# Patient Record
Sex: FEMALE | Race: BLACK OR AFRICAN AMERICAN | NOT HISPANIC OR LATINO | Employment: OTHER | URBAN - METROPOLITAN AREA
[De-identification: names, ages, dates, MRNs, and addresses within clinical notes are randomized per-mention and may not be internally consistent; named-entity substitution may affect disease eponyms.]

---

## 2017-01-09 ENCOUNTER — NURSE TRIAGE (OUTPATIENT)
Dept: ADMINISTRATIVE | Facility: CLINIC | Age: 62
End: 2017-01-09

## 2017-01-09 ENCOUNTER — OFFICE VISIT (OUTPATIENT)
Dept: INTERNAL MEDICINE | Facility: CLINIC | Age: 62
End: 2017-01-09

## 2017-01-09 VITALS
TEMPERATURE: 98 F | HEIGHT: 69 IN | DIASTOLIC BLOOD PRESSURE: 80 MMHG | HEART RATE: 65 BPM | WEIGHT: 185.63 LBS | BODY MASS INDEX: 27.49 KG/M2 | SYSTOLIC BLOOD PRESSURE: 138 MMHG | OXYGEN SATURATION: 99 %

## 2017-01-09 DIAGNOSIS — R07.89 CHEST WALL PAIN: Primary | ICD-10-CM

## 2017-01-09 PROCEDURE — 99214 OFFICE O/P EST MOD 30 MIN: CPT | Mod: S$PBB,,, | Performed by: INTERNAL MEDICINE

## 2017-01-09 PROCEDURE — 99999 PR PBB SHADOW E&M-EST. PATIENT-LVL III: CPT | Mod: PBBFAC,,, | Performed by: INTERNAL MEDICINE

## 2017-01-09 PROCEDURE — 93010 ELECTROCARDIOGRAM REPORT: CPT | Mod: ,,, | Performed by: INTERNAL MEDICINE

## 2017-01-09 PROCEDURE — 93005 ELECTROCARDIOGRAM TRACING: CPT | Mod: PBBFAC | Performed by: INTERNAL MEDICINE

## 2017-01-09 NOTE — TELEPHONE ENCOUNTER
Reason for Disposition   Intermittent chest pains persist > 3 days    Protocols used: ST CHEST PAIN-A-[place]    Patient complains of intermittent chest pain for a few days.  No dizziness or difficulty breathing noted.  Patient wanted to be seen.  Appointment scheduled for today at .

## 2017-01-09 NOTE — MR AVS SNAPSHOT
[facility]  [address]  [facility] [address]  Phone: [phone]  Fax: [phone]                  Josi Gil   2017 6:00 PM   Office Visit    Description:  Female : 1955   Provider:  Rayne Cid MD   Department:  [facility]           Reason for Visit     Chest Pain     Shortness of Breath           Diagnoses this Visit        Comments    Chest wall pain    -  Primary            To Do List           Future Appointments        Provider Department Dept Phone    2017 7:30 AM Lanie Ospina, PT [facility] [phone]      Goals (5 Years of Data)     None      [facility]     [facility] Nurse Care Line -  Assistance  Registered nurses in the [facility] provide clinical advisement, health education, appointment booking, and other advisory services.  Call for this free service at [phone].             Medications           Message regarding Medications     Verify the changes and/or additions to your medication regime listed below are the same as discussed with your clinician today.  If any of these changes or additions are incorrect, please notify your healthcare provider.             Verify that the below list of medications is an accurate representation of the medications you are currently taking.  If none reported, the list may be blank. If incorrect, please contact your healthcare provider. Carry this list with you in case of emergency.           Current Medications     amlodipine (NORVASC) 5 MG tablet Take 1 tablet (5 mg total) by mouth once daily. 1 Tablet Oral Every day    aspirin 81 mg Tab Take 81 mg by mouth. 1 Tablet Oral Every day    esomeprazole (NEXIUM) 40 MG capsule Take 1 capsule (40 mg total) by mouth before breakfast.    ibuprofen (ADVIL,MOTRIN) 800 MG tablet Take 1 tablet (800 mg total) by mouth 2 (two) times daily as needed for Pain.    ketorolac (TORADOL) 10  "mg tablet Take 1 tablet (10 mg total) by mouth 3 (three) times daily.    losartan-hydrochlorothiazide 100-25 mg (HYZAAR) 100-25 mg per tablet Take 1 tablet by mouth once daily. 1 Tablet Oral Every day    metoprolol succinate (TOPROL XL) 25 MG 24 hr tablet Take 1 tablet (25 mg total) by mouth once daily. 1 Tablet Sustained Release 24HR Oral Every day    nitroGLYCERIN (NITROSTAT) 0.4 MG SL tablet Place 1 tablet (0.4 mg total) under the tongue every 5 (five) minutes as needed for Chest pain (times three).           Clinical Reference Information           Vital Signs - Last Recorded  Most recent update: 1/9/2017  6:23 PM by Josseline Franks MA    BP Pulse Temp Ht Wt SpO2    138/80 (BP Location: Left arm, Patient Position: Sitting, BP Method: Manual) 65 98.2 °F (36.8 °C) (Oral) 5' 9" (1.753 m) 84.2 kg (185 lb 10 oz) 99%    BMI                27.41 kg/m2          Blood Pressure          Most Recent Value    BP  138/80      Allergies as of 1/9/2017     No Known Allergies      Immunizations Administered on Date of Encounter - 1/9/2017     None      "

## 2017-01-10 NOTE — PROGRESS NOTES
Subjective:       Patient ID: Josi Gil is a 61 y.o. female.    Chief Complaint: Chest Pain and Shortness of Breath    Chest Pain    This is a new problem. The current episode started in the past 7 days. The onset quality is gradual. The problem occurs intermittently. The problem has been unchanged. The pain is present in the substernal region. The pain is at a severity of 3/10. The pain is mild. The quality of the pain is described as sharp. The pain does not radiate. Associated symptoms include back pain and shortness of breath. Pertinent negatives include no abdominal pain, cough, diaphoresis, dizziness, exertional chest pressure, fever, headaches, nausea, vomiting or weakness. Associated symptoms comments: Chronic back pain  .   Shortness of Breath   Associated symptoms include chest pain. Pertinent negatives include no abdominal pain, ear pain, fever, headaches, rash, sore throat, vomiting or wheezing.     Review of Systems   Constitutional: Negative for activity change, chills, diaphoresis, fatigue and fever.   HENT: Negative for congestion, ear pain, nosebleeds, postnasal drip, sinus pressure and sore throat.    Eyes: Negative.  Negative for visual disturbance.   Respiratory: Positive for shortness of breath. Negative for cough, chest tightness and wheezing.    Cardiovascular: Positive for chest pain.   Gastrointestinal: Negative for abdominal pain, diarrhea, nausea and vomiting.   Genitourinary: Negative for difficulty urinating, dysuria, frequency and urgency.   Musculoskeletal: Positive for back pain. Negative for arthralgias and neck stiffness.   Skin: Negative for rash.   Neurological: Negative for dizziness, weakness and headaches.   Psychiatric/Behavioral: Negative for sleep disturbance. The patient is not nervous/anxious.        Objective:      Physical Exam   Constitutional: She is oriented to person, place, and time. She appears well-developed and well-nourished.  Non-toxic appearance.  No distress.   HENT:   Head: Normocephalic and atraumatic.   Right Ear: Tympanic membrane, external ear and ear canal normal.   Left Ear: Tympanic membrane, external ear and ear canal normal.   Eyes: EOM are normal. Pupils are equal, round, and reactive to light. No scleral icterus.   Neck: Normal range of motion. Neck supple. No thyromegaly present.   Cardiovascular: Normal rate, regular rhythm and normal heart sounds.    Pulmonary/Chest: Effort normal and breath sounds normal.       Abdominal: Soft. Bowel sounds are normal. She exhibits no mass. There is no tenderness. There is no rebound.   Musculoskeletal: Normal range of motion.   Lymphadenopathy:     She has no cervical adenopathy.   Neurological: She is alert and oriented to person, place, and time. She has normal reflexes. She displays normal reflexes. No cranial nerve deficit. She exhibits normal muscle tone. Coordination normal.   Skin: Skin is warm and dry.   Psychiatric: She has a normal mood and affect. Her behavior is normal.       Assessment:       1. Chest wall pain        Plan:   Josi was seen today for chest pain and shortness of breath.    Diagnoses and all orders for this visit:    Chest wall pain

## 2017-01-12 ENCOUNTER — DOCUMENTATION ONLY (OUTPATIENT)
Dept: REHABILITATION | Facility: OTHER | Age: 62
End: 2017-01-12

## 2017-01-12 ENCOUNTER — CLINICAL SUPPORT (OUTPATIENT)
Dept: REHABILITATION | Facility: OTHER | Age: 62
End: 2017-01-12
Attending: PHYSICAL MEDICINE & REHABILITATION

## 2017-01-12 DIAGNOSIS — R29.898 DECREASED STRENGTH OF TRUNK AND BACK: ICD-10-CM

## 2017-01-12 DIAGNOSIS — M54.50 CHRONIC MIDLINE LOW BACK PAIN WITHOUT SCIATICA: Primary | ICD-10-CM

## 2017-01-12 DIAGNOSIS — G89.29 CHRONIC MIDLINE LOW BACK PAIN WITHOUT SCIATICA: Primary | ICD-10-CM

## 2017-01-12 DIAGNOSIS — M53.86 DECREASED RANGE OF MOTION OF LUMBAR SPINE: ICD-10-CM

## 2017-01-12 PROCEDURE — 97110 THERAPEUTIC EXERCISES: CPT

## 2017-01-12 PROCEDURE — 97163 PT EVAL HIGH COMPLEX 45 MIN: CPT
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
## 2017-01-12 NOTE — PROGRESS NOTES
Met with patient to complete FOTO outcomes for lumbar Healthy Back program and to discuss utilizing health coaching services as part of the program.  Patient stated that she is extremely interested and would like to learn more about nutrition and complimentary approaches to her physical therapy.  Said she is very open, but that she has concerns about the focus being on her mindset or arthritis versus her 'skeletal pain'.  Discussed with patient the physiological response of the body to acute inflammation and how with chronic inflammation that once adaptive response can begin to pose challenges physically, mentally, and otherwise.  Josi seemed to understand and said she would like to learn more about inflammation, nutrition, and relaxation.  Gave patient Anti-Inflammatory Diet handout and asked her to bring it back to next session to discuss strategies she is already implementing.  She is scheduled for her first intake session for health coaching before her next PT appointment on Jan 17.
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
## 2017-01-17 ENCOUNTER — CLINICAL SUPPORT (OUTPATIENT)
Dept: REHABILITATION | Facility: OTHER | Age: 62
End: 2017-01-17
Attending: PHYSICAL MEDICINE & REHABILITATION

## 2017-01-17 DIAGNOSIS — M51.36 DDD (DEGENERATIVE DISC DISEASE), LUMBAR: Primary | ICD-10-CM

## 2017-01-17 PROCEDURE — 97110 THERAPEUTIC EXERCISES: CPT
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
## 2017-01-23 ENCOUNTER — TELEPHONE (OUTPATIENT)
Dept: REHABILITATION | Facility: OTHER | Age: 62
End: 2017-01-23

## 2017-01-23 PROCEDURE — 96372 THER/PROPH/DIAG INJ SC/IM: CPT

## 2017-01-23 PROCEDURE — 93005 ELECTROCARDIOGRAM TRACING: CPT

## 2017-01-23 PROCEDURE — 99284 EMERGENCY DEPT VISIT MOD MDM: CPT | Mod: ,,, | Performed by: EMERGENCY MEDICINE

## 2017-01-23 PROCEDURE — 99284 EMERGENCY DEPT VISIT MOD MDM: CPT | Mod: 25
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
## 2017-01-24 ENCOUNTER — HOSPITAL ENCOUNTER (EMERGENCY)
Facility: HOSPITAL | Age: 62
Discharge: HOME | End: 2017-01-24
Attending: EMERGENCY MEDICINE
Payer: COMMERCIAL

## 2017-01-24 VITALS
HEART RATE: 87 BPM | DIASTOLIC BLOOD PRESSURE: 74 MMHG | RESPIRATION RATE: 20 BRPM | BODY MASS INDEX: 27.25 KG/M2 | TEMPERATURE: 99 F | HEIGHT: 69 IN | OXYGEN SATURATION: 98 % | SYSTOLIC BLOOD PRESSURE: 141 MMHG | WEIGHT: 184 LBS
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
## 2017-01-24 DIAGNOSIS — M54.9 BACK PAIN, UNSPECIFIED BACK LOCATION, UNSPECIFIED BACK PAIN LATERALITY, UNSPECIFIED CHRONICITY: Primary | ICD-10-CM

## 2017-01-24 PROCEDURE — 96372 THER/PROPH/DIAG INJ SC/IM: CPT

## 2017-01-24 PROCEDURE — 63600175 PHARM REV CODE 636 W HCPCS: Performed by: EMERGENCY MEDICINE

## 2017-01-24 PROCEDURE — 93005 ELECTROCARDIOGRAM TRACING: CPT

## 2017-01-24 PROCEDURE — 93010 ELECTROCARDIOGRAM REPORT: CPT | Mod: ,,, | Performed by: INTERNAL MEDICINE

## 2017-01-24 RX ORDER — KETOROLAC TROMETHAMINE 30 MG/ML
15 INJECTION, SOLUTION INTRAMUSCULAR; INTRAVENOUS
Status: COMPLETED | OUTPATIENT
Start: 2017-01-24 | End: 2017-01-24

## 2017-01-24 RX ADMIN — KETOROLAC TROMETHAMINE 15 MG: 30 INJECTION, SOLUTION INTRAMUSCULAR at 02:01
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
## 2017-01-25 ENCOUNTER — CLINICAL SUPPORT (OUTPATIENT)
Dept: REHABILITATION | Facility: OTHER | Age: 62
End: 2017-01-25
Attending: PHYSICAL MEDICINE & REHABILITATION

## 2017-01-25 DIAGNOSIS — M51.36 DDD (DEGENERATIVE DISC DISEASE), LUMBAR: Primary | ICD-10-CM

## 2017-01-25 PROCEDURE — 97110 THERAPEUTIC EXERCISES: CPT | Performed by: PHYSICAL THERAPIST
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
## 2017-01-28 ENCOUNTER — HOSPITAL ENCOUNTER (EMERGENCY)
Facility: HOSPITAL | Age: 62
Discharge: HOME | End: 2017-01-28
Attending: FAMILY MEDICINE
Payer: COMMERCIAL

## 2017-01-28 ENCOUNTER — NURSE TRIAGE (OUTPATIENT)
Dept: ADMINISTRATIVE | Facility: CLINIC | Age: 62
End: 2017-01-28

## 2017-01-28 VITALS
HEART RATE: 64 BPM | BODY MASS INDEX: 26.66 KG/M2 | SYSTOLIC BLOOD PRESSURE: 145 MMHG | OXYGEN SATURATION: 100 % | HEIGHT: 69 IN | TEMPERATURE: 99 F | WEIGHT: 180 LBS | DIASTOLIC BLOOD PRESSURE: 63 MMHG | RESPIRATION RATE: 16 BRPM

## 2017-01-28 DIAGNOSIS — R07.89 MUSCULOSKELETAL CHEST PAIN: Primary | ICD-10-CM

## 2017-01-28 LAB — TROPONIN I SERPL DL<=0.01 NG/ML-MCNC: <0.006 NG/ML

## 2017-01-28 PROCEDURE — 93010 ELECTROCARDIOGRAM REPORT: CPT | Mod: ,,, | Performed by: INTERNAL MEDICINE

## 2017-01-28 PROCEDURE — 99284 EMERGENCY DEPT VISIT MOD MDM: CPT | Mod: ,,, | Performed by: PHYSICIAN ASSISTANT

## 2017-01-28 PROCEDURE — 93005 ELECTROCARDIOGRAM TRACING: CPT

## 2017-01-28 PROCEDURE — 63600175 PHARM REV CODE 636 W HCPCS: Performed by: PHYSICIAN ASSISTANT

## 2017-01-28 PROCEDURE — 96372 THER/PROPH/DIAG INJ SC/IM: CPT

## 2017-01-28 PROCEDURE — 99284 EMERGENCY DEPT VISIT MOD MDM: CPT | Mod: 25

## 2017-01-28 PROCEDURE — 84484 ASSAY OF TROPONIN QUANT: CPT

## 2017-01-28 RX ORDER — KETOROLAC TROMETHAMINE 30 MG/ML
15 INJECTION, SOLUTION INTRAMUSCULAR; INTRAVENOUS
Status: DISCONTINUED | OUTPATIENT
Start: 2017-01-28 | End: 2017-01-28

## 2017-01-28 RX ORDER — KETOROLAC TROMETHAMINE 30 MG/ML
30 INJECTION, SOLUTION INTRAMUSCULAR; INTRAVENOUS
Status: COMPLETED | OUTPATIENT
Start: 2017-01-28 | End: 2017-01-28

## 2017-01-28 RX ADMIN — KETOROLAC TROMETHAMINE 30 MG: 30 INJECTION, SOLUTION INTRAMUSCULAR at 02:01

## 2017-01-28 NOTE — PROVIDER PROGRESS NOTES - EMERGENCY DEPT.
Encounter Date: 1/28/2017    ED Physician Progress Notes        Physician Note:   EKG with nonspecific T-wave changes.  Unchanged from prior EKGs    EKG - STEMI Decision  Initial Reading: No STEMI present.
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
## 2017-01-28 NOTE — ED NOTES
LOC: Pt awake, alert, aware of environment, appropriate affect, oriented x3, speaking appropriately  APPEARANCE: Pt resting comfortably, no acute distress, clean, well groomed. Pt's clothing is properly fastened.  SKIN: The skin is warm and dry, intact, patient has normal skin turgor and moist mucus membranes  RESPIRATORY: Airway is open and patent, respirations spontaneous, even and unlabored, normal effort and rate  CARDIAC:  no peripheral edema noted, capillary refill < 3 seconds. Bilateral radial pulses +2 slight swelling to R hand compared to L, denies pain, and no injury  NEUROLOGIC: PERRL, facial expression is symmetrical, pt moving all extremities spontaneously, normal sensation in all extremities when touched with finger. Follows all commands appropriately.  MUSCULOSKELETAL: No obvious deformities.

## 2017-01-28 NOTE — ED AVS SNAPSHOT
[facility]  [address]  [facility] [address]               Josi Gil   2017  1:37 PM   ED    Description:  Female : 1955   Department:  [facility]           Your Care was Coordinated By:     Provider Role From To    Wily Ayala MD Attending Provider 17 [id number] --    XENIA Kline Physician Assistant 17 [phone] --      Reason for Visit     Hand Problem           Diagnoses this Visit        Comments    Musculoskeletal chest pain    -  Primary       ED Disposition     None           To Do List           Follow-up Information     Schedule an appointment as soon as possible for a visit with Jonny Willis MD.    Specialty:  Internal Medicine    Contact information:    [address]  [facility] [address]  [phone]        [facility]     [facility] -  Assistance  Registered nurses in the [facility] provide clinical advisement, health education, appointment booking, and other advisory services.  Call for this free service at [phone].             Medications           Message regarding Medications     Verify the changes and/or additions to your medication regime listed below are the same as discussed with your clinician today.  If any of these changes or additions are incorrect, please notify your healthcare provider.        These medications were administered today        Dose Freq    ketorolac injection 30 mg 30 mg ED 1 Time    Sig: Inject 30 mg into the muscle ED 1 Time.    Class: Normal    Route: Intramuscular           Verify that the below list of medications is an accurate representation of the medications you are currently taking.  If none reported, the list may be blank. If incorrect, please contact your healthcare provider. Carry this list with you in case of emergency.           Current Medications     amlodipine (NORVASC) 5 MG tablet Take 1 tablet (5  "mg total) by mouth once daily. 1 Tablet Oral Every day    aspirin 81 mg Tab Take 81 mg by mouth. 1 Tablet Oral Every day    ibuprofen (ADVIL,MOTRIN) 800 MG tablet Take 1 tablet (800 mg total) by mouth 2 (two) times daily as needed for Pain.    losartan-hydrochlorothiazide 100-25 mg (HYZAAR) 100-25 mg per tablet Take 1 tablet by mouth once daily. 1 Tablet Oral Every day    metoprolol succinate (TOPROL XL) 25 MG 24 hr tablet Take 1 tablet (25 mg total) by mouth once daily. 1 Tablet Sustained Release 24HR Oral Every day    esomeprazole (NEXIUM) 40 MG capsule Take 1 capsule (40 mg total) by mouth before breakfast.    ketorolac (TORADOL) 10 mg tablet Take 1 tablet (10 mg total) by mouth 3 (three) times daily.    nitroGLYCERIN (NITROSTAT) 0.4 MG SL tablet Place 1 tablet (0.4 mg total) under the tongue every 5 (five) minutes as needed for Chest pain (times three).           Clinical Reference Information           Your Vitals Were     BP Pulse Temp Resp Height Weight    110/67 (BP Location: Right arm, Patient Position: Sitting) 77 98.9 °F (37.2 °C) (Oral) 16 5' 9" (1.753 m) 81.6 kg (180 lb)    SpO2 BMI             96% 26.58 kg/m2         Allergies as of 1/28/2017        Reactions    No Known Allergies       Immunizations Administered on Date of Encounter - 1/28/2017     None      ED Micro, Lab, POCT     Start Ordered       Status Ordering Provider    01/28/17 1445 01/28/17 1444  Troponin I  STAT      Final result       ED Imaging Orders     None        Discharge Instructions         Noncardiac Chest Pain    Based on your visit today, the health care provider doesnt know what is causing your chest pain. In most cases, people who come to the emergency department with chest pain dont have a problem with their heart. Instead, the pain is caused by other conditions. These may be problems with the lungs, muscles, bones, digestive tract, nerves, or mental health.  Lung problems  · Inflammation around the lungs " (pleurisy)  · Collapsed lung (pneumothorax)  · Fluid around the lungs (pleural effusion)  · Lung cancer. This is a rare cause of chest pain.  Muscle or bone problems  · Inflamed cartilage between the ribs (pleurisy)  · Fibromyalgia  · Rheumatoid arthritis  Digestive system problems  · Reflux  · Stomach ulcer  · Spasms of the esophagus  · Gall stones  · Gallbladder inflammation  Mental health conditions  · Panic or anxiety attacks  · Emotional distress  Your condition doesnt seem serious and your pain doesnt appear to be coming from your heart. But sometimes the signs of a serious problem take more time to appear. Watch for the warning signs listed below.  Home care  Follow these guidelines when caring for yourself at home:  · Rest today and avoid strenuous activity.  · Take any prescribed medicine as directed.  Follow-up care  Follow up with your health care provider, or as advised, if you dont start to feel better within 24 hours.  When to seek medical advice  Call your health care provider right away if any of these occur:  · A change in the type of pain. Call if it feels different, becomes more serious, lasts longer, or begins to spread into your shoulder, arm, neck, jaw, or back.  · Shortness of breath  · You feel more pain when you breathe  · Cough with dark-colored mucus or blood  · Weakness, dizziness, or fainting  · Fever of 100.4ºF (38ºC) or higher, or as directed by your health care provider  · Swelling, pain, or redness in one leg  © 9946-1258 [employer]. [address] [place] [address]. All rights reserved. This information is not intended as a substitute for professional medical care. Always follow your healthcare professional's instructions.          Chest Wall Pain: Costochondritis    The chest pain that you have had today is caused by costochondritis. This condition is caused by an inflammation of the cartilage joining your ribs to your breastbone. It is not caused by  heart or lung problems. The inflammation may have been brought on by a blow to the chest, lifting heavy objects, intense exercise, or an illness that made you cough and sneeze. It often occurs during times of emotional stress. It can be painful, but it is not dangerous. It usually goes away in 1 to 2 weeks. But it may happen again. Rarely, a more serious condition may cause symptoms similar to costochondritis. Thats why its important to watch for the warning signs listed below.  Home care  Follow these guidelines when caring for yourself at home:  · If you feel that emotional stress is a cause of your condition, try to figure out the sources of that stress. It may not be obvious! Learn ways to deal with the stress in your life. This can include regular exercise, muscle relaxation, meditation, or simply taking time out for yourself. For more information about this, talk with your health care provider. Or go to your local library and look at books on stress reduction.  · You may use acetaminophen or ibuprofen to control pain, unless another pain medicine was prescribed. If you have liver disease or ever had a stomach ulcer, talk with your health care provider before using these medicines.  · You can also help ease pain by using a hot, wet compress or heating pad. Use this with or without a medicated skin cream that helps relieves pain.  · Do stretching exercise as advised by your provider.  · Take any prescribed medicines as directed.  Follow-up care  Follow up with your health care provider, or as advised, if you do not start to get better in the next 2 days.  When to seek medical advice  Call your health care provider right away if any of these occur:  · A change in the type of pain. Call if it feels different, becomes more serious, lasts longer, or spreads into your shoulder, arm, neck, jaw, or back.  · Shortness of breath or pain gets worse when you breathe  · Weakness, dizziness, or fainting  · Cough with  dark-colored sputum (phlegm) or blood  · Abdominal pain  · Dark red or black stools  · Fever of 100.4ºF (38ºC) or higher, or as directed by your health care provider  © 3001-9435 [employer]. [address] [place] [address]. All rights reserved. This information is not intended as a substitute for professional medical care. Always follow your healthcare professional's instructions.          Your Scheduled Appointments     Feb 02, 2017  5:00 PM CST   Established Physical Therapy with Angeline Laureano PTA   [facility]    [address]  [place] [address]   [phone] Feb 07, 2017  4:30 PM CST   Established Physical Therapy with Ghazal Quiñonez, RENITA   [facility]    [address]  [place] [address]   [phone] Feb 09, 2017  5:00 PM CST   Established Physical Therapy with Angeline Laureano PTA   [facility]    [address]  [place] [address]   [phone] Feb 14, 2017  5:00 PM CST   Established Physical Therapy with Ghazal Quiñonez, RENITA   [facility]    [address]  [place] [address]   [phone] Feb 16, 2017  5:00 PM CST   Established Physical Therapy with Angeline Laureano PTA   [facility]    [address]  [place] [address]   [phone]               [facility] complies with applicable Federal civil rights laws and does not discriminate on the basis of race, color, national origin, age, disability, or sex.        Language Assistance Services     ATTENTION: Language assistance services are available, free of charge. Please call [phone].      ATENCIÓN: Si habla español, tiene a galvez disposición servicios gratuitos de asistencia  lingüística. [facility] [phone].     TIMOTHY Ý: N?u b?n nói Ti?ng Vi?t, có các d?ch v? h? tr? ngôn ng? mi?n phí dành cho b?n. G?i s? [phone].

## 2017-01-28 NOTE — DISCHARGE INSTRUCTIONS
Noncardiac Chest Pain    Based on your visit today, the health care provider doesnt know what is causing your chest pain. In most cases, people who come to the emergency department with chest pain dont have a problem with their heart. Instead, the pain is caused by other conditions. These may be problems with the lungs, muscles, bones, digestive tract, nerves, or mental health.  Lung problems  · Inflammation around the lungs (pleurisy)  · Collapsed lung (pneumothorax)  · Fluid around the lungs (pleural effusion)  · Lung cancer. This is a rare cause of chest pain.  Muscle or bone problems  · Inflamed cartilage between the ribs (pleurisy)  · Fibromyalgia  · Rheumatoid arthritis  Digestive system problems  · Reflux  · Stomach ulcer  · Spasms of the esophagus  · Gall stones  · Gallbladder inflammation  Mental health conditions  · Panic or anxiety attacks  · Emotional distress  Your condition doesnt seem serious and your pain doesnt appear to be coming from your heart. But sometimes the signs of a serious problem take more time to appear. Watch for the warning signs listed below.  Home care  Follow these guidelines when caring for yourself at home:  · Rest today and avoid strenuous activity.  · Take any prescribed medicine as directed.  Follow-up care  Follow up with your health care provider, or as advised, if you dont start to feel better within 24 hours.  When to seek medical advice  Call your health care provider right away if any of these occur:  · A change in the type of pain. Call if it feels different, becomes more serious, lasts longer, or begins to spread into your shoulder, arm, neck, jaw, or back.  · Shortness of breath  · You feel more pain when you breathe  · Cough with dark-colored mucus or blood  · Weakness, dizziness, or fainting  · Fever of 100.4ºF (38ºC) or higher, or as directed by your health care provider  · Swelling, pain, or redness in one leg  © 0151-6184 [employer] [address]  [place] [address]. All rights reserved. This information is not intended as a substitute for professional medical care. Always follow your healthcare professional's instructions.          Chest Wall Pain: Costochondritis    The chest pain that you have had today is caused by costochondritis. This condition is caused by an inflammation of the cartilage joining your ribs to your breastbone. It is not caused by heart or lung problems. The inflammation may have been brought on by a blow to the chest, lifting heavy objects, intense exercise, or an illness that made you cough and sneeze. It often occurs during times of emotional stress. It can be painful, but it is not dangerous. It usually goes away in 1 to 2 weeks. But it may happen again. Rarely, a more serious condition may cause symptoms similar to costochondritis. Thats why its important to watch for the warning signs listed below.  Home care  Follow these guidelines when caring for yourself at home:  · If you feel that emotional stress is a cause of your condition, try to figure out the sources of that stress. It may not be obvious! Learn ways to deal with the stress in your life. This can include regular exercise, muscle relaxation, meditation, or simply taking time out for yourself. For more information about this, talk with your health care provider. Or go to your local library and look at books on stress reduction.  · You may use acetaminophen or ibuprofen to control pain, unless another pain medicine was prescribed. If you have liver disease or ever had a stomach ulcer, talk with your health care provider before using these medicines.  · You can also help ease pain by using a hot, wet compress or heating pad. Use this with or without a medicated skin cream that helps relieves pain.  · Do stretching exercise as advised by your provider.  · Take any prescribed medicines as directed.  Follow-up care  Follow up with your health care  provider, or as advised, if you do not start to get better in the next 2 days.  When to seek medical advice  Call your health care provider right away if any of these occur:  · A change in the type of pain. Call if it feels different, becomes more serious, lasts longer, or spreads into your shoulder, arm, neck, jaw, or back.  · Shortness of breath or pain gets worse when you breathe  · Weakness, dizziness, or fainting  · Cough with dark-colored sputum (phlegm) or blood  · Abdominal pain  · Dark red or black stools  · Fever of 100.4ºF (38ºC) or higher, or as directed by your health care provider  © 0364-4236 [employer]. [address] [place] [address], [place] [address]. All rights reserved. This information is not intended as a substitute for professional medical care. Always follow your healthcare professional's instructions.

## 2017-01-28 NOTE — TELEPHONE ENCOUNTER
Reason for Disposition   Patient sounds very sick or weak to the triager    Protocols used: ST HAND AND WRIST PAIN-A-AH    The right hand is very swollen and patient has not had any trauma to the extremity and denies insect or animal bite. No fever reported. Advised patient to have another adult drive her to the ED for evaluation. Patient verbalized understanding.

## 2017-02-09 ENCOUNTER — CLINICAL SUPPORT (OUTPATIENT)
Dept: REHABILITATION | Facility: OTHER | Age: 62
End: 2017-02-09
Attending: PHYSICAL MEDICINE & REHABILITATION

## 2017-02-09 DIAGNOSIS — M51.36 DDD (DEGENERATIVE DISC DISEASE), LUMBAR: Primary | ICD-10-CM

## 2017-02-09 PROCEDURE — 97110 THERAPEUTIC EXERCISES: CPT
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
## 2017-02-13 ENCOUNTER — DOCUMENTATION ONLY (OUTPATIENT)
Dept: REHABILITATION | Facility: OTHER | Age: 62
End: 2017-02-13

## 2017-02-13 NOTE — PROGRESS NOTES
Pt called 2/10/17 and spoke with technician stating she has having a lot of LBP. PTA unable to get on the phone with patient. Technician told pt to ice in z lie and cont to do her wall pelvic tilts. PTA called pt the same day and left a message for pt to call [facility] back. Pt did not call back.

## 2017-02-14 ENCOUNTER — CLINICAL SUPPORT (OUTPATIENT)
Dept: REHABILITATION | Facility: OTHER | Age: 62
End: 2017-02-14
Attending: PHYSICAL MEDICINE & REHABILITATION

## 2017-02-14 DIAGNOSIS — M51.36 DDD (DEGENERATIVE DISC DISEASE), LUMBAR: Primary | ICD-10-CM

## 2017-02-14 PROCEDURE — 97150 GROUP THERAPEUTIC PROCEDURES: CPT

## 2017-02-14 PROCEDURE — 97110 THERAPEUTIC EXERCISES: CPT
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
## 2017-02-16 ENCOUNTER — CLINICAL SUPPORT (OUTPATIENT)
Dept: REHABILITATION | Facility: OTHER | Age: 62
End: 2017-02-16
Attending: PHYSICAL MEDICINE & REHABILITATION

## 2017-02-16 DIAGNOSIS — M51.36 DDD (DEGENERATIVE DISC DISEASE), LUMBAR: Primary | ICD-10-CM

## 2017-02-16 PROCEDURE — 97110 THERAPEUTIC EXERCISES: CPT
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
## 2017-02-21 ENCOUNTER — CLINICAL SUPPORT (OUTPATIENT)
Dept: REHABILITATION | Facility: OTHER | Age: 62
End: 2017-02-21
Attending: PHYSICAL MEDICINE & REHABILITATION

## 2017-02-21 DIAGNOSIS — M51.36 DDD (DEGENERATIVE DISC DISEASE), LUMBAR: Primary | ICD-10-CM

## 2017-02-21 PROCEDURE — 97110 THERAPEUTIC EXERCISES: CPT
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
## 2017-03-13 ENCOUNTER — TELEPHONE (OUTPATIENT)
Dept: INTERNAL MEDICINE | Facility: CLINIC | Age: 62
End: 2017-03-13

## 2017-03-13 DIAGNOSIS — M48.02 NEURAL FORAMINAL STENOSIS OF CERVICAL SPINE: ICD-10-CM

## 2017-03-13 DIAGNOSIS — M47.812 FACET ARTHROPATHY, CERVICAL: Primary | ICD-10-CM

## 2017-03-13 DIAGNOSIS — M47.816 LUMBAR FACET ARTHROPATHY: ICD-10-CM

## 2017-03-13 NOTE — TELEPHONE ENCOUNTER
----- Message from Bronson Alejo MA sent at 3/13/2017 11:12 AM CDT -----  Contact: self - [phone]  Patient stated she left a message a week ago regarding her therapy and has not heard from anyone. Requesting to speak with someone today. Please call. Thanks!

## 2017-03-13 NOTE — TELEPHONE ENCOUNTER
Called pt back. Pt states that she called about a week ago regarding therapy. i see no documentation of her calling. When i asked what the therapy was pertaining to all she said was Dr Willis knows what the therapy is for and she would like him to give her a call back after 5 to discuss more about it.

## 2017-03-17 ENCOUNTER — DOCUMENTATION ONLY (OUTPATIENT)
Dept: INTERNAL MEDICINE | Facility: CLINIC | Age: 62
End: 2017-03-17

## 2017-03-17 NOTE — PROGRESS NOTES
Earlier this week, I had a discussion with Ms. Gil regarding her chronic   ongoing pain that involves her neck to her shoulders and upper head,   particularly to the left shoulder, as well as pain in the lower back that   extends down to the buttocks and legs.  She is going to physical therapy.  She   has tried a number of medicines without success or having some reaction to it.    Also, due to the shoulder arthropathy of the left shoulder, she right now is put   on hold for surgery on this and physical therapy seems to make it worse.  I   just told her that the only other option to consider, which we have not done   yet, is the [facility], for which if there is any indication to do an epidural   steroid injection or facet arthropathy injection.  She is known to have severe   left-sided neural foraminal narrowing at C7-T1 and multilevel facet arthropathy.    She is known to have facet arthropathy involving the lumbar spine,   particularly at L4-L5, L5-S1 with synovial cyst at L5-S1 and evidence of neural   foraminal stenosis.        /ls [id number] review       ESSENCE/CATHY  dd: 03/17/2017 16:22:01 (CDT)  td: 03/18/2017 09:01:38 (CDT)  Doc ID   #[id number]  Job ID #[id number]    CC:

## 2017-03-22 ENCOUNTER — TELEPHONE (OUTPATIENT)
Dept: INTERNAL MEDICINE | Facility: CLINIC | Age: 62
End: 2017-03-22

## 2017-03-22 NOTE — TELEPHONE ENCOUNTER
Refer to the pain clinic per order        Patient  Schedule for Pain clinic. Appt mail out to home on file.

## 2017-03-23 ENCOUNTER — TELEPHONE (OUTPATIENT)
Dept: INTERNAL MEDICINE | Facility: CLINIC | Age: 62
End: 2017-03-23

## 2017-03-23 ENCOUNTER — OFFICE VISIT (OUTPATIENT)
Dept: INTERNAL MEDICINE | Facility: CLINIC | Age: 62
End: 2017-03-23
Payer: COMMERCIAL

## 2017-03-23 VITALS
HEIGHT: 69 IN | SYSTOLIC BLOOD PRESSURE: 113 MMHG | BODY MASS INDEX: 26.96 KG/M2 | DIASTOLIC BLOOD PRESSURE: 68 MMHG | HEART RATE: 71 BPM | WEIGHT: 182 LBS

## 2017-03-23 DIAGNOSIS — G56.21 ULNAR NEUROPATHY AT ELBOW, RIGHT: ICD-10-CM

## 2017-03-23 DIAGNOSIS — D36.10 NEUROMA: ICD-10-CM

## 2017-03-23 DIAGNOSIS — G56.22 ULNAR NEUROPATHY AT ELBOW OF LEFT UPPER EXTREMITY: Primary | ICD-10-CM

## 2017-03-23 DIAGNOSIS — M47.22 OSTEOARTHRITIS OF SPINE WITH RADICULOPATHY, CERVICAL REGION: ICD-10-CM

## 2017-03-23 DIAGNOSIS — M77.42 METATARSALGIA OF BOTH FEET: ICD-10-CM

## 2017-03-23 DIAGNOSIS — M47.26 OSTEOARTHRITIS OF SPINE WITH RADICULOPATHY, LUMBAR REGION: ICD-10-CM

## 2017-03-23 DIAGNOSIS — M77.41 METATARSALGIA OF BOTH FEET: ICD-10-CM

## 2017-03-23 PROCEDURE — 99999 PR PBB SHADOW E&M-EST. PATIENT-LVL III: CPT | Mod: PBBFAC,,, | Performed by: INTERNAL MEDICINE

## 2017-03-23 PROCEDURE — 3074F SYST BP LT 130 MM HG: CPT | Mod: S$GLB,,, | Performed by: INTERNAL MEDICINE

## 2017-03-23 PROCEDURE — 1160F RVW MEDS BY RX/DR IN RCRD: CPT | Mod: S$GLB,,, | Performed by: INTERNAL MEDICINE

## 2017-03-23 PROCEDURE — 3078F DIAST BP <80 MM HG: CPT | Mod: S$GLB,,, | Performed by: INTERNAL MEDICINE

## 2017-03-23 PROCEDURE — 96372 THER/PROPH/DIAG INJ SC/IM: CPT | Mod: S$GLB,,, | Performed by: INTERNAL MEDICINE

## 2017-03-23 PROCEDURE — 99214 OFFICE O/P EST MOD 30 MIN: CPT | Mod: 25,S$GLB,, | Performed by: INTERNAL MEDICINE

## 2017-03-23 RX ORDER — KETOROLAC TROMETHAMINE 10 MG/1
10 TABLET, FILM COATED ORAL 3 TIMES DAILY
Qty: 30 TABLET | Refills: 0 | Status: SHIPPED | OUTPATIENT
Start: 2017-03-23 | End: 2017-06-30

## 2017-03-23 RX ORDER — LIDOCAINE AND PRILOCAINE 25; 25 MG/G; MG/G
CREAM TOPICAL
Qty: 30 G | Refills: 0 | Status: SHIPPED | OUTPATIENT
Start: 2017-03-23 | End: 2017-06-30

## 2017-03-23 RX ORDER — KETOROLAC TROMETHAMINE 30 MG/ML
60 INJECTION, SOLUTION INTRAMUSCULAR; INTRAVENOUS
Status: COMPLETED | OUTPATIENT
Start: 2017-03-23 | End: 2017-03-23

## 2017-03-23 RX ADMIN — KETOROLAC TROMETHAMINE 60 MG: 30 INJECTION, SOLUTION INTRAMUSCULAR; INTRAVENOUS at 04:03

## 2017-03-23 NOTE — PROGRESS NOTES
REASON FOR VISIT:  This is a 62-year-old female who is here to address a number   of painful symptoms.    She has been complaining of tingling pain involving the fourth and fifth fingers   and she feels the pain in the forearm on the ulnar aspect, but not in the hand.    She actually states it feels like her funny bone is being hit, but she feels   the pain extending up to the arm on the right side and even to the neck.  She   does have chronic left-sided neck pain that extends to the left scapula and   shoulder and also pain lifting up the shoulder, but that is due to significant   rotator cuff arthropathy.  She still has chronic low back pain and mid back   pain.  The pain in the back extends down to the buttocks and to the lateral   posterior aspect of the thigh.  Sometimes she feels that her legs are weak.  She   was going to Physical Therapy, but she felt it made it worse.  She always has a   reaction to medication or deferred on it, and thus the situation has been   difficult to treat.  However, in the recent conversation with me, she has agreed   to meet with the Pain Clinic for hopefully therapeutic options.  There is an   appointment set up on April 7.    She has also been noticing a burning type pain on the plantar surface of the   feet, particularly with walking, and episodic sharp pain between the first and   second toes of the right foot.    PAST MEDICAL HISTORY:  Hypertension.  Cervical spondylosis.  Lumbar degenerative disc disease.  Rotator cuff arthropathy of the left shoulder.    PRESENT MEDICATIONS:  Metoprolol XL 25 mg.  Losartan /25.  Nexium 40 mg.  Amlodipine 5 mg.    PHYSICAL EXAMINATION:  VITAL SIGNS:  She has a weight of 182, pulse 72, blood pressure 130/68.  EXTREMITIES:  She does have 2+ biceps, triceps reflexes, 1 to 2+ knee, and   absent ankle jerk reflexes.  She can feel the pain and similarities when I tap   within the ulnar groove of each elbow and a slightly positive Tinel's  sign.  She   is not having tenderness right now when I palpate over the plantar aspect of   both feet.  There is a sharp area noted when I palpate between the big toe and   the second toe of the right foot.    ADDENDUM:      IMPRESSION:  1.  Ulnar neuropathy.  2.  Cervical degenerative disc disease with cervical radiculopathy.  3.  Lumbar spondylosis with radicular pain.  4.  Metatarsalgia.    PLAN:  Recommend possibly meeting with Podiatry for her feet to determine if   there could be evidence of neuroma.  In the meantime, proper arch supports have   been recommended.  Regarding the ulnar neuropathy, we can offer the option of an   EMG study, but I feel that she is going to defer on this.  We will recommend a   trial of sulindac 200 mg twice a day since this is hopefully not harmful and   will not cause GI issues compared to other anti-inflammatories.  She is taking   over-the-counter Aleve and even 800 mg ibuprofen without much relief.  Sometimes   she might take over-the-counter ibuprofen five tablets at a time, which will be   1000 mg.    /sc [id number] review      ESSENCE/CATHY  dd: 03/23/2017 16:24:24 (CDT)  td: 03/24/2017 07:02:48 (CDT)  Doc ID   #[id number]  Job ID #[id number]    CC:

## 2017-03-23 NOTE — TELEPHONE ENCOUNTER
----- Message from Shraddha Park sent at 3/23/2017  1:13 PM CDT -----  Contact: Patient  The patient is scheduled to see Dr. Willis at 3:30 and she would like a call back before her appointment today. She says she has a question to ask you about insurance but refused to elaborate.  You can reach her at [phone].    Thanks!

## 2017-03-23 NOTE — MR AVS SNAPSHOT
[facility] [address]  [address]  [address]  [facility] [address]  Phone: [phone]                  Josi Gil   3/23/2017 3:30 PM   Office Visit    Description:  Female : 1955   Provider:  Jonny Willis MD   Department:  [facility] [address]           Reason for Visit     Generalized Body Aches     Tingling           Diagnoses this Visit        Comments    Ulnar neuropathy at elbow of left upper extremity    -  Primary     Ulnar neuropathy at elbow, right         Metatarsalgia of both feet         Neuroma         Osteoarthritis of spine with radiculopathy, lumbar region         Osteoarthritis of spine with radiculopathy, cervical region                To Do List           Future Appointments        Provider Department Dept Phone    3/28/2017 4:30 PM Ghazal Quiñonez PT [facility] [phone]    2017 1:20 PM Camacho Guillen III, MD [facility] [phone]      Goals (5 Years of Data)     None       These Medications        Disp Refills Start End    lidocaine-prilocaine (EMLA) cream 30 g 0 3/23/2017     Apply topically as needed. - Topical (Top)    Pharmacy: [facility] [address] - [place], [place] - [address] Ph #: [phone]       ketorolac (TORADOL) 10 mg tablet 30 tablet 0 3/23/2017     Take 1 tablet (10 mg total) by mouth 3 (three) times daily. - Oral    Pharmacy: [facility] - [facility] [address] Ph #: [phone]         [facility]     [facility] Care [facility] -  Assistance  Registered nurses in the [facility] provide clinical advisement, health education, appointment booking, and other advisory services.  Call for this free service at [phone].             Medications           Message regarding Medications     Verify the changes and/or additions to your medication regime listed  below are the same as discussed with your clinician today.  If any of these changes or additions are incorrect, please notify your healthcare provider.        START taking these NEW medications        Refills    lidocaine-prilocaine (EMLA) cream 0    Sig: Apply topically as needed.    Class: Normal    Route: Topical (Top)      These medications were administered today        Dose Freq    ketorolac injection 60 mg 60 mg [facility] 1 time    Sig: Inject 2 mLs (60 mg total) into the muscle one time.    Class: Normal    Route: Intramuscular      STOP taking these medications     ibuprofen (ADVIL,MOTRIN) 800 MG tablet Take 1 tablet (800 mg total) by mouth 2 (two) times daily as needed for Pain.    cefTRIAXone injection 1 g            Verify that the below list of medications is an accurate representation of the medications you are currently taking.  If none reported, the list may be blank. If incorrect, please contact your healthcare provider. Carry this list with you in case of emergency.           Current Medications     amlodipine (NORVASC) 5 MG tablet Take 1 tablet (5 mg total) by mouth once daily. 1 Tablet Oral Every day    aspirin 81 mg Tab Take 81 mg by mouth. 1 Tablet Oral Every day    esomeprazole (NEXIUM) 40 MG capsule Take 1 capsule (40 mg total) by mouth before breakfast.    ketorolac (TORADOL) 10 mg tablet Take 1 tablet (10 mg total) by mouth 3 (three) times daily.    lidocaine-prilocaine (EMLA) cream Apply topically as needed.    losartan-hydrochlorothiazide 100-25 mg (HYZAAR) 100-25 mg per tablet Take 1 tablet by mouth once daily. 1 Tablet Oral Every day    metoprolol succinate (TOPROL XL) 25 MG 24 hr tablet Take 1 tablet (25 mg total) by mouth once daily. 1 Tablet Sustained Release 24HR Oral Every day    nitroGLYCERIN (NITROSTAT) 0.4 MG SL tablet Place 1 tablet (0.4 mg total) under the tongue every 5 (five) minutes as needed for Chest pain (times three).           Clinical Reference Information          "  Your Vitals Were     BP Pulse Height Weight BMI    113/68 71 5' 9" (1.753 m) 82.6 kg (182 lb) 26.88 kg/m2      Blood Pressure          Most Recent Value    BP  113/68      Allergies as of 3/23/2017     No Known Allergies      Immunizations Administered on Date of Encounter - 3/23/2017     None      Administrations This Visit     ketorolac injection 60 mg     Admin Date Action Dose Route Administered By             03/23/2017 Given 60 mg Intramuscular Hetal Delgado LPN                      Language Assistance Services     ATTENTION: Language assistance services are available, free of charge. Please call [phone].      ATENCIÓN: Si habla español, tiene a galvez disposición servicios gratuitos de asistencia lingüística. Llame al [phone].     CHÚ Ý: N?u b?n nói Ti?ng Vi?t, có các d?ch v? h? tr? ngôn ng? mi?n phí dành cho b?n. G?i s? [phone].         [facility] [address] complies with applicable Federal civil rights laws and does not discriminate on the basis of race, color, national origin, age, disability, or sex.        "

## 2017-03-24 ENCOUNTER — TELEPHONE (OUTPATIENT)
Dept: INTERNAL MEDICINE | Facility: CLINIC | Age: 62
End: 2017-03-24

## 2017-03-24 NOTE — TELEPHONE ENCOUNTER
Nicci Pt states the pens and needles feeling  in left hand started at 8:15 this morning and lasted for a while , i also informed pt the thr Toradol injection was 60mg and the pill form is 10mg.

## 2017-04-03 ENCOUNTER — CLINICAL SUPPORT (OUTPATIENT)
Dept: REHABILITATION | Facility: OTHER | Age: 62
End: 2017-04-03
Attending: PHYSICAL MEDICINE & REHABILITATION
Payer: COMMERCIAL

## 2017-04-03 DIAGNOSIS — M51.36 DDD (DEGENERATIVE DISC DISEASE), LUMBAR: Primary | ICD-10-CM

## 2017-04-03 PROCEDURE — 97110 THERAPEUTIC EXERCISES: CPT
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
## 2017-04-06 ENCOUNTER — TELEPHONE (OUTPATIENT)
Dept: PAIN MEDICINE | Facility: CLINIC | Age: 62
End: 2017-04-06

## 2017-04-07 ENCOUNTER — TELEPHONE (OUTPATIENT)
Dept: PAIN MEDICINE | Facility: CLINIC | Age: 62
End: 2017-04-07

## 2017-04-07 NOTE — TELEPHONE ENCOUNTER
----- Message from Cathie Peoples sent at 4/6/2017  4:33 PM CDT -----  Contact: Self [phone]  Patient Returning Your Phone Call

## 2017-04-25 ENCOUNTER — TELEPHONE (OUTPATIENT)
Dept: INTERNAL MEDICINE | Facility: CLINIC | Age: 62
End: 2017-04-25

## 2017-04-25 RX ORDER — LOSARTAN POTASSIUM AND HYDROCHLOROTHIAZIDE 25; 100 MG/1; MG/1
1 TABLET ORAL DAILY
Qty: 90 TABLET | Refills: 3 | Status: SHIPPED | OUTPATIENT
Start: 2017-04-25 | End: 2017-04-27 | Stop reason: SDUPTHER

## 2017-04-25 RX ORDER — METOPROLOL SUCCINATE 25 MG/1
25 TABLET, EXTENDED RELEASE ORAL DAILY
Qty: 90 TABLET | Refills: 3 | Status: SHIPPED | OUTPATIENT
Start: 2017-04-25 | End: 2017-04-25 | Stop reason: SDUPTHER

## 2017-04-25 RX ORDER — METOPROLOL SUCCINATE 25 MG/1
25 TABLET, EXTENDED RELEASE ORAL DAILY
Qty: 90 TABLET | Refills: 3 | Status: SHIPPED | OUTPATIENT
Start: 2017-04-25 | End: 2017-04-27 | Stop reason: SDUPTHER

## 2017-04-25 RX ORDER — AMLODIPINE BESYLATE 5 MG/1
5 TABLET ORAL DAILY
Qty: 90 TABLET | Refills: 3 | Status: SHIPPED | OUTPATIENT
Start: 2017-04-25 | End: 2017-04-25 | Stop reason: SDUPTHER

## 2017-04-25 RX ORDER — LOSARTAN POTASSIUM AND HYDROCHLOROTHIAZIDE 25; 100 MG/1; MG/1
1 TABLET ORAL DAILY
Qty: 90 TABLET | Refills: 3 | Status: SHIPPED | OUTPATIENT
Start: 2017-04-25 | End: 2017-04-25 | Stop reason: SDUPTHER

## 2017-04-25 RX ORDER — AMLODIPINE BESYLATE 5 MG/1
5 TABLET ORAL DAILY
Qty: 90 TABLET | Refills: 3 | Status: SHIPPED | OUTPATIENT
Start: 2017-04-25 | End: 2017-04-27 | Stop reason: SDUPTHER

## 2017-04-25 NOTE — TELEPHONE ENCOUNTER
Called pt daughter back since she was the original person who sent the message and she told me to call her mothers number as to this is concerning her mother not her. i call the mothers number and no one answers. i just wanted to let her know that the medications were sent to the pharmacy and if she had any questions to give us a call back. No answer left message on VM to call office back.

## 2017-04-25 NOTE — TELEPHONE ENCOUNTER
----- Message from Bronson Alejo MA sent at 4/25/2017  4:27 PM CDT -----  Contact: self- [phone]   Type: Returning a call    Who left a message? Hetal     When did the practice call? 4/25/17     Comments: stated pharmacy is [id number] - [place] - [address] Please call. Thanks!

## 2017-04-25 NOTE — TELEPHONE ENCOUNTER
Hi Dr. Willis,     A new authorization prescription for my mom, Faina Gil,was sent recently for her high blood pressure medicine. The pharmacy said you denied it due to her not being a patient of yours. Can you please authorize a 90 supply? She is out of medication. Thank you!     ^^^came from pts daughter

## 2017-04-25 NOTE — TELEPHONE ENCOUNTER
Spoke with pt and she is now requesting that all the medication she originally requested be sent to the [facility] on [address] and a [facility] and she is going to get on the phone with [facility] and see what  each uses because she has had a bad experience with one  so she wants to check with her insurance and she said she will call back with the info i told her we leave at 5 so if i do not get back to her today i will call her first thing in the morning. She said she would get on the phone with them now and call back

## 2017-04-27 RX ORDER — METOPROLOL SUCCINATE 25 MG/1
25 TABLET, EXTENDED RELEASE ORAL DAILY
Qty: 90 TABLET | Refills: 3 | Status: SHIPPED | OUTPATIENT
Start: 2017-04-27 | End: 2018-06-25

## 2017-04-27 RX ORDER — AMLODIPINE BESYLATE 5 MG/1
5 TABLET ORAL DAILY
Qty: 90 TABLET | Refills: 3 | Status: SHIPPED | OUTPATIENT
Start: 2017-04-27 | End: 2018-02-26

## 2017-04-27 RX ORDER — LOSARTAN POTASSIUM AND HYDROCHLOROTHIAZIDE 25; 100 MG/1; MG/1
1 TABLET ORAL DAILY
Qty: 90 TABLET | Refills: 3 | Status: SHIPPED | OUTPATIENT
Start: 2017-04-27 | End: 2018-04-27

## 2017-04-27 NOTE — TELEPHONE ENCOUNTER
----- Message from Georgie Bolton sent at 4/27/2017 11:29 AM CDT -----  Contact: patient- [phone] patient is in area and would like to call  RX request - refill or new RX.  Is this a refill or new RX:  refill  RX name and strength: amlodipine (NORVASC) 5 MG tablet  Directions:   Is this a 30 day or 90 day RX: 90    Phone [facility] [address] [facility] [phone]      RX request - refill or new RX.  Is this a refill or new RX:    RX name and strength: metoprolol succinate (TOPROL XL) 25 MG 24 hr tablet  Directions:   Is this a 30 day or 90 day RX:           RX request - refill or new RX.  Is this a refill or new RX:  refill  RX name and strength: losartan-hydrochlorothiazide 100-25 mg (HYZAAR)  Directions:   Is this a 30 day or 90 day RX:  90      Patient is out of medication for days and would need refills sent in

## 2017-05-05 ENCOUNTER — TELEPHONE (OUTPATIENT)
Dept: PAIN MEDICINE | Facility: CLINIC | Age: 62
End: 2017-05-05

## 2017-05-05 NOTE — TELEPHONE ENCOUNTER
Spoke to patient. SHe inquired about the type of pain management DG prescribed. I told her that we are more interventional management and prefer not to prescribe narcotics. Pt verbally stated that she did not want narcotics. Scheduled appt.   ----- Message from Elena Jung sent at 5/5/2017  1:15 PM CDT -----  Contact: pt  _  1st Request  _  2nd Request  _  3rd Request        Who: pt    Why: pt would like the nurse to call her regarding her treatment. Please call the pt     What Number to Call Back [phone]    When to Expect a call back: (Before the end of the day)   -- if the call is after 12:00, the call back will be tomorrow.

## 2017-05-17 ENCOUNTER — NURSE TRIAGE (OUTPATIENT)
Dept: ADMINISTRATIVE | Facility: CLINIC | Age: 62
End: 2017-05-17

## 2017-05-17 ENCOUNTER — TELEPHONE (OUTPATIENT)
Dept: INTERNAL MEDICINE | Facility: CLINIC | Age: 62
End: 2017-05-17

## 2017-05-17 NOTE — TELEPHONE ENCOUNTER
Reason for Disposition   Intermittent chest pains persist > 3 days    Protocols used: ST CHEST PAIN-A-[facility]  pt reports chest pain on and off for 2 weeks. Currently she is having substernal chest pain and  is rating pain 3/10. She denies difficulty breathing. Protocol suggest that she needs to be seen today but nothing available with PCP- pt is refusing to see or talk to anyone but Dr. Gonzalez.    Please contact patient with further instructions, questions, or concerns.

## 2017-05-17 NOTE — TELEPHONE ENCOUNTER
----- Message from Shraddha Park sent at 5/17/2017  4:28 PM CDT -----  Contact: Patient  The patient would like to see Dr. Willis this evening for chest pain. She has spoken with a triage nurse.  Please call her at [phone].    Thanks!

## 2017-05-17 NOTE — TELEPHONE ENCOUNTER
Called pt back no answer was not able to leave message. Phone just cut off. i have informed Dr Willis and he said he will call her after clinic

## 2017-05-27 ENCOUNTER — HOSPITAL ENCOUNTER (EMERGENCY)
Facility: HOSPITAL | Age: 62
Discharge: HOME | End: 2017-05-27
Attending: FAMILY MEDICINE
Payer: COMMERCIAL

## 2017-05-27 VITALS
OXYGEN SATURATION: 100 % | HEIGHT: 69 IN | RESPIRATION RATE: 18 BRPM | HEART RATE: 60 BPM | TEMPERATURE: 99 F | DIASTOLIC BLOOD PRESSURE: 82 MMHG | WEIGHT: 178.81 LBS | SYSTOLIC BLOOD PRESSURE: 159 MMHG | BODY MASS INDEX: 26.48 KG/M2

## 2017-05-27 DIAGNOSIS — R07.9 CHEST PAIN: ICD-10-CM

## 2017-05-27 LAB
ALBUMIN SERPL BCP-MCNC: 3.7 G/DL
ALP SERPL-CCNC: 72 U/L
ALT SERPL W/O P-5'-P-CCNC: 15 U/L
ANION GAP SERPL CALC-SCNC: 10 MMOL/L
AST SERPL-CCNC: 18 U/L
BASOPHILS # BLD AUTO: 0.05 K/UL
BASOPHILS NFR BLD: 1 %
BILIRUB SERPL-MCNC: 0.6 MG/DL
BILIRUB UR QL STRIP: NEGATIVE
BNP SERPL-MCNC: 13 PG/ML
BUN SERPL-MCNC: 13 MG/DL
CALCIUM SERPL-MCNC: 10 MG/DL
CHLORIDE SERPL-SCNC: 102 MMOL/L
CLARITY UR REFRACT.AUTO: CLEAR
CO2 SERPL-SCNC: 28 MMOL/L
COLOR UR AUTO: NORMAL
CREAT SERPL-MCNC: 1 MG/DL
DIFFERENTIAL METHOD: NORMAL
EOSINOPHIL # BLD AUTO: 0.3 K/UL
EOSINOPHIL NFR BLD: 5.4 %
ERYTHROCYTE [DISTWIDTH] IN BLOOD BY AUTOMATED COUNT: 13.3 %
EST. GFR  (AFRICAN AMERICAN): >60 ML/MIN/1.73 M^2
EST. GFR  (NON AFRICAN AMERICAN): >60 ML/MIN/1.73 M^2
GLUCOSE SERPL-MCNC: 87 MG/DL
GLUCOSE UR QL STRIP: NEGATIVE
HCT VFR BLD AUTO: 39.3 %
HGB BLD-MCNC: 13.6 G/DL
HGB UR QL STRIP: NEGATIVE
KETONES UR QL STRIP: NEGATIVE
LEUKOCYTE ESTERASE UR QL STRIP: NEGATIVE
LYMPHOCYTES # BLD AUTO: 2 K/UL
LYMPHOCYTES NFR BLD: 39 %
MCH RBC QN AUTO: 30.8 PG
MCHC RBC AUTO-ENTMCNC: 34.6 %
MCV RBC AUTO: 89 FL
MONOCYTES # BLD AUTO: 0.4 K/UL
MONOCYTES NFR BLD: 7.8 %
NEUTROPHILS # BLD AUTO: 2.4 K/UL
NEUTROPHILS NFR BLD: 46.6 %
NITRITE UR QL STRIP: NEGATIVE
PH UR STRIP: 7 [PH] (ref 5–8)
PLATELET # BLD AUTO: 228 K/UL
PMV BLD AUTO: 10.3 FL
POTASSIUM SERPL-SCNC: 3.9 MMOL/L
PROT SERPL-MCNC: 7.3 G/DL
PROT UR QL STRIP: NEGATIVE
RBC # BLD AUTO: 4.42 M/UL
SODIUM SERPL-SCNC: 140 MMOL/L
SP GR UR STRIP: 1 (ref 1–1.03)
TROPONIN I SERPL DL<=0.01 NG/ML-MCNC: <0.006 NG/ML
TROPONIN I SERPL DL<=0.01 NG/ML-MCNC: <0.006 NG/ML
URN SPEC COLLECT METH UR: NORMAL
UROBILINOGEN UR STRIP-ACNC: NEGATIVE EU/DL
WBC # BLD AUTO: 5.16 K/UL

## 2017-05-27 PROCEDURE — 85025 COMPLETE CBC W/AUTO DIFF WBC: CPT

## 2017-05-27 PROCEDURE — 99284 EMERGENCY DEPT VISIT MOD MDM: CPT | Mod: ,,, | Performed by: EMERGENCY MEDICINE

## 2017-05-27 PROCEDURE — 93005 ELECTROCARDIOGRAM TRACING: CPT

## 2017-05-27 PROCEDURE — 81003 URINALYSIS AUTO W/O SCOPE: CPT

## 2017-05-27 PROCEDURE — 83880 ASSAY OF NATRIURETIC PEPTIDE: CPT

## 2017-05-27 PROCEDURE — 25000003 PHARM REV CODE 250: Performed by: EMERGENCY MEDICINE

## 2017-05-27 PROCEDURE — 99284 EMERGENCY DEPT VISIT MOD MDM: CPT | Mod: 25

## 2017-05-27 PROCEDURE — 84484 ASSAY OF TROPONIN QUANT: CPT

## 2017-05-27 PROCEDURE — 93010 ELECTROCARDIOGRAM REPORT: CPT | Mod: ,,, | Performed by: INTERNAL MEDICINE

## 2017-05-27 PROCEDURE — 80053 COMPREHEN METABOLIC PANEL: CPT

## 2017-05-27 RX ORDER — ASPIRIN 325 MG
325 TABLET ORAL DAILY
COMMUNITY

## 2017-05-27 RX ORDER — ASPIRIN 325 MG
325 TABLET ORAL
Status: COMPLETED | OUTPATIENT
Start: 2017-05-27 | End: 2017-05-27

## 2017-05-27 RX ORDER — NITROGLYCERIN 0.4 MG/1
0.4 TABLET SUBLINGUAL EVERY 5 MIN PRN
Status: DISCONTINUED | OUTPATIENT
Start: 2017-05-27 | End: 2017-05-27 | Stop reason: HOSPADM

## 2017-05-27 RX ADMIN — ASPIRIN 325 MG ORAL TABLET 325 MG: 325 PILL ORAL at 02:05
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
## 2017-05-27 NOTE — ED NOTES
Patient identifiers verified and correct for Josi Gil.    LOC: The patient is awake, alert and aware of environment with an appropriate affect, the patient is oriented x 3 and speaking appropriately.  APPEARANCE: Patient resting comfortably and in no acute distress, patient is clean and well groomed, patient's clothing is properly fastened.  SKIN: The skin is warm and dry, color consistent with ethnicity, patient has normal skin turgor and moist mucus membranes, skin intact, no breakdown or bruising noted.  MUSCULOSKELETAL: Patient moving all extremities spontaneously, no obvious swelling or deformities noted.  RESPIRATORY: Airway is open and patent, respirations are spontaneous, patient has a normal effort and rate, no accessory muscle use noted,   CARDIAC: Patient has a normal rate, no periphreal edema noted, capillary refill < 3 seconds.  ABDOMEN: Soft and non tender to palpation, no distention noted  NEUROLOGIC: PERRL, 3mm bilaterally, eyes open spontaneously, behavior appropriate to situation, follows commands, facial expression symmetrical, bilateral hand grasp equal and even, purposeful motor response noted, normal sensation in all extremities when touched with a finger.

## 2017-05-27 NOTE — ED PROVIDER NOTES
Encounter Date: 2017       History     Chief Complaint   Patient presents with    Chest Pain     x 1.5 weeks.      Review of patient's allergies indicates:   Allergen Reactions    No known allergies      Pt is a 63 y/o F with PMHx as per below who presents with concern for episodic CP that has been occurring for a little over a week.  Pt describes the pain as mid-sternal pressure that radiates to both shoulders.  Pt denies any Hx of ACS or MI with only PMHx of HTN.  Pt's mother suffered a MI in her 70's with no other concerning PMHx or FamHx.  Pt denies any clear inciting or alleviating factors and denies any SOB, JONES, diaphoresis, N/V or any other inciting factors.  Pt denies any exacerbation of her pain with exertion.  Episodes last minutes to hours and then spontaneously resolve.            Past Medical History:   Diagnosis Date    Acute costochondritis 2012    Benign essential hypertension     Gastroesophageal reflux disease without esophagitis     Pain in joint involving multiple sites 2013     Past Surgical History:   Procedure Laterality Date     SECTION      KNEE ARTHROSCOPY W/ ACL RECONSTRUCTION       Family History   Problem Relation Age of Onset    Hypertension Mother     Heart disease Maternal Grandmother      Social History   Substance Use Topics    Smoking status: Never Smoker    Smokeless tobacco: Never Used    Alcohol use No     Review of Systems   Constitutional: Negative for chills and fever.   HENT: Negative for congestion, postnasal drip, rhinorrhea, sinus pressure, sneezing and sore throat.    Eyes: Negative for discharge and redness.   Respiratory: Negative for apnea, cough, choking, chest tightness, shortness of breath, wheezing and stridor.    Cardiovascular: Positive for chest pain. Negative for palpitations and leg swelling.   Gastrointestinal: Negative for abdominal pain, blood in stool, constipation, diarrhea, nausea and vomiting.   Endocrine: Negative  for polydipsia, polyphagia and polyuria.   Genitourinary: Negative for dysuria, hematuria, pelvic pain, vaginal bleeding, vaginal discharge and vaginal pain.   Musculoskeletal: Negative for arthralgias and myalgias.   Skin: Negative for rash and wound.   Allergic/Immunologic: Negative for immunocompromised state.   Neurological: Negative for weakness, light-headedness and headaches.   Hematological: Does not bruise/bleed easily.   Psychiatric/Behavioral: Negative for agitation and confusion. The patient is not nervous/anxious.        Physical Exam     Initial Vitals [05/27/17 1200]   BP Pulse Resp Temp SpO2   121/63 63 16 98.4 °F (36.9 °C) 100 %     Physical Exam    Constitutional: She appears well-developed and well-nourished. She is not diaphoretic. No distress.   HENT:   Head: Normocephalic and atraumatic.   Eyes: Conjunctivae are normal. Right eye exhibits no discharge. No scleral icterus.   Neck: No tracheal deviation present. No JVD present.   Cardiovascular: Normal rate, regular rhythm, normal heart sounds and intact distal pulses. Exam reveals no gallop and no friction rub.    No murmur heard.  Pulmonary/Chest: Breath sounds normal. No stridor. No respiratory distress. She has no wheezes. She has no rhonchi. She has no rales.   Abdominal: Soft. She exhibits no distension. There is no tenderness. There is no rebound and no guarding.   Musculoskeletal: Normal range of motion. She exhibits no edema or tenderness.   Neurological: She is alert and oriented to person, place, and time. She has normal strength.   Skin: Skin is warm and dry. Capillary refill takes less than 2 seconds. No rash noted. No erythema.   Psychiatric: She has a normal mood and affect. Her behavior is normal. Judgment and thought content normal.         ED Course   Procedures  Labs Reviewed   CBC W/ AUTO DIFFERENTIAL   COMPREHENSIVE METABOLIC PANEL   TROPONIN I   B-TYPE NATRIURETIC PEPTIDE   URINALYSIS, REFLEX TO URINE CULTURE   TROPONIN I                    APC / Resident Notes:   Pt is A/O x 4, afebrile, non-toxic in appearance, in no acute respiratory distress with VSS.  Pt's CP mildly concerning for ACS but with prolonged duration of symptoms yet her EKG is NSR with no ischemic changes, initial troponin is negative, labs have all returned unremarkable and CXR shows no acute cardiopulmonary pathology.  HEART score of 2 as story is mildly concerning for 1 point and age is 1 point as well.  EKG is zero as well as initial troponin and risk factors as her only risk factor is HTN.  Pt with no risk factors for PE and with no SOB or exertional Sx's with Well's score of 0 there is a very low clinical suspicion.   Pt denies sudden tearing or ripping CP with no widened mediastinum on CXR.  Will continue to monitor and await second troponin and if negative will discharge with outpatient F/U.    Brannon Harrington MD  PGY -  4  05/27/2017  4:11 PM    2nd Trop has returned negative.  Pt has been discharged at this time and counseled on the need to return to the nearest emergency room if they experience any other concerning symptoms.  Pt will F/U with her PCP in the next two to three days.    Brannon Harrington MD  PGY -  4  05/27/2017  9:33 PM           Attending Attestation:   Physician Attestation Statement for Resident:  As the supervising MD   Physician Attestation Statement: I have personally seen and examined this patient.   I agree with the above history. -:   As the supervising MD I agree with the above PE.    As the supervising MD I agree with the above treatment, course, plan, and disposition.   -: Given that her pain has been going on for over a week, can rule out AMI with troponin.  Will send two sets.  Pain may be GI or possibly musculoskeletal.  Pt has a long history of msk pain.                      ED Course     Clinical Impression:\   The encounter diagnosis was Chest pain.          Brannon Harrington MD  Resident  05/27/17 2094       Sindi MONTEZ  Angelica Gann MD  06/03/17 1958

## 2017-05-27 NOTE — PROVIDER PROGRESS NOTES - EMERGENCY DEPT.
Encounter Date: 5/27/2017    ED Physician Progress Notes        Physician Note:   NSR.  No change from prior EKG.      EKG - STEMI Decision  Initial Reading: No STEMI present.
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
## 2017-06-06 ENCOUNTER — TELEPHONE (OUTPATIENT)
Dept: INTERNAL MEDICINE | Facility: CLINIC | Age: 62
End: 2017-06-06

## 2017-06-06 ENCOUNTER — OFFICE VISIT (OUTPATIENT)
Dept: INTERNAL MEDICINE | Facility: CLINIC | Age: 62
End: 2017-06-06
Payer: COMMERCIAL

## 2017-06-06 VITALS
WEIGHT: 177 LBS | HEART RATE: 64 BPM | DIASTOLIC BLOOD PRESSURE: 78 MMHG | SYSTOLIC BLOOD PRESSURE: 127 MMHG | HEIGHT: 69 IN | BODY MASS INDEX: 26.22 KG/M2

## 2017-06-06 DIAGNOSIS — M54.12 CERVICAL RADICULAR PAIN: Primary | ICD-10-CM

## 2017-06-06 DIAGNOSIS — G56.02 CARPAL TUNNEL SYNDROME OF LEFT WRIST: ICD-10-CM

## 2017-06-06 DIAGNOSIS — M12.812 ROTATOR CUFF ARTHROPATHY, LEFT: ICD-10-CM

## 2017-06-06 PROCEDURE — 99999 PR PBB SHADOW E&M-EST. PATIENT-LVL III: CPT | Mod: PBBFAC,,, | Performed by: INTERNAL MEDICINE

## 2017-06-06 PROCEDURE — 99213 OFFICE O/P EST LOW 20 MIN: CPT | Mod: S$GLB,,, | Performed by: INTERNAL MEDICINE

## 2017-06-06 PROCEDURE — 99213 OFFICE O/P EST LOW 20 MIN: CPT | Mod: PBBFAC,PO | Performed by: INTERNAL MEDICINE

## 2017-06-06 NOTE — PROGRESS NOTES
REASON FOR VISIT:  This is a 62-year-old female.  She still has ongoing pain   involving her shoulders at the top of the left shoulder, down the upper arm,   difficult to raise up.  In addition, for a month she has been also noticing a   tingling, burning pain in her thumb, second, third and part of her fourth finger   going up the hand, the forearm and even the upper arm.    She is known to have degenerative changes involving the glenohumeral joint and   labrum, also cervical degenerative disc disease with significant neural   foraminal stenosis at C7-T1.    PAST MEDICAL HISTORY:  Hypertension.  Anxiety state at times.  Prior history of lumbar pain.  Suspected ulnar neuropathy.    MEDICATIONS:  List per MedCard.    PHYSICAL EXAMINATION:  VITAL SIGNS:  Per EPIC.  EXTREMITIES:  There are 2+ biceps, 2+ right, 1+ left triceps reflexes.  She did   display a positive Tinel sign when tapping on the left wrist.  It is difficult   to abduct the arm up to 90 degrees because of pain involving the lateral arm and   shoulder.    IMPRESSION:  1.  Cervical radicular pain.  2.  Possible carpal tunnel syndrome.  3.  Rotator cuff arthropathy.    PLAN:    We will be putting in an order with Physical Therapy to help further with her   shoulder.    She agreed to take ibuprofen 200 mg four tablets three times a day with meals.   We will arrange for an EMG study.          /monika [id number] review        ESSENCE/CATHY  dd: 06/06/2017 17:02:27 (CDT)  td: 06/07/2017 13:52:34 (CDT)  Doc ID   #[id number]  Job ID #[id number]    CC:

## 2017-06-06 NOTE — TELEPHONE ENCOUNTER
----- Message from Lu Reardon sent at 6/6/2017  9:10 AM CDT -----  Contact: Self  Pt would like a call back in regards to her shoulder pain         Pt can be contacted at [phone]

## 2017-06-07 ENCOUNTER — TELEPHONE (OUTPATIENT)
Dept: PAIN MEDICINE | Facility: CLINIC | Age: 62
End: 2017-06-07
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
## 2017-06-07 NOTE — TELEPHONE ENCOUNTER
----- Message from Zeinab Vargas sent at 6/7/2017  1:16 PM CDT -----  Contact: self  Patient referred by dr Willis  Patient request Dr Guillen states need to schedule appointment   Please call pt at [phone]

## 2017-06-09 ENCOUNTER — TELEPHONE (OUTPATIENT)
Dept: INTERNAL MEDICINE | Facility: CLINIC | Age: 62
End: 2017-06-09

## 2017-06-09 NOTE — TELEPHONE ENCOUNTER
Pt. did not answer the phone. I left a message instructing her to call 911 or call back to be connected to the OOC nurse for triage and instruction.

## 2017-06-09 NOTE — TELEPHONE ENCOUNTER
----- Message from Lu Reardon sent at 6/9/2017  3:25 PM CDT -----  Contact: Self  Pt would like to speak with the nurse in regards to severe chest pains.    Pt refused to speak with the on call nurse     Pt can be contacted at  [phone]

## 2017-06-12 ENCOUNTER — TELEPHONE (OUTPATIENT)
Dept: PAIN MEDICINE | Facility: CLINIC | Age: 62
End: 2017-06-12

## 2017-06-21 ENCOUNTER — DOCUMENTATION ONLY (OUTPATIENT)
Dept: REHABILITATION | Facility: OTHER | Age: 62
End: 2017-06-21

## 2017-06-21 NOTE — PROGRESS NOTES
Outpatient Physical Therapy Discharge Summary    Date/Time: 6/21/17    Date of PT eval:1/12/17  Number of PT visits:8  Date of last visit:4/3/17  Discharge reason: poor response to program    Subjective: Patient has attended 8 visits of physical therapy at [facility] including PT evaluation, pt education, HEP instruction, aerobic exercise, isometric testing of the lumbar/cervical extensors, dynamic spinal strengthening, peripheral muscle strengthening, and functional activity training.        Plan: Discharge from outpatient physical therapy due to failure to respond to patient, symptoms persist, goals not met.

## 2017-06-30 ENCOUNTER — OFFICE VISIT (OUTPATIENT)
Dept: PAIN MEDICINE | Facility: CLINIC | Age: 62
End: 2017-06-30
Attending: ANESTHESIOLOGY
Payer: COMMERCIAL

## 2017-06-30 ENCOUNTER — TELEPHONE (OUTPATIENT)
Dept: PAIN MEDICINE | Facility: CLINIC | Age: 62
End: 2017-06-30

## 2017-06-30 VITALS
SYSTOLIC BLOOD PRESSURE: 156 MMHG | DIASTOLIC BLOOD PRESSURE: 84 MMHG | WEIGHT: 176.13 LBS | HEART RATE: 74 BPM | RESPIRATION RATE: 20 BRPM | HEIGHT: 69 IN | BODY MASS INDEX: 26.09 KG/M2

## 2017-06-30 DIAGNOSIS — G89.29 CHRONIC LEFT SHOULDER PAIN: ICD-10-CM

## 2017-06-30 DIAGNOSIS — G89.4 CHRONIC PAIN SYNDROME: ICD-10-CM

## 2017-06-30 DIAGNOSIS — M19.012 PRIMARY OSTEOARTHRITIS OF LEFT SHOULDER: ICD-10-CM

## 2017-06-30 DIAGNOSIS — M47.22 OSTEOARTHRITIS OF SPINE WITH RADICULOPATHY, CERVICAL REGION: ICD-10-CM

## 2017-06-30 DIAGNOSIS — M25.512 CHRONIC LEFT SHOULDER PAIN: ICD-10-CM

## 2017-06-30 DIAGNOSIS — M79.18 MYOFASCIAL PAIN: ICD-10-CM

## 2017-06-30 DIAGNOSIS — M47.26 OSTEOARTHRITIS OF SPINE WITH RADICULOPATHY, LUMBAR REGION: Primary | ICD-10-CM

## 2017-06-30 PROCEDURE — 99204 OFFICE O/P NEW MOD 45 MIN: CPT | Mod: S$GLB,,, | Performed by: ANESTHESIOLOGY

## 2017-06-30 RX ORDER — MELOXICAM 15 MG/1
15 TABLET ORAL DAILY
Qty: 30 TABLET | Refills: 2 | Status: SHIPPED | OUTPATIENT
Start: 2017-06-30 | End: 2017-07-07 | Stop reason: SDUPTHER

## 2017-06-30 RX ORDER — GABAPENTIN 300 MG/1
300 CAPSULE ORAL 3 TIMES DAILY
Qty: 90 CAPSULE | Refills: 11 | Status: SHIPPED | OUTPATIENT
Start: 2017-06-30 | End: 2017-07-07 | Stop reason: SDUPTHER
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
## 2017-06-30 NOTE — TELEPHONE ENCOUNTER
----- Message from Donita Paris sent at 6/30/2017 11:27 AM CDT -----  _  1st Request  _  2nd Request  _  3rd Request        Who: patient    Why: please call pt, she would like to reschedule her appt she missed this morning but needs for you to call her first.     What Number to Call Back: [phone]    When to Expect a call back: (With in 24 hours)

## 2017-06-30 NOTE — LETTER
June 30, 2017      Jonny Willis MD  [address]  [place] [address]           [facility] - Pain Management  [address]  [facility] [address]  Phone: [phone]  Fax: [phone]          Patient: Josi Gil   MR Number: [id number]   YOB: 1955   Date of Visit: 6/30/2017       Dear Dr. Jonny Willis:    Thank you for referring Josi Gil to me for evaluation. Attached you will find relevant portions of my assessment and plan of care.    If you have questions, please do not hesitate to call me. I look forward to following Josi Gil along with you.    Sincerely,    Camacho Guillen III, MD    Enclosure  CC:  No Recipients    If you would like to receive this communication electronically, please contact [email or web address] or [phone] to request more information on [facility] access.    For providers and/or their staff who would like to refer a patient to [facility], please contact us through our one-stop-shop provider referral line, [facility], at [phone].    If you feel you have received this communication in error or would no longer like to receive these types of communications, please e-mail [email or web address]

## 2017-07-03 ENCOUNTER — TELEPHONE (OUTPATIENT)
Dept: INTERNAL MEDICINE | Facility: CLINIC | Age: 62
End: 2017-07-03

## 2017-07-03 NOTE — TELEPHONE ENCOUNTER
----- Message from Dawn Tavarez sent at 7/3/2017  3:48 PM CDT -----  Contact: Self/[phone]  Patient would like a call back today. Would not leave details.    Please call and advise.    Thank You

## 2017-07-05 ENCOUNTER — TELEPHONE (OUTPATIENT)
Dept: INTERNAL MEDICINE | Facility: CLINIC | Age: 62
End: 2017-07-05

## 2017-07-05 NOTE — TELEPHONE ENCOUNTER
----- Message from Jana Llanos sent at 7/5/2017  9:37 AM CDT -----  Contact: Pt [phone]  Patient is returning a phone call.  Who left a message for the patient: Dr Willis  Does patient know what this is regarding:  A message left on Friday  Comments:

## 2017-07-07 ENCOUNTER — TELEPHONE (OUTPATIENT)
Dept: INTERNAL MEDICINE | Facility: CLINIC | Age: 62
End: 2017-07-07

## 2017-07-07 DIAGNOSIS — M25.512 CHRONIC LEFT SHOULDER PAIN: ICD-10-CM

## 2017-07-07 DIAGNOSIS — M47.22 OSTEOARTHRITIS OF SPINE WITH RADICULOPATHY, CERVICAL REGION: ICD-10-CM

## 2017-07-07 DIAGNOSIS — G89.29 CHRONIC LEFT SHOULDER PAIN: ICD-10-CM

## 2017-07-07 DIAGNOSIS — M19.012 PRIMARY OSTEOARTHRITIS OF LEFT SHOULDER: ICD-10-CM

## 2017-07-07 RX ORDER — GABAPENTIN 300 MG/1
300 CAPSULE ORAL NIGHTLY
Qty: 30 CAPSULE | Refills: 0 | Status: SHIPPED | OUTPATIENT
Start: 2017-07-07 | End: 2018-10-02

## 2017-07-07 RX ORDER — MELOXICAM 15 MG/1
15 TABLET ORAL DAILY
Qty: 30 TABLET | Refills: 0 | Status: SHIPPED | OUTPATIENT
Start: 2017-07-07 | End: 2018-01-03

## 2017-07-07 NOTE — TELEPHONE ENCOUNTER
----- Message from Dawn Tavarez sent at 7/7/2017  8:33 AM CDT -----  Contact: Self/[phone]  Patient is calling again requesting a call.    Please call and advise.    Thank You

## 2017-07-19 ENCOUNTER — OFFICE VISIT (OUTPATIENT)
Dept: OPTOMETRY | Facility: CLINIC | Age: 62
End: 2017-07-19
Payer: COMMERCIAL

## 2017-07-19 DIAGNOSIS — H10.31 ACUTE CONJUNCTIVITIS OF RIGHT EYE, UNSPECIFIED ACUTE CONJUNCTIVITIS TYPE: ICD-10-CM

## 2017-07-19 DIAGNOSIS — H43.811 PVD (POSTERIOR VITREOUS DETACHMENT), RIGHT EYE: Primary | ICD-10-CM

## 2017-07-19 PROCEDURE — 92012 INTRM OPH EXAM EST PATIENT: CPT | Mod: S$GLB,,, | Performed by: OPTOMETRIST

## 2017-07-19 PROCEDURE — 99999 PR PBB SHADOW E&M-EST. PATIENT-LVL II: CPT | Mod: PBBFAC,,, | Performed by: OPTOMETRIST

## 2017-07-19 NOTE — PATIENT INSTRUCTIONS
For occasional ocular allergies (itchy eyes) either Zaditor or Alaway over the counter drops can be used as needed up to twice a daily.

## 2017-07-19 NOTE — PROGRESS NOTES
HPI     DLS: 07/30/2015 Dr. Meyers    S/P LASIK OU // distance only 1999 or 2000 Dr. Sinha     Patient c/o whitish discharge and floaters. The floaters have been going   on for about 5 days. Redness, discharge, itching, and irritation OD for 2   days. Patient has not used any medications. Denies flashes, floaters,   diplopia, glare, HA's, or pain. (+)some HA's    Last edited by Louie Anderson, OD on 7/19/2017  3:51 PM. (History)            Assessment /Plan     For exam results, see Encounter Report.    PVD (posterior vitreous detachment), right eye  -Reviewed signs and symptoms of retinal detachment. Pt instructed to return to clinic immediately with flashes, floaters, or veil of darkness in vision.  Follow up DFE in 1 mos    Acute conjunctivitis of right eye, unspecified acute conjunctivitis type  -no obvious infection  -recommended Zaditor for itch, Systane Balance for dryness      RTC full CL exam with DFE

## 2017-07-20 ENCOUNTER — OFFICE VISIT (OUTPATIENT)
Dept: INTERNAL MEDICINE | Facility: CLINIC | Age: 62
End: 2017-07-20
Payer: COMMERCIAL

## 2017-07-20 VITALS
SYSTOLIC BLOOD PRESSURE: 132 MMHG | WEIGHT: 177.94 LBS | HEART RATE: 60 BPM | BODY MASS INDEX: 26.35 KG/M2 | DIASTOLIC BLOOD PRESSURE: 64 MMHG | HEIGHT: 69 IN

## 2017-07-20 DIAGNOSIS — H10.31 ACUTE CONJUNCTIVITIS OF RIGHT EYE, UNSPECIFIED ACUTE CONJUNCTIVITIS TYPE: Primary | ICD-10-CM

## 2017-07-20 DIAGNOSIS — Z77.011 LEAD EXPOSURE: ICD-10-CM

## 2017-07-20 PROCEDURE — 99213 OFFICE O/P EST LOW 20 MIN: CPT | Mod: S$GLB,,, | Performed by: INTERNAL MEDICINE

## 2017-07-20 PROCEDURE — 99999 PR PBB SHADOW E&M-EST. PATIENT-LVL III: CPT | Mod: PBBFAC,,, | Performed by: INTERNAL MEDICINE

## 2017-07-20 RX ORDER — OLOPATADINE HYDROCHLORIDE 1 MG/ML
1 SOLUTION/ DROPS OPHTHALMIC 2 TIMES DAILY
Qty: 5 ML | Refills: 0 | Status: SHIPPED | OUTPATIENT
Start: 2017-07-20 | End: 2018-10-02
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
## 2017-07-20 NOTE — TELEPHONE ENCOUNTER
put her on for 11:45 today pt states her and her daughter may have been exposed to led ,she stated there is  led in the water system and thinks they could be exposed to it and would like to get that test . I put her on the schedule for an eye infection she says she has also.      Please advise

## 2017-07-20 NOTE — TELEPHONE ENCOUNTER
----- Message from Brien Putnam sent at 7/20/2017  7:25 AM CDT -----  Contact: Pt  Pt would like a call back from staff for scheduling    Pt is requesting to be seen today for possible eye infection and is always requesting an appt for daughter  MRN: [id number]    Pt can be reached at [phone]

## 2017-07-21 RX ORDER — OLOPATADINE HYDROCHLORIDE 1 MG/ML
1 SOLUTION/ DROPS OPHTHALMIC 2 TIMES DAILY
Qty: 5 ML | Refills: 0 | Status: CANCELLED | OUTPATIENT
Start: 2017-07-21 | End: 2018-07-21

## 2017-07-21 NOTE — TELEPHONE ENCOUNTER
"----- Message from Rhodes Shelly sent at 7/21/2017  8:14 AM CDT -----  Contact: self/ [phone] cell  Pt would like her rx for the medication olopatadine (PATANOL) 0.1 % ophthalmic solution re-sent to her local pharmacy [facility] in [facility].  Pt states that the doctor needs to send it "right now".   Please call and advise.    Thank you  "

## 2017-07-24 ENCOUNTER — NURSE TRIAGE (OUTPATIENT)
Dept: ADMINISTRATIVE | Facility: CLINIC | Age: 62
End: 2017-07-24

## 2017-07-24 ENCOUNTER — HOSPITAL ENCOUNTER (EMERGENCY)
Facility: HOSPITAL | Age: 62
Discharge: HOME | End: 2017-07-24
Attending: EMERGENCY MEDICINE
Payer: COMMERCIAL

## 2017-07-24 VITALS
BODY MASS INDEX: 25.92 KG/M2 | HEART RATE: 59 BPM | WEIGHT: 175 LBS | TEMPERATURE: 98 F | RESPIRATION RATE: 18 BRPM | DIASTOLIC BLOOD PRESSURE: 88 MMHG | OXYGEN SATURATION: 100 % | SYSTOLIC BLOOD PRESSURE: 194 MMHG | HEIGHT: 69 IN

## 2017-07-24 DIAGNOSIS — R07.89 CHEST WALL PAIN: Primary | ICD-10-CM

## 2017-07-24 DIAGNOSIS — R07.9 CHEST PAIN: ICD-10-CM

## 2017-07-24 LAB
ALBUMIN SERPL BCP-MCNC: 3.8 G/DL
ALP SERPL-CCNC: 70 U/L
ALT SERPL W/O P-5'-P-CCNC: 17 U/L
ANION GAP SERPL CALC-SCNC: 12 MMOL/L
AST SERPL-CCNC: 16 U/L
BASOPHILS # BLD AUTO: 0.05 K/UL
BASOPHILS NFR BLD: 0.7 %
BILIRUB SERPL-MCNC: 0.5 MG/DL
BILIRUB UR QL STRIP: NEGATIVE
BUN SERPL-MCNC: 16 MG/DL
CALCIUM SERPL-MCNC: 9.6 MG/DL
CHLORIDE SERPL-SCNC: 101 MMOL/L
CLARITY UR REFRACT.AUTO: CLEAR
CO2 SERPL-SCNC: 25 MMOL/L
COLOR UR AUTO: YELLOW
CREAT SERPL-MCNC: 1 MG/DL
DIFFERENTIAL METHOD: NORMAL
EOSINOPHIL # BLD AUTO: 0.3 K/UL
EOSINOPHIL NFR BLD: 4.3 %
ERYTHROCYTE [DISTWIDTH] IN BLOOD BY AUTOMATED COUNT: 13.4 %
EST. GFR  (AFRICAN AMERICAN): >60 ML/MIN/1.73 M^2
EST. GFR  (NON AFRICAN AMERICAN): >60 ML/MIN/1.73 M^2
GLUCOSE SERPL-MCNC: 94 MG/DL
GLUCOSE UR QL STRIP: NEGATIVE
HCT VFR BLD AUTO: 40.5 %
HGB BLD-MCNC: 14 G/DL
HGB UR QL STRIP: NEGATIVE
KETONES UR QL STRIP: NEGATIVE
LEUKOCYTE ESTERASE UR QL STRIP: NEGATIVE
LYMPHOCYTES # BLD AUTO: 2.8 K/UL
LYMPHOCYTES NFR BLD: 41.4 %
MCH RBC QN AUTO: 31 PG
MCHC RBC AUTO-ENTMCNC: 34.6 G/DL
MCV RBC AUTO: 90 FL
MONOCYTES # BLD AUTO: 0.5 K/UL
MONOCYTES NFR BLD: 7.8 %
NEUTROPHILS # BLD AUTO: 3.1 K/UL
NEUTROPHILS NFR BLD: 45.7 %
NITRITE UR QL STRIP: NEGATIVE
PH UR STRIP: 5 [PH] (ref 5–8)
PLATELET # BLD AUTO: 249 K/UL
PMV BLD AUTO: 10.9 FL
POTASSIUM SERPL-SCNC: 4 MMOL/L
PROT SERPL-MCNC: 7.7 G/DL
PROT UR QL STRIP: NEGATIVE
RBC # BLD AUTO: 4.52 M/UL
SODIUM SERPL-SCNC: 138 MMOL/L
SP GR UR STRIP: 1.01 (ref 1–1.03)
TROPONIN I SERPL DL<=0.01 NG/ML-MCNC: <0.006 NG/ML
URN SPEC COLLECT METH UR: NORMAL
UROBILINOGEN UR STRIP-ACNC: NEGATIVE EU/DL
WBC # BLD AUTO: 6.78 K/UL

## 2017-07-24 PROCEDURE — 85025 COMPLETE CBC W/AUTO DIFF WBC: CPT

## 2017-07-24 PROCEDURE — 99284 EMERGENCY DEPT VISIT MOD MDM: CPT | Mod: 25

## 2017-07-24 PROCEDURE — 84484 ASSAY OF TROPONIN QUANT: CPT

## 2017-07-24 PROCEDURE — 93005 ELECTROCARDIOGRAM TRACING: CPT

## 2017-07-24 PROCEDURE — 80053 COMPREHEN METABOLIC PANEL: CPT

## 2017-07-24 PROCEDURE — 81003 URINALYSIS AUTO W/O SCOPE: CPT

## 2017-07-24 PROCEDURE — 99285 EMERGENCY DEPT VISIT HI MDM: CPT | Mod: ,,, | Performed by: EMERGENCY MEDICINE

## 2017-07-24 PROCEDURE — 93010 ELECTROCARDIOGRAM REPORT: CPT | Mod: ,,, | Performed by: INTERNAL MEDICINE

## 2017-07-24 NOTE — TELEPHONE ENCOUNTER
Reason for Disposition   [1] Age > 40 AND [2] no obvious cause AND [3] pain even when not moving the arm    (Exception: pain is clearly made worse by moving arm or bending neck)    Protocols used: ST SHOULDER PAIN-A-AH

## 2017-07-25 NOTE — ED TRIAGE NOTES
Pt states she woke up out of her sleep with awful back/ pain shoulder pain early this morning, pt states throughout the day the pain got worse. Pt states around 2pm she began having chest pain. Pt states the chest pain radiates to her right shoulder, down her back.  Pt states 20 mins later the chest pain came back and this time it radiated to her left jaw. Pt states she took 2 Amor Aspirin ( 325mg).

## 2017-07-25 NOTE — PROVIDER PROGRESS NOTES - EMERGENCY DEPT.
Encounter Date: 7/24/2017    ED Physician Progress Notes       SCRIBE NOTE: I, José Jasso, am scribing for, and in the presence of,  Dr. Loza.  Physician Statement: I, Dr. Loza, personally performed the services described in this documentation as scribed by José Jasso in my presence, and it is both accurate and complete.      EKG - STEMI Decision  Initial Reading: No STEMI present.

## 2017-07-25 NOTE — ED NOTES
Patient identifiers for Josi Gil 62 y.o. female checked and correct.  Chief Complaint   Patient presents with    Chest Pain     intermittent x 2 episodes. right chest radiates to right shoulder and right jaw. States took two 325mg ASA prior to arrival.      Past Medical History:   Diagnosis Date    Acute costochondritis 9/18/2012    Benign essential hypertension     Gastroesophageal reflux disease without esophagitis     Pain in joint involving multiple sites 7/9/2013     Allergies reported:   Review of patient's allergies indicates:   Allergen Reactions    No known allergies        LOC: Patient is awake, alert, and aware of environment with an appropriate affect. Patient is oriented x 3 and speaking appropriately.  APPEARANCE: Patient resting comfortably and in no acute distress. Patient is clean and well groomed, patient's clothing is properly fastened.  HEENT: **  SKIN: The skin is warm and dry. Patient has normal skin turgor and moist mucus membranes. Skin is intact; no bruising or breakdown noted.  MUSKULOSKELETAL: Patient is moving all extremities well, no obvious deformities noted. Pulses intact.   RESPIRATORY: Airway is open and patent. Respirations are spontaneous and non-labored with normal effort and rate, BBS=clear  CARDIAC: Patient has a normal rate and rhythm.No peripheral edema noted.   ABDOMEN: No distention noted. Bowel sounds active in all 4 quadrants. Soft and non-tender upon palpation.  NEUROLOGICAL: pupils 4mm, PERRL. Facial expression is symmetrical. Hand grasps are equal bilaterally. Normal sensation in all extremities when touched with finger.

## 2017-07-25 NOTE — ED PROVIDER NOTES
Encounter Date: 2017    SCRIBE #1 NOTE: I, Negar Cooper, am scribing for, and in the presence of,  Dr. Esposito. I have scribed the following portions of the note - the APC attestation.       History     Chief Complaint   Patient presents with    Chest Pain     intermittent x 2 episodes. right chest radiates to right shoulder and right jaw. States took two 325mg ASA prior to arrival.      62-year-old female to the ER for evaluation of chest pain.  Patient's symptoms began greater than 10 hours ago.  Patient has chronic back and L shoulder pain.  She did not take any medication for her pain earlier today, as she is reluctant to take too many OTC meds and refuses to take opiates.  As the day progressed he began to have pain radiating from her back and R shoulder into her chest that radiated to her jaw.  This started about 12 noon.  This pain lasted for approximately 20 minutes and occurred again an hour later.  She took aspirin for this pain.  She has had similar chest pain in the past and has underwent cardiac evaluation multiple times but there has been no cardiac etiology found.  She was concerned today since pain new went to jaw today. Currently patient resting well, she is in no acute distress and in no pain at this time. No worsening of pain with exertion, no fevers/cough          Review of patient's allergies indicates:   Allergen Reactions    No known allergies      Past Medical History:   Diagnosis Date    Acute costochondritis 2012    Benign essential hypertension     Gastroesophageal reflux disease without esophagitis     Pain in joint involving multiple sites 2013     Past Surgical History:   Procedure Laterality Date     SECTION      KNEE ARTHROSCOPY W/ ACL RECONSTRUCTION      REFRACTIVE SURGERY Bilateral  or     Dr. Bharath hammer     Family History   Problem Relation Age of Onset    Hypertension Mother     Heart disease Maternal Grandmother      Social  History   Substance Use Topics    Smoking status: Never Smoker    Smokeless tobacco: Never Used    Alcohol use No     Review of Systems   Constitutional: Negative for fever.   HENT: Negative for sore throat.    Respiratory: Negative for shortness of breath.    Cardiovascular: Positive for chest pain. Negative for palpitations and leg swelling.   Gastrointestinal: Negative for nausea.   Genitourinary: Negative for dysuria.   Musculoskeletal: Positive for back pain.   Skin: Negative for rash.   Neurological: Negative for weakness.   Hematological: Does not bruise/bleed easily.       Physical Exam     Initial Vitals [07/24/17 1854]   BP Pulse Resp Temp SpO2   (!) 168/82 63 18 98 °F (36.7 °C) 100 %      MAP       110.67         Physical Exam    Constitutional: Vital signs are normal. She appears well-developed and well-nourished. She is not diaphoretic. No distress.   HENT:   Head: Normocephalic and atraumatic.   Right Ear: External ear normal.   Left Ear: External ear normal.   Mouth/Throat: No oropharyngeal exudate.   Eyes: Conjunctivae and EOM are normal. Pupils are equal, round, and reactive to light.   Cardiovascular: Normal rate, regular rhythm and normal heart sounds. Exam reveals no gallop and no friction rub.    No murmur heard.  Pulmonary/Chest: No respiratory distress. She has no wheezes. She has no rhonchi. She has no rales. She exhibits no tenderness.   Normal exam   Abdominal: Soft. Normal appearance, normal aorta and bowel sounds are normal. She exhibits no distension and no mass. There is no tenderness. There is no rebound and no guarding.   Musculoskeletal: Normal range of motion.   Some mild pain to the paraspinal muscles, cervical region   Neurological: She is alert and oriented to person, place, and time.   Skin: Skin is warm and intact.   Psychiatric: She has a normal mood and affect. Her speech is normal and behavior is normal. Cognition and memory are normal.         ED Course    Procedures  Labs Reviewed   CBC W/ AUTO DIFFERENTIAL   COMPREHENSIVE METABOLIC PANEL   URINALYSIS, REFLEX TO URINE CULTURE   TROPONIN I             Medical Decision Making:   History:   Old Medical Records: I decided to obtain old medical records.  Clinical Tests:   Lab Tests: Reviewed and Ordered  Medical Tests: Reviewed and Ordered  ED Management:  62-year-old female with atypical chest pain; h/o chronic LBP and shoulder pain, sx started as chronic pain and radiated to chest and jaw. Similar episodes before with negative ACS workup. No exertional component. Patient's chest pain is not present in the ER.  The pain she was experiencing in her back is reproducible with range of motion and palpation.     ACS workup in the ER is negative.  Chest x-ray was not completed as this was just done 2 months ago and normal, patient's physical exam reveals no findings of cardiac etiology.  EKG is unchanged. I believe the patient's symptoms are secondary to MSK etiology.  She will be discharged home to follow up in clinic.                 Scribe Attestation:   Scribe #1: I performed the above scribed service and the documentation accurately describes the services I performed. I attest to the accuracy of the note.    Attending Attestation:     Physician Attestation Statement for NP/PA:   I have conducted a face to face encounter with this patient in addition to the NP/PA, due to Medical Complexity    Other NP/PA Attestation Additions:      Medical Decision Making:     Hx of HTN and chronic left shoulder and neck pain with exacerbation of chronic pain radiating into chest and jaw. ACS workup negative. No concerning exertional component or EKG changes. Multiple previous chest pain and ed visits with nml findings. Likely musculoskeletal will follow up with pain management and outpatient stress test if needed.        Physician Attestation for Scribe:  Physician Attestation Statement for Scribe #1: I, Dr. Esposito, reviewed  documentation, as scribed by Negar Cooper in my presence, and it is both accurate and complete.                 ED Course     Clinical Impression:   The primary encounter diagnosis was Chest wall pain. A diagnosis of Chest pain was also pertinent to this visit.                           Paramjit Hensley PA-C  07/25/17 0005       Trell Esposito MD  07/26/17 3175

## 2017-07-26 ENCOUNTER — TELEPHONE (OUTPATIENT)
Dept: INTERNAL MEDICINE | Facility: CLINIC | Age: 62
End: 2017-07-26

## 2017-07-26 NOTE — TELEPHONE ENCOUNTER
----- Message from Bronson Alejo MA sent at 7/26/2017 12:50 PM CDT -----  Contact: self - [phone]   Type: Test Results    What test was performed? Labs     Who ordered the test?    When and where were the test performed?  7/20/17    Comments: patient also c/o headaches and requesting to speak with Dr Willis. Please call. Thanks!

## 2017-07-31 ENCOUNTER — TELEPHONE (OUTPATIENT)
Dept: INTERNAL MEDICINE | Facility: CLINIC | Age: 62
End: 2017-07-31

## 2017-07-31 NOTE — TELEPHONE ENCOUNTER
Called pt back to check on her she had called and left a message on my private line. No answer left message on VM to call office back

## 2017-08-03 ENCOUNTER — TELEPHONE (OUTPATIENT)
Dept: INTERNAL MEDICINE | Facility: CLINIC | Age: 62
End: 2017-08-03

## 2017-08-03 NOTE — TELEPHONE ENCOUNTER
----- Message from Gato Kahn sent at 8/3/2017 11:40 AM CDT -----  Contact: self   Patient would like to get test results.  Name of test (lab, mammo, etc.):  lab  Date of test:  7/20  Ordering provider: Dr. YUN  Where was the test performed:  [facility]  Comments:

## 2017-09-25 ENCOUNTER — NURSE TRIAGE (OUTPATIENT)
Dept: ADMINISTRATIVE | Facility: CLINIC | Age: 62
End: 2017-09-25

## 2017-09-26 NOTE — TELEPHONE ENCOUNTER
Reason for Disposition   Caller has medication question, adult has minor symptoms, caller declines triage, and triager answers question    Protocols used: ST MEDICATION QUESTION CALL-A-AH    Josi wanted to know if aspirin can be taken at the same time as nitro if she has chest pain. Both are on her medication list and advised if she needs them she can take them together as directed and there are no interactions between these two medications. She verbalizes understanding. Please contact caller with any further care advice.

## 2017-10-23 ENCOUNTER — TELEPHONE (OUTPATIENT)
Dept: INTERNAL MEDICINE | Facility: CLINIC | Age: 62
End: 2017-10-23

## 2017-10-23 NOTE — TELEPHONE ENCOUNTER
----- Message from Tiana Cottrell sent at 10/20/2017  4:35 PM CDT -----  Contact: self/[phone]  Pt called in regards to getting a Rx for nail fungus.      [facility]  Please advise

## 2017-10-26 NOTE — TELEPHONE ENCOUNTER
Called pt back no answer left message on VM to call office back and to get more info on the nail fungus

## 2018-01-03 ENCOUNTER — TELEPHONE (OUTPATIENT)
Dept: INTERNAL MEDICINE | Facility: CLINIC | Age: 63
End: 2018-01-03

## 2018-01-03 RX ORDER — KETOROLAC TROMETHAMINE 10 MG/1
10 TABLET, FILM COATED ORAL EVERY 6 HOURS
Qty: 40 TABLET | Refills: 0 | Status: SHIPPED | OUTPATIENT
Start: 2018-01-03 | End: 2018-01-05 | Stop reason: SDUPTHER

## 2018-01-03 NOTE — TELEPHONE ENCOUNTER
Pt states she is in extreme pain she doesn't know if it is the flu or a cold. She wants something called in. She states you know she is not good at pain med but would like Toradol sent in, she is having chills body ache , cough nasal congestion , denied fever. she did not want a UC apt because it is painful to walk and don't have any transpiration. She would like something sent in for her.      Please advise

## 2018-01-03 NOTE — TELEPHONE ENCOUNTER
I sent in Toradol medication     I attempted to contact her      the voicemail was not accepting any more messages

## 2018-01-03 NOTE — TELEPHONE ENCOUNTER
----- Message from Georgia De Souza sent at 1/3/2018  9:16 AM CST -----  Contact: self/[phone]  Patient called in regards needing to talk with Dr Willis nurse about medication. Patient stated that she is in a lot of pain. Please call and advise.       Thank you!!!

## 2018-01-04 ENCOUNTER — TELEPHONE (OUTPATIENT)
Dept: INTERNAL MEDICINE | Facility: CLINIC | Age: 63
End: 2018-01-04

## 2018-01-04 RX ORDER — AZITHROMYCIN 250 MG/1
TABLET, FILM COATED ORAL
Qty: 6 TABLET | Refills: 0 | Status: SHIPPED | OUTPATIENT
Start: 2018-01-04 | End: 2018-01-05 | Stop reason: SDUPTHER

## 2018-01-05 ENCOUNTER — TELEPHONE (OUTPATIENT)
Dept: INTERNAL MEDICINE | Facility: CLINIC | Age: 63
End: 2018-01-05

## 2018-01-05 RX ORDER — AZITHROMYCIN 250 MG/1
TABLET, FILM COATED ORAL
Qty: 6 TABLET | Refills: 0 | Status: SHIPPED | OUTPATIENT
Start: 2018-01-05 | End: 2018-02-26

## 2018-01-05 RX ORDER — KETOROLAC TROMETHAMINE 10 MG/1
10 TABLET, FILM COATED ORAL EVERY 6 HOURS
Qty: 40 TABLET | Refills: 0 | Status: SHIPPED | OUTPATIENT
Start: 2018-01-05 | End: 2018-01-15

## 2018-01-05 NOTE — TELEPHONE ENCOUNTER
Patient stated that none of her Rx should have been sent to [facility] and is requesting that you send the two most resent Rx to [facility] asap.     Please call and advise.     Thank You

## 2018-01-05 NOTE — TELEPHONE ENCOUNTER
----- Message from Dawn Tavarez sent at 1/5/2018  8:46 AM CST -----  Contact: Patient [phone]  Patient stated that none of her Rx should have been sent to [facility] and is requesting that you send the two most resent Rx to [facility] asap.    Please call and advise.    Thank You

## 2018-02-16 ENCOUNTER — NURSE TRIAGE (OUTPATIENT)
Dept: ADMINISTRATIVE | Facility: CLINIC | Age: 63
End: 2018-02-16

## 2018-02-26 ENCOUNTER — TELEPHONE (OUTPATIENT)
Dept: INTERNAL MEDICINE | Facility: CLINIC | Age: 63
End: 2018-02-26

## 2018-02-26 RX ORDER — AMLODIPINE BESYLATE 5 MG/1
5 TABLET ORAL DAILY
Qty: 90 TABLET | Refills: 3
Start: 2017-04-27 | End: 2018-04-27

## 2018-02-26 NOTE — TELEPHONE ENCOUNTER
Pt was scheduled for 2pm today,               Pt is calling in regards to being seen today, if possible. Per pt she is having diarrhea, can't keep anything down, headaches and also having leg pain in both legs. Pt would like a call back in regards to this matter.     Pt can be reached at [phone].     Thank you

## 2018-02-26 NOTE — TELEPHONE ENCOUNTER
----- Message from Danyelle Pringle sent at 2/26/2018  8:38 AM CST -----  Contact: self  Pt is calling in regards to being seen today, if possible. Per pt she is having diarrhea, can't keep anything down, headaches and also having leg pain in both legs. Pt would like a call back in regards to this matter.    Pt can be reached at [phone].    Thank you

## 2018-03-19 ENCOUNTER — HOSPITAL ENCOUNTER (EMERGENCY)
Facility: HOSPITAL | Age: 63
Discharge: HOME | End: 2018-03-20
Attending: FAMILY MEDICINE

## 2018-03-19 VITALS
HEIGHT: 69 IN | WEIGHT: 150 LBS | DIASTOLIC BLOOD PRESSURE: 75 MMHG | BODY MASS INDEX: 22.22 KG/M2 | TEMPERATURE: 98 F | SYSTOLIC BLOOD PRESSURE: 146 MMHG | OXYGEN SATURATION: 100 % | RESPIRATION RATE: 18 BRPM | HEART RATE: 83 BPM

## 2018-03-19 DIAGNOSIS — R07.9 CHEST PAIN: ICD-10-CM

## 2018-03-19 DIAGNOSIS — M79.661 PAIN OF RIGHT LOWER LEG: ICD-10-CM

## 2018-03-19 LAB
ALBUMIN SERPL BCP-MCNC: 3.7 G/DL
ALP SERPL-CCNC: 66 U/L
ALT SERPL W/O P-5'-P-CCNC: 16 U/L
ANION GAP SERPL CALC-SCNC: 12 MMOL/L
AST SERPL-CCNC: 25 U/L
BASOPHILS # BLD AUTO: 0.05 K/UL
BASOPHILS NFR BLD: 0.5 %
BILIRUB SERPL-MCNC: 0.9 MG/DL
BUN SERPL-MCNC: 15 MG/DL
CALCIUM SERPL-MCNC: 9.9 MG/DL
CHLORIDE SERPL-SCNC: 101 MMOL/L
CO2 SERPL-SCNC: 24 MMOL/L
CREAT SERPL-MCNC: 0.9 MG/DL
D DIMER PPP IA.FEU-MCNC: 1.68 MG/L FEU
DIFFERENTIAL METHOD: ABNORMAL
EOSINOPHIL # BLD AUTO: 0.3 K/UL
EOSINOPHIL NFR BLD: 2.8 %
ERYTHROCYTE [DISTWIDTH] IN BLOOD BY AUTOMATED COUNT: 13.2 %
EST. GFR  (AFRICAN AMERICAN): >60 ML/MIN/1.73 M^2
EST. GFR  (NON AFRICAN AMERICAN): >60 ML/MIN/1.73 M^2
GLUCOSE SERPL-MCNC: 123 MG/DL
HCT VFR BLD AUTO: 41.8 %
HGB BLD-MCNC: 13.9 G/DL
IMM GRANULOCYTES # BLD AUTO: 0.02 K/UL
IMM GRANULOCYTES NFR BLD AUTO: 0.2 %
LYMPHOCYTES # BLD AUTO: 1.4 K/UL
LYMPHOCYTES NFR BLD: 14.4 %
MCH RBC QN AUTO: 30.3 PG
MCHC RBC AUTO-ENTMCNC: 33.3 G/DL
MCV RBC AUTO: 91 FL
MONOCYTES # BLD AUTO: 0.7 K/UL
MONOCYTES NFR BLD: 7.6 %
NEUTROPHILS # BLD AUTO: 7.2 K/UL
NEUTROPHILS NFR BLD: 74.5 %
NRBC BLD-RTO: 0 /100 WBC
PLATELET # BLD AUTO: 235 K/UL
PMV BLD AUTO: 10.6 FL
POTASSIUM SERPL-SCNC: 4.1 MMOL/L
PROT SERPL-MCNC: 7.8 G/DL
RBC # BLD AUTO: 4.59 M/UL
SODIUM SERPL-SCNC: 137 MMOL/L
TROPONIN I SERPL DL<=0.01 NG/ML-MCNC: 0.01 NG/ML
WBC # BLD AUTO: 9.7 K/UL

## 2018-03-19 PROCEDURE — 84484 ASSAY OF TROPONIN QUANT: CPT

## 2018-03-19 PROCEDURE — 99284 EMERGENCY DEPT VISIT MOD MDM: CPT | Mod: 25

## 2018-03-19 PROCEDURE — 80053 COMPREHEN METABOLIC PANEL: CPT

## 2018-03-19 PROCEDURE — 96374 THER/PROPH/DIAG INJ IV PUSH: CPT

## 2018-03-19 PROCEDURE — 85379 FIBRIN DEGRADATION QUANT: CPT

## 2018-03-19 PROCEDURE — 93010 ELECTROCARDIOGRAM REPORT: CPT | Mod: ,,, | Performed by: INTERNAL MEDICINE

## 2018-03-19 PROCEDURE — 93005 ELECTROCARDIOGRAM TRACING: CPT

## 2018-03-19 PROCEDURE — 99284 EMERGENCY DEPT VISIT MOD MDM: CPT | Mod: ,,, | Performed by: PHYSICIAN ASSISTANT

## 2018-03-19 PROCEDURE — 85025 COMPLETE CBC W/AUTO DIFF WBC: CPT

## 2018-03-19 RX ORDER — SILVER SULFADIAZINE 10 G/1000G
1 CREAM TOPICAL
Status: COMPLETED | OUTPATIENT
Start: 2018-03-19 | End: 2018-03-20

## 2018-03-19 RX ORDER — KETOROLAC TROMETHAMINE 30 MG/ML
10 INJECTION, SOLUTION INTRAMUSCULAR; INTRAVENOUS
Status: COMPLETED | OUTPATIENT
Start: 2018-03-19 | End: 2018-03-20

## 2018-03-20 PROCEDURE — 63600175 PHARM REV CODE 636 W HCPCS: Performed by: PHYSICIAN ASSISTANT

## 2018-03-20 PROCEDURE — 25500020 PHARM REV CODE 255: Performed by: PHYSICIAN ASSISTANT

## 2018-03-20 PROCEDURE — 25000003 PHARM REV CODE 250: Performed by: PHYSICIAN ASSISTANT

## 2018-03-20 PROCEDURE — 90471 IMMUNIZATION ADMIN: CPT | Performed by: PHYSICIAN ASSISTANT

## 2018-03-20 PROCEDURE — 90715 TDAP VACCINE 7 YRS/> IM: CPT | Performed by: PHYSICIAN ASSISTANT

## 2018-03-20 RX ORDER — SILVER SULFADIAZINE 10 G/1000G
CREAM TOPICAL 2 TIMES DAILY
Qty: 85 G | Refills: 1 | Status: SHIPPED | OUTPATIENT
Start: 2018-03-20 | End: 2018-03-20

## 2018-03-20 RX ORDER — SILVER SULFADIAZINE 10 G/1000G
CREAM TOPICAL 2 TIMES DAILY
Qty: 85 G | Refills: 1 | Status: SHIPPED | OUTPATIENT
Start: 2018-03-20 | End: 2018-10-02

## 2018-03-20 RX ORDER — NAPROXEN 500 MG/1
500 TABLET ORAL 2 TIMES DAILY WITH MEALS
Qty: 20 TABLET | Refills: 0 | Status: SHIPPED | OUTPATIENT
Start: 2018-03-20 | End: 2018-10-02

## 2018-03-20 RX ORDER — METHOCARBAMOL 500 MG/1
1000 TABLET, FILM COATED ORAL 3 TIMES DAILY
Qty: 20 TABLET | Refills: 0 | Status: SHIPPED | OUTPATIENT
Start: 2018-03-20 | End: 2018-03-25

## 2018-03-20 RX ADMIN — CLOSTRIDIUM TETANI TOXOID ANTIGEN (FORMALDEHYDE INACTIVATED), CORYNEBACTERIUM DIPHTHERIAE TOXOID ANTIGEN (FORMALDEHYDE INACTIVATED), BORDETELLA PERTUSSIS TOXOID ANTIGEN (GLUTARALDEHYDE INACTIVATED), BORDETELLA PERTUSSIS FILAMENTOUS HEMAGGLUTININ ANTIGEN (FORMALDEHYDE INACTIVATED), BORDETELLA PERTUSSIS PERTACTIN ANTIGEN, AND BORDETELLA PERTUSSIS FIMBRIAE 2/3 ANTIGEN 0.5 ML: 5; 2; 2.5; 5; 3; 5 INJECTION, SUSPENSION INTRAMUSCULAR at 12:03

## 2018-03-20 RX ADMIN — IOHEXOL 75 ML: 350 INJECTION, SOLUTION INTRAVENOUS at 01:03

## 2018-03-20 RX ADMIN — KETOROLAC TROMETHAMINE 10 MG: 30 INJECTION, SOLUTION INTRAMUSCULAR at 12:03

## 2018-03-20 RX ADMIN — SILVER SULFADIAZINE 1 TUBE: 10 CREAM TOPICAL at 12:03
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
## 2018-03-20 NOTE — PROVIDER PROGRESS NOTES - EMERGENCY DEPT.
Encounter Date: 3/19/2018    ED Physician Progress Notes         EKG - STEMI Decision  Initial Reading: No STEMI present.
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
## 2018-03-20 NOTE — DISCHARGE INSTRUCTIONS
Followup with your PCP  Return to the ED for any new symptoms      Our goal in the emergency department is to always give you outstanding care and exceptional service. You may receive a survey by mail or e-mail in the next week regarding your experience in our ED. We would greatly appreciate your completing and returning the survey. Your feedback provides us with a way to recognize our staff who give very good care and it helps us learn how to improve when your experience was below our aspiration of excellence.

## 2018-03-20 NOTE — ED NOTES
Patient identifiers verified and correct for Josi Gil.    LOC: The patient is awake, alert and aware of environment with an appropriate affect, the patient is oriented x 3 and speaking appropriately.  APPEARANCE: Patient resting comfortably and in no acute distress, patient is clean and well groomed, patient's clothing is properly fastened.  SKIN: The skin is warm and dry, color consistent with ethnicity, patient has normal skin turgor and moist mucus membranes, skin intact, no breakdown or bruising noted.  MUSCULOSKELETAL: Patient moving all extremities spontaneously, no obvious swelling or deformities noted.  RESPIRATORY: Airway is open and patent, respirations are spontaneous, patient has a normal effort and rate, no accessory muscle use noted, bilateral breath sounds clear.  CARDIAC: Patient has a normal rate and regular rhythm, mild edema noted to BLE, 2+ pedal pulses bilaterally, capillary refill < 3 seconds, 5/10 chest pain  ABDOMEN: Soft and non tender to palpation, no distention noted, normoactive bowel sounds present in all four quadrants.  NEUROLOGIC: PERRL, 3mm bilaterally, eyes open spontaneously, behavior appropriate to situation, follows commands, facial expression symmetrical, bilateral hand grasp equal and even, purposeful motor response noted, normal sensation in all extremities when touched with a finger.
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
## 2018-03-23 ENCOUNTER — TELEPHONE (OUTPATIENT)
Dept: INTERNAL MEDICINE | Facility: CLINIC | Age: 63
End: 2018-03-23

## 2018-03-23 ENCOUNTER — DOCUMENTATION ONLY (OUTPATIENT)
Dept: INTERNAL MEDICINE | Facility: CLINIC | Age: 63
End: 2018-03-23

## 2018-03-23 DIAGNOSIS — T30.0 BURN BY HOT LIQUID: Primary | ICD-10-CM
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
## 2018-03-23 RX ORDER — SULFAMETHOXAZOLE AND TRIMETHOPRIM 800; 160 MG/1; MG/1
1 TABLET ORAL 2 TIMES DAILY
Qty: 14 TABLET | Refills: 0 | Status: SHIPPED | OUTPATIENT
Start: 2018-03-23 | End: 2018-03-30

## 2018-03-23 RX ORDER — TRAMADOL HYDROCHLORIDE 50 MG/1
50 TABLET ORAL 3 TIMES DAILY
Qty: 15 TABLET | Refills: 0 | Status: SHIPPED | OUTPATIENT
Start: 2018-03-23 | End: 2018-03-28
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
## 2018-03-23 NOTE — TELEPHONE ENCOUNTER
Called pt back and no answer left message on VM to call office back. Need to know what wound and what happened

## 2018-03-23 NOTE — TELEPHONE ENCOUNTER
----- Message from Arian Cross sent at 3/22/2018  3:06 PM CDT -----  Contact: Patient [phone]  Left hand is swollen and warm to the touch, not sure if infected and wants to know what you recommend and if any antibiotic's. Also have been keeping the womb clean and using the cream that was prescribe from the ER Doctor.    Please call and advise  Thank you

## 2018-03-31 ENCOUNTER — NURSE TRIAGE (OUTPATIENT)
Dept: ADMINISTRATIVE | Facility: CLINIC | Age: 63
End: 2018-03-31

## 2018-04-01 NOTE — TELEPHONE ENCOUNTER
2nd call   LM at 922pm at [phone]   Pt called back re further questions. What could be bringing hot and cold sensations in body. Pt states T99.7. rec ED this evening due to CP, fever. Call back with questions.     Reason for Disposition   Nursing judgment or information in reference    Protocols used: ST NO GUIDELINE AVAILABLE - SICK ADULT CALL-A-AH
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
## 2018-04-05 ENCOUNTER — NURSE TRIAGE (OUTPATIENT)
Dept: ADMINISTRATIVE | Facility: CLINIC | Age: 63
End: 2018-04-05

## 2018-04-05 NOTE — TELEPHONE ENCOUNTER
Reason for Disposition   Message left on identified answering machine.     Left message  X 2, with 2 call attempts   Second attempt to contact family AND no contact made.  Answering service notified.    Protocols used: ST NO CONTACT OR DUPLICATE CONTACT CALL-A-AH

## 2018-04-06 ENCOUNTER — TELEPHONE (OUTPATIENT)
Dept: INTERNAL MEDICINE | Facility: CLINIC | Age: 63
End: 2018-04-06

## 2018-04-06 ENCOUNTER — DOCUMENTATION ONLY (OUTPATIENT)
Dept: INTERNAL MEDICINE | Facility: CLINIC | Age: 63
End: 2018-04-06

## 2018-04-06 DIAGNOSIS — N17.9 AKI (ACUTE KIDNEY INJURY): Primary | ICD-10-CM
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
## 2018-04-06 NOTE — TELEPHONE ENCOUNTER
----- Message from Malia Smith sent at 4/6/2018 12:08 PM CDT -----  Contact: Self Call  [phone]  She want to speak with you about the antibiotic and pain medication given to her about two weeks ago. Also, the tetanus shot. She didn't have the name of it.

## 2018-04-06 NOTE — TELEPHONE ENCOUNTER
"Called pt back and she states that the tramadol and the antibiotic caused her to have serious reactions she had No appetite, vision loss, and Blood pressure dropped she states that she is very shocked and mad that she was prescribed bacterium and tramadol she said that the bacterium she heard from many other people that it is not good she states it "should be taken off the market" she thought she was "dying". She states that she wants to know what is the components of the tetanus shot because she feels that since then and the medications prescribed that she has had a number of problems and blames them all on what was given to her that day. She states that she went to a non [facility] facility and got blood work done and they said something was wrong with her kidneys she refused to tell me where or the results of the test she just said that this is all the information Dr Willis needs to know and he needs to call her  "

## 2018-04-09 NOTE — TELEPHONE ENCOUNTER
Call patient tomorrow      we discussed over the phone about her coming in to repeat a chemistry tests to reassess kidney status    the orders are in     this is a lab only appointment       in addition she was to fax over the lab information that she had at the [facility] emergency room

## 2018-04-10 ENCOUNTER — LAB VISIT (OUTPATIENT)
Dept: LAB | Facility: HOSPITAL | Age: 63
End: 2018-04-10
Attending: INTERNAL MEDICINE
Payer: COMMERCIAL

## 2018-04-10 DIAGNOSIS — N17.9 AKI (ACUTE KIDNEY INJURY): ICD-10-CM

## 2018-04-10 LAB
ANION GAP SERPL CALC-SCNC: 8 MMOL/L
BUN SERPL-MCNC: 12 MG/DL
CALCIUM SERPL-MCNC: 10.1 MG/DL
CHLORIDE SERPL-SCNC: 101 MMOL/L
CO2 SERPL-SCNC: 30 MMOL/L
CREAT SERPL-MCNC: 0.9 MG/DL
EST. GFR  (AFRICAN AMERICAN): >60 ML/MIN/1.73 M^2
EST. GFR  (NON AFRICAN AMERICAN): >60 ML/MIN/1.73 M^2
GLUCOSE SERPL-MCNC: 90 MG/DL
POTASSIUM SERPL-SCNC: 3.7 MMOL/L
SODIUM SERPL-SCNC: 139 MMOL/L

## 2018-04-10 PROCEDURE — 36415 COLL VENOUS BLD VENIPUNCTURE: CPT

## 2018-04-10 PROCEDURE — 80048 BASIC METABOLIC PNL TOTAL CA: CPT

## 2018-04-11 NOTE — TELEPHONE ENCOUNTER
Called pt back and explained that her kidney results were fine but that Dr Willis will call her later and discuss more with her and I asked her if she could fax over the results from [facility] she said she would

## 2018-04-12 ENCOUNTER — DOCUMENTATION ONLY (OUTPATIENT)
Dept: INTERNAL MEDICINE | Facility: CLINIC | Age: 63
End: 2018-04-12

## 2018-04-12 NOTE — PROGRESS NOTES
The recent basic metabolic profile lab tests was fine     mainly the creatinine was 0.9       renal function looks good       this was done because of a recent ER visit to [place] , in which she was told that there was something wrong with her kidneys       I suspect that she may have had a degree of dehydration, resulting in azotemia or acute kidney injury, that now has resolved       she was informed of the test results     proper hydration discuss      she can take an over-the-counter anti-inflammatory such as Aleve ibuprofen if needed, for a few days

## 2018-04-13 ENCOUNTER — NURSE TRIAGE (OUTPATIENT)
Dept: ADMINISTRATIVE | Facility: CLINIC | Age: 63
End: 2018-04-13

## 2018-04-17 ENCOUNTER — TELEPHONE (OUTPATIENT)
Dept: INTERNAL MEDICINE | Facility: CLINIC | Age: 63
End: 2018-04-17

## 2018-04-17 ENCOUNTER — DOCUMENTATION ONLY (OUTPATIENT)
Dept: INTERNAL MEDICINE | Facility: CLINIC | Age: 63
End: 2018-04-17

## 2018-04-17 NOTE — TELEPHONE ENCOUNTER
----- Message from Mar Rodriguez sent at 4/17/2018  3:54 PM CDT -----  Contact: Patient [phone]  She said to please call her once you received the fax she sent because she wants to go over it with you.    Thank you

## 2018-04-17 NOTE — PROGRESS NOTES
This is to document lab evaluation when she was at [facility] in April 1 was faxed over and reviewed     CBC was normal with normal white blood cell count of 7.2, hematocrit 37.8, platelet count 109,000, normal differential    Regarding the kidney there was evidence of acute kidney injury with a creatinine being 2.0 and sodium 131    I suspect that this was due to dehydration and she has responded well with IV fluids in the emergency room and stopping the use of tramadol on Bactrim    A repeat basic metabolic profile the [facility] April 10 was normal    creatinine was 0.9      She was reassured that she does not have chronic kidney disease  or a pre-existing kidney disease    proper hydration was discuss     she can use a nonsteroidal anti-inflammatory drug for a few days if needed      she will probably avoid the use of Bactrim since it made her nauseated and felt bad

## 2018-06-14 DIAGNOSIS — Z12.39 BREAST CANCER SCREENING: ICD-10-CM

## 2018-06-22 ENCOUNTER — TELEPHONE (OUTPATIENT)
Dept: INTERNAL MEDICINE | Facility: CLINIC | Age: 63
End: 2018-06-22

## 2018-06-25 RX ORDER — METOPROLOL SUCCINATE 25 MG/1
25 TABLET, EXTENDED RELEASE ORAL DAILY
Qty: 90 TABLET | Refills: 3 | Status: SHIPPED | OUTPATIENT
Start: 2018-06-25 | End: 2019-08-15

## 2018-06-25 RX ORDER — LOSARTAN POTASSIUM AND HYDROCHLOROTHIAZIDE 25; 100 MG/1; MG/1
1 TABLET ORAL DAILY
Qty: 90 TABLET | Refills: 3 | Status: SHIPPED | OUTPATIENT
Start: 2018-06-25 | End: 2019-08-15

## 2018-06-25 RX ORDER — AMLODIPINE BESYLATE 5 MG/1
5 TABLET ORAL DAILY
Qty: 90 TABLET | Refills: 3 | Status: SHIPPED | OUTPATIENT
Start: 2018-06-25 | End: 2019-08-15

## 2018-07-03 ENCOUNTER — LAB VISIT (OUTPATIENT)
Dept: LAB | Facility: HOSPITAL | Age: 63
End: 2018-07-03
Attending: INTERNAL MEDICINE
Payer: COMMERCIAL

## 2018-07-03 ENCOUNTER — OFFICE VISIT (OUTPATIENT)
Dept: OPTOMETRY | Facility: CLINIC | Age: 63
End: 2018-07-03
Payer: COMMERCIAL

## 2018-07-03 DIAGNOSIS — E78.00 PURE HYPERCHOLESTEROLEMIA: ICD-10-CM

## 2018-07-03 DIAGNOSIS — H52.203 ASTIGMATISM OF BOTH EYES WITH PRESBYOPIA: Primary | ICD-10-CM

## 2018-07-03 DIAGNOSIS — I10 ESSENTIAL HYPERTENSION: ICD-10-CM

## 2018-07-03 DIAGNOSIS — H52.4 ASTIGMATISM OF BOTH EYES WITH PRESBYOPIA: Primary | ICD-10-CM

## 2018-07-03 DIAGNOSIS — I10 ESSENTIAL HYPERTENSION: Primary | ICD-10-CM

## 2018-07-03 LAB
ALBUMIN SERPL BCP-MCNC: 3.8 G/DL
ALP SERPL-CCNC: 71 U/L
ALT SERPL W/O P-5'-P-CCNC: 20 U/L
ANION GAP SERPL CALC-SCNC: 8 MMOL/L
AST SERPL-CCNC: 21 U/L
BASOPHILS # BLD AUTO: 0.05 K/UL
BASOPHILS NFR BLD: 1.1 %
BILIRUB SERPL-MCNC: 0.7 MG/DL
BUN SERPL-MCNC: 9 MG/DL
CALCIUM SERPL-MCNC: 10.1 MG/DL
CHLORIDE SERPL-SCNC: 104 MMOL/L
CHOLEST SERPL-MCNC: 243 MG/DL
CHOLEST/HDLC SERPL: 3 {RATIO}
CO2 SERPL-SCNC: 29 MMOL/L
CREAT SERPL-MCNC: 0.8 MG/DL
DIFFERENTIAL METHOD: NORMAL
EOSINOPHIL # BLD AUTO: 0.3 K/UL
EOSINOPHIL NFR BLD: 6 %
ERYTHROCYTE [DISTWIDTH] IN BLOOD BY AUTOMATED COUNT: 13.1 %
EST. GFR  (AFRICAN AMERICAN): >60 ML/MIN/1.73 M^2
EST. GFR  (NON AFRICAN AMERICAN): >60 ML/MIN/1.73 M^2
GLUCOSE SERPL-MCNC: 84 MG/DL
HCT VFR BLD AUTO: 40.8 %
HDLC SERPL-MCNC: 82 MG/DL
HDLC SERPL: 33.7 %
HGB BLD-MCNC: 13.7 G/DL
IMM GRANULOCYTES # BLD AUTO: 0.02 K/UL
IMM GRANULOCYTES NFR BLD AUTO: 0.4 %
LDLC SERPL CALC-MCNC: 140 MG/DL
LYMPHOCYTES # BLD AUTO: 1.9 K/UL
LYMPHOCYTES NFR BLD: 40 %
MCH RBC QN AUTO: 30.8 PG
MCHC RBC AUTO-ENTMCNC: 33.6 G/DL
MCV RBC AUTO: 92 FL
MONOCYTES # BLD AUTO: 0.5 K/UL
MONOCYTES NFR BLD: 9.8 %
NEUTROPHILS # BLD AUTO: 2 K/UL
NEUTROPHILS NFR BLD: 42.7 %
NONHDLC SERPL-MCNC: 161 MG/DL
NRBC BLD-RTO: 0 /100 WBC
PLATELET # BLD AUTO: 223 K/UL
PMV BLD AUTO: 11.2 FL
POTASSIUM SERPL-SCNC: 4.3 MMOL/L
PROT SERPL-MCNC: 7.3 G/DL
RBC # BLD AUTO: 4.45 M/UL
SODIUM SERPL-SCNC: 141 MMOL/L
TRIGL SERPL-MCNC: 105 MG/DL
TSH SERPL DL<=0.005 MIU/L-ACNC: 1.07 UIU/ML
WBC # BLD AUTO: 4.68 K/UL

## 2018-07-03 PROCEDURE — 99999 PR PBB SHADOW E&M-EST. PATIENT-LVL II: CPT | Mod: PBBFAC,,, | Performed by: OPTOMETRIST

## 2018-07-03 PROCEDURE — 80053 COMPREHEN METABOLIC PANEL: CPT

## 2018-07-03 PROCEDURE — 36415 COLL VENOUS BLD VENIPUNCTURE: CPT

## 2018-07-03 PROCEDURE — 92014 COMPRE OPH EXAM EST PT 1/>: CPT | Mod: S$GLB,,, | Performed by: OPTOMETRIST

## 2018-07-03 PROCEDURE — 85025 COMPLETE CBC W/AUTO DIFF WBC: CPT

## 2018-07-03 PROCEDURE — 80061 LIPID PANEL: CPT

## 2018-07-03 PROCEDURE — 84443 ASSAY THYROID STIM HORMONE: CPT

## 2018-07-03 PROCEDURE — 92015 DETERMINE REFRACTIVE STATE: CPT | Mod: S$GLB,,, | Performed by: OPTOMETRIST

## 2018-07-03 NOTE — PROGRESS NOTES
"HPI     DLS: 7/19/2017 with Dr. Anderson  Pt states va is rapidly decreasing all distances. Wearing otc readers.   Floaters are daily, but not constant. Denies flashes   +Tearing   +Redness    No gtts     S/P LASIK OU // distance only 1999 or 2000 Dr. Sinha     Last edited by Jack Gonzalez, OD on 7/3/2018  1:25 PM. (History)        ROS     Positive for: Eyes (LASIK OU)    Negative for: Constitutional, Gastrointestinal, Neurological, Skin,   Genitourinary, Musculoskeletal, HENT, Endocrine, Cardiovascular,   Respiratory, Psychiatric, Allergic/Imm, Heme/Lymph    Last edited by Jack Gonzalez, OD on 7/3/2018  1:25 PM. (History)        Assessment /Plan     For exam results, see Encounter Report.    Astigmatism of both eyes with presbyopia      1. Sp LASIK OU  2. Cat OU    PLAN:    1, long discussion w patient about cataracts and effects on vision.  Discussed that pt would see better with spex, but will still not be perfect due to cats.  Advised trying new spex first, and if VA not sufficient she will call for cat eval w Dr Desouza.  Warned pt that because of LASIK hx VA may still not be 'crisp" even with cats removed, but VA would be improved.  Otherwise if happy w spex will rtc 1 yr  2. Discussed pt pt that dilating eyedrops would not have caused her cats                 "

## 2018-07-09 ENCOUNTER — TELEPHONE (OUTPATIENT)
Dept: INTERNAL MEDICINE | Facility: CLINIC | Age: 63
End: 2018-07-09

## 2018-07-09 NOTE — TELEPHONE ENCOUNTER
Call patient      overall the lab results looked fine      Need to let her know that the chemistry test was normal which includes testing for kidney and liver    thyroid test was normal    blood counts were normal   there is no anemia or any other blood count abnormality    The cholesterol is a little elevated at 242 but the overall profile is fine in which the HDL or good cholesterol is elevated    We'll call later in regard to the results

## 2018-07-09 NOTE — TELEPHONE ENCOUNTER
----- Message from Massiel Paz sent at 7/9/2018 11:16 AM CDT -----  Contact: self/[phone]  Pt took some lab work on 7/3 and is requesting a call with those results. Please advise.        Thanks

## 2018-09-10 ENCOUNTER — TELEPHONE (OUTPATIENT)
Dept: INTERNAL MEDICINE | Facility: CLINIC | Age: 63
End: 2018-09-10

## 2018-09-10 NOTE — TELEPHONE ENCOUNTER
----- Message from Tracey Kimble sent at 9/10/2018 12:58 PM CDT -----  Contact: self [phone]  Pt states that she having terrible pain in the back of neck, legs and she's having trouble walking.Please call her back as soon as possible      thanks

## 2018-09-10 NOTE — TELEPHONE ENCOUNTER
Dr Willis ,pt is requesting to go to the hospital or visiting you to get a Saratin injection to relieve her pain.   Please advise.

## 2018-09-11 ENCOUNTER — CLINICAL SUPPORT (OUTPATIENT)
Dept: INTERNAL MEDICINE | Facility: CLINIC | Age: 63
End: 2018-09-11
Payer: COMMERCIAL

## 2018-09-11 PROCEDURE — 96372 THER/PROPH/DIAG INJ SC/IM: CPT | Mod: PBBFAC,PO

## 2018-09-11 RX ORDER — KETOROLAC TROMETHAMINE 30 MG/ML
60 INJECTION, SOLUTION INTRAMUSCULAR; INTRAVENOUS
Status: COMPLETED | OUTPATIENT
Start: 2018-09-11 | End: 2018-09-11

## 2018-09-11 RX ADMIN — KETOROLAC TROMETHAMINE 60 MG: 60 INJECTION, SOLUTION INTRAMUSCULAR at 10:09

## 2018-09-24 NOTE — TELEPHONE ENCOUNTER
"----- Message from Pablito Akins sent at 6/30/2017 11:34 AM CDT -----  Contact: self   [phone]  "Georgia, please call me.  I would like to come in to see you now.  " negative...

## 2018-10-01 ENCOUNTER — TELEPHONE (OUTPATIENT)
Dept: INTERNAL MEDICINE | Facility: CLINIC | Age: 63
End: 2018-10-01

## 2018-10-01 NOTE — TELEPHONE ENCOUNTER
----- Message from Georgia De Souza sent at 10/1/2018  4:23 PM CDT -----  Contact: self/[phone]  Patient called in regards needing to talk with Dr Willis's office in regards her appointment today 10/01/18 at 4:15pm. Please call and advise. Thank you

## 2018-10-02 ENCOUNTER — OFFICE VISIT (OUTPATIENT)
Dept: INTERNAL MEDICINE | Facility: CLINIC | Age: 63
End: 2018-10-02
Payer: COMMERCIAL

## 2018-10-02 VITALS
SYSTOLIC BLOOD PRESSURE: 124 MMHG | HEART RATE: 79 BPM | BODY MASS INDEX: 27.11 KG/M2 | OXYGEN SATURATION: 99 % | WEIGHT: 183 LBS | HEIGHT: 69 IN | DIASTOLIC BLOOD PRESSURE: 62 MMHG

## 2018-10-02 DIAGNOSIS — R22.40 LOCALIZED SWELLING OF LOWER LEG: Primary | ICD-10-CM

## 2018-10-02 DIAGNOSIS — M79.642 HAND PAIN, LEFT: ICD-10-CM

## 2018-10-02 PROCEDURE — 3074F SYST BP LT 130 MM HG: CPT | Mod: CPTII,S$GLB,, | Performed by: INTERNAL MEDICINE

## 2018-10-02 PROCEDURE — 3008F BODY MASS INDEX DOCD: CPT | Mod: CPTII,S$GLB,, | Performed by: INTERNAL MEDICINE

## 2018-10-02 PROCEDURE — 99999 PR PBB SHADOW E&M-EST. PATIENT-LVL IV: CPT | Mod: PBBFAC,,, | Performed by: INTERNAL MEDICINE

## 2018-10-02 PROCEDURE — 99214 OFFICE O/P EST MOD 30 MIN: CPT | Mod: S$GLB,,, | Performed by: INTERNAL MEDICINE

## 2018-10-02 PROCEDURE — 3078F DIAST BP <80 MM HG: CPT | Mod: CPTII,S$GLB,, | Performed by: INTERNAL MEDICINE
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
## 2018-10-03 ENCOUNTER — TELEPHONE (OUTPATIENT)
Dept: INTERNAL MEDICINE | Facility: CLINIC | Age: 63
End: 2018-10-03

## 2018-10-03 ENCOUNTER — HOSPITAL ENCOUNTER (OUTPATIENT)
Dept: VASCULAR SURGERY | Facility: CLINIC | Age: 63
Discharge: HOME | End: 2018-10-03
Attending: INTERNAL MEDICINE
Payer: COMMERCIAL

## 2018-10-03 DIAGNOSIS — R22.40 LOCALIZED SWELLING OF LOWER LEG: ICD-10-CM

## 2018-10-03 PROCEDURE — 93971 EXTREMITY STUDY: CPT | Mod: S$GLB,,, | Performed by: SURGERY

## 2018-11-03 ENCOUNTER — HOSPITAL ENCOUNTER (EMERGENCY)
Facility: HOSPITAL | Age: 63
Discharge: HOME | End: 2018-11-03
Attending: EMERGENCY MEDICINE
Payer: COMMERCIAL

## 2018-11-03 VITALS
SYSTOLIC BLOOD PRESSURE: 164 MMHG | TEMPERATURE: 99 F | OXYGEN SATURATION: 99 % | HEART RATE: 72 BPM | DIASTOLIC BLOOD PRESSURE: 76 MMHG | RESPIRATION RATE: 18 BRPM

## 2018-11-03 DIAGNOSIS — R07.9 CHEST PAIN: ICD-10-CM

## 2018-11-03 DIAGNOSIS — R07.89 MUSCULAR CHEST PAIN: Primary | ICD-10-CM

## 2018-11-03 LAB
ALBUMIN SERPL BCP-MCNC: 3.7 G/DL
ALP SERPL-CCNC: 76 U/L
ALT SERPL W/O P-5'-P-CCNC: 21 U/L
ANION GAP SERPL CALC-SCNC: 12 MMOL/L
AST SERPL-CCNC: 29 U/L
BASOPHILS # BLD AUTO: 0.06 K/UL
BASOPHILS NFR BLD: 0.7 %
BILIRUB SERPL-MCNC: 0.5 MG/DL
BUN SERPL-MCNC: 16 MG/DL
CALCIUM SERPL-MCNC: 10.3 MG/DL
CHLORIDE SERPL-SCNC: 101 MMOL/L
CO2 SERPL-SCNC: 25 MMOL/L
CREAT SERPL-MCNC: 1 MG/DL
DIFFERENTIAL METHOD: NORMAL
EOSINOPHIL # BLD AUTO: 0.4 K/UL
EOSINOPHIL NFR BLD: 4.1 %
ERYTHROCYTE [DISTWIDTH] IN BLOOD BY AUTOMATED COUNT: 13.8 %
EST. GFR  (AFRICAN AMERICAN): >60 ML/MIN/1.73 M^2
EST. GFR  (NON AFRICAN AMERICAN): >60 ML/MIN/1.73 M^2
GLUCOSE SERPL-MCNC: 94 MG/DL
HCT VFR BLD AUTO: 41.7 %
HGB BLD-MCNC: 14.3 G/DL
IMM GRANULOCYTES # BLD AUTO: 0.03 K/UL
IMM GRANULOCYTES NFR BLD AUTO: 0.3 %
LYMPHOCYTES # BLD AUTO: 1.8 K/UL
LYMPHOCYTES NFR BLD: 20.5 %
MCH RBC QN AUTO: 30.6 PG
MCHC RBC AUTO-ENTMCNC: 34.3 G/DL
MCV RBC AUTO: 89 FL
MONOCYTES # BLD AUTO: 0.8 K/UL
MONOCYTES NFR BLD: 9 %
NEUTROPHILS # BLD AUTO: 5.7 K/UL
NEUTROPHILS NFR BLD: 65.4 %
NRBC BLD-RTO: 0 /100 WBC
PLATELET # BLD AUTO: 260 K/UL
PMV BLD AUTO: 11.3 FL
POTASSIUM SERPL-SCNC: 4.2 MMOL/L
PROT SERPL-MCNC: 7.9 G/DL
RBC # BLD AUTO: 4.68 M/UL
SODIUM SERPL-SCNC: 138 MMOL/L
TROPONIN I SERPL DL<=0.01 NG/ML-MCNC: 0.01 NG/ML
WBC # BLD AUTO: 8.69 K/UL

## 2018-11-03 PROCEDURE — 85025 COMPLETE CBC W/AUTO DIFF WBC: CPT

## 2018-11-03 PROCEDURE — 84484 ASSAY OF TROPONIN QUANT: CPT

## 2018-11-03 PROCEDURE — 63600175 PHARM REV CODE 636 W HCPCS: Performed by: EMERGENCY MEDICINE

## 2018-11-03 PROCEDURE — 80053 COMPREHEN METABOLIC PANEL: CPT

## 2018-11-03 PROCEDURE — 93005 ELECTROCARDIOGRAM TRACING: CPT

## 2018-11-03 PROCEDURE — 93010 ELECTROCARDIOGRAM REPORT: CPT | Mod: ,,, | Performed by: INTERNAL MEDICINE

## 2018-11-03 PROCEDURE — 25000003 PHARM REV CODE 250: Performed by: EMERGENCY MEDICINE

## 2018-11-03 PROCEDURE — 99283 EMERGENCY DEPT VISIT LOW MDM: CPT

## 2018-11-03 PROCEDURE — 99284 EMERGENCY DEPT VISIT MOD MDM: CPT | Mod: ,,, | Performed by: EMERGENCY MEDICINE

## 2018-11-03 RX ORDER — CYCLOBENZAPRINE HCL 10 MG
5 TABLET ORAL 3 TIMES DAILY
Qty: 15 TABLET | Refills: 0 | Status: SHIPPED | OUTPATIENT
Start: 2018-11-03 | End: 2018-11-03 | Stop reason: SDUPTHER

## 2018-11-03 RX ORDER — CYCLOBENZAPRINE HCL 5 MG
5 TABLET ORAL
Status: COMPLETED | OUTPATIENT
Start: 2018-11-03 | End: 2018-11-03

## 2018-11-03 RX ORDER — CYCLOBENZAPRINE HCL 5 MG
5 TABLET ORAL
Status: DISCONTINUED | OUTPATIENT
Start: 2018-11-03 | End: 2018-11-04 | Stop reason: HOSPADM

## 2018-11-03 RX ORDER — CYCLOBENZAPRINE HCL 10 MG
5 TABLET ORAL 3 TIMES DAILY
Qty: 8 TABLET | Refills: 0 | Status: SHIPPED | OUTPATIENT
Start: 2018-11-03 | End: 2018-11-08

## 2018-11-03 RX ORDER — KETOROLAC TROMETHAMINE 30 MG/ML
30 INJECTION, SOLUTION INTRAMUSCULAR; INTRAVENOUS
Status: DISCONTINUED | OUTPATIENT
Start: 2018-11-03 | End: 2018-11-04 | Stop reason: HOSPADM

## 2018-11-03 RX ADMIN — CYCLOBENZAPRINE HYDROCHLORIDE 5 MG: 5 TABLET, FILM COATED ORAL at 10:11

## 2018-11-03 NOTE — TELEPHONE ENCOUNTER
Reason for Disposition   [1] Chest pain lasts > 5 minutes AND [2] history of heart disease  (i.e., heart attack, bypass surgery, angina, angioplasty, CHF; not just a heart murmur)    Protocols used: ST CHEST PAIN-A-    Patient called while she was driving to inform that she is having severe chest pain. At times she sounds out of breath, and she states she started having this pain about 2 hours ago. Patient instructed to pull over in a safe area to leave her car because she needs to call 911 now. Patient verbalized understanding and she states she did not take her 325 mg aspirin today and she is at a gas station and wanted to buy it now.     1705-patient took the aspirin and some water and states the crushing pain is gone. She is going to drive herself to the ED.   
na

## 2018-11-04 NOTE — ED TRIAGE NOTES
Reports bilateral subscapular stabbing pain radiating to lower back, sternum, and R chest. Reports pain 10/10. Reports chronic numbness in legs. Denies acute numbness and tingling in UE. Pain started this morning and worsened throughout the day. C/o SOB. Reports decreased pain when leaning forward.
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
## 2018-11-04 NOTE — ED NOTES
"Pt offered to be placed in a recliner, instead of a wheelchair, to increase comfort; pt states " I am not going to sit in that chair. You will leave me in this chair"; pt educated on purpose of offer to make her more comfortable as she is verbalizing discomfort however pt still refused, pt states "I am educated and you do not need to tell me anything. I will not be getting out of this chair"; pt placed in REC1 area but remains in wheelchair per her request   "

## 2018-11-04 NOTE — ED NOTES
Dr. Hwang notified of patients concerns regarding her pain control. He agrees to speak with the patient.

## 2018-11-04 NOTE — ED NOTES
Pt yelling at xray tech stating that she is hurting her when moving the wheelchair; xray tech apologetic and attempting to move wheelchair slower; pt continuing to yell

## 2018-11-04 NOTE — ED NOTES
Pt returned from xray; [place] GREEN offered recliner chair a second time to help increase comfort; pt denied

## 2018-11-04 NOTE — ED NOTES
"Pt yelling and expressing that she is displeased with service she is receiving; pt states that nurses need to be "more educated" on her chart and that we are unprofessional; pt states "you will not have a job soon"; charge nurse aware of pt's concerns   "
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
## 2018-11-04 NOTE — ED NOTES
"By request of Dr. Hwang, I attempted to speak with the patient and family at this time regarding their displeasure. Upon introducing myself, the patient and family member immediately began accusing myself and our entire staff of being rude and not caring about their concerns. She stated that she was "suprised it took you so long to see me, so I know you don't even care." I attempted to explain that we were all very busy and that I came to speak with them as soon as I was available to do so. Both the patient and the family member became very verbally aggressive towards me during this conversation. Due to their continued belligerence I was unable to have any type of constructive conversation with neither the patient or her family member. At this point, I contacted the house supervisor to speak with the patient in hopes of coming to a resolution.   "

## 2018-11-04 NOTE — ED NOTES
Pt wheeled to restroom by daughter; pt independently ambulated from wheelchair to toilet and back; wheeled back to [facility] area by daughter

## 2018-11-04 NOTE — ED NOTES
"Pt states " you are not going to just give me anything. I want to see what you are doing and what you are drawing up. You will not just give me anything"; meds not drawn up at this time and remained in vial; pt's daughter states "don't worry, I am watching what he is doing"  "

## 2018-11-04 NOTE — ED NOTES
Pt repeatedly verbalizing pain however denies multiple offers from MD for various pain medications

## 2018-11-04 NOTE — ED NOTES
"As an addendum--pt was moved to rec 01 since she was a "chest painer" and most of the time they want to be in a recliner--along with her moaning and felt that the dark quiet area would be best--also the TV can not be seen from the bed 6--why they wanted that area was unknown to  Armando and I. Pt and daughter picked on everything we did.  "

## 2018-11-04 NOTE — ED NOTES
"MD w/ pt discussing pain; pt states "toradol is the only thing I take and will trust"; pt now states that "I do not want toradol because it takes hours to work"; MD exploring other options for pain control; pt states "I don't want narcotics. I don't take drugs"     Pt expressing that she is displeased with the service she is receiving to the MD; states that "I don't do drugs, he must do drugs, he aint talking to someone on the street, he needs to become more educated and professional"; MD empathizing with pt  "

## 2018-11-05 ENCOUNTER — NURSE TRIAGE (OUTPATIENT)
Dept: ADMINISTRATIVE | Facility: CLINIC | Age: 63
End: 2018-11-05

## 2018-11-05 NOTE — TELEPHONE ENCOUNTER
"Pt was calling to speak with Dr. Gonzalez. She is complaining about back pain that is "making her chest hurt". Pt frustrated on the phone. She refused triage even after I explained to her that this could be cardiac related. Pt was sent in the ER yesterday for chest pain and was told to follow up with her PCP. Pt is demanding to pseak with PCP. Please contact to advise    Reason for Disposition   Nursing judgment    Protocols used: ST NO PROTOCOL CALL: INFORMATION ONLY-A-OH      "

## 2018-11-06 ENCOUNTER — OFFICE VISIT (OUTPATIENT)
Dept: INTERNAL MEDICINE | Facility: CLINIC | Age: 63
End: 2018-11-06
Payer: COMMERCIAL

## 2018-11-06 ENCOUNTER — HOSPITAL ENCOUNTER (OUTPATIENT)
Dept: RADIOLOGY | Facility: HOSPITAL | Age: 63
Discharge: HOME | End: 2018-11-06
Attending: NURSE PRACTITIONER
Payer: COMMERCIAL

## 2018-11-06 ENCOUNTER — TELEPHONE (OUTPATIENT)
Dept: INTERNAL MEDICINE | Facility: CLINIC | Age: 63
End: 2018-11-06

## 2018-11-06 VITALS
SYSTOLIC BLOOD PRESSURE: 130 MMHG | TEMPERATURE: 98 F | WEIGHT: 189.13 LBS | BODY MASS INDEX: 28.01 KG/M2 | DIASTOLIC BLOOD PRESSURE: 68 MMHG | OXYGEN SATURATION: 98 % | HEIGHT: 69 IN | HEART RATE: 77 BPM
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
## 2018-11-06 DIAGNOSIS — M79.641 PAIN OF RIGHT HAND: ICD-10-CM

## 2018-11-06 DIAGNOSIS — M25.561 ACUTE PAIN OF RIGHT KNEE: ICD-10-CM

## 2018-11-06 DIAGNOSIS — M62.838 MUSCLE SPASM: Primary | ICD-10-CM

## 2018-11-06 DIAGNOSIS — M62.838 MUSCLE SPASM: ICD-10-CM

## 2018-11-06 PROCEDURE — 73030 X-RAY EXAM OF SHOULDER: CPT | Mod: 26,RT,, | Performed by: RADIOLOGY

## 2018-11-06 PROCEDURE — 73130 X-RAY EXAM OF HAND: CPT | Mod: TC,RT

## 2018-11-06 PROCEDURE — 73130 X-RAY EXAM OF HAND: CPT | Mod: 26,RT,, | Performed by: RADIOLOGY

## 2018-11-06 PROCEDURE — 99999 PR PBB SHADOW E&M-EST. PATIENT-LVL V: CPT | Mod: PBBFAC,,, | Performed by: NURSE PRACTITIONER

## 2018-11-06 PROCEDURE — 3075F SYST BP GE 130 - 139MM HG: CPT | Mod: CPTII,S$GLB,, | Performed by: NURSE PRACTITIONER

## 2018-11-06 PROCEDURE — 3008F BODY MASS INDEX DOCD: CPT | Mod: CPTII,S$GLB,, | Performed by: NURSE PRACTITIONER

## 2018-11-06 PROCEDURE — 73560 X-RAY EXAM OF KNEE 1 OR 2: CPT | Mod: TC,RT

## 2018-11-06 PROCEDURE — 96372 THER/PROPH/DIAG INJ SC/IM: CPT | Mod: S$GLB,,, | Performed by: NURSE PRACTITIONER

## 2018-11-06 PROCEDURE — 99214 OFFICE O/P EST MOD 30 MIN: CPT | Mod: 25,S$GLB,, | Performed by: NURSE PRACTITIONER

## 2018-11-06 PROCEDURE — 3078F DIAST BP <80 MM HG: CPT | Mod: CPTII,S$GLB,, | Performed by: NURSE PRACTITIONER

## 2018-11-06 PROCEDURE — 73030 X-RAY EXAM OF SHOULDER: CPT | Mod: TC,RT

## 2018-11-06 PROCEDURE — 73560 X-RAY EXAM OF KNEE 1 OR 2: CPT | Mod: 26,RT,, | Performed by: RADIOLOGY

## 2018-11-06 RX ORDER — KETOROLAC TROMETHAMINE 30 MG/ML
60 INJECTION, SOLUTION INTRAMUSCULAR; INTRAVENOUS
Status: COMPLETED | OUTPATIENT
Start: 2018-11-06 | End: 2018-11-06

## 2018-11-06 RX ADMIN — KETOROLAC TROMETHAMINE 60 MG: 30 INJECTION, SOLUTION INTRAMUSCULAR; INTRAVENOUS at 06:11

## 2018-11-06 NOTE — TELEPHONE ENCOUNTER
I spoke with patient she states she has been experiencing  back pain and she went to The ED and she was not satisfied so she left and she stated she spoke with phone staff and was not satisfied so she hung she thought she was going to send a message even though she hung up in frustration. I scheduled patient for a an appt today at 5pm with Joann Paige
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
## 2018-11-06 NOTE — TELEPHONE ENCOUNTER
Pt is having chest and back pain she want to see someone in orthopedics for the back pain but that is the spine clinic at [facility], [place], operated [facility] she will need testing before scheduling please advise thanks . I tried scheduling orthopedic but they said it is spine clinic

## 2018-11-06 NOTE — LETTER
November 6, 2018      [facility]  [address]  [facility] [address]  Phone: [phone]  Fax: [phone]       Patient: Josi Gil   YOB: 1955  Date of Visit: 11/06/2018    To Whom It May Concern:    Sierra Gil  was at [facility] on 11/06/2018. She may return to work on 11/08/2018 with no restrictions. If you have any questions or concerns, or if I can be of further assistance, please do not hesitate to contact me.    Sincerely,    Joann ROBERTS

## 2018-11-07 ENCOUNTER — TELEPHONE (OUTPATIENT)
Dept: SPINE | Facility: CLINIC | Age: 63
End: 2018-11-07

## 2018-11-07 ENCOUNTER — TELEPHONE (OUTPATIENT)
Dept: INTERNAL MEDICINE | Facility: CLINIC | Age: 63
End: 2018-11-07

## 2018-11-07 ENCOUNTER — HOSPITAL ENCOUNTER (OUTPATIENT)
Dept: RADIOLOGY | Facility: HOSPITAL | Age: 63
Discharge: HOME | End: 2018-11-07
Attending: INTERNAL MEDICINE
Payer: COMMERCIAL

## 2018-11-07 DIAGNOSIS — Z12.39 BREAST CANCER SCREENING: ICD-10-CM

## 2018-11-07 PROCEDURE — 77067 SCR MAMMO BI INCL CAD: CPT | Mod: TC

## 2018-11-07 PROCEDURE — 77067 SCR MAMMO BI INCL CAD: CPT | Mod: 26,,, | Performed by: RADIOLOGY
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
## 2018-11-07 NOTE — TELEPHONE ENCOUNTER
Spoke to patient and offered 1:30 appt for today and also 11:00 on tomorrow.  Patient declined both appts and disconnected the phone call.

## 2018-11-07 NOTE — TELEPHONE ENCOUNTER
Please call Ms. Gil. Her knee, hand and shoulder xrays all show degenerative, arthritic changes. Specifically, she has arthritis in her right thumb at the joint where she was having pain, she has arthritis of the right knee, worse on the medial side, and she has cystic change to her right shoulder.    Since she missed her appointment with back and spine today, she should reschedule with them for her back pain.     For her shoulder and knee I recommend that she follow up with orthopedics. I have placed the consult order. Please assist with scheduling    Thank you.
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
## 2018-11-08 NOTE — TELEPHONE ENCOUNTER
----- Message from Tracey Kimble sent at 11/8/2018  3:57 PM CST -----  Contact: Patient [phone]  Type: Returning a call    Who left a message?Marbella Harrington MA    When did the practice call?Today    Comments:Please call back      Thanks

## 2018-11-08 NOTE — TELEPHONE ENCOUNTER
----- Message from Ale Beverly sent at 11/8/2018 11:00 AM CST -----  Contact: PT  Pt called to speak to the nurse to request a medication refill and would like a call back.    Pt can be reached at [phone].    Thanks

## 2018-11-09 RX ORDER — KETOROLAC TROMETHAMINE 10 MG/1
10 TABLET, FILM COATED ORAL EVERY 6 HOURS
Qty: 30 TABLET | Refills: 0 | OUTPATIENT
Start: 2018-11-09

## 2018-11-09 NOTE — TELEPHONE ENCOUNTER
----- Message from Arian Cross sent at 2018  3:54 PM CST -----  Contact: Patient [phone]  RX request - refill or new RX.  Is this a refill or new RX:    RX name and strength: Ketorolac 10 mg    Is this a 30 day or 90 day RX:  30 Day Supply  Pharmacy name and phone #  [id number] - [facility] [address] [facility] [address] [phone] (Phone) [phone] (Fax    Comments:  Would like a call back to inform Rx has been sent and latest test results.    Please call an advise  Thank you

## 2018-11-09 NOTE — TELEPHONE ENCOUNTER
----- Message from Arian Cross sent at 2018  3:54 PM CST -----  Contact: Patient [phone]  RX request - refill or new RX.  Is this a refill or new RX:    RX name and strength: Ketorolac 10 mg    Is this a 30 day or 90 day RX:  30 Day Supply  Pharmacy name and phone #  [id number] - [facility] [address] [facility] [address] [phone] (Phone) [phone] (Fax    Comments:  Would like a call back to inform Rx has been sent and latest test results.    Please call an advise  Thank you

## 2018-11-13 ENCOUNTER — TELEPHONE (OUTPATIENT)
Dept: INTERNAL MEDICINE | Facility: CLINIC | Age: 63
End: 2018-11-13

## 2018-11-13 NOTE — TELEPHONE ENCOUNTER
----- Message from Carlyn Davalos sent at 11/12/2018  4:55 PM CST -----  Contact: [phone]  PT states she would like a call back in regards to her test results. Pt states she has tried on several occasions to get in touch with  and has not received a response. Please call and advise.

## 2018-11-14 ENCOUNTER — TELEPHONE (OUTPATIENT)
Dept: INTERNAL MEDICINE | Facility: CLINIC | Age: 63
End: 2018-11-14

## 2018-11-19 ENCOUNTER — TELEPHONE (OUTPATIENT)
Dept: INTERNAL MEDICINE | Facility: CLINIC | Age: 63
End: 2018-11-19

## 2018-11-19 NOTE — TELEPHONE ENCOUNTER
Left voice message to return call, to let pt know vascular study was normal and that it showed no blood clots and no vein abnormalities

## 2018-11-19 NOTE — TELEPHONE ENCOUNTER
----- Message from Chrissy Gil sent at 11/19/2018  4:10 PM CST -----  Contact: [phone]  Patient is requesting her test results,please advise, thank you.

## 2018-11-19 NOTE — TELEPHONE ENCOUNTER
----- Message from Becca Wells sent at 11/19/2018  8:28 AM CST -----  Contact: self/[phone]  Patient would like to get test results    Name of test (lab, mammo, etc.):   vascular    Date of test:  10/03  Ordering provider: Lazaro    Where was the test performed:  [facility]    Would you prefer a response via [employer]?:      Comments:

## 2018-11-20 NOTE — TELEPHONE ENCOUNTER
----- Message from Lena Goel sent at 11/19/2018  4:31 PM CST -----  Contact: [phone]  Patient is returning a call from the office.    Please advise, thanks

## 2018-12-01 ENCOUNTER — OFFICE VISIT (OUTPATIENT)
Dept: URGENT CARE | Facility: CLINIC | Age: 63
End: 2018-12-01
Payer: COMMERCIAL

## 2018-12-01 VITALS
DIASTOLIC BLOOD PRESSURE: 68 MMHG | HEART RATE: 69 BPM | WEIGHT: 189 LBS | OXYGEN SATURATION: 100 % | RESPIRATION RATE: 18 BRPM | SYSTOLIC BLOOD PRESSURE: 127 MMHG | HEIGHT: 69 IN | BODY MASS INDEX: 27.99 KG/M2 | TEMPERATURE: 98 F

## 2018-12-01 DIAGNOSIS — M25.511 ACUTE PAIN OF RIGHT SHOULDER: Primary | ICD-10-CM

## 2018-12-01 PROCEDURE — 3008F BODY MASS INDEX DOCD: CPT | Mod: CPTII,S$GLB,, | Performed by: NURSE PRACTITIONER

## 2018-12-01 PROCEDURE — 3078F DIAST BP <80 MM HG: CPT | Mod: CPTII,S$GLB,, | Performed by: NURSE PRACTITIONER

## 2018-12-01 PROCEDURE — 99214 OFFICE O/P EST MOD 30 MIN: CPT | Mod: S$GLB,,, | Performed by: NURSE PRACTITIONER

## 2018-12-01 PROCEDURE — 3074F SYST BP LT 130 MM HG: CPT | Mod: CPTII,S$GLB,, | Performed by: NURSE PRACTITIONER

## 2018-12-01 PROCEDURE — 73030 X-RAY EXAM OF SHOULDER: CPT | Mod: RT,S$GLB,, | Performed by: INTERNAL MEDICINE

## 2018-12-01 NOTE — PATIENT INSTRUCTIONS
X-ray Shoulder Trauma 3 View Right    Result Date: 11/7/2018  EXAMINATION: XR SHOULDER TRAUMA 3 VIEW RIGHT CLINICAL HISTORY: Other muscle spasm TECHNIQUE: Three or four views of the right shoulder were performed.  AP, scapular Y, and axillary views. COMPARISON: 07/21/2015 FINDINGS: The skeletal structures are intact with good alignment.  Joint spaces are not significantly narrowed.  The minor bony degenerative changes are present.  A geode or cyst is noted at the humeral head.  No abnormal soft tissue calcifications are seen.     No acute abnormality.  Minor degenerative change at shoulder. Electronically signed by: Estrada Mendez MD Date:    11/07/2018 Time:    08:31        Shoulder Sprain  A sprain is a stretching or tearing of the ligaments that hold a joint together. A sprain may take up to 8 weeks to fully heal, depending on how severe it is. Moderate to severe shoulder sprains are treated with a sling or shoulder immobilizer. Minor sprains can be treated without any special support.  Home care  The following guidelines will help you care for your injury at home:  · If a sling was given to you, leave it in place for the time advised by your healthcare provider. If you arent sure how long to wear it, ask for advice. If the sling becomes loose, adjust it so that your forearm is level with the ground. Your shoulder should feel well supported.  · Put an ice pack on the injured area for 20 minutes every 1 to 2 hours the first day. You can make your own ice pack by putting ice cubes in a plastic bag. A bag of frozen peas or something similar works well too. Wrap the bag in a thin towel. Continue with ice packs 3 to 4 times a day for the next 2 to 3 days. Then use the pack as needed to ease pain and swelling.  · You may use acetaminophen or ibuprofen to control pain, unless another pain medicine was prescribed. If you have chronic liver or kidney disease, talk with your healthcare provider before using these  medicines. Also talk with your provider if youve had a stomach ulcer or gastrointestinal bleeding.  · Shoulder joints become stiff if left in a sling for too long. You should start range of motion exercises about 7 to 10 days after the injury. Talk with your provider to find out what type of exercises to do and how soon to start.  Follow-up care  Follow up with your healthcare provider, or as advised.  Any X-rays you had today dont show any broken bones, breaks, or fractures. Sometimes fractures dont show up on the first X-ray. Bruises and sprains can sometimes hurt as much as a fracture. These injuries can take time to heal completely. If your symptoms dont improve or they get worse, talk with your provider. You may need a repeat X-ray or other treatments.  When to seek medical advice  Call your healthcare provider right away if any of these occur:  · Shoulder pain or swelling in your arm that gets worse  · Fingers become cold, blue, numb, or tingly  · Large amount of bruising of the shoulder or upper arm  · Fever or chills  Date Last Reviewed: 8/1/2016  © 4948-0201 [employer]. [address] [place] [address]. All rights reserved. This information is not intended as a substitute for professional medical care. Always follow your healthcare professional's instructions.

## 2018-12-04 ENCOUNTER — TELEPHONE (OUTPATIENT)
Dept: INTERNAL MEDICINE | Facility: CLINIC | Age: 63
End: 2018-12-04

## 2018-12-04 NOTE — TELEPHONE ENCOUNTER
----- Message from Chrissy Gil sent at 12/4/2018  4:11 PM CST -----  Contact: [phone]  Patient is requesting a call from the doctor/nurse in regards to lab results and recalled medication  amLODIPine (NORVASC) 5 MG tablet,  losartan-hydrochlorothiazide 100-25 mg (HYZAAR) 100-25 mg per tablet.    Darby advise, thank you

## 2018-12-14 ENCOUNTER — TELEPHONE (OUTPATIENT)
Dept: INTERNAL MEDICINE | Facility: CLINIC | Age: 63
End: 2018-12-14

## 2018-12-14 NOTE — TELEPHONE ENCOUNTER
----- Message from Shwetha Reardon sent at 12/14/2018  9:25 AM CST -----  Contact: self  Pt would like to discuss problems that she is having and also the medication that was prescribed.      Pt can be reached at [phone]  Please call after 4pm

## 2018-12-18 ENCOUNTER — OFFICE VISIT (OUTPATIENT)
Dept: URGENT CARE | Facility: CLINIC | Age: 63
End: 2018-12-18
Payer: COMMERCIAL

## 2018-12-18 ENCOUNTER — TELEPHONE (OUTPATIENT)
Dept: INTERNAL MEDICINE | Facility: CLINIC | Age: 63
End: 2018-12-18

## 2018-12-18 VITALS
SYSTOLIC BLOOD PRESSURE: 154 MMHG | RESPIRATION RATE: 17 BRPM | DIASTOLIC BLOOD PRESSURE: 66 MMHG | HEART RATE: 68 BPM | BODY MASS INDEX: 27.99 KG/M2 | OXYGEN SATURATION: 100 % | TEMPERATURE: 98 F | HEIGHT: 69 IN | WEIGHT: 189 LBS

## 2018-12-18 DIAGNOSIS — M25.561 CHRONIC PAIN OF RIGHT KNEE: ICD-10-CM

## 2018-12-18 DIAGNOSIS — M25.511 CHRONIC RIGHT SHOULDER PAIN: Primary | ICD-10-CM

## 2018-12-18 DIAGNOSIS — M79.605 LEFT LEG PAIN: ICD-10-CM

## 2018-12-18 DIAGNOSIS — M25.562 POSTERIOR LEFT KNEE PAIN: ICD-10-CM

## 2018-12-18 DIAGNOSIS — G89.29 CHRONIC RIGHT SHOULDER PAIN: Primary | ICD-10-CM

## 2018-12-18 DIAGNOSIS — G89.29 CHRONIC PAIN OF RIGHT KNEE: ICD-10-CM

## 2018-12-18 PROCEDURE — 3078F DIAST BP <80 MM HG: CPT | Mod: CPTII,S$GLB,, | Performed by: NURSE PRACTITIONER

## 2018-12-18 PROCEDURE — 3077F SYST BP >= 140 MM HG: CPT | Mod: CPTII,S$GLB,, | Performed by: NURSE PRACTITIONER

## 2018-12-18 PROCEDURE — 99214 OFFICE O/P EST MOD 30 MIN: CPT | Mod: 25,S$GLB,, | Performed by: NURSE PRACTITIONER

## 2018-12-18 PROCEDURE — 96372 THER/PROPH/DIAG INJ SC/IM: CPT | Mod: 59,S$GLB,, | Performed by: NURSE PRACTITIONER

## 2018-12-18 PROCEDURE — 3008F BODY MASS INDEX DOCD: CPT | Mod: CPTII,S$GLB,, | Performed by: NURSE PRACTITIONER

## 2018-12-18 RX ORDER — NAPROXEN 500 MG/1
TABLET ORAL
Refills: 4 | COMMUNITY
Start: 2018-12-02 | End: 2019-01-24

## 2018-12-18 RX ORDER — BETAMETHASONE SODIUM PHOSPHATE AND BETAMETHASONE ACETATE 3; 3 MG/ML; MG/ML
6 INJECTION, SUSPENSION INTRA-ARTICULAR; INTRALESIONAL; INTRAMUSCULAR; SOFT TISSUE
Status: COMPLETED | OUTPATIENT
Start: 2018-12-18 | End: 2018-12-18

## 2018-12-18 RX ORDER — METHYLPREDNISOLONE 4 MG/1
TABLET ORAL
Qty: 1 PACKAGE | Refills: 0 | Status: SHIPPED | OUTPATIENT
Start: 2018-12-18 | End: 2019-01-08 | Stop reason: ALTCHOICE

## 2018-12-18 RX ADMIN — BETAMETHASONE SODIUM PHOSPHATE AND BETAMETHASONE ACETATE 6 MG: 3; 3 INJECTION, SUSPENSION INTRA-ARTICULAR; INTRALESIONAL; INTRAMUSCULAR; SOFT TISSUE at 04:12

## 2018-12-18 NOTE — LETTER
December 18, 2018      [facility] [address] [address]  Phone: [phone]  Fax: [phone]       Patient: Josi Gil   YOB: 1955  Date of Visit: 12/18/2018    To Whom It May Concern:    Sierra Gil  was at [facility] on 12/18/2018. She may return to work/school on 12/19/2018  with no restrictions. If you have any questions or concerns, or if I can be of further assistance, please do not hesitate to contact me.    Sincerely,        Sushma Reardon NP

## 2018-12-18 NOTE — PATIENT INSTRUCTIONS
Follow up with your doctor in a few days.  Return to the urgent care or go to the ER if symptoms get worse.      Please call [phone] if you do not hear from [facility] to get your referral appointment we discussed. ORTHOPEDIC REFERRAL PLACED.    START ORAL STEROIDS TOMORROW.  You received a steroid shot today - this can elevate your blood pressure, elevate your blood sugar, water weight gain, nervous energy, redness to the face and dimpling of the skin where the shot goes in.       Osteoarthritis: Common Sites  Osteoarthritis (OA) is sometimes called degenerative joint disease or wear-and-tear arthritis. It's the most common type of arthritis. In OA, the cartilage wears away. Cartilage covers the ends of bones and acts as a cushion. If enough cartilage wears away, bone rubs against bone. The joint changes in OA cause pain, stiffness, and trouble moving. OA may occur in any joint. Some joints that are commonly affected are the spine, neck,  hips, knees, fingers, and toes.     Neck  Joints between small bones in the neck may wear out. Pain may travel to the shoulder or the base of the skull.  Lumbar Spine  Bony spurs may form on the joints between the vertebrae (spinal bones). And disks (cushions of cartilage between vertebrae) may wear down. Pain may affect the lower back or leg.  Hip  Cartilage damage can occur in the large ball and socket joint that connects the pelvis and thighbone (femur). Pain may travel to the groin, buttocks, or knee.  Knee  The cartilage in the knee joint may wear down. Weakness or instability in the knee joint may make walking or climbing stairs difficult.  Fingers  Finger joints may become enlarged and knobby. Grasping objects may be hard, especially if the joint at the base of the thumb is affected.  Toes  Toes may be affected. Arthritis may cause a bunion, a bump at the base of the big toe. Standing or walking may be painful.  Date Last Reviewed: 2/14/2016  © 7820-7411 The  [employer] [address] [place] [address], [place] [address]. All rights reserved. This information is not intended as a substitute for professional medical care. Always follow your healthcare professional's instructions.

## 2018-12-18 NOTE — PROGRESS NOTES
"Subjective:       Patient ID: Josi Gil is a 63 y.o. female.    Vitals:  height is 5' 9" (1.753 m) and weight is 85.7 kg (189 lb). Her oral temperature is 98.3 °F (36.8 °C). Her blood pressure is 154/66 (abnormal) and her pulse is 68. Her respiration is 17 and oxygen saturation is 100%.     Chief Complaint: Pain and Knee Pain    Was seen one week ago for right shoulder pain, now says her right knee has pain. Hx of shoulder and knee pain. Last shoulder imaging was last week. Knee imaging was a few months ago. Requesting to review her results.   Left leg:  Reports some tenderness over shin, swelling, tenderness behind left knee and some leg swelling. No hx of known trauma. Asa as needed. No hx recent immobilization, air travel, oral contraception, hx of pa or dvts.       Pain   This is a recurrent problem. The current episode started 1 to 4 weeks ago. The problem occurs constantly. The problem has been unchanged. Pertinent negatives include no arthralgias, chest pain, chills, congestion, coughing, fatigue, fever, headaches, joint swelling, myalgias, nausea, rash, sore throat, vertigo, vomiting or weakness.   Knee Pain    The incident occurred more than 1 week ago. The pain is at a severity of 6/10. The pain is moderate. The pain has been constant since onset. She reports no foreign bodies present.       Constitution: Negative for chills, fatigue and fever.   HENT: Negative for congestion and sore throat.    Neck: Negative for painful lymph nodes.   Cardiovascular: Negative for chest pain and leg swelling.   Eyes: Negative for double vision and blurred vision.   Respiratory: Negative for cough and shortness of breath.    Gastrointestinal: Negative for nausea, vomiting and diarrhea.   Genitourinary: Negative for dysuria, frequency, urgency and history of kidney stones.   Musculoskeletal: Positive for pain. Negative for joint pain, joint swelling, muscle cramps and muscle ache.   Skin: Negative for color " change, pale, rash and bruising.   Allergic/Immunologic: Negative for seasonal allergies.   Neurological: Negative for dizziness, history of vertigo, light-headedness, passing out and headaches.   Hematologic/Lymphatic: Negative for swollen lymph nodes.   Psychiatric/Behavioral: Negative for nervous/anxious, sleep disturbance and depression. The patient is not nervous/anxious.        Objective:      Physical Exam   Constitutional: She is oriented to person, place, and time. She appears well-developed and well-nourished. She is cooperative.  Non-toxic appearance. She does not appear ill. No distress.   HENT:   Head: Normocephalic and atraumatic. Head is without abrasion, without contusion and without laceration.   Right Ear: Hearing, tympanic membrane, external ear and ear canal normal. No hemotympanum.   Left Ear: Hearing, tympanic membrane, external ear and ear canal normal. No hemotympanum.   Nose: Nose normal. No mucosal edema, rhinorrhea or nasal deformity. No epistaxis. Right sinus exhibits no maxillary sinus tenderness and no frontal sinus tenderness. Left sinus exhibits no maxillary sinus tenderness and no frontal sinus tenderness.   Mouth/Throat: Uvula is midline, oropharynx is clear and moist and mucous membranes are normal. No trismus in the jaw. Normal dentition. No uvula swelling. No posterior oropharyngeal erythema.   Eyes: Conjunctivae, EOM and lids are normal. Pupils are equal, round, and reactive to light. Right eye exhibits no discharge. Left eye exhibits no discharge. No scleral icterus.   Sclera clear bilat   Neck: Trachea normal, normal range of motion, full passive range of motion without pain and phonation normal. Neck supple. No spinous process tenderness and no muscular tenderness present. No neck rigidity. No tracheal deviation present.   Cardiovascular: Normal rate, regular rhythm, normal heart sounds, intact distal pulses and normal pulses.   Pulmonary/Chest: Effort normal and breath  sounds normal. No respiratory distress.   Abdominal: Soft. Normal appearance and bowel sounds are normal. She exhibits no distension, no pulsatile midline mass and no mass. There is no tenderness.   Musculoskeletal: She exhibits no edema or deformity.        Right shoulder: She exhibits decreased range of motion, tenderness and bony tenderness. She exhibits no swelling.        Right knee: She exhibits decreased range of motion and swelling. She exhibits no laceration. Tenderness found. Patellar tendon tenderness noted.        Left lower leg: She exhibits tenderness and swelling.        Legs:  Tenderness posterior left knee and left calf. Mild to minimal edema of left LE.   Neurological: She is alert and oriented to person, place, and time. She has normal strength. No cranial nerve deficit or sensory deficit. She exhibits normal muscle tone. She displays no seizure activity. Coordination normal. GCS eye subscore is 4. GCS verbal subscore is 5. GCS motor subscore is 6.   Skin: Skin is warm, dry and intact. Capillary refill takes less than 2 seconds. No abrasion, no bruising, no burn, no ecchymosis and no laceration noted. She is not diaphoretic. No pallor.   Psychiatric: She has a normal mood and affect. Her speech is normal and behavior is normal. Judgment and thought content normal. Cognition and memory are normal.   Nursing note and vitals reviewed.      Assessment:       1. Chronic right shoulder pain    2. Chronic pain of right knee    3. Left leg pain    4. Posterior left knee pain        Plan:         Chronic right shoulder pain  -     betamethasone acetate-betamethasone sodium phosphate injection 6 mg  -     methylPREDNISolone (MEDROL DOSEPACK) 4 mg tablet; use as directed  Dispense: 1 Package; Refill: 0  -     Ambulatory referral to Orthopedics    Chronic pain of right knee  -     betamethasone acetate-betamethasone sodium phosphate injection 6 mg  -     methylPREDNISolone (MEDROL DOSEPACK) 4 mg tablet; use  as directed  Dispense: 1 Package; Refill: 0  -     Ambulatory referral to Orthopedics    Left leg pain  -     US Lower Extremity Veins Left; Future; Expected date: 12/18/2018    Posterior left knee pain  -     US Lower Extremity Veins Left; Future; Expected date: 12/18/2018      Patient Instructions   Follow up with your doctor in a few days.  Return to the urgent care or go to the ER if symptoms get worse.      Please call [phone] if you do not hear from [facility] to get your referral appointment we discussed. ORTHOPEDIC REFERRAL PLACED.    START ORAL STEROIDS TOMORROW.  You received a steroid shot today - this can elevate your blood pressure, elevate your blood sugar, water weight gain, nervous energy, redness to the face and dimpling of the skin where the shot goes in.       Osteoarthritis: Common Sites  Osteoarthritis (OA) is sometimes called degenerative joint disease or wear-and-tear arthritis. It's the most common type of arthritis. In OA, the cartilage wears away. Cartilage covers the ends of bones and acts as a cushion. If enough cartilage wears away, bone rubs against bone. The joint changes in OA cause pain, stiffness, and trouble moving. OA may occur in any joint. Some joints that are commonly affected are the spine, neck,  hips, knees, fingers, and toes.     Neck  Joints between small bones in the neck may wear out. Pain may travel to the shoulder or the base of the skull.  Lumbar Spine  Bony spurs may form on the joints between the vertebrae (spinal bones). And disks (cushions of cartilage between vertebrae) may wear down. Pain may affect the lower back or leg.  Hip  Cartilage damage can occur in the large ball and socket joint that connects the pelvis and thighbone (femur). Pain may travel to the groin, buttocks, or knee.  Knee  The cartilage in the knee joint may wear down. Weakness or instability in the knee joint may make walking or climbing stairs difficult.  Fingers  Finger joints may  become enlarged and knobby. Grasping objects may be hard, especially if the joint at the base of the thumb is affected.  Toes  Toes may be affected. Arthritis may cause a bunion, a bump at the base of the big toe. Standing or walking may be painful.  Date Last Reviewed: 2/14/2016  © 6203-7204 [employer]. [address] [place] [address]. All rights reserved. This information is not intended as a substitute for professional medical care. Always follow your healthcare professional's instructions.

## 2018-12-18 NOTE — LETTER
December 18, 2018      [facility] [address] [address]  Phone: [phone]  Fax: [phone]       Patient: Josi Gil   YOB: 1955  Date of Visit: 12/18/2018    To Whom It May Concern:    Sierra Gil  was at [facility] on 12/18/2018. She may return to work/school on 12/20/2018 with no restrictions. If you have any questions or concerns, or if I can be of further assistance, please do not hesitate to contact me.    Sincerely,        Sushma Reardon NP

## 2018-12-18 NOTE — TELEPHONE ENCOUNTER
----- Message from Bren Taylor sent at 12/18/2018 11:31 AM CST -----  Contact: pt  Pt would like to be called back regarding  An appt, didn't  want to schedule appt 1/7/2018  Pt can be reached at [phone]

## 2018-12-19 ENCOUNTER — HOSPITAL ENCOUNTER (OUTPATIENT)
Dept: RADIOLOGY | Facility: OTHER | Age: 63
Discharge: HOME | End: 2018-12-19
Attending: NURSE PRACTITIONER
Payer: COMMERCIAL

## 2018-12-19 DIAGNOSIS — M79.605 LEFT LEG PAIN: ICD-10-CM

## 2018-12-19 DIAGNOSIS — M25.562 POSTERIOR LEFT KNEE PAIN: ICD-10-CM

## 2018-12-19 PROCEDURE — 93971 EXTREMITY STUDY: CPT | Mod: TC

## 2018-12-19 PROCEDURE — 93971 EXTREMITY STUDY: CPT | Mod: 26,,, | Performed by: RADIOLOGY

## 2018-12-20 ENCOUNTER — TELEPHONE (OUTPATIENT)
Dept: URGENT CARE | Facility: CLINIC | Age: 63
End: 2018-12-20

## 2018-12-20 NOTE — TELEPHONE ENCOUNTER
Called and left  for patient to review with her US results. I would recommend compression stockings and elevating her legs as much as possible during the day. Follow with her PCP     1. No evidence of deep venous thrombosis in the left lower extremity.  2. Subcutaneous edema/trace fluid along the anterior left lower leg corresponding with the site of pain.

## 2018-12-27 ENCOUNTER — TELEPHONE (OUTPATIENT)
Dept: INTERNAL MEDICINE | Facility: CLINIC | Age: 63
End: 2018-12-27

## 2018-12-27 NOTE — TELEPHONE ENCOUNTER
"Patient came to office to speak with Dr. Willis and/or his nurse, stated to check in personnel(Mrs. Sutton), she has been trying to reach Dr. Willis or someone in the office to discuss her medical condition, patient was aware Dr. Willis was out of out until 1/3/2019, but needed to speak with someone in the office, I stated to the check in personnel(Mrs. Sutton)via messaging, I was on the other line with another patient and one of the doctors needed me to give a medication, I could not come out at the time, if you(Mrs. Sutton) could get her name and number I will give her a call as soon as I have a minute, Mrs. Sutton stated ok, patient in turn stated she really needed to speak with someone, I said to Mrs. Sutton "give me a minute I will be out", I in turn went out to the check in desk to speak with the patient who at this time in the [facility] waiting area, as I walked out patient stated "hi are you Shalita" I said yes how can I help you, patient stated, "first I do not like the fact that you discussed my medical history with a non-clinical staff member, Mrs. Sutton is not medical and you should not have been discussing my history with her" I in turn stated to the patient I did not discuss your medical history with  I verified with Mrs. Sutton who you were and explained to Mrs. Sutton I knew the patient by name because I have been trying to contact her. Patient insisted I was speaking to Mrs. Sutton about her medical information, which I was not. Patient proceed to say she has been trying to get in contact with Dr. Willis and she had not been able to, I verified her telephone number with her and she stated that's correct but the way technology is today she would have been alerted if someone from this practice was trying to call her by a missed call, patient's daughter then states maybe if you all can contact her via the portal would that be better, I stated yes that's a good idea, this way no one will not miss messages sent, I " "attempted to go get card to assist patient with "[facility]" portal, she stopped me stating  "no wait I'm not done" I stated ok continue, patient wanted to go back to the "no one has returned a call to her after she has called the practice several times to speak to Dr. Willis or his MA(Marbella), about the recall on "her" blood pressure medicine amlodipine, did you hear about that? " I stated yes I did", patient in turn says "a good doctor and or nurse would have called their patient to let them know there was a recall on their medication" I stated to patient, when a patient calls in reference to a pharmaceutical company recall we direct patients to call the pharmacy to assist with recalls", patient stated it should be the doctors responsibility to call their patients, I asked the patient if she really believes the physician should call every patient that's on a particular medication(on a recall list), to informed them of a recall, she stated the doctor prescribed the medicine, I stated ok Ms. Gil if this was what was so urgent I have to go back into the practice to finish up, patient stated I would not allow her to talk about her issue.  Told her daughter "come let's go, she is not allowing me to speak", I in turn walked back into practice.  "

## 2019-01-03 ENCOUNTER — TELEPHONE (OUTPATIENT)
Dept: INTERNAL MEDICINE | Facility: CLINIC | Age: 64
End: 2019-01-03

## 2019-01-03 ENCOUNTER — TELEPHONE (OUTPATIENT)
Dept: ORTHOPEDICS | Facility: CLINIC | Age: 64
End: 2019-01-03

## 2019-01-03 NOTE — TELEPHONE ENCOUNTER
----- Message from Massiel Paz sent at 1/3/2019  3:11 PM CST -----  Contact: self/[phone]  Pt is calling to speak with someone in the office in regards to getting her test results back. She also needs to speak with someone in the office in regards to her BP medication. Please advise.        Thanks

## 2019-01-03 NOTE — TELEPHONE ENCOUNTER
Ortho Telephone Triage Message [phone]  Patient C/O: R knee pain. 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 Pt states she did not seek medical attention at the time and did not involve Workers Comp. CRISTOFER Shannon/   [facility] also spoke with pt who confirmed Ortho appt for R knee pain will not be under Workers Comp. Pt also C/O R shoulder and R thumb pain s/p  injury at school and seen at [facility] on 12/1/18. Pt requests appt today or tomorrow.   HX: R ACL repair    Triage Advice: Advised pt that appt will only be for c/o R knee pain and that c/o  R shoulder/thumb pain will need to be scheduled as separate appt.  Resolution:Pt states understanding. First available appt scheduled tomorrow with Dr. Evans/Sports Medicine a 9:15am with arrival at 8:45am for R knee pain. Pt confirms time and location of appt.

## 2019-01-03 NOTE — TELEPHONE ENCOUNTER
Spoke with pt and schedule an appt for right hand and shoulder pain.  Pt stated she will be using her [employer] insurance.  Pt stated she is also scheduled to see Sports Medicine for her knee which was scheduled by Lala at [facility].

## 2019-01-03 NOTE — TELEPHONE ENCOUNTER
----- Message from Junior Camara LPN sent at 1/2/2019  4:00 PM CST -----  Contact: Self/ [phone]  I spoke with the patient, and she is not wanting to pursue this as a work related injury at this time.  I told her you would contact her to get her scheduled.    Dequan     ----- Message -----  From: Lala Kramer LPN  Sent: 1/2/2019   3:18 PM  To: Junior Camara LPN    Dequan -    Please contact pt regarding work-related R knee injury.    Thank you,  Lala  [phone]  ----- Message -----  From: Graham Archer  Sent: 1/2/2019   2:59 PM  To: [facility] Ortho Triage    Patient would like a call back to make a appt with a doctor for today or tomorrow for her right knee pain. Patient said she had a fall at work.

## 2019-01-04 ENCOUNTER — OFFICE VISIT (OUTPATIENT)
Dept: SPORTS MEDICINE | Facility: CLINIC | Age: 64
End: 2019-01-04
Payer: COMMERCIAL

## 2019-01-04 ENCOUNTER — HOSPITAL ENCOUNTER (OUTPATIENT)
Dept: RADIOLOGY | Facility: HOSPITAL | Age: 64
Discharge: HOME | End: 2019-01-04
Attending: FAMILY MEDICINE
Payer: COMMERCIAL

## 2019-01-04 VITALS — TEMPERATURE: 100 F

## 2019-01-04 DIAGNOSIS — M25.561 RIGHT KNEE PAIN, UNSPECIFIED CHRONICITY: ICD-10-CM

## 2019-01-04 DIAGNOSIS — M17.11 PRIMARY OSTEOARTHRITIS OF RIGHT KNEE: Primary | ICD-10-CM

## 2019-01-04 PROCEDURE — 73564 X-RAY EXAM KNEE 4 OR MORE: CPT | Mod: TC,50,FY,PO

## 2019-01-04 PROCEDURE — 99204 OFFICE O/P NEW MOD 45 MIN: CPT | Mod: 25,S$GLB,, | Performed by: FAMILY MEDICINE

## 2019-01-04 PROCEDURE — 20611 PR DRAIN/ASP/INJECT MAJOR JOINT/BURSA W/US GUIDANCE: ICD-10-PCS | Mod: RT,S$GLB,, | Performed by: FAMILY MEDICINE

## 2019-01-04 PROCEDURE — 73564 X-RAY EXAM KNEE 4 OR MORE: CPT | Mod: 26,,, | Performed by: RADIOLOGY

## 2019-01-04 PROCEDURE — 99499 UNLISTED E&M SERVICE: CPT | Mod: S$GLB,,, | Performed by: FAMILY MEDICINE

## 2019-01-04 PROCEDURE — 20611 DRAIN/INJ JOINT/BURSA W/US: CPT | Mod: RT,S$GLB,, | Performed by: FAMILY MEDICINE

## 2019-01-04 PROCEDURE — 99999 PR PBB SHADOW E&M-EST. PATIENT-LVL III: ICD-10-PCS | Mod: PBBFAC,,, | Performed by: FAMILY MEDICINE

## 2019-01-04 PROCEDURE — 99204 PR OFFICE/OUTPT VISIT, NEW, LEVL IV, 45-59 MIN: ICD-10-PCS | Mod: 25,S$GLB,, | Performed by: FAMILY MEDICINE

## 2019-01-04 PROCEDURE — 99499 LARGE JOINT ASPIRATION/INJECTION: R KNEE: ICD-10-PCS | Mod: S$GLB,,, | Performed by: FAMILY MEDICINE

## 2019-01-04 PROCEDURE — 73564 XR KNEE ORTHO BILAT WITH FLEXION: ICD-10-PCS | Mod: 26,,, | Performed by: RADIOLOGY

## 2019-01-04 PROCEDURE — 99999 PR PBB SHADOW E&M-EST. PATIENT-LVL III: CPT | Mod: PBBFAC,,, | Performed by: FAMILY MEDICINE

## 2019-01-04 NOTE — LETTER
January 4, 2019      Sushma Reardon, NP  [address] [facility]  [address]  [facility] [address]           [address] [address]  Phone: [phone]          Patient: Josi Gil   MR Number: [id number]   YOB: 1955   Date of Visit: 1/4/2019       Dear Sushma Reardon:    Thank you for referring Josi Gil to me for evaluation. Attached you will find relevant portions of my assessment and plan of care.    If you have questions, please do not hesitate to call me. I look forward to following Josi Gil along with you.    Sincerely,    César Evans MD    Enclosure  CC:  No Recipients    If you would like to receive this communication electronically, please contact [email or web address] or [phone] to request more information on spigit Link access.    For providers and/or their staff who would like to refer a patient to [facility], please contact us through our one-stop-shop provider referral line, [facility] , at [phone].    If you feel you have received this communication in error or would no longer like to receive these types of communications, please e-mail [email or web address]
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
## 2019-01-04 NOTE — PROCEDURES
"Large Joint Aspiration/Injection: R knee  Date/Time: 1/4/2019 12:10 PM  Performed by: César Evans MD  Authorized by: César Evans MD     Consent Done?:  Yes (Verbal)  Indications:  Pain  Procedure site marked: Yes    Timeout: Prior to procedure the correct patient, procedure, and site was verified      Location:  Knee  Site:  R knee  Prep: Patient was prepped and draped in usual sterile fashion    Ultrasonic Guidance for needle placement: Yes  Images are saved and documented.  Needle size:  20 G  Approach:  Lateral  Medications:  20 mg sodium hyaluronate (EUFLEXXA) 10 mg/mL(mw 2.4 -3.6 million)  Patient tolerance:  Patient tolerated the procedure well with no immediate complications    Additional Comments: Description of ultrasound utilization for needle guidance:   Ultrasound guidance used for needle localization. Images saved and stored for documentation. The knee joint was visualized. Dynamic visualization of the 20g x 1.5" needle was continuous throughout the procedure.      "

## 2019-01-07 ENCOUNTER — TELEPHONE (OUTPATIENT)
Dept: ORTHOPEDICS | Facility: CLINIC | Age: 64
End: 2019-01-07

## 2019-01-07 NOTE — TELEPHONE ENCOUNTER
----- Message from Isabela Russell sent at 1/7/2019 12:21 PM CST -----  Contact: Pt   Name of Who is Calling: NATALIA LUGO [[id number]]    What is the request in detail: Pt is returning Missy's call in regards to being seen today. Please advise.     Can the clinic reply by [facility]: No     What Number [facility] Back [facility]: [phone]

## 2019-01-07 NOTE — TELEPHONE ENCOUNTER
pt states she may not get here till closer to 4:30- pt made aware of 15 min nishant period and pt verbalized understanding.

## 2019-01-07 NOTE — TELEPHONE ENCOUNTER
----- Message from Annmarie Robertson sent at 1/7/2019 11:57 AM CST -----  Contact: Self  Pt is needing to be seen today pt wants to know if she can be squeeze in today for right shoulder pain   Pt can be reached @Home#

## 2019-01-08 ENCOUNTER — OFFICE VISIT (OUTPATIENT)
Dept: ORTHOPEDICS | Facility: CLINIC | Age: 64
End: 2019-01-08
Payer: COMMERCIAL

## 2019-01-08 VITALS
BODY MASS INDEX: 27.99 KG/M2 | WEIGHT: 189 LBS | SYSTOLIC BLOOD PRESSURE: 129 MMHG | HEART RATE: 62 BPM | DIASTOLIC BLOOD PRESSURE: 75 MMHG | HEIGHT: 69 IN

## 2019-01-08 DIAGNOSIS — M25.511 RIGHT SHOULDER PAIN, UNSPECIFIED CHRONICITY: Primary | ICD-10-CM

## 2019-01-08 PROCEDURE — 99999 PR PBB SHADOW E&M-EST. PATIENT-LVL III: CPT | Mod: PBBFAC,,, | Performed by: ORTHOPAEDIC SURGERY

## 2019-01-08 PROCEDURE — 3008F BODY MASS INDEX DOCD: CPT | Mod: CPTII,S$GLB,, | Performed by: ORTHOPAEDIC SURGERY

## 2019-01-08 PROCEDURE — 99204 PR OFFICE/OUTPT VISIT, NEW, LEVL IV, 45-59 MIN: ICD-10-PCS | Mod: 25,S$GLB,, | Performed by: ORTHOPAEDIC SURGERY

## 2019-01-08 PROCEDURE — 3078F DIAST BP <80 MM HG: CPT | Mod: CPTII,S$GLB,, | Performed by: ORTHOPAEDIC SURGERY

## 2019-01-08 PROCEDURE — 3078F PR MOST RECENT DIASTOLIC BLOOD PRESSURE < 80 MM HG: ICD-10-PCS | Mod: CPTII,S$GLB,, | Performed by: ORTHOPAEDIC SURGERY

## 2019-01-08 PROCEDURE — 3074F SYST BP LT 130 MM HG: CPT | Mod: CPTII,S$GLB,, | Performed by: ORTHOPAEDIC SURGERY

## 2019-01-08 PROCEDURE — 20610 DRAIN/INJ JOINT/BURSA W/O US: CPT | Mod: RT,S$GLB,, | Performed by: ORTHOPAEDIC SURGERY

## 2019-01-08 PROCEDURE — 3008F PR BODY MASS INDEX (BMI) DOCUMENTED: ICD-10-PCS | Mod: CPTII,S$GLB,, | Performed by: ORTHOPAEDIC SURGERY

## 2019-01-08 PROCEDURE — 3074F PR MOST RECENT SYSTOLIC BLOOD PRESSURE < 130 MM HG: ICD-10-PCS | Mod: CPTII,S$GLB,, | Performed by: ORTHOPAEDIC SURGERY

## 2019-01-08 PROCEDURE — 20610 LARGE JOINT ASPIRATION/INJECTION: R SUBACROMIAL BURSA: ICD-10-PCS | Mod: RT,S$GLB,, | Performed by: ORTHOPAEDIC SURGERY

## 2019-01-08 PROCEDURE — 99999 PR PBB SHADOW E&M-EST. PATIENT-LVL III: ICD-10-PCS | Mod: PBBFAC,,, | Performed by: ORTHOPAEDIC SURGERY

## 2019-01-08 PROCEDURE — 99204 OFFICE O/P NEW MOD 45 MIN: CPT | Mod: 25,S$GLB,, | Performed by: ORTHOPAEDIC SURGERY

## 2019-01-08 RX ADMIN — TRIAMCINOLONE ACETONIDE 80 MG: 40 INJECTION, SUSPENSION INTRA-ARTICULAR; INTRAMUSCULAR at 05:01

## 2019-01-08 NOTE — PROCEDURES
Large Joint Aspiration/Injection: R subacromial bursa  Date/Time: 1/8/2019 5:07 PM  Performed by: Faina Campos MD  Authorized by: Faina Campos MD     Consent Done?:  Yes (Verbal)  Indications:  Pain  Timeout: Prior to procedure the correct patient, procedure, and site was verified      Location:  Shoulder  Site:  R subacromial bursa  Needle size:  22 G  Approach:  Posterior  Medications:  80 mg triamcinolone acetonide 40 mg/mL
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
## 2019-01-09 ENCOUNTER — TELEPHONE (OUTPATIENT)
Dept: ORTHOPEDICS | Facility: CLINIC | Age: 64
End: 2019-01-09

## 2019-01-09 ENCOUNTER — NURSE TRIAGE (OUTPATIENT)
Dept: ADMINISTRATIVE | Facility: CLINIC | Age: 64
End: 2019-01-09

## 2019-01-09 NOTE — TELEPHONE ENCOUNTER
Left a voicemail for patient to return my phone call in regards to scheduling an concerns at their convenience. Provided the clinic's phone number.

## 2019-01-09 NOTE — TELEPHONE ENCOUNTER
"Had an injection on yesterday for pain now pain is worse at this time.    Reason for Disposition   Patient sounds very sick or weak to the triager    Answer Assessment - Initial Assessment Questions  1. ONSET: "When did the pain start?"      Pain started while asleep  2. LOCATION: "Where is the pain located?"      Shoulder right  3. PAIN: "How bad is the pain?" (Scale 1-10; or mild, moderate, severe)    - MILD (1-3): doesn't interfere with normal activities    - MODERATE (4-7): interferes with normal activities (e.g., work or school) or awakens from sleep    - SEVERE (8-10): excruciating pain, unable to do any normal activities, unable to move arm at all due to pain      Beyond 10  4. WORK OR EXERCISE: "Has there been any recent work or exercise that involved this part of the body?"      Had an injection  5. CAUSE: "What do you think is causing the shoulder pain?"      The injection  6. OTHER SYMPTOMS: "Do you have any other symptoms?" (e.g., neck pain, swelling, rash, fever, numbness, weakness)      Weakness can barely lift arm  7. PREGNANCY: "Is there any chance you are pregnant?" "When was your last menstrual period?"      no    Protocols used: ST SHOULDER PAIN-A-AH      "

## 2019-01-09 NOTE — TELEPHONE ENCOUNTER
----- Message from Ivory Fabian sent at 1/9/2019 12:25 PM CST -----  Contact: pt  Name of Who is Calling: pt      What is the request in detail: is needing staff to call her back at 3:50pm please. Pt states she keeps missing the call and it is very important that she speaks with the staff today in regards to seeing the doctor today at the same time she was in on yesterday. Pt states to please call her at 3:50pm      Can the clinic reply by [facility]: no      What [facility]: [phone]

## 2019-01-09 NOTE — TELEPHONE ENCOUNTER
----- Message from Dana Landry sent at 1/9/2019  8:46 AM CST -----  Contact: NATALIA LUGO [[id number]]  Name of Who is Calling: NATALIA LUGO [[id number]]      What is the request in detail: patient is returning a call       Can the clinic reply by [facility]: no      What Number to Call Back [facility] not in [facility]: [phone]

## 2019-01-09 NOTE — TELEPHONE ENCOUNTER
Left a voicemail for patient to return my phone call in regards to concerns. Provided the clinic's phone number.

## 2019-01-09 NOTE — TELEPHONE ENCOUNTER
Informed the patient that the injection can hurt for at least a week and that OTC pain medication can be used to alleviate any discomfort. Instructed the patient to call the office is the pain worsens or does not subside. Patient verbalized understanding.

## 2019-01-10 ENCOUNTER — TELEPHONE (OUTPATIENT)
Dept: SPORTS MEDICINE | Facility: CLINIC | Age: 64
End: 2019-01-10

## 2019-01-10 NOTE — TELEPHONE ENCOUNTER
Called Josi Jeannie Gil to reschedule her Euflexxa 2/3 VSI appointment.  I was unable to reach the patient, I left patient a voicemail to return my call.    Donnie Robles   Sports Medicine Assistant   [facility]       ----- Message from Donnie Robles MA sent at 1/10/2019  7:06 AM CST -----  Contact: Patient       ----- Message -----  From: Eva Baugh  Sent: 1/9/2019   5:57 PM  To: Cristina MORA Staff    Patient would like to come tomorrow for injection later in the afternoon not the morning at 0830 , she would like someone to contact her asap .   Patient Is in Pain   Patient can be reached at [phone] thanks

## 2019-01-11 ENCOUNTER — TELEPHONE (OUTPATIENT)
Dept: SPORTS MEDICINE | Facility: CLINIC | Age: 64
End: 2019-01-11

## 2019-01-11 NOTE — TELEPHONE ENCOUNTER
R/S no show euflexxa appointment.     Donnie Robles   Sports Medicine Assistant   [facility] [place]         ----- Message from Donnie Robles MA sent at 1/10/2019  4:30 PM CST -----  Contact: Self      ----- Message -----  From: Latonia Brown  Sent: 1/10/2019   4:28 PM  To: Cristina MORA Staff    Patient returning Donnie's call regarding injection. Prefers late appts.  Patient can be reached at [phone]

## 2019-01-14 RX ORDER — TRIAMCINOLONE ACETONIDE 40 MG/ML
80 INJECTION, SUSPENSION INTRA-ARTICULAR; INTRAMUSCULAR
Status: DISCONTINUED | OUTPATIENT
Start: 2019-01-08 | End: 2019-01-14 | Stop reason: HOSPADM

## 2019-01-16 ENCOUNTER — OFFICE VISIT (OUTPATIENT)
Dept: SPORTS MEDICINE | Facility: CLINIC | Age: 64
End: 2019-01-16
Payer: COMMERCIAL

## 2019-01-16 VITALS — HEIGHT: 69 IN | WEIGHT: 189 LBS | TEMPERATURE: 99 F | BODY MASS INDEX: 27.99 KG/M2

## 2019-01-16 DIAGNOSIS — M17.11 PRIMARY OSTEOARTHRITIS OF RIGHT KNEE: Primary | ICD-10-CM

## 2019-01-16 PROCEDURE — 99999 PR PBB SHADOW E&M-EST. PATIENT-LVL III: CPT | Mod: PBBFAC,,, | Performed by: FAMILY MEDICINE

## 2019-01-16 PROCEDURE — 20611 DRAIN/INJ JOINT/BURSA W/US: CPT | Mod: RT,S$GLB,, | Performed by: FAMILY MEDICINE

## 2019-01-16 PROCEDURE — 20611 LARGE JOINT ASPIRATION/INJECTION: R KNEE: ICD-10-PCS | Mod: RT,S$GLB,, | Performed by: FAMILY MEDICINE

## 2019-01-16 PROCEDURE — 99499 NO LOS: ICD-10-PCS | Mod: S$GLB,,, | Performed by: FAMILY MEDICINE

## 2019-01-16 PROCEDURE — 99999 PR PBB SHADOW E&M-EST. PATIENT-LVL III: ICD-10-PCS | Mod: PBBFAC,,, | Performed by: FAMILY MEDICINE

## 2019-01-16 PROCEDURE — 99499 UNLISTED E&M SERVICE: CPT | Mod: S$GLB,,, | Performed by: FAMILY MEDICINE

## 2019-01-16 NOTE — PROCEDURES
"Large Joint Aspiration/Injection: R knee  Date/Time: 1/16/2019 4:30 PM  Performed by: César Evans MD  Authorized by: César Evans MD     Consent Done?:  Yes (Verbal)  Indications:  Pain  Procedure site marked: Yes    Timeout: Prior to procedure the correct patient, procedure, and site was verified      Location:  Knee  Site:  R knee  Prep: Patient was prepped and draped in usual sterile fashion    Ultrasonic Guidance for needle placement: Yes  Images are saved and documented.  Needle size:  20 G  Approach:  Lateral  Medications:  20 mg sodium hyaluronate (EUFLEXXA) 10 mg/mL(mw 2.4 -3.6 million)  Patient tolerance:  Patient tolerated the procedure well with no immediate complications    Additional Comments: Description of ultrasound utilization for needle guidance:   Ultrasound guidance used for needle localization. Images saved and stored for documentation. The knee joint was visualized. Dynamic visualization of the 20g x 1.5" needle was continuous throughout the procedure.      "

## 2019-01-21 ENCOUNTER — OFFICE VISIT (OUTPATIENT)
Dept: SPORTS MEDICINE | Facility: CLINIC | Age: 64
End: 2019-01-21
Payer: COMMERCIAL

## 2019-01-21 VITALS
TEMPERATURE: 98 F | WEIGHT: 189 LBS | SYSTOLIC BLOOD PRESSURE: 114 MMHG | BODY MASS INDEX: 27.99 KG/M2 | HEART RATE: 74 BPM | HEIGHT: 69 IN | DIASTOLIC BLOOD PRESSURE: 64 MMHG

## 2019-01-21 DIAGNOSIS — M17.11 PRIMARY OSTEOARTHRITIS OF RIGHT KNEE: Primary | ICD-10-CM

## 2019-01-21 DIAGNOSIS — M17.12 PRIMARY OSTEOARTHRITIS OF LEFT KNEE: ICD-10-CM

## 2019-01-21 DIAGNOSIS — M79.642 LEFT HAND PAIN: ICD-10-CM

## 2019-01-21 PROCEDURE — 20611 DRAIN/INJ JOINT/BURSA W/US: CPT | Mod: RT,S$GLB,, | Performed by: FAMILY MEDICINE

## 2019-01-21 PROCEDURE — 99499 NO LOS: ICD-10-PCS | Mod: S$GLB,,, | Performed by: FAMILY MEDICINE

## 2019-01-21 PROCEDURE — 20611 LARGE JOINT ASPIRATION/INJECTION: R KNEE: ICD-10-PCS | Mod: RT,S$GLB,, | Performed by: FAMILY MEDICINE

## 2019-01-21 PROCEDURE — 99999 PR PBB SHADOW E&M-EST. PATIENT-LVL III: ICD-10-PCS | Mod: PBBFAC,,, | Performed by: FAMILY MEDICINE

## 2019-01-21 PROCEDURE — 99499 UNLISTED E&M SERVICE: CPT | Mod: S$GLB,,, | Performed by: FAMILY MEDICINE

## 2019-01-21 PROCEDURE — 99999 PR PBB SHADOW E&M-EST. PATIENT-LVL III: CPT | Mod: PBBFAC,,, | Performed by: FAMILY MEDICINE

## 2019-01-21 NOTE — PROCEDURES
"Large Joint Aspiration/Injection: R knee  Date/Time: 1/21/2019 5:40 PM  Performed by: César Evans MD  Authorized by: César Evans MD     Consent Done?:  Yes (Verbal)  Indications:  Pain  Procedure site marked: Yes    Timeout: Prior to procedure the correct patient, procedure, and site was verified      Location:  Knee  Site:  R knee  Prep: Patient was prepped and draped in usual sterile fashion    Ultrasonic Guidance for needle placement: Yes  Images are saved and documented.  Needle size:  20 G  Approach:  Lateral  Medications:  20 mg sodium hyaluronate (EUFLEXXA) 10 mg/mL(mw 2.4 -3.6 million)  Patient tolerance:  Patient tolerated the procedure well with no immediate complications    Additional Comments: Description of ultrasound utilization for needle guidance:   Ultrasound guidance used for needle localization. Images saved and stored for documentation. The knee joint was visualized. Dynamic visualization of the 20g x 1.5" needle was continuous throughout the procedure.      "

## 2019-01-24 ENCOUNTER — NURSE TRIAGE (OUTPATIENT)
Dept: ADMINISTRATIVE | Facility: CLINIC | Age: 64
End: 2019-01-24

## 2019-01-24 ENCOUNTER — LAB VISIT (OUTPATIENT)
Dept: LAB | Facility: HOSPITAL | Age: 64
End: 2019-01-24
Attending: INTERNAL MEDICINE
Payer: COMMERCIAL

## 2019-01-24 ENCOUNTER — OFFICE VISIT (OUTPATIENT)
Dept: INTERNAL MEDICINE | Facility: CLINIC | Age: 64
End: 2019-01-24
Payer: COMMERCIAL

## 2019-01-24 ENCOUNTER — TELEPHONE (OUTPATIENT)
Dept: INTERNAL MEDICINE | Facility: CLINIC | Age: 64
End: 2019-01-24

## 2019-01-24 VITALS
SYSTOLIC BLOOD PRESSURE: 140 MMHG | WEIGHT: 180 LBS | HEIGHT: 69 IN | BODY MASS INDEX: 26.66 KG/M2 | HEART RATE: 84 BPM | DIASTOLIC BLOOD PRESSURE: 87 MMHG | OXYGEN SATURATION: 98 %

## 2019-01-24 DIAGNOSIS — I10 ESSENTIAL HYPERTENSION: ICD-10-CM

## 2019-01-24 DIAGNOSIS — R00.2 HEART PALPITATIONS: ICD-10-CM

## 2019-01-24 DIAGNOSIS — R00.2 HEART PALPITATIONS: Primary | ICD-10-CM

## 2019-01-24 LAB
ANION GAP SERPL CALC-SCNC: 10 MMOL/L
BASOPHILS # BLD AUTO: 0.07 K/UL
BASOPHILS NFR BLD: 0.8 %
BUN SERPL-MCNC: 14 MG/DL
CALCIUM SERPL-MCNC: 10.4 MG/DL
CHLORIDE SERPL-SCNC: 102 MMOL/L
CO2 SERPL-SCNC: 30 MMOL/L
CREAT SERPL-MCNC: 0.9 MG/DL
DIFFERENTIAL METHOD: ABNORMAL
EOSINOPHIL # BLD AUTO: 0.1 K/UL
EOSINOPHIL NFR BLD: 1.2 %
ERYTHROCYTE [DISTWIDTH] IN BLOOD BY AUTOMATED COUNT: 13.5 %
EST. GFR  (AFRICAN AMERICAN): >60 ML/MIN/1.73 M^2
EST. GFR  (NON AFRICAN AMERICAN): >60 ML/MIN/1.73 M^2
GLUCOSE SERPL-MCNC: 93 MG/DL
HCT VFR BLD AUTO: 46.8 %
HGB BLD-MCNC: 14.8 G/DL
IMM GRANULOCYTES # BLD AUTO: 0.03 K/UL
IMM GRANULOCYTES NFR BLD AUTO: 0.4 %
LYMPHOCYTES # BLD AUTO: 1.9 K/UL
LYMPHOCYTES NFR BLD: 22.3 %
MCH RBC QN AUTO: 29.5 PG
MCHC RBC AUTO-ENTMCNC: 31.6 G/DL
MCV RBC AUTO: 93 FL
MONOCYTES # BLD AUTO: 0.6 K/UL
MONOCYTES NFR BLD: 6.7 %
NEUTROPHILS # BLD AUTO: 5.8 K/UL
NEUTROPHILS NFR BLD: 68.6 %
NRBC BLD-RTO: 0 /100 WBC
PLATELET # BLD AUTO: 243 K/UL
PMV BLD AUTO: 11.5 FL
POTASSIUM SERPL-SCNC: 4 MMOL/L
RBC # BLD AUTO: 5.02 M/UL
SODIUM SERPL-SCNC: 142 MMOL/L
TSH SERPL DL<=0.005 MIU/L-ACNC: 0.68 UIU/ML
WBC # BLD AUTO: 8.38 K/UL

## 2019-01-24 PROCEDURE — 3077F SYST BP >= 140 MM HG: CPT | Mod: CPTII,S$GLB,, | Performed by: INTERNAL MEDICINE

## 2019-01-24 PROCEDURE — 3008F BODY MASS INDEX DOCD: CPT | Mod: CPTII,S$GLB,, | Performed by: INTERNAL MEDICINE

## 2019-01-24 PROCEDURE — 99999 PR PBB SHADOW E&M-EST. PATIENT-LVL III: ICD-10-PCS | Mod: PBBFAC,,, | Performed by: INTERNAL MEDICINE

## 2019-01-24 PROCEDURE — 84443 ASSAY THYROID STIM HORMONE: CPT

## 2019-01-24 PROCEDURE — 3077F PR MOST RECENT SYSTOLIC BLOOD PRESSURE >= 140 MM HG: ICD-10-PCS | Mod: CPTII,S$GLB,, | Performed by: INTERNAL MEDICINE

## 2019-01-24 PROCEDURE — 99214 OFFICE O/P EST MOD 30 MIN: CPT | Mod: S$GLB,,, | Performed by: INTERNAL MEDICINE

## 2019-01-24 PROCEDURE — 85025 COMPLETE CBC W/AUTO DIFF WBC: CPT

## 2019-01-24 PROCEDURE — 3079F PR MOST RECENT DIASTOLIC BLOOD PRESSURE 80-89 MM HG: ICD-10-PCS | Mod: CPTII,S$GLB,, | Performed by: INTERNAL MEDICINE

## 2019-01-24 PROCEDURE — 3008F PR BODY MASS INDEX (BMI) DOCUMENTED: ICD-10-PCS | Mod: CPTII,S$GLB,, | Performed by: INTERNAL MEDICINE

## 2019-01-24 PROCEDURE — 3079F DIAST BP 80-89 MM HG: CPT | Mod: CPTII,S$GLB,, | Performed by: INTERNAL MEDICINE

## 2019-01-24 PROCEDURE — 99999 PR PBB SHADOW E&M-EST. PATIENT-LVL III: CPT | Mod: PBBFAC,,, | Performed by: INTERNAL MEDICINE

## 2019-01-24 PROCEDURE — 80048 BASIC METABOLIC PNL TOTAL CA: CPT

## 2019-01-24 PROCEDURE — 36415 COLL VENOUS BLD VENIPUNCTURE: CPT | Mod: PO

## 2019-01-24 PROCEDURE — 99214 PR OFFICE/OUTPT VISIT, EST, LEVL IV, 30-39 MIN: ICD-10-PCS | Mod: S$GLB,,, | Performed by: INTERNAL MEDICINE
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
## 2019-01-24 NOTE — TELEPHONE ENCOUNTER
Call patient and let her know the blood test results from the visit looked fine    Thyroid function was normal   Blood cell count test was fine there was no anemia  Chemistry tests results were fine in which there was no diabetes, test marker for kidney function was normal, and electrolytes such as sodium potassium was fine    Will follow up and see will what happens doing the Holter test

## 2019-01-25 ENCOUNTER — OFFICE VISIT (OUTPATIENT)
Dept: ORTHOPEDICS | Facility: CLINIC | Age: 64
End: 2019-01-25
Payer: COMMERCIAL

## 2019-01-25 VITALS
SYSTOLIC BLOOD PRESSURE: 149 MMHG | HEART RATE: 70 BPM | HEIGHT: 69 IN | BODY MASS INDEX: 26.64 KG/M2 | WEIGHT: 179.88 LBS | DIASTOLIC BLOOD PRESSURE: 84 MMHG

## 2019-01-25 DIAGNOSIS — M18.11 DEGENERATIVE ARTHRITIS OF THUMB, RIGHT: Primary | ICD-10-CM

## 2019-01-25 PROCEDURE — 3008F PR BODY MASS INDEX (BMI) DOCUMENTED: ICD-10-PCS | Mod: CPTII,S$GLB,, | Performed by: ORTHOPAEDIC SURGERY

## 2019-01-25 PROCEDURE — 99999 PR PBB SHADOW E&M-EST. PATIENT-LVL III: CPT | Mod: PBBFAC,,, | Performed by: ORTHOPAEDIC SURGERY

## 2019-01-25 PROCEDURE — 3077F SYST BP >= 140 MM HG: CPT | Mod: CPTII,S$GLB,, | Performed by: ORTHOPAEDIC SURGERY

## 2019-01-25 PROCEDURE — 99999 PR PBB SHADOW E&M-EST. PATIENT-LVL III: ICD-10-PCS | Mod: PBBFAC,,, | Performed by: ORTHOPAEDIC SURGERY

## 2019-01-25 PROCEDURE — 3077F PR MOST RECENT SYSTOLIC BLOOD PRESSURE >= 140 MM HG: ICD-10-PCS | Mod: CPTII,S$GLB,, | Performed by: ORTHOPAEDIC SURGERY

## 2019-01-25 PROCEDURE — 99212 PR OFFICE/OUTPT VISIT, EST, LEVL II, 10-19 MIN: ICD-10-PCS | Mod: S$GLB,,, | Performed by: ORTHOPAEDIC SURGERY

## 2019-01-25 PROCEDURE — 3008F BODY MASS INDEX DOCD: CPT | Mod: CPTII,S$GLB,, | Performed by: ORTHOPAEDIC SURGERY

## 2019-01-25 PROCEDURE — 99212 OFFICE O/P EST SF 10 MIN: CPT | Mod: S$GLB,,, | Performed by: ORTHOPAEDIC SURGERY

## 2019-01-25 PROCEDURE — 3079F PR MOST RECENT DIASTOLIC BLOOD PRESSURE 80-89 MM HG: ICD-10-PCS | Mod: CPTII,S$GLB,, | Performed by: ORTHOPAEDIC SURGERY

## 2019-01-25 PROCEDURE — 3079F DIAST BP 80-89 MM HG: CPT | Mod: CPTII,S$GLB,, | Performed by: ORTHOPAEDIC SURGERY

## 2019-01-25 NOTE — PROGRESS NOTES
[facility]  History & Physical  Orthopedics    SUBJECTIVE:      Chief Complaint: right thumb pain    Referring Provider: César Evans, *       History of Present Illness:  Patient is a 63 y.o. right hand dominant female who presents today with complaints of right thumb pain which began 2 months ago.  The pain is severe.  She has been taking 800 ibuprofen ant tumeric for pain and it does not seem to help much. She essentially has pain in the entire RUE after this injury. She had a CSI injection into right shoulder at her visit with dr. anton which helped some but the pain has returned.    The patient is a/an teacher.    Onset of symptoms/DOI was 2 months ago.    Symptoms are aggravated by activity and movement.    Symptoms are alleviated by rest.    Symptoms consist of pain and swelling.    The patient rates their pain as a 6/10.    Attempted treatment(s) and/or interventions include rest, activity modification and anti-inflammatory medications.        Past Medical History:   Diagnosis Date    Acute costochondritis 2012    Benign essential hypertension     Gastroesophageal reflux disease without esophagitis     Pain in joint involving multiple sites 2013     Past Surgical History:   Procedure Laterality Date    BREAST BIOPSY Right      SECTION      COLONOSCOPY N/A 2013    Performed by [facility]ack [facility] at [facility] ([facility])    KNEE ARTHROSCOPY W/ ACL RECONSTRUCTION      REFRACTIVE SURGERY Bilateral  or     Dr. Bharath hammer     Review of patient's allergies indicates:  No Known Allergies  Social History     Social History Narrative    Not on file     Family History   Problem Relation Age of Onset    Hypertension Mother     Heart disease Maternal Grandmother          Current Outpatient Medications:     amLODIPine (NORVASC) 5 MG tablet, TAKE 1 TABLET BY MOUTH ONCE DAILY, Disp: 90 tablet, Rfl: 3    aspirin 325 MG tablet, Take 325 mg by  "mouth once daily., Disp: , Rfl:     losartan-hydrochlorothiazide 100-25 mg (HYZAAR) 100-25 mg per tablet, Take 1 tablet by mouth once daily., Disp: 90 tablet, Rfl: 3    metoprolol succinate (TOPROL XL) 25 MG 24 hr tablet, Take 1 tablet (25 mg total) by mouth once daily., Disp: 90 tablet, Rfl: 3    nitroGLYCERIN (NITROSTAT) 0.4 MG SL tablet, Place 1 tablet (0.4 mg total) under the tongue every 5 (five) minutes as needed for Chest pain (times three)., Disp: 30 tablet, Rfl: 0      Review of Systems:  As per HPI otherwise noncontributory  Musculoskeletal: pain, soreness and decreased ROM    OBJECTIVE:      Vital Signs (Most Recent):  Vitals:    01/25/19 0926   BP: (!) 149/84   BP Location: Right arm   Patient Position: Sitting   BP Method: Large (Manual)   Pulse: 70   Weight: 81.6 kg (179 lb 14.3 oz)   Height: 5' 9" (1.753 m)     Body mass index is 26.57 kg/m².      Physical Exam:  Constitutional: The patient appears well-developed and well-nourished. No distress.   Head: Normocephalic and atraumatic.   Nose: Nose normal.   Eyes: Conjunctivae and EOM are normal.   Neck: No tracheal deviation present.   Cardiovascular: Normal rate and intact distal pulses.    Pulmonary/Chest: Effort normal. No respiratory distress.   Abdominal: There is no guarding.   Neurological: The patient is alert.   Psychiatric: The patient has a normal mood and affect.     Right Hand/Wrist Examination:    Observation/Inspection:  Swelling  mild    Deformity  +  Discoloration  none     Scars   none    Atrophy  None    Deformity of right 1st MCP joint. Dorsal prominence of head of MC     HAND/WRIST EXAMINATION:  Finkelstein's Test   Neg  Snuff box tenderness   Neg  Casarez's Test    Neg  Hook of Hamate Tenderness  Neg  CMC grind    Neg  Circumduction test   Neg    TTP and pain with ROM at 1st MCP joint.    Neurovascular Exam:  Digits WWP, brisk CR < 3s throughout  NVI motor/LTS to M/R/U nerves, radial pulse 2+  Tinel's Test - Carpal " Tunnel  Neg  Tinel's Test - Cubital Tunnel  Neg  Phalen's Test    Neg  Median Nerve Compression Test Neg    ROM wrist/elbow full, painless  Thumb MCP ROM painful    Diagnostic Results:     Xray - showing OA of right 1st MCP joint with subluxation of the proximal phalanx.        ASSESSMENT/PLAN:      63 y.o. 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

## 2019-01-25 NOTE — LETTER
January 25, 2019      César Evans MD  [address]  [address]  [facility] [address]           [facility]  [address]  [facility] [address]  Phone: [phone]          Patient: Josi Gil   MR Number: [id number]   YOB: 1955   Date of Visit: 1/25/2019       Dear Dr. César Evans:    Thank you for referring Josi Gil to me for evaluation. Attached you will find relevant portions of my assessment and plan of care.    If you have questions, please do not hesitate to call me. I look forward to following Josi Gil along with you.    Sincerely,    Reza Coffey MD    Enclosure  CC:  No Recipients    If you would like to receive this communication electronically, please contact [email or web address] or [phone] to request more information on [employer] Link access.    For providers and/or their staff who would like to refer a patient to [facility], please contact us through our one-stop-shop provider referral line, Clinic [facility], at [phone].    If you feel you have received this communication in error or would no longer like to receive these types of communications, please e-mail [email or web address]

## 2019-01-25 NOTE — TELEPHONE ENCOUNTER
----- Message from Arian Cross sent at 1/25/2019 11:10 AM CST -----  Contact: Patient [phone]  Patient is returning a phone call.  Who left a message for the patient: Marbella SHORT  Does patient know what this is regarding:  Previous message  Comments:     Please call an advise  Thank you

## 2019-01-29 ENCOUNTER — HOSPITAL ENCOUNTER (EMERGENCY)
Facility: HOSPITAL | Age: 64
Discharge: HOME | End: 2019-01-29
Attending: EMERGENCY MEDICINE
Payer: COMMERCIAL

## 2019-01-29 ENCOUNTER — CLINICAL SUPPORT (OUTPATIENT)
Dept: CARDIOLOGY | Facility: HOSPITAL | Age: 64
End: 2019-01-29
Attending: INTERNAL MEDICINE
Payer: COMMERCIAL

## 2019-01-29 ENCOUNTER — TELEPHONE (OUTPATIENT)
Dept: INTERNAL MEDICINE | Facility: CLINIC | Age: 64
End: 2019-01-29

## 2019-01-29 VITALS
TEMPERATURE: 99 F | DIASTOLIC BLOOD PRESSURE: 65 MMHG | SYSTOLIC BLOOD PRESSURE: 141 MMHG | HEART RATE: 60 BPM | WEIGHT: 180 LBS | RESPIRATION RATE: 16 BRPM | BODY MASS INDEX: 26.66 KG/M2 | OXYGEN SATURATION: 100 % | HEIGHT: 69 IN

## 2019-01-29 DIAGNOSIS — I10 ESSENTIAL HYPERTENSION: ICD-10-CM

## 2019-01-29 DIAGNOSIS — R07.9 CHEST PAIN: Primary | ICD-10-CM

## 2019-01-29 DIAGNOSIS — R00.2 HEART PALPITATIONS: ICD-10-CM

## 2019-01-29 LAB
ALBUMIN SERPL BCP-MCNC: 3.7 G/DL
ALP SERPL-CCNC: 65 U/L
ALT SERPL W/O P-5'-P-CCNC: 22 U/L
ANION GAP SERPL CALC-SCNC: 11 MMOL/L
AST SERPL-CCNC: 28 U/L
BASOPHILS # BLD AUTO: 0.04 K/UL
BASOPHILS NFR BLD: 0.6 %
BILIRUB SERPL-MCNC: 1 MG/DL
BNP SERPL-MCNC: 27 PG/ML
BUN SERPL-MCNC: 13 MG/DL
CALCIUM SERPL-MCNC: 10 MG/DL
CHLORIDE SERPL-SCNC: 101 MMOL/L
CO2 SERPL-SCNC: 26 MMOL/L
CREAT SERPL-MCNC: 0.9 MG/DL
DIFFERENTIAL METHOD: NORMAL
EOSINOPHIL # BLD AUTO: 0.1 K/UL
EOSINOPHIL NFR BLD: 1.3 %
ERYTHROCYTE [DISTWIDTH] IN BLOOD BY AUTOMATED COUNT: 13.3 %
EST. GFR  (AFRICAN AMERICAN): >60 ML/MIN/1.73 M^2
EST. GFR  (NON AFRICAN AMERICAN): >60 ML/MIN/1.73 M^2
GLUCOSE SERPL-MCNC: 78 MG/DL
HCT VFR BLD AUTO: 43.9 %
HGB BLD-MCNC: 14.6 G/DL
IMM GRANULOCYTES # BLD AUTO: 0.02 K/UL
IMM GRANULOCYTES NFR BLD AUTO: 0.3 %
INR PPP: 1
LYMPHOCYTES # BLD AUTO: 1.6 K/UL
LYMPHOCYTES NFR BLD: 23.5 %
MAGNESIUM SERPL-MCNC: 2.8 MG/DL
MCH RBC QN AUTO: 30.7 PG
MCHC RBC AUTO-ENTMCNC: 33.3 G/DL
MCV RBC AUTO: 92 FL
MONOCYTES # BLD AUTO: 0.5 K/UL
MONOCYTES NFR BLD: 7.1 %
NEUTROPHILS # BLD AUTO: 4.7 K/UL
NEUTROPHILS NFR BLD: 67.2 %
NRBC BLD-RTO: 0 /100 WBC
PLATELET # BLD AUTO: 225 K/UL
PMV BLD AUTO: 10.7 FL
POTASSIUM SERPL-SCNC: 4.5 MMOL/L
PROT SERPL-MCNC: 8.1 G/DL
PROTHROMBIN TIME: 10.6 SEC
RBC # BLD AUTO: 4.76 M/UL
SODIUM SERPL-SCNC: 138 MMOL/L
TROPONIN I SERPL DL<=0.01 NG/ML-MCNC: <0.006 NG/ML
WBC # BLD AUTO: 6.93 K/UL

## 2019-01-29 PROCEDURE — 93005 ELECTROCARDIOGRAM TRACING: CPT

## 2019-01-29 PROCEDURE — 84484 ASSAY OF TROPONIN QUANT: CPT

## 2019-01-29 PROCEDURE — 83880 ASSAY OF NATRIURETIC PEPTIDE: CPT

## 2019-01-29 PROCEDURE — 80053 COMPREHEN METABOLIC PANEL: CPT

## 2019-01-29 PROCEDURE — 93227 HOLTER MONITOR - 48 HOUR (CUPID ONLY): ICD-10-PCS | Mod: ,,, | Performed by: INTERNAL MEDICINE

## 2019-01-29 PROCEDURE — 93010 EKG 12-LEAD: ICD-10-PCS | Mod: ,,, | Performed by: INTERNAL MEDICINE

## 2019-01-29 PROCEDURE — 99284 PR EMERGENCY DEPT VISIT,LEVEL IV: ICD-10-PCS | Mod: ,,, | Performed by: EMERGENCY MEDICINE

## 2019-01-29 PROCEDURE — 93225 XTRNL ECG REC<48 HRS REC: CPT

## 2019-01-29 PROCEDURE — 83735 ASSAY OF MAGNESIUM: CPT

## 2019-01-29 PROCEDURE — 99284 EMERGENCY DEPT VISIT MOD MDM: CPT | Mod: ,,, | Performed by: EMERGENCY MEDICINE

## 2019-01-29 PROCEDURE — 85610 PROTHROMBIN TIME: CPT

## 2019-01-29 PROCEDURE — 93227 XTRNL ECG REC<48 HR R&I: CPT | Mod: ,,, | Performed by: INTERNAL MEDICINE

## 2019-01-29 PROCEDURE — 85025 COMPLETE CBC W/AUTO DIFF WBC: CPT

## 2019-01-29 PROCEDURE — 93010 ELECTROCARDIOGRAM REPORT: CPT | Mod: ,,, | Performed by: INTERNAL MEDICINE

## 2019-01-29 PROCEDURE — 99284 EMERGENCY DEPT VISIT MOD MDM: CPT

## 2019-01-29 NOTE — ED NOTES
Pt request recliner.  Explained happy to have her there until I need for more suited pt. Pt  agreeable.

## 2019-01-29 NOTE — ED NOTES
Patient identifiers verified and correct for Ms Gil  C/C: Palpitations, chest pain   APPEARANCE: awake and alert in NAD.  SKIN: warm, dry and intact. No breakdown or bruising.  MUSCULOSKELETAL: Patient moving all extremities spontaneously, no obvious swelling or deformities noted. Ambulates independently.  RESPIRATORY: Denies shortness of breath.Respirations unlabored.   CARDIAC: Positive CP, 2+ distal pulses; no peripheral edema Positive palpitations intermittent   ABDOMEN: S/ND/NT, Denies nausea  : voids spontaneously, denies difficulty  Neurologic: AAO x 4; follows commands equal strength in all extremities; denies numbness/tingling. Denies dizziness Denies weakness

## 2019-01-29 NOTE — DISCHARGE INSTRUCTIONS
You were seen in the ED for chest pain. Your labs were normal, but your symptoms may be related to your palpitations. Wear the monitor as indicated and please follow up with your PCP for further testing including a possible stress test. Return  to the ED for worsening symptoms.

## 2019-01-29 NOTE — ED PROVIDER NOTES
Encounter Date: 2019    SCRIBE #1 NOTE: I, Son Yumiko, am scribing for, and in the presence of,  Dr. Crain . I have scribed the entire note.       History     Chief Complaint   Patient presents with    Chest Pain     63 y.o. female with past medical history of palpitations, GERD, hypertension, chronic chest pain presenting with chest pain. Patient states that she was going to the cardiology clinic to be evaluated for recurrent palpitations, howerver as she was checking in, she reports of having an episode of palpitations associated with chest pain. She placed on a Holter monitor and was advised to report to the ED for CP.  She states that she often has back pain that similar to her chest pain, but today's pain is different. She noticed her symptoms of CP started at 5:00 PM last night. In the past when she has had palpitations and CP she associated them with shoulder injections, but she has not recently had an injection.  Denies feeling numbness or tinglings, no N/V, SOB during the episode. Denies recent illness or travels. Denies smoking cigarettes or drinking EtOH. No abdominal pain.       The history is provided by the patient and medical records.     Review of patient's allergies indicates:  No Known Allergies  Past Medical History:   Diagnosis Date    Acute costochondritis 2012    Back pain     Benign essential hypertension     Gastroesophageal reflux disease without esophagitis     Pain in joint involving multiple sites 2013     Past Surgical History:   Procedure Laterality Date    BREAST BIOPSY Right      SECTION      COLONOSCOPY N/A 2013    Performed by YVES Womack MD at [facility] (4TH FLR)    KNEE ARTHROSCOPY W/ ACL RECONSTRUCTION      REFRACTIVE SURGERY Bilateral  or     Dr. Sinha OU distance     Family History   Problem Relation Age of Onset    Hypertension Mother     Heart disease Maternal Grandmother      Social History     Tobacco Use    Smoking  status: Never Smoker    Smokeless tobacco: Never Used   Substance Use Topics    Alcohol use: No    Drug use: No     Review of Systems   Constitutional: Negative for chills and fever.   HENT: Negative for congestion and tinnitus.    Eyes: Negative for pain.   Respiratory: Negative for shortness of breath.    Cardiovascular: Positive for chest pain and palpitations. Negative for leg swelling.   Gastrointestinal: Negative for abdominal pain, nausea and vomiting.   Genitourinary: Negative for dysuria.   Musculoskeletal: Negative for back pain.   Skin: Negative for rash.   Allergic/Immunologic: Negative for immunocompromised state.   Neurological: Negative for dizziness, facial asymmetry, weakness, light-headedness, numbness and headaches.   Hematological: Does not bruise/bleed easily.       Physical Exam     Initial Vitals [01/29/19 1101]   BP Pulse Resp Temp SpO2   (!) 149/71 63 18 98.3 °F (36.8 °C) 100 %      MAP       --         Physical Exam    Constitutional: She appears well-developed and well-nourished. She is not diaphoretic. No distress.   HENT:   Head: Normocephalic and atraumatic.   Nose: Nose normal.   Eyes: Conjunctivae and EOM are normal. Pupils are equal, round, and reactive to light.   Neck: Normal range of motion. Neck supple. No thyromegaly present. No JVD present.   Cardiovascular: Normal rate and regular rhythm.   Pulmonary/Chest: Breath sounds normal. No respiratory distress. She has no wheezes. She has no rhonchi. She has no rales.   Mild tenderness over her sternum without deformity    Abdominal: Soft. Bowel sounds are normal. She exhibits no distension. There is no tenderness.   Musculoskeletal: Normal range of motion.   Wrist brace on right,  No LE edema   Neurological: She is alert and oriented to person, place, and time. She has normal strength.   Skin: Skin is warm and dry. No rash noted.   Psychiatric: She has a normal mood and affect. Her behavior is normal. Thought content normal.          ED Course   Procedures  Labs Reviewed   MAGNESIUM - Abnormal; Notable for the following components:       Result Value    Magnesium 2.8 (*)     All other components within normal limits    Narrative:     add on test magnesium per dr faisal malik order #[id number]   01/29/2019  14:19    CBC W/ AUTO DIFFERENTIAL   COMPREHENSIVE METABOLIC PANEL   TROPONIN I   B-TYPE NATRIURETIC PEPTIDE   PROTIME-INR   MAGNESIUM     EKG Readings: (Independently Interpreted)   Normal Sinus, Heart Rate: 65 bpm, , QRS 68, , NO STEMI, isolated t-wave inversions in lead III, no significant changes compared to Nov 3, 2018     ECG Results          EKG 12-lead (Final result)  Result time 01/29/19 14:58:56    Final result by Interface, Lab In [facility] (01/29/19 14:58:56)                 Narrative:    Test Reason : R07.9,    Vent. Rate : 065 BPM     Atrial Rate : 065 BPM     P-R Int : 166 ms          QRS Dur : 068 ms      QT Int : 404 ms       P-R-T Axes : 058 038 029 degrees     QTc Int : 420 ms    Normal sinus rhythm  Normal ECG  When compared with ECG of 03-NOV-2018 19:55,  No significant change was found  Confirmed by Dedra Montalvo MD ([id number]) on 1/29/2019 2:58:49 PM    Referred By: [email or web address]   SELF           Confirmed By:Dedra Montalvo MD                             EKG 12-LEAD (Final result)  Result time 02/15/19 15:21:30              Imaging Results          X-Ray Chest AP Portable (Final result)  Result time 01/29/19 12:59:17    Final result by Satish Arzate MD (01/29/19 12:59:17)                 Impression:      See above      Electronically signed by: Satish Arzate MD  Date:    01/29/2019  Time:    12:59             Narrative:    EXAMINATION:  XR CHEST AP PORTABLE    CLINICAL HISTORY:  CHF;    TECHNIQUE:  Single frontal view of the chest was performed.    COMPARISON:  No 11/03/2018 ne    FINDINGS:  Heart size normal.  The lungs are clear.  No pleural effusion.                                 Medical Decision Making:    History:   Old Medical Records: I decided to obtain old medical records.  Initial Assessment:   64 y/o with a past medical history of having recurrent palpitation and chest pressure which she associated with having steroid injection after an injury. She is being seen by her PCP for this and was placed on the a Holter monitor today and was told to report to ED because she was having chest discomfort. She has had a CTA of her heart recently which was unremarkable and a calcification score of 0. She is not a smoker or drink and has no other risk factors beside HTN and HLD. She has tenderness with palpation over her sternum and appears anxious about her health, but does emphasize that she eats well and researches her health well. Initial EKG unchanged from Nov without any ST depression or elevation. TSH was normal 4 days ago. Given discomfort starting last night, will obtain basic labs and single troponin. I believe this may be secondary to her palpitations rather than a ACS event, no risk factor for PE; however, I advised the patient to follow up with her PCP tomorrow for an outpt stress test if today's labs are normal.   Differential Diagnosis:   Arrhythmia vs palpitations vs ACS vs costochondritis vs pericarditis vs MSK pain, less likely PE or PTX as pt denies SOB and no hypoxia or tachycardia  Independently Interpreted Test(s):   I have ordered and independently interpreted EKG Reading(s) - see prior notes  Clinical Tests:   Lab Tests: Ordered and Reviewed  Radiological Study: Ordered and Reviewed  Medical Tests: Reviewed and Ordered  ED Management:  Pt is well-appearing, and CXR, CBC, BMP, and trop WNL aside from mildly elevated magnesium, however I do not believe this is responsible for her palpitations and chest discomfort. HEART score low (2 pts) and ELBERT score low (17).    The exact etiology of patient's chest pain is not entirely clear, however I doubt emergent pathology given patient's exam, vitals, and  workup today. Given EKG is unremarkable, cardiac enzymes are negative, and that chest pain has resolved in the ED I feel the patient would be a good candidate for further outpatient workup of chest pain with a stress test or other provocative testing. Patient does have access to primary care who can help facilitate further workup. I think that it is reasonable to pursue further outpatient workup and that admission is not necessitated at this time.    I have strongly encouraged patient to followup with his primary care physician for reevaluation and to set up outpatient stress test within 48-72 hours (preferably 24 hours). I did have a lengthy discussion regarding chest pain sign/symptoms that would require immediate return to the ED. I did discuss the importance stress testing to further risk stratify patient's symptoms. I also discussed the importance of primary care followup for cardiacoptimization and lifestyle modifications which can be further discussed and monitored            Scribe Attestation:   Scribe #1: I performed the above scribed service and the documentation accurately describes the services I performed. I attest to the accuracy of the note.               Clinical Impression:   The encounter diagnosis was Chest pain.      Disposition:   Disposition: Discharged  Condition: Stable                        Marisa Crain MD  02/18/19 [id number]

## 2019-01-29 NOTE — ED TRIAGE NOTES
Patient states heart palpitations since knee and shoulder injection onset 1/8, states worse with eating. Has heart monitor on for known palpitations, placed within the hour. Denies SOB. States pain to chest intermittent daily, states she lives with pain daily.

## 2019-01-29 NOTE — TELEPHONE ENCOUNTER
----- Message from Bipin Flower sent at 1/29/2019  4:14 PM CST -----  Contact: Patient  Patient needs someone to call her back regarding today's visit to the ER and other things    Callback: [phone]    Thank you

## 2019-01-29 NOTE — ED NOTES
Patient requests PIV be placed without any movement, unable to thread PIV, patient requests IV be removed. Second nurse in to place IV/draw blood.

## 2019-01-31 ENCOUNTER — TELEPHONE (OUTPATIENT)
Dept: CARDIOLOGY | Facility: HOSPITAL | Age: 64
End: 2019-01-31

## 2019-01-31 NOTE — TELEPHONE ENCOUNTER
Patient called and stated that she will not be able to return her holter monitor until right before 4:30 PM today. I informed her that I would let the technician know.   ----- Message from Lavinia Gomez sent at 1/31/2019  9:17 AM CST -----  Contact: Pt called   Pt would like to know if she can return her monitor after 10:00 am. Please call pt @ [phone]. Thank you.

## 2019-02-05 ENCOUNTER — TELEPHONE (OUTPATIENT)
Dept: INTERNAL MEDICINE | Facility: CLINIC | Age: 64
End: 2019-02-05

## 2019-02-05 DIAGNOSIS — M79.89 PAIN AND SWELLING OF RIGHT LOWER LEG: Primary | ICD-10-CM

## 2019-02-05 DIAGNOSIS — M79.609 POPLITEAL PAIN: ICD-10-CM

## 2019-02-05 DIAGNOSIS — G89.29 CHRONIC CHEST PAIN: ICD-10-CM

## 2019-02-05 DIAGNOSIS — M79.661 PAIN AND SWELLING OF RIGHT LOWER LEG: Primary | ICD-10-CM

## 2019-02-05 DIAGNOSIS — R07.9 CHRONIC CHEST PAIN: ICD-10-CM

## 2019-02-06 ENCOUNTER — DOCUMENTATION ONLY (OUTPATIENT)
Dept: INTERNAL MEDICINE | Facility: CLINIC | Age: 64
End: 2019-02-06

## 2019-02-06 NOTE — TELEPHONE ENCOUNTER
Pt want to speak to you in regards to the swelling in her knees she has been experiencing she states she would like a call

## 2019-02-06 NOTE — PROGRESS NOTES
Patient called today regarding ongoing swelling and recurring swelling involving the right leg as well as what appears to be in the popliteal region    Will make arrangements to do an vascular and nonvascular ultrasound of the leg and popliteal

## 2019-02-06 NOTE — TELEPHONE ENCOUNTER
Set up vascular and soft tissue ultrasound right leg in ultrasound department    Make sure that the ultrasound of the right leg non vascular order is correct

## 2019-02-07 NOTE — TELEPHONE ENCOUNTER
CTA cardiac was put in    Patient continues to chest pain in front of the chest on recurrent basis    Recent evaluation emergency room was negative for acute coronary syndrome    It was recommended pursue stress test    Due to arthralgias involving the knees, she is not able to pursue a stress echo and presently is apprehensive of doing a chemical stress test

## 2019-02-08 ENCOUNTER — HOSPITAL ENCOUNTER (OUTPATIENT)
Dept: RADIOLOGY | Facility: HOSPITAL | Age: 64
Discharge: HOME | End: 2019-02-08
Attending: INTERNAL MEDICINE
Payer: COMMERCIAL

## 2019-02-08 DIAGNOSIS — M79.609 POPLITEAL PAIN: ICD-10-CM

## 2019-02-08 DIAGNOSIS — M79.89 PAIN AND SWELLING OF RIGHT LOWER LEG: ICD-10-CM

## 2019-02-08 DIAGNOSIS — M79.661 PAIN AND SWELLING OF RIGHT LOWER LEG: ICD-10-CM

## 2019-02-08 PROCEDURE — 93971 US LOWER EXTREMITY VEINS RIGHT: ICD-10-PCS | Mod: 26,RT,, | Performed by: RADIOLOGY

## 2019-02-08 PROCEDURE — 93971 EXTREMITY STUDY: CPT | Mod: TC,RT

## 2019-02-08 PROCEDURE — 93971 EXTREMITY STUDY: CPT | Mod: 26,RT,, | Performed by: RADIOLOGY

## 2019-02-11 ENCOUNTER — HOSPITAL ENCOUNTER (OUTPATIENT)
Dept: RADIOLOGY | Facility: HOSPITAL | Age: 64
Discharge: HOME | End: 2019-02-11
Attending: INTERNAL MEDICINE
Payer: COMMERCIAL

## 2019-02-11 VITALS — SYSTOLIC BLOOD PRESSURE: 130 MMHG | HEART RATE: 63 BPM | RESPIRATION RATE: 18 BRPM | DIASTOLIC BLOOD PRESSURE: 56 MMHG

## 2019-02-11 DIAGNOSIS — G89.29 CHRONIC CHEST PAIN: ICD-10-CM

## 2019-02-11 DIAGNOSIS — R07.9 CHRONIC CHEST PAIN: ICD-10-CM

## 2019-02-11 LAB
OHS CV EVENT MONITOR DAY: 0
OHS CV HOLTER LENGTH DECIMAL HOURS: 48
OHS CV HOLTER LENGTH HOURS: 48
OHS CV HOLTER LENGTH MINUTES: 0

## 2019-02-11 PROCEDURE — 75574 CT ANGIO HRT W/3D IMAGE: CPT | Mod: 26,,, | Performed by: RADIOLOGY

## 2019-02-11 PROCEDURE — 75574 CT ANGIO HRT W/3D IMAGE: CPT | Mod: TC

## 2019-02-11 PROCEDURE — 25500020 PHARM REV CODE 255: Performed by: INTERNAL MEDICINE

## 2019-02-11 PROCEDURE — 75574 CTA CARDIAC: ICD-10-PCS | Mod: 26,,, | Performed by: RADIOLOGY

## 2019-02-11 PROCEDURE — 25000003 PHARM REV CODE 250: Performed by: INTERNAL MEDICINE

## 2019-02-11 PROCEDURE — 63600175 PHARM REV CODE 636 W HCPCS: Performed by: INTERNAL MEDICINE

## 2019-02-11 RX ORDER — NITROGLYCERIN 0.4 MG/1
0.4 TABLET SUBLINGUAL ONCE
Status: COMPLETED | OUTPATIENT
Start: 2019-02-11 | End: 2019-02-11

## 2019-02-11 RX ORDER — METOPROLOL TARTRATE 1 MG/ML
5 INJECTION, SOLUTION INTRAVENOUS ONCE
Status: COMPLETED | OUTPATIENT
Start: 2019-02-11 | End: 2019-02-11

## 2019-02-11 RX ADMIN — IOHEXOL 100 ML: 350 INJECTION, SOLUTION INTRAVENOUS at 03:02

## 2019-02-11 RX ADMIN — NITROGLYCERIN 0.4 MG: 0.4 TABLET SUBLINGUAL at 02:02

## 2019-02-11 RX ADMIN — METOPROLOL TARTRATE 5 MG: 5 INJECTION, SOLUTION INTRAVENOUS at 02:02

## 2019-02-12 ENCOUNTER — TELEPHONE (OUTPATIENT)
Dept: SPORTS MEDICINE | Facility: CLINIC | Age: 64
End: 2019-02-12

## 2019-02-12 ENCOUNTER — OFFICE VISIT (OUTPATIENT)
Dept: INTERNAL MEDICINE | Facility: CLINIC | Age: 64
End: 2019-02-12
Payer: COMMERCIAL

## 2019-02-12 VITALS
OXYGEN SATURATION: 95 % | HEIGHT: 69 IN | SYSTOLIC BLOOD PRESSURE: 128 MMHG | WEIGHT: 181 LBS | DIASTOLIC BLOOD PRESSURE: 82 MMHG | HEART RATE: 89 BPM | BODY MASS INDEX: 26.81 KG/M2

## 2019-02-12 DIAGNOSIS — L04.9 ACUTE ADENITIS: Primary | ICD-10-CM

## 2019-02-12 PROCEDURE — 3074F PR MOST RECENT SYSTOLIC BLOOD PRESSURE < 130 MM HG: ICD-10-PCS | Mod: CPTII,S$GLB,, | Performed by: INTERNAL MEDICINE

## 2019-02-12 PROCEDURE — 3008F PR BODY MASS INDEX (BMI) DOCUMENTED: ICD-10-PCS | Mod: CPTII,S$GLB,, | Performed by: INTERNAL MEDICINE

## 2019-02-12 PROCEDURE — 3079F PR MOST RECENT DIASTOLIC BLOOD PRESSURE 80-89 MM HG: ICD-10-PCS | Mod: CPTII,S$GLB,, | Performed by: INTERNAL MEDICINE

## 2019-02-12 PROCEDURE — 3008F BODY MASS INDEX DOCD: CPT | Mod: CPTII,S$GLB,, | Performed by: INTERNAL MEDICINE

## 2019-02-12 PROCEDURE — 3074F SYST BP LT 130 MM HG: CPT | Mod: CPTII,S$GLB,, | Performed by: INTERNAL MEDICINE

## 2019-02-12 PROCEDURE — 99999 PR PBB SHADOW E&M-EST. PATIENT-LVL III: ICD-10-PCS | Mod: PBBFAC,,, | Performed by: INTERNAL MEDICINE

## 2019-02-12 PROCEDURE — 99999 PR PBB SHADOW E&M-EST. PATIENT-LVL III: CPT | Mod: PBBFAC,,, | Performed by: INTERNAL MEDICINE

## 2019-02-12 PROCEDURE — 3079F DIAST BP 80-89 MM HG: CPT | Mod: CPTII,S$GLB,, | Performed by: INTERNAL MEDICINE

## 2019-02-12 PROCEDURE — 99213 PR OFFICE/OUTPT VISIT, EST, LEVL III, 20-29 MIN: ICD-10-PCS | Mod: S$GLB,,, | Performed by: INTERNAL MEDICINE

## 2019-02-12 PROCEDURE — 99213 OFFICE O/P EST LOW 20 MIN: CPT | Mod: S$GLB,,, | Performed by: INTERNAL MEDICINE

## 2019-02-12 NOTE — TELEPHONE ENCOUNTER
Returned patient's phone call, SARA.    Donnie Robles   Sports Medicine Assistant   [facility]         ----- Message from Donnie Robles MA sent at 2/11/2019  6:54 PM CST -----  Contact: Self/ [phone]      ----- Message -----  From: Graham Archer  Sent: 2/11/2019   3:50 PM  To: Cristina MORA Staff    Patient would like a call back to speak with someone about not getting her injection that was for today at 4:30pm.

## 2019-02-13 ENCOUNTER — TELEPHONE (OUTPATIENT)
Dept: SPORTS MEDICINE | Facility: CLINIC | Age: 64
End: 2019-02-13

## 2019-02-13 NOTE — TELEPHONE ENCOUNTER
Josi received Euflexxa series injections in her right knee in January of 2019 and 3 mg as betamethasone sodium phosphate and 3 mg as betamethasone acetate (shoulder) per ED. Patient was seen in Dr. Willis clinic on 19.01.24 for heart palpitations. Patient has a hx of chest related pain and has been under the care of a physician for all ailments. Patient concerned that previous injections caused the heart palpitations.    Patient counciling on:  #1 vasovagal response  #2 past CSI injections - did not give her palpitations  #3 monitoring chest pain and continued follow up with PCP    Patient would like to hold off on the Left knee VSI series for now. She will call back when she would like to reschedule.    Donnie Robles   Sports Medicine Assistant   [facility]             ----- Message from Donnie Robles MA sent at 2/13/2019  4:31 PM CST -----  Contact: Self      ----- Message -----  From: Latonia Brown  Sent: 2/13/2019   4:27 PM  To: Cristina MORA Staff    Patient Returning Call from [facility]    Who Left Message for Patient: Donnie  Communication Preference: [phone]    Additional Information:

## 2019-04-15 ENCOUNTER — HOSPITAL ENCOUNTER (OUTPATIENT)
Dept: RADIOLOGY | Facility: HOSPITAL | Age: 64
Discharge: HOME | End: 2019-04-15
Attending: PHYSICIAN ASSISTANT
Payer: COMMERCIAL

## 2019-04-15 ENCOUNTER — OFFICE VISIT (OUTPATIENT)
Dept: ORTHOPEDICS | Facility: CLINIC | Age: 64
End: 2019-04-15
Payer: COMMERCIAL

## 2019-04-15 VITALS
BODY MASS INDEX: 26.48 KG/M2 | WEIGHT: 179.38 LBS | HEART RATE: 73 BPM | SYSTOLIC BLOOD PRESSURE: 127 MMHG | DIASTOLIC BLOOD PRESSURE: 82 MMHG

## 2019-04-15 DIAGNOSIS — R52 PAIN: ICD-10-CM

## 2019-04-15 DIAGNOSIS — M79.671 BILATERAL FOOT PAIN: Primary | ICD-10-CM

## 2019-04-15 DIAGNOSIS — M79.672 BILATERAL FOOT PAIN: Primary | ICD-10-CM

## 2019-04-15 PROCEDURE — 99999 PR PBB SHADOW E&M-EST. PATIENT-LVL III: CPT | Mod: PBBFAC,,, | Performed by: PHYSICIAN ASSISTANT

## 2019-04-15 PROCEDURE — 3074F SYST BP LT 130 MM HG: CPT | Mod: CPTII,S$GLB,, | Performed by: PHYSICIAN ASSISTANT

## 2019-04-15 PROCEDURE — 99213 PR OFFICE/OUTPT VISIT, EST, LEVL III, 20-29 MIN: ICD-10-PCS | Mod: S$GLB,,, | Performed by: PHYSICIAN ASSISTANT

## 2019-04-15 PROCEDURE — 3079F DIAST BP 80-89 MM HG: CPT | Mod: CPTII,S$GLB,, | Performed by: PHYSICIAN ASSISTANT

## 2019-04-15 PROCEDURE — 99999 PR PBB SHADOW E&M-EST. PATIENT-LVL III: ICD-10-PCS | Mod: PBBFAC,,, | Performed by: PHYSICIAN ASSISTANT

## 2019-04-15 PROCEDURE — 3008F PR BODY MASS INDEX (BMI) DOCUMENTED: ICD-10-PCS | Mod: CPTII,S$GLB,, | Performed by: PHYSICIAN ASSISTANT

## 2019-04-15 PROCEDURE — 3008F BODY MASS INDEX DOCD: CPT | Mod: CPTII,S$GLB,, | Performed by: PHYSICIAN ASSISTANT

## 2019-04-15 PROCEDURE — 99213 OFFICE O/P EST LOW 20 MIN: CPT | Mod: S$GLB,,, | Performed by: PHYSICIAN ASSISTANT

## 2019-04-15 PROCEDURE — 3079F PR MOST RECENT DIASTOLIC BLOOD PRESSURE 80-89 MM HG: ICD-10-PCS | Mod: CPTII,S$GLB,, | Performed by: PHYSICIAN ASSISTANT

## 2019-04-15 PROCEDURE — 3074F PR MOST RECENT SYSTOLIC BLOOD PRESSURE < 130 MM HG: ICD-10-PCS | Mod: CPTII,S$GLB,, | Performed by: PHYSICIAN ASSISTANT

## 2019-04-15 NOTE — PROGRESS NOTES
Subjective:      Patient ID: Josi Gil is a 64 y.o. female.    Chief Complaint: Pain of the Right Foot and Pain of the Left Foot    HPI  64 year old female presents with chief complaint of intermittent bilateral foot pain x 6 months. She denies trauma. Pain is diffuse at the dorsal and plantar surface. Nothing makes it worse. It just comes and goes. She says it feels like nerve or muscle. She does not take medicine for it. She takes Mg prn and tumeric. She says she drinks plenty of water. She reports tingling at her toes. She is not a diabetic.   Review of Systems   Constitution: Negative for chills, fever and night sweats.   Cardiovascular: Negative for chest pain.   Respiratory: Negative for cough and shortness of breath.    Hematologic/Lymphatic: Does not bruise/bleed easily.   Skin: Negative for color change.   Gastrointestinal: Negative for heartburn.   Genitourinary: Negative for dysuria.   Psychiatric/Behavioral: Negative for altered mental status.   Allergic/Immunologic: Negative for persistent infections.         Objective:            General    Vitals reviewed.  Constitutional: She is oriented to person, place, and time. She appears well-developed and well-nourished.   Cardiovascular: Normal rate.    Neurological: She is alert and oriented to person, place, and time.         Right Ankle/Foot Exam     Inspection   Erythema: absent  Effusion: Foot - absent    Range of Motion   The patient has normal right ankle ROM.    Muscle Strength   The patient has normal right ankle strength.    Other   Sensation: normal    Comments:  No TTP.     Left Ankle/Foot Exam     Inspection  Erythema: absent  Effusion: Foot - absent    Range of Motion   The patient has normal left ankle ROM.     Muscle Strength   The patient has normal left ankle strength.    Other   Sensation: normal    Comments:  No TTP.      Vascular Exam     Right Pulses  Dorsalis Pedis:      2+          Left Pulses  Dorsalis Pedis:       2+              Patient declined foot x-rays.        Assessment:       Encounter Diagnosis   Name Primary?    Bilateral foot pain Yes          Plan:       Discussed treatment options with patient. She declined x-ray because she says it does not feel like a bone/joint problem to her. Explained that her pain is likely not orthopedic in nature. She will try otc inserts. Continue Mg daily. Recommend f/u with PCP or neurology for further evaluation.

## 2019-04-22 ENCOUNTER — OFFICE VISIT (OUTPATIENT)
Dept: INTERNAL MEDICINE | Facility: CLINIC | Age: 64
End: 2019-04-22
Payer: COMMERCIAL

## 2019-04-22 VITALS
WEIGHT: 180.75 LBS | SYSTOLIC BLOOD PRESSURE: 121 MMHG | HEIGHT: 69 IN | BODY MASS INDEX: 26.77 KG/M2 | OXYGEN SATURATION: 96 % | DIASTOLIC BLOOD PRESSURE: 66 MMHG | HEART RATE: 77 BPM

## 2019-04-22 DIAGNOSIS — L72.9 CUTANEOUS CYST: Primary | ICD-10-CM

## 2019-04-22 PROCEDURE — 99999 PR PBB SHADOW E&M-EST. PATIENT-LVL III: ICD-10-PCS | Mod: PBBFAC,,, | Performed by: INTERNAL MEDICINE

## 2019-04-22 PROCEDURE — 3074F PR MOST RECENT SYSTOLIC BLOOD PRESSURE < 130 MM HG: ICD-10-PCS | Mod: CPTII,S$GLB,, | Performed by: INTERNAL MEDICINE

## 2019-04-22 PROCEDURE — 3008F PR BODY MASS INDEX (BMI) DOCUMENTED: ICD-10-PCS | Mod: CPTII,S$GLB,, | Performed by: INTERNAL MEDICINE

## 2019-04-22 PROCEDURE — 99212 PR OFFICE/OUTPT VISIT, EST, LEVL II, 10-19 MIN: ICD-10-PCS | Mod: S$GLB,,, | Performed by: INTERNAL MEDICINE

## 2019-04-22 PROCEDURE — 3078F PR MOST RECENT DIASTOLIC BLOOD PRESSURE < 80 MM HG: ICD-10-PCS | Mod: CPTII,S$GLB,, | Performed by: INTERNAL MEDICINE

## 2019-04-22 PROCEDURE — 3008F BODY MASS INDEX DOCD: CPT | Mod: CPTII,S$GLB,, | Performed by: INTERNAL MEDICINE

## 2019-04-22 PROCEDURE — 3074F SYST BP LT 130 MM HG: CPT | Mod: CPTII,S$GLB,, | Performed by: INTERNAL MEDICINE

## 2019-04-22 PROCEDURE — 99999 PR PBB SHADOW E&M-EST. PATIENT-LVL III: CPT | Mod: PBBFAC,,, | Performed by: INTERNAL MEDICINE

## 2019-04-22 PROCEDURE — 99212 OFFICE O/P EST SF 10 MIN: CPT | Mod: S$GLB,,, | Performed by: INTERNAL MEDICINE

## 2019-04-22 PROCEDURE — 3078F DIAST BP <80 MM HG: CPT | Mod: CPTII,S$GLB,, | Performed by: INTERNAL MEDICINE

## 2019-04-22 RX ORDER — MUPIROCIN CALCIUM 20 MG/G
CREAM TOPICAL 3 TIMES DAILY
Qty: 30 G | Refills: 0 | Status: SHIPPED | OUTPATIENT
Start: 2019-04-22 | End: 2019-07-17

## 2019-04-22 RX ORDER — CICLOPIROX 80 MG/ML
SOLUTION TOPICAL NIGHTLY
Qty: 6.6 ML | Refills: 3 | Status: SHIPPED | OUTPATIENT
Start: 2019-04-22 | End: 2020-02-07
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
## 2019-05-01 ENCOUNTER — OFFICE VISIT (OUTPATIENT)
Dept: NEUROLOGY | Facility: CLINIC | Age: 64
End: 2019-05-01
Payer: COMMERCIAL

## 2019-05-01 ENCOUNTER — LAB VISIT (OUTPATIENT)
Dept: LAB | Facility: HOSPITAL | Age: 64
End: 2019-05-01
Attending: PSYCHIATRY & NEUROLOGY
Payer: COMMERCIAL

## 2019-05-01 VITALS
HEIGHT: 68 IN | WEIGHT: 185.19 LBS | HEART RATE: 71 BPM | DIASTOLIC BLOOD PRESSURE: 76 MMHG | SYSTOLIC BLOOD PRESSURE: 125 MMHG | BODY MASS INDEX: 28.07 KG/M2

## 2019-05-01 DIAGNOSIS — M79.672 PAIN IN BOTH FEET: ICD-10-CM

## 2019-05-01 DIAGNOSIS — M79.671 PAIN IN BOTH FEET: ICD-10-CM

## 2019-05-01 DIAGNOSIS — R25.2 MUSCLE CRAMPING: ICD-10-CM

## 2019-05-01 DIAGNOSIS — R25.2 MUSCLE CRAMPING: Primary | ICD-10-CM

## 2019-05-01 LAB
ALBUMIN SERPL BCP-MCNC: 3.6 G/DL (ref 3.5–5.2)
ALP SERPL-CCNC: 78 U/L (ref 55–135)
ALT SERPL W/O P-5'-P-CCNC: 14 U/L (ref 10–44)
ANION GAP SERPL CALC-SCNC: 7 MMOL/L (ref 8–16)
AST SERPL-CCNC: 16 U/L (ref 10–40)
BILIRUB SERPL-MCNC: 0.5 MG/DL (ref 0.1–1)
BUN SERPL-MCNC: 14 MG/DL (ref 8–23)
CALCIUM SERPL-MCNC: 10 MG/DL (ref 8.7–10.5)
CHLORIDE SERPL-SCNC: 103 MMOL/L (ref 95–110)
CO2 SERPL-SCNC: 30 MMOL/L (ref 23–29)
CREAT SERPL-MCNC: 0.9 MG/DL (ref 0.5–1.4)
EST. GFR  (AFRICAN AMERICAN): >60 ML/MIN/1.73 M^2
EST. GFR  (NON AFRICAN AMERICAN): >60 ML/MIN/1.73 M^2
GLUCOSE SERPL-MCNC: 86 MG/DL (ref 70–110)
MAGNESIUM SERPL-MCNC: 2 MG/DL (ref 1.6–2.6)
PHOSPHATE SERPL-MCNC: 2.8 MG/DL (ref 2.7–4.5)
POTASSIUM SERPL-SCNC: 3.9 MMOL/L (ref 3.5–5.1)
PROT SERPL-MCNC: 7 G/DL (ref 6–8.4)
SODIUM SERPL-SCNC: 140 MMOL/L (ref 136–145)

## 2019-05-01 PROCEDURE — 99999 PR PBB SHADOW E&M-EST. PATIENT-LVL III: CPT | Mod: PBBFAC,,, | Performed by: STUDENT IN AN ORGANIZED HEALTH CARE EDUCATION/TRAINING PROGRAM

## 2019-05-01 PROCEDURE — 99205 PR OFFICE/OUTPT VISIT, NEW, LEVL V, 60-74 MIN: ICD-10-PCS | Mod: S$GLB,,, | Performed by: PSYCHIATRY & NEUROLOGY

## 2019-05-01 PROCEDURE — 3008F PR BODY MASS INDEX (BMI) DOCUMENTED: ICD-10-PCS | Mod: CPTII,S$GLB,, | Performed by: PSYCHIATRY & NEUROLOGY

## 2019-05-01 PROCEDURE — 36415 COLL VENOUS BLD VENIPUNCTURE: CPT

## 2019-05-01 PROCEDURE — 3074F SYST BP LT 130 MM HG: CPT | Mod: CPTII,S$GLB,, | Performed by: PSYCHIATRY & NEUROLOGY

## 2019-05-01 PROCEDURE — 3078F DIAST BP <80 MM HG: CPT | Mod: CPTII,S$GLB,, | Performed by: PSYCHIATRY & NEUROLOGY

## 2019-05-01 PROCEDURE — 80053 COMPREHEN METABOLIC PANEL: CPT

## 2019-05-01 PROCEDURE — 99999 PR PBB SHADOW E&M-EST. PATIENT-LVL III: ICD-10-PCS | Mod: PBBFAC,,, | Performed by: STUDENT IN AN ORGANIZED HEALTH CARE EDUCATION/TRAINING PROGRAM

## 2019-05-01 PROCEDURE — 84100 ASSAY OF PHOSPHORUS: CPT

## 2019-05-01 PROCEDURE — 3074F PR MOST RECENT SYSTOLIC BLOOD PRESSURE < 130 MM HG: ICD-10-PCS | Mod: CPTII,S$GLB,, | Performed by: PSYCHIATRY & NEUROLOGY

## 2019-05-01 PROCEDURE — 83735 ASSAY OF MAGNESIUM: CPT

## 2019-05-01 PROCEDURE — 3078F PR MOST RECENT DIASTOLIC BLOOD PRESSURE < 80 MM HG: ICD-10-PCS | Mod: CPTII,S$GLB,, | Performed by: PSYCHIATRY & NEUROLOGY

## 2019-05-01 PROCEDURE — 99205 OFFICE O/P NEW HI 60 MIN: CPT | Mod: S$GLB,,, | Performed by: PSYCHIATRY & NEUROLOGY

## 2019-05-01 PROCEDURE — 3008F BODY MASS INDEX DOCD: CPT | Mod: CPTII,S$GLB,, | Performed by: PSYCHIATRY & NEUROLOGY

## 2019-05-01 NOTE — PATIENT INSTRUCTIONS
Please start taking small amounts of pickle juice and/or tonic water. Need to monitor your BP closely due to high sodium levels in pickle juice. Please take vitamin D supplement regularly as low vitamin D levels would lead to low calcium levels that effects the muscles

## 2019-05-01 NOTE — PROGRESS NOTES
Neurology Clinic  Initial Consult Visit    Patient Name: Josi Gil  MRN: [id number]    CC: cramping in hands and feet    HPI: Josi Gil is a 64 y.o. 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    Patient reports intermittent episodes of stiffness and curling of the fingers and toes in both hands and feet that usually happens independently. these episodes last 2-3 minutes accompanied by severe pain in dorsum of the hand and bottom of the feet these symptoms have been going on for up to one year and happen 4-5 times a week on average. symptoms sometimes wake her up. She denies any specific triggers and nothing has helped with improving the pain and stiffness. She states that she has been trying to maintain a healthy diet, not high on carbohydrates, drinks enough water and sometimes uses magnesium and turmeric as needed to alleviate the pain (?). she denies new medications, smoking tobacco, recreational drugs or alcohol use. she denies any autoimmune disease orders in self or family members. She was evaluated recently in orthopedics clinic (4/15/19), when she refused x-rays because she thinks the pain is not bone related, therefore it was recommended to follow up with neurology. Patient was also told to drink pickle juice, however she has not tried.      Review of Systems:  General: fevers -, chills -, fatigue -, change in appetite -  Eyes: blurred vision -, double vision -, photosensitivity -  ENT: hearing loss -, difficulty swallowing -, drooling -, change in voice -  Respiratory: SOB -, cough -,  choking -  CV: chest pain -, palpitations -  GI: nausea -, vomiting -, abdominal pain -  Urinary: dysuria -, hematuria -, frequency -, urine incontinence  Skin: rash -, change of color -  Neurological: Headache -, dizziness -, weakness -, numbness -, seizure -, gait problem -, difficulty in speech -  Psychiatric:  hallucinations -, confusion -, dysphoric mood -, anxiety -      Past Medical History  Past Medical History:   Diagnosis Date    Acute costochondritis 9/18/2012    Back pain     Benign essential hypertension     Gastroesophageal reflux disease without esophagitis     Pain in joint involving multiple sites 7/9/2013       Medications    Current Outpatient Medications:     amLODIPine (NORVASC) 5 MG tablet, TAKE 1 TABLET BY MOUTH ONCE DAILY, Disp: 90 tablet, Rfl: 3    aspirin 325 MG tablet, Take 325 mg by mouth once daily., Disp: , Rfl:     ciclopirox (PENLAC) 8 % Soln, Apply topically nightly., Disp: 6.6 mL, Rfl: 3    losartan-hydrochlorothiazide 100-25 mg (HYZAAR) 100-25 mg per tablet, Take 1 tablet by mouth once daily., Disp: 90 tablet, Rfl: 3    metoprolol succinate (TOPROL XL) 25 MG 24 hr tablet, Take 1 tablet (25 mg total) by mouth once daily., Disp: 90 tablet, Rfl: 3    mupirocin calcium 2% (BACTROBAN) 2 % cream, Apply topically 3 (three) times daily., Disp: 30 g, Rfl: 0  Any other notable medications as documented in HPI    Allergies  Review of patient's allergies indicates:  No Known Allergies    Social History  Social History     Socioeconomic History    Marital status: Single     Spouse name: Not on file    Number of children: 1    Years of education: Not on file    Highest education level: Not on file   Occupational History    Not on file   Social Needs    Financial resource strain: Not on file    Food insecurity:     Worry: Not on file     Inability: Not on file    Transportation needs:     Medical: Not on file     Non-medical: Not on file   Tobacco Use    Smoking status: Never Smoker    Smokeless tobacco: Never Used   Substance and Sexual Activity    Alcohol use: No    Drug use: No    Sexual activity: Not on file   Lifestyle    Physical activity:     Days per week: Not on file     Minutes per session: Not on file    Stress: Not on file   Relationships    Social connections:      "Talks on phone: Not on file     Gets together: Not on file     Attends Synagogue service: Not on file     Active member of club or organization: Not on file     Attends meetings of clubs or organizations: Not on file     Relationship status: Not on file   Other Topics Concern    Not on file   Social History Narrative    Not on file     Any other notable Social History as documented in HPI.    Family History  Family History   Problem Relation Age of Onset    Hypertension Mother     Heart disease Maternal Grandmother      Any other notable FMH as documented in HPI.    Physical Exam  /76   Pulse 71   Ht 5' 8" (1.727 m)   Wt 84 kg (185 lb 3 oz)   BMI 28.16 kg/m²     General: Well-developed, well-groomed. No apparent distress  HENT: Normocephalic, atraumatic.   Cardiovascular: Regular rate and rhythm with no murmurs, rubs or gallops.    Chest: Lungs clear to auscultation bilaterally.  No wheezes, stridor, ronchi appreciated.  Abdomen: Normoactive bowel sounds present.  Soft, nontender to palpation.  Musculoskeletal: No peripheral edema, No joint swelling    Neurologic Exam:   Mental status and Cognition:  Awake, alert and oriented x3  Follows commands, answers questions appropriately  Short term memory intact  Praxis normal  Speech Normal. Language is fluent.       Cranial nerves:   PERRLA. EOM intact. No Nystagmus. No ophthalmoplegia.   Visual fields are full to confrontation.    Facial sensation is normal to light touch.   Facial expression is full and symmetric.   Hearing is intact bilaterally.   Palate elevates symmetrically.   SCM and Trapezius full strength bilaterally.   Tongue is midline.     Motor examination   normal bulk and tone in all four limbs. There are no atrophy or fasciculations.   Strength is 5/5 in the upper and lower extremities bilaterally.    Sensory examination  Sensation to light touch: intact in BUE and BLE  Sensation to temperature: intact in BUE and BLE  Sensation to vibration: " slightly decreased up to ankles    Deep tendon reflexes   2+ and symmetric in the upper and lower extremities bilaterally.    No clonus. Babinski normal bilaterally.    Gait and Coordination:  Normal gait.  Finger to nose is normal bilaterally.      Lab and Test Results    WBC   Date Value Ref Range Status   01/29/2019 6.93 3.90 - 12.70 K/uL Final   01/24/2019 8.38 3.90 - 12.70 K/uL Final   11/03/2018 8.69 3.90 - 12.70 K/uL Final     Hemoglobin   Date Value Ref Range Status   01/29/2019 14.6 12.0 - 16.0 g/dL Final   01/24/2019 14.8 12.0 - 16.0 g/dL Final   11/03/2018 14.3 12.0 - 16.0 g/dL Final     Hematocrit   Date Value Ref Range Status   01/29/2019 43.9 37.0 - 48.5 % Final   01/24/2019 46.8 37.0 - 48.5 % Final   11/03/2018 41.7 37.0 - 48.5 % Final     Platelets   Date Value Ref Range Status   01/29/2019 225 150 - 350 K/uL Final   01/24/2019 243 150 - 350 K/uL Final   11/03/2018 260 150 - 350 K/uL Final     Glucose   Date Value Ref Range Status   05/01/2019 86 70 - 110 mg/dL Final   01/29/2019 78 70 - 110 mg/dL Final   01/24/2019 93 70 - 110 mg/dL Final     Sodium   Date Value Ref Range Status   05/01/2019 140 136 - 145 mmol/L Final   01/29/2019 138 136 - 145 mmol/L Final   01/24/2019 142 136 - 145 mmol/L Final     Potassium   Date Value Ref Range Status   05/01/2019 3.9 3.5 - 5.1 mmol/L Final   01/29/2019 4.5 3.5 - 5.1 mmol/L Final     Comment:     *Result may be interfered by visible hemolysis   01/24/2019 4.0 3.5 - 5.1 mmol/L Final     Chloride   Date Value Ref Range Status   05/01/2019 103 95 - 110 mmol/L Final   01/29/2019 101 95 - 110 mmol/L Final   01/24/2019 102 95 - 110 mmol/L Final     CO2   Date Value Ref Range Status   05/01/2019 30 (H) 23 - 29 mmol/L Final   01/29/2019 26 23 - 29 mmol/L Final   01/24/2019 30 (H) 23 - 29 mmol/L Final     BUN, Bld   Date Value Ref Range Status   05/01/2019 14 8 - 23 mg/dL Final   01/29/2019 13 8 - 23 mg/dL Final   01/24/2019 14 8 - 23 mg/dL Final     Creatinine   Date  Value Ref Range Status   05/01/2019 0.9 0.5 - 1.4 mg/dL Final   01/29/2019 0.9 0.5 - 1.4 mg/dL Final   01/24/2019 0.9 0.5 - 1.4 mg/dL Final     Calcium   Date Value Ref Range Status   05/01/2019 10.0 8.7 - 10.5 mg/dL Final   01/29/2019 10.0 8.7 - 10.5 mg/dL Final   01/24/2019 10.4 8.7 - 10.5 mg/dL Final     Magnesium   Date Value Ref Range Status   05/01/2019 2.0 1.6 - 2.6 mg/dL Final   01/29/2019 2.8 (H) 1.6 - 2.6 mg/dL Final   10/02/2018 2.1 1.6 - 2.6 mg/dL Final     Phosphorus   Date Value Ref Range Status   05/01/2019 2.8 2.7 - 4.5 mg/dL Final   01/16/2009 3.6 2.7 - 4.5 mg/dl Final     Alkaline Phosphatase   Date Value Ref Range Status   05/01/2019 78 55 - 135 U/L Final   01/29/2019 65 55 - 135 U/L Final   11/03/2018 76 55 - 135 U/L Final     ALT   Date Value Ref Range Status   05/01/2019 14 10 - 44 U/L Final   01/29/2019 22 10 - 44 U/L Final   11/03/2018 21 10 - 44 U/L Final     AST   Date Value Ref Range Status   05/01/2019 16 10 - 40 U/L Final   01/29/2019 28 10 - 40 U/L Final     Comment:     *Result may be interfered by visible hemolysis   11/03/2018 29 10 - 40 U/L Final     Comment:     *Result may be interfered by visible hemolysis         Images:        Assessment and Plan    Problem List Items Addressed This Visit        Orthopedic    Pain in both feet    Current Assessment & Plan     Please see below         Relevant Orders    Comprehensive metabolic panel (Completed)    Magnesium (Completed)    Phosphorus (Completed)    Miscellaneous Sendout Test voltage-gated potassium channel antibody       Other    Muscle cramping - Primary    Current Assessment & Plan     Unclear etiology, mostly due to electrolyte abnormalities. Spoke with patient in length and addressed her concerns and questions.    - recommended taking small amounts of pickle juice and/or tonic water.   - Need to monitor BP closely due to high sodium levels in pickle juice.   - Please take vitamin D supplement regularly as low vitamin D levels  would lead to low calcium levels that effects the muscles  - will repeat labs for BMP, Mg, Phos  - will send out labs for voltage-gated potassium channel Ab             Relevant Orders    Comprehensive metabolic panel (Completed)    Magnesium (Completed)    Phosphorus (Completed)    Miscellaneous Sendout Test voltage-gated potassium channel antibody              Rubina Candelario MD  PGY-II, Neurology Resident  [facility]  [address] [place] [address]

## 2019-05-06 ENCOUNTER — TELEPHONE (OUTPATIENT)
Dept: INTERNAL MEDICINE | Facility: CLINIC | Age: 64
End: 2019-05-06

## 2019-05-06 NOTE — TELEPHONE ENCOUNTER
----- Message from Meaghan Putnam MA sent at 5/4/2019  8:13 AM CDT -----  Contact: self [phone]  Pt would like a call back concerning test results. Please, advise, thanks.

## 2019-05-07 ENCOUNTER — TELEPHONE (OUTPATIENT)
Dept: NEUROLOGY | Facility: CLINIC | Age: 64
End: 2019-05-07

## 2019-05-07 NOTE — ASSESSMENT & PLAN NOTE
Unclear etiology, mostly due to electrolyte abnormalities. Spoke with patient in length and addressed her concerns and questions.    - recommended taking small amounts of pickle juice and/or tonic water.   - Need to monitor BP closely due to high sodium levels in pickle juice.   - Please take vitamin D supplement regularly as low vitamin D levels would lead to low calcium levels that effects the muscles  - will repeat labs for BMP, Mg, Phos  - will send out labs for voltage-gated potassium channel Ab

## 2019-05-07 NOTE — TELEPHONE ENCOUNTER
----- Message from Meaghan Putnam MA sent at 5/4/2019  8:15 AM CDT -----  Contact: self [phone]  Pt would like a call back concerning test results. Please, advise. Thanks.

## 2019-05-15 ENCOUNTER — NURSE TRIAGE (OUTPATIENT)
Dept: ADMINISTRATIVE | Facility: CLINIC | Age: 64
End: 2019-05-15

## 2019-05-15 NOTE — TELEPHONE ENCOUNTER
Reason for Disposition   [1] Chest pain lasts > 5 minutes AND [2] age > 50    Protocols used: CHEST PAIN-A-AH    Pt states she has been having chest/left shoulder/arm pain since this morning, she states she is on a new BP medication, advised her to call EMS to be assessed in the ER, also advised her to chew 160 - 325 mg of aspirin while waiting, caller agrees.

## 2019-05-15 NOTE — TELEPHONE ENCOUNTER
Has already spoken with another nurse.  No further questions    Reason for Disposition   Caller has already spoken with another triager and has no further questions.    Protocols used: NO CONTACT OR DUPLICATE CONTACT CALL-A-AH

## 2019-05-16 ENCOUNTER — TELEPHONE (OUTPATIENT)
Dept: NEUROLOGY | Facility: CLINIC | Age: 64
End: 2019-05-16

## 2019-05-16 NOTE — TELEPHONE ENCOUNTER
----- Message from Stanford Porter sent at 5/13/2019  4:31 PM CDT -----  Test Results    Type of Test: LAB  Date of Test: 05/01/19  Communication Preference: [phone]  Additional Information: Pt is asking for a call back today

## 2019-05-20 ENCOUNTER — TELEPHONE (OUTPATIENT)
Dept: NEUROLOGY | Facility: CLINIC | Age: 64
End: 2019-05-20

## 2019-05-20 NOTE — TELEPHONE ENCOUNTER
----- Message from Lyndsey Jacob sent at 5/20/2019  1:13 PM CDT -----  Contact: self @ [phone]  Pt says she is waiting on a return call concerning her lab results from 5-1-19.  Pls call to discuss asap.

## 2019-05-23 ENCOUNTER — TELEPHONE (OUTPATIENT)
Dept: NEUROLOGY | Facility: CLINIC | Age: 64
End: 2019-05-23

## 2019-05-23 NOTE — TELEPHONE ENCOUNTER
Call received from pt. She is very upset that she has called 3 times to get results and has gotten no call back as of yet. Advised pt that messages have been sent to Dr Candelario, but the provider has not gotten back to her MA with results. Results discussed with pt. Advised that results were normal for all 3 tests. Verbalizes understanding.

## 2019-05-24 ENCOUNTER — OFFICE VISIT (OUTPATIENT)
Dept: OPTOMETRY | Facility: CLINIC | Age: 64
End: 2019-05-24
Payer: COMMERCIAL

## 2019-05-24 DIAGNOSIS — H11.153 PINGUECULA OF BOTH EYES: Primary | ICD-10-CM

## 2019-05-24 PROCEDURE — 99499 NO LOS: ICD-10-PCS | Mod: S$GLB,,, | Performed by: OPTOMETRIST

## 2019-05-24 PROCEDURE — 99999 PR PBB SHADOW E&M-EST. PATIENT-LVL II: CPT | Mod: PBBFAC,,, | Performed by: OPTOMETRIST

## 2019-05-24 PROCEDURE — 99999 PR PBB SHADOW E&M-EST. PATIENT-LVL II: ICD-10-PCS | Mod: PBBFAC,,, | Performed by: OPTOMETRIST

## 2019-05-24 PROCEDURE — 99499 UNLISTED E&M SERVICE: CPT | Mod: S$GLB,,, | Performed by: OPTOMETRIST

## 2019-05-24 NOTE — PROGRESS NOTES
HPI     DLS:7/3/18  Pt states she is having trouble seeing states her Va is blurry, also   states she  Is having some goo in the corner of the left that has been   there for a few months now.   Floaters, no flashes   systane gtts PRN     Last edited by Felisa Madrid MA on 5/24/2019  1:52 PM. (History)        ROS     Positive for: Eyes (LASIK OU)    Negative for: Constitutional, Gastrointestinal, Neurological, Skin,   Genitourinary, Musculoskeletal, HENT, Endocrine, Cardiovascular,   Respiratory, Psychiatric, Allergic/Imm, Heme/Lymph    Last edited by Jack Gonzalez, BARB on 5/24/2019  2:36 PM. (History)        Assessment /Plan     For exam results, see Encounter Report.    Pinguecula of both eyes      A mix up in the phone room when taking patients call led pt into a [facility] spot today.  Phone person thought patient had an eye problems because she mentioned noting some changes on her conj--but pt says at time of exam she just was here to check her glasses/refraction.  I informed pt that her insurance may not pay for refraction today because it has been less than a year (SEE MY NOTES FROM July 2018).      PLAN:    1, will [facility] charge today since there was a mix up.  Discussed with pt her pinguecula are normal, and advised sunglasses/ATs to help prevent growth.  She will rtc when she is due for her routine exam

## 2019-06-12 ENCOUNTER — LAB VISIT (OUTPATIENT)
Dept: LAB | Facility: HOSPITAL | Age: 64
End: 2019-06-12
Attending: INTERNAL MEDICINE
Payer: COMMERCIAL

## 2019-06-12 ENCOUNTER — OFFICE VISIT (OUTPATIENT)
Dept: INTERNAL MEDICINE | Facility: CLINIC | Age: 64
End: 2019-06-12
Payer: COMMERCIAL

## 2019-06-12 VITALS
OXYGEN SATURATION: 98 % | HEIGHT: 68 IN | WEIGHT: 183 LBS | BODY MASS INDEX: 27.74 KG/M2 | DIASTOLIC BLOOD PRESSURE: 76 MMHG | SYSTOLIC BLOOD PRESSURE: 132 MMHG | HEART RATE: 78 BPM

## 2019-06-12 DIAGNOSIS — R10.84 GENERALIZED POSTPRANDIAL ABDOMINAL PAIN: ICD-10-CM

## 2019-06-12 DIAGNOSIS — R01.1 CARDIAC MURMUR: ICD-10-CM

## 2019-06-12 DIAGNOSIS — R07.9 CHEST PAIN, UNSPECIFIED TYPE: ICD-10-CM

## 2019-06-12 DIAGNOSIS — R05.9 COUGH: ICD-10-CM

## 2019-06-12 DIAGNOSIS — R07.9 CHEST PAIN, UNSPECIFIED TYPE: Primary | ICD-10-CM

## 2019-06-12 LAB
ALBUMIN SERPL BCP-MCNC: 4.1 G/DL (ref 3.5–5.2)
ALP SERPL-CCNC: 73 U/L (ref 55–135)
ALT SERPL W/O P-5'-P-CCNC: 15 U/L (ref 10–44)
AMYLASE SERPL-CCNC: 90 U/L (ref 20–110)
ANION GAP SERPL CALC-SCNC: 13 MMOL/L (ref 8–16)
AST SERPL-CCNC: 19 U/L (ref 10–40)
BILIRUB SERPL-MCNC: 0.8 MG/DL (ref 0.1–1)
BUN SERPL-MCNC: 9 MG/DL (ref 8–23)
CALCIUM SERPL-MCNC: 10.8 MG/DL (ref 8.7–10.5)
CHLORIDE SERPL-SCNC: 102 MMOL/L (ref 95–110)
CHOLEST SERPL-MCNC: 281 MG/DL (ref 120–199)
CHOLEST/HDLC SERPL: 3.1 {RATIO} (ref 2–5)
CO2 SERPL-SCNC: 24 MMOL/L (ref 23–29)
CREAT SERPL-MCNC: 0.8 MG/DL (ref 0.5–1.4)
EST. GFR  (AFRICAN AMERICAN): >60 ML/MIN/1.73 M^2
EST. GFR  (NON AFRICAN AMERICAN): >60 ML/MIN/1.73 M^2
GLUCOSE SERPL-MCNC: 85 MG/DL (ref 70–110)
HDLC SERPL-MCNC: 92 MG/DL (ref 40–75)
HDLC SERPL: 32.7 % (ref 20–50)
LDLC SERPL CALC-MCNC: 170.2 MG/DL (ref 63–159)
LIPASE SERPL-CCNC: 38 U/L (ref 4–60)
NONHDLC SERPL-MCNC: 189 MG/DL
POTASSIUM SERPL-SCNC: 4.2 MMOL/L (ref 3.5–5.1)
PROT SERPL-MCNC: 7.7 G/DL (ref 6–8.4)
SODIUM SERPL-SCNC: 139 MMOL/L (ref 136–145)
TRIGL SERPL-MCNC: 94 MG/DL (ref 30–150)

## 2019-06-12 PROCEDURE — 82150 ASSAY OF AMYLASE: CPT

## 2019-06-12 PROCEDURE — 3078F PR MOST RECENT DIASTOLIC BLOOD PRESSURE < 80 MM HG: ICD-10-PCS | Mod: CPTII,S$GLB,, | Performed by: INTERNAL MEDICINE

## 2019-06-12 PROCEDURE — 80061 LIPID PANEL: CPT

## 2019-06-12 PROCEDURE — 80053 COMPREHEN METABOLIC PANEL: CPT

## 2019-06-12 PROCEDURE — 3008F PR BODY MASS INDEX (BMI) DOCUMENTED: ICD-10-PCS | Mod: CPTII,S$GLB,, | Performed by: INTERNAL MEDICINE

## 2019-06-12 PROCEDURE — 3008F BODY MASS INDEX DOCD: CPT | Mod: CPTII,S$GLB,, | Performed by: INTERNAL MEDICINE

## 2019-06-12 PROCEDURE — 86677 HELICOBACTER PYLORI ANTIBODY: CPT

## 2019-06-12 PROCEDURE — 3075F SYST BP GE 130 - 139MM HG: CPT | Mod: CPTII,S$GLB,, | Performed by: INTERNAL MEDICINE

## 2019-06-12 PROCEDURE — 36415 COLL VENOUS BLD VENIPUNCTURE: CPT | Mod: PO

## 2019-06-12 PROCEDURE — 99214 PR OFFICE/OUTPT VISIT, EST, LEVL IV, 30-39 MIN: ICD-10-PCS | Mod: S$GLB,,, | Performed by: INTERNAL MEDICINE

## 2019-06-12 PROCEDURE — 99999 PR PBB SHADOW E&M-EST. PATIENT-LVL III: ICD-10-PCS | Mod: PBBFAC,,, | Performed by: INTERNAL MEDICINE

## 2019-06-12 PROCEDURE — 3078F DIAST BP <80 MM HG: CPT | Mod: CPTII,S$GLB,, | Performed by: INTERNAL MEDICINE

## 2019-06-12 PROCEDURE — 99214 OFFICE O/P EST MOD 30 MIN: CPT | Mod: S$GLB,,, | Performed by: INTERNAL MEDICINE

## 2019-06-12 PROCEDURE — 99999 PR PBB SHADOW E&M-EST. PATIENT-LVL III: CPT | Mod: PBBFAC,,, | Performed by: INTERNAL MEDICINE

## 2019-06-12 PROCEDURE — 3075F PR MOST RECENT SYSTOLIC BLOOD PRESS GE 130-139MM HG: ICD-10-PCS | Mod: CPTII,S$GLB,, | Performed by: INTERNAL MEDICINE

## 2019-06-12 PROCEDURE — 83690 ASSAY OF LIPASE: CPT

## 2019-06-12 RX ORDER — MUPIROCIN 20 MG/G
OINTMENT TOPICAL
Refills: 0 | COMMUNITY
Start: 2019-04-23 | End: 2019-07-17

## 2019-06-12 RX ORDER — PANTOPRAZOLE SODIUM 40 MG/1
40 TABLET, DELAYED RELEASE ORAL DAILY
Qty: 14 TABLET | Refills: 0 | Status: SHIPPED | OUTPATIENT
Start: 2019-06-12 | End: 2019-06-26

## 2019-06-12 NOTE — PROGRESS NOTES
REASON FOR VISIT:  This is a 64-year-old female who comes in to address a number   of issues and symptoms that she has been experiencing.    Within the past month, she has been having reoccurring episodes of a sharp pain   that she can feel over the left upper chest.  It can last for a minute and a   half, may happen every few days.  She does note it could be more toward the   afternoon and evening, maybe one time it woke her up from sleep.  It has not   particularly happen around any physical activity or eating.  In the past, she   has had a CTA of the coronary vessels which have been normal and the last being   in February 2019 in which there was a calcium score of zero and normal coronary   arteries were reported.    Within the past four days, she has noticed that after eating, she feels an   uncomfortable feeling in the upper abdomen.  She would not report as a pain, is   not bloating or eating.  Mainly she is not bloated or distended.  There has been   no change in the bowel function and there has been no change in urination.  She   actually has no problems with this.    It has also been noted that at times after eating, she may have a cough that is   nonproductive.  Sometimes her voice can get a little bit raspy or hoarse.  She   does not have trouble swallowing food or liquids.  She is not aware of   regurgitation.  There is no indigestion or heartburn.  She does not have any   sinus symptoms.    PAST MEDICAL HISTORY:  Hypertension.  History of cervical and lumbar pain.    CURRENT MEDICINES:  Aspirin 325 mg and sometimes she might take two when she feels the chest pain.  Losartan//25 mg daily.  Metoprolol XL 25 mg daily.  Amlodipine 5 mg daily.    SOCIAL HISTORY:  Tobacco use - none.    PHYSICAL EXAMINATION:  VITAL SIGNS:  Weight is 183, pulse 72, blood pressure by me 144/72.  NECK:  No thyromegaly.  No masses.  LUNGS:  Clear breath sounds, good effort.  HEART:  Regular rate and rhythm with 1 or 2  systolic murmur at the base.  ABDOMEN:  Active bowel sounds, soft, nontender.  No hepatosplenomegaly or   abdominal masses.    IMPRESSION:  1.  Chest pain, which sounds like that of chest wall pain.  2.  Postprandial abdominal discomfort.  3.  Postprandial cough, which may be LPR.    PLAN:  Today, we will get a chemistry, CBC, troponin, amylase, lipase and H.   pylori.  Recommend 2D echo with Doppler.  Reassurance that the chest pain itself   does not sound like angina and for two weeks, a trial of Protonix or Prilosec.      JAM/CATHY  dd: 06/12/2019 16:15:48 (CDT)  td: 06/13/2019 10:55:35 (CDT)  Doc ID   #[id number]  Job ID #[id number]    CC:

## 2019-06-13 ENCOUNTER — TELEPHONE (OUTPATIENT)
Dept: INTERNAL MEDICINE | Facility: CLINIC | Age: 64
End: 2019-06-13

## 2019-06-13 DIAGNOSIS — R76.8 POSITIVE HELICOBACTER PYLORI SEROLOGY: ICD-10-CM

## 2019-06-13 DIAGNOSIS — R07.9 CHEST PAIN, UNSPECIFIED TYPE: Primary | ICD-10-CM

## 2019-06-13 LAB — H PYLORI IGG SERPL QL IA: POSITIVE

## 2019-06-13 NOTE — TELEPHONE ENCOUNTER
Set up referral to Gastroenterology    Also set up CBC at the [facility] on June 24th when she is to do her 2D echo

## 2019-06-13 NOTE — TELEPHONE ENCOUNTER
----- Message from Lizbeth Archuletaa sent at 6/13/2019  9:47 AM CDT -----  Regarding: Lab Client Services  Contact: [phone]  Hi my name is Lizbeth I work in the Lab Client Services. We had a problem with some lab work on this patient. If someone from your office could call us at [phone] or ext [phone] that would be great. Anyone in my department can help. Thank you

## 2019-06-13 NOTE — PROGRESS NOTES
Test results reviewed    Lipid profile shows cholesterol to be a little bit higher 283 however the HDL is 94    she is reluctant start medication, early in the year she had a normal CT a the coronary vessels, in for now to be very attentive to diet and physical activity      Patient also had a positive Helicobacter pylori    in view of the abdominal discomfort, , will have a meet with Gastroenterology   in the meantime can continue with Protonix 40 mg that was sent in

## 2019-06-24 ENCOUNTER — LAB VISIT (OUTPATIENT)
Dept: LAB | Facility: HOSPITAL | Age: 64
End: 2019-06-24
Attending: INTERNAL MEDICINE
Payer: COMMERCIAL

## 2019-06-24 ENCOUNTER — DOCUMENTATION ONLY (OUTPATIENT)
Dept: INTERNAL MEDICINE | Facility: CLINIC | Age: 64
End: 2019-06-24

## 2019-06-24 ENCOUNTER — TELEPHONE (OUTPATIENT)
Dept: INTERNAL MEDICINE | Facility: CLINIC | Age: 64
End: 2019-06-24

## 2019-06-24 ENCOUNTER — CLINICAL SUPPORT (OUTPATIENT)
Dept: CARDIOLOGY | Facility: CLINIC | Age: 64
End: 2019-06-24
Attending: INTERNAL MEDICINE
Payer: COMMERCIAL

## 2019-06-24 VITALS
HEART RATE: 76 BPM | HEIGHT: 68 IN | WEIGHT: 183 LBS | DIASTOLIC BLOOD PRESSURE: 80 MMHG | BODY MASS INDEX: 27.74 KG/M2 | SYSTOLIC BLOOD PRESSURE: 150 MMHG

## 2019-06-24 DIAGNOSIS — A04.8 POSITIVE H. PYLORI TEST: ICD-10-CM

## 2019-06-24 DIAGNOSIS — E78.00 PURE HYPERCHOLESTEROLEMIA: ICD-10-CM

## 2019-06-24 DIAGNOSIS — R05.3 COUGH, PERSISTENT: ICD-10-CM

## 2019-06-24 DIAGNOSIS — R76.8 POSITIVE HELICOBACTER PYLORI SEROLOGY: ICD-10-CM

## 2019-06-24 DIAGNOSIS — R06.02 SHORTNESS OF BREATH: ICD-10-CM

## 2019-06-24 DIAGNOSIS — R07.9 CHEST PAIN, UNSPECIFIED TYPE: ICD-10-CM

## 2019-06-24 DIAGNOSIS — A04.8 H. PYLORI INFECTION: Primary | ICD-10-CM

## 2019-06-24 DIAGNOSIS — R01.1 CARDIAC MURMUR: ICD-10-CM

## 2019-06-24 DIAGNOSIS — I35.9 AORTIC ANNULAR CALCIFICATION: ICD-10-CM

## 2019-06-24 DIAGNOSIS — J47.9 BRONCHIECTASIS WITHOUT COMPLICATION: ICD-10-CM

## 2019-06-24 LAB
ASCENDING AORTA: 3.12 CM
AV INDEX (PROSTH): 0.91
AV MEAN GRADIENT: 8 MMHG
AV PEAK GRADIENT: 17 MMHG
AV VALVE AREA: 2.61 CM2
AV VELOCITY RATIO: 0.81
BASOPHILS # BLD AUTO: 0.05 K/UL (ref 0–0.2)
BASOPHILS NFR BLD: 1 % (ref 0–1.9)
BSA FOR ECHO PROCEDURE: 2 M2
CV ECHO LV RWT: 0.37 CM
DIFFERENTIAL METHOD: NORMAL
DOP CALC AO PEAK VEL: 2.05 M/S
DOP CALC AO VTI: 39.87 CM
DOP CALC LVOT AREA: 2.9 CM2
DOP CALC LVOT DIAMETER: 1.91 CM
DOP CALC LVOT PEAK VEL: 1.66 M/S
DOP CALC LVOT STROKE VOLUME: 104.1 CM3
DOP CALCLVOT PEAK VEL VTI: 36.35 CM
E WAVE DECELERATION TIME: 170.89 MSEC
E/A RATIO: 1.07
ECHO LV POSTERIOR WALL: 0.83 CM (ref 0.6–1.1)
EOSINOPHIL # BLD AUTO: 0.4 K/UL (ref 0–0.5)
EOSINOPHIL NFR BLD: 6.7 % (ref 0–8)
ERYTHROCYTE [DISTWIDTH] IN BLOOD BY AUTOMATED COUNT: 13.4 % (ref 11.5–14.5)
FRACTIONAL SHORTENING: 29 % (ref 28–44)
HCT VFR BLD AUTO: 41.2 % (ref 37–48.5)
HGB BLD-MCNC: 13.3 G/DL (ref 12–16)
IMM GRANULOCYTES # BLD AUTO: 0.01 K/UL (ref 0–0.04)
IMM GRANULOCYTES NFR BLD AUTO: 0.2 % (ref 0–0.5)
INTERVENTRICULAR SEPTUM: 0.83 CM (ref 0.6–1.1)
IVRT: 0.07 MSEC
LA MAJOR: 5.62 CM
LA MINOR: 5.58 CM
LA WIDTH: 4.19 CM
LEFT ATRIUM SIZE: 4.04 CM
LEFT ATRIUM VOLUME INDEX: 40.9 ML/M2
LEFT ATRIUM VOLUME: 80.57 CM3
LEFT INTERNAL DIMENSION IN SYSTOLE: 3.22 CM (ref 2.1–4)
LEFT VENTRICLE DIASTOLIC VOLUME INDEX: 47.65 ML/M2
LEFT VENTRICLE DIASTOLIC VOLUME: 93.78 ML
LEFT VENTRICLE MASS INDEX: 61 G/M2
LEFT VENTRICLE SYSTOLIC VOLUME INDEX: 21.1 ML/M2
LEFT VENTRICLE SYSTOLIC VOLUME: 41.53 ML
LEFT VENTRICULAR INTERNAL DIMENSION IN DIASTOLE: 4.53 CM (ref 3.5–6)
LEFT VENTRICULAR MASS: 120.59 G
LYMPHOCYTES # BLD AUTO: 1.4 K/UL (ref 1–4.8)
LYMPHOCYTES NFR BLD: 27.1 % (ref 18–48)
MCH RBC QN AUTO: 30.2 PG (ref 27–31)
MCHC RBC AUTO-ENTMCNC: 32.3 G/DL (ref 32–36)
MCV RBC AUTO: 93 FL (ref 82–98)
MONOCYTES # BLD AUTO: 0.6 K/UL (ref 0.3–1)
MONOCYTES NFR BLD: 10.9 % (ref 4–15)
MV PEAK A VEL: 0.82 M/S
MV PEAK E VEL: 0.88 M/S
NEUTROPHILS # BLD AUTO: 2.8 K/UL (ref 1.8–7.7)
NEUTROPHILS NFR BLD: 54.1 % (ref 38–73)
NRBC BLD-RTO: 0 /100 WBC
PISA TR MAX VEL: 2.6 M/S
PLATELET # BLD AUTO: 243 K/UL (ref 150–350)
PMV BLD AUTO: 11.6 FL (ref 9.2–12.9)
PULM VEIN S/D RATIO: 1.4
PV PEAK D VEL: 0.47 M/S
PV PEAK S VEL: 0.66 M/S
RA MAJOR: 5.2 CM
RA PRESSURE: 3 MMHG
RA WIDTH: 3.22 CM
RBC # BLD AUTO: 4.41 M/UL (ref 4–5.4)
RIGHT VENTRICULAR END-DIASTOLIC DIMENSION: 3.21 CM
SINUS: 3.09 CM
STJ: 2.5 CM
TDI LATERAL: 0.1 M/S
TDI SEPTAL: 0.1 M/S
TR MAX PG: 27 MMHG
TRICUSPID ANNULAR PLANE SYSTOLIC EXCURSION: 1.56 CM
TV REST PULMONARY ARTERY PRESSURE: 30 MMHG
WBC # BLD AUTO: 5.21 K/UL (ref 3.9–12.7)

## 2019-06-24 PROCEDURE — 99999 PR PBB SHADOW E&M-EST. PATIENT-LVL II: CPT | Mod: PBBFAC,,,

## 2019-06-24 PROCEDURE — 99999 PR PBB SHADOW E&M-EST. PATIENT-LVL II: ICD-10-PCS | Mod: PBBFAC,,,

## 2019-06-24 PROCEDURE — 93306 TRANSTHORACIC ECHO (TTE) COMPLETE (CUPID ONLY): ICD-10-PCS | Mod: S$GLB,,, | Performed by: INTERNAL MEDICINE

## 2019-06-24 PROCEDURE — 36415 COLL VENOUS BLD VENIPUNCTURE: CPT | Mod: PO

## 2019-06-24 PROCEDURE — 85025 COMPLETE CBC W/AUTO DIFF WBC: CPT

## 2019-06-24 PROCEDURE — 93306 TTE W/DOPPLER COMPLETE: CPT | Mod: S$GLB,,, | Performed by: INTERNAL MEDICINE

## 2019-06-24 NOTE — PROGRESS NOTES
Review note regarding recent transesophageal echo    This was done due to hearing a cardiac murmur      No significant heart valve dysfunction was seen but there was evidence of both mitral and aortic annular calcification which prior explains that a heart murmur      Also noted was a PA pressure of 30 which is on the up in of normal    There was normal right ventricular function     however time she has found herself short of breath and still has up issue with the cough     arrange for CT of the chest to make sure there is no evidence of interstitial lung finding      Also she agrees to meet with a nutritionist regarding hyperlipidemia and an elevated LDL

## 2019-06-24 NOTE — TELEPHONE ENCOUNTER
Need to set up referral to nutrition regarding elevated cholesterol and has valvular calcification      Need to set up CT chest

## 2019-06-25 ENCOUNTER — HOSPITAL ENCOUNTER (OUTPATIENT)
Dept: RADIOLOGY | Facility: HOSPITAL | Age: 64
Discharge: HOME | End: 2019-06-25
Attending: INTERNAL MEDICINE
Payer: COMMERCIAL

## 2019-06-25 DIAGNOSIS — R06.02 SHORTNESS OF BREATH: ICD-10-CM

## 2019-06-25 DIAGNOSIS — R05.3 COUGH, PERSISTENT: ICD-10-CM

## 2019-06-25 PROCEDURE — 71250 CT CHEST WITHOUT CONTRAST: ICD-10-PCS | Mod: 26,,, | Performed by: RADIOLOGY

## 2019-06-25 PROCEDURE — 71250 CT THORAX DX C-: CPT | Mod: 26,,, | Performed by: RADIOLOGY

## 2019-06-25 PROCEDURE — 71250 CT THORAX DX C-: CPT | Mod: TC

## 2019-06-25 NOTE — TELEPHONE ENCOUNTER
Pt is requesting results of blood work from yesterday please advise pt is having ct scan done today she want to hold on nutrition appt

## 2019-06-25 NOTE — PROGRESS NOTES
CT of the chest did reveal bronchiectatic changes    Cough is improved    Will make arrangements to do pulmonary function test

## 2019-06-26 ENCOUNTER — OFFICE VISIT (OUTPATIENT)
Dept: INTERNAL MEDICINE | Facility: CLINIC | Age: 64
End: 2019-06-26
Payer: COMMERCIAL

## 2019-06-26 ENCOUNTER — HOSPITAL ENCOUNTER (OUTPATIENT)
Dept: PULMONOLOGY | Facility: CLINIC | Age: 64
Discharge: HOME | End: 2019-06-26
Payer: COMMERCIAL

## 2019-06-26 VITALS
DIASTOLIC BLOOD PRESSURE: 74 MMHG | OXYGEN SATURATION: 97 % | BODY MASS INDEX: 28.04 KG/M2 | SYSTOLIC BLOOD PRESSURE: 126 MMHG | HEART RATE: 67 BPM | WEIGHT: 185 LBS | HEIGHT: 68 IN

## 2019-06-26 DIAGNOSIS — J47.9 BRONCHIECTASIS WITHOUT COMPLICATION: ICD-10-CM

## 2019-06-26 DIAGNOSIS — R09.A2 SENSATION OF LUMP IN THROAT: ICD-10-CM

## 2019-06-26 DIAGNOSIS — K76.89 HEPATIC CYST: Primary | ICD-10-CM

## 2019-06-26 DIAGNOSIS — R76.8 HELICOBACTER PYLORI ANTIBODY ELEVATION: ICD-10-CM

## 2019-06-26 LAB
PRE FEV1 FVC: 86
PRE FEV1: 2.23
PRE FVC: 2.58
PREDICTED FEV1 FVC: 78
PREDICTED FEV1: 2.5
PREDICTED FVC: 3.17

## 2019-06-26 PROCEDURE — 99999 PR PBB SHADOW E&M-EST. PATIENT-LVL IV: CPT | Mod: PBBFAC,,, | Performed by: INTERNAL MEDICINE

## 2019-06-26 PROCEDURE — 3078F DIAST BP <80 MM HG: CPT | Mod: CPTII,S$GLB,, | Performed by: INTERNAL MEDICINE

## 2019-06-26 PROCEDURE — 94010 BREATHING CAPACITY TEST: CPT | Mod: S$GLB,,, | Performed by: INTERNAL MEDICINE

## 2019-06-26 PROCEDURE — 3078F PR MOST RECENT DIASTOLIC BLOOD PRESSURE < 80 MM HG: ICD-10-PCS | Mod: CPTII,S$GLB,, | Performed by: INTERNAL MEDICINE

## 2019-06-26 PROCEDURE — 3074F SYST BP LT 130 MM HG: CPT | Mod: CPTII,S$GLB,, | Performed by: INTERNAL MEDICINE

## 2019-06-26 PROCEDURE — 3074F PR MOST RECENT SYSTOLIC BLOOD PRESSURE < 130 MM HG: ICD-10-PCS | Mod: CPTII,S$GLB,, | Performed by: INTERNAL MEDICINE

## 2019-06-26 PROCEDURE — 3008F PR BODY MASS INDEX (BMI) DOCUMENTED: ICD-10-PCS | Mod: CPTII,S$GLB,, | Performed by: INTERNAL MEDICINE

## 2019-06-26 PROCEDURE — 99213 OFFICE O/P EST LOW 20 MIN: CPT | Mod: S$GLB,,, | Performed by: INTERNAL MEDICINE

## 2019-06-26 PROCEDURE — 3008F BODY MASS INDEX DOCD: CPT | Mod: CPTII,S$GLB,, | Performed by: INTERNAL MEDICINE

## 2019-06-26 PROCEDURE — 99213 PR OFFICE/OUTPT VISIT, EST, LEVL III, 20-29 MIN: ICD-10-PCS | Mod: S$GLB,,, | Performed by: INTERNAL MEDICINE

## 2019-06-26 PROCEDURE — 99999 PR PBB SHADOW E&M-EST. PATIENT-LVL IV: ICD-10-PCS | Mod: PBBFAC,,, | Performed by: INTERNAL MEDICINE

## 2019-06-26 PROCEDURE — 94010 BREATHING CAPACITY TEST: ICD-10-PCS | Mod: S$GLB,,, | Performed by: INTERNAL MEDICINE

## 2019-06-26 NOTE — PROGRESS NOTES
A 64-year-old female.  Reason for visit is to discuss the various testing that   she has had.    1.  Regarding positive Helicobacter pylori test.  This was initially done about   10 days ago when she was having upper abdominal pain.  On that visit, Protonix   was prescribed because there was also suggestion of possible LPR having   postprandial cough.  She modified her eating habits and actually did not take   the medication.  She wanted to have the H. pylori test re-tested and was still   positive.    It is noted that back in 2009, she was tested positive for this, received   antibiotics, and followup stool study was negative.  Also, had an upper   endoscopy that did show gastritis changes, but negative for H. pylori.  She was   fearful that she was still having the infection ongoing.  It was explained to   her that the test will always remain positive suggesting that she has been   exposed to this in the past, but does not mean that she has any active abdominal   active infection.  For further confirmation, we will repeat a H. pylori stool   antigen.  2.  She recently had a CT of the chest.  The primary reason for this was recent   2D echo showed high normal pulmonary artery pressure.  There were no findings   consistent with interstitial lung disease or pulmonary masses.  However, noted   was bronchiectatic changes in the left lingula and this has been a finding that   has been noted since 2008 when she had a pneumonia in that spot and suggested to   be due to post-inflammatory changes.  Since that time, she has had a number of   CT scans to evaluate for various reasons that showed the same finding except the   most recent CT scan did mention bronchiectatic changes at the bases and this is   a new report.  A pulmonary function test is pending, but she has no trouble and   may require further review and comparison to previous CT scans to determine if   this is not something that is new, but certainly does not  appear to be anything   active or acute.    She was also concerned that the CT scan showed multiple densities in the liver   that was suggestive of hepatic cyst.  This has also been something that has been   reported since having a CT of the abdomen back in 2005 and various CT scans of   the chest since then.  I explained to her that this is a benign process.  No   defined lesion was seen.  Hepatic function panel has been normal, but to   evaluate this further, we will be doing an abdominal ultrasound.    She does complain of abnormal sensation deep in her throat.  I did suggest to   her that this probably is related to LPR and to consider taking the Protonix or   try over-the-counter Zantac or Pepcid.  It has been elected to first have an ENT   evaluation done, laryngoscopy and then disposition to follow afterwards.    For the time being, maintain proper diet habits.  Continue with current   medicines of amlodipine, losartan/HCT, metoprolol.  We will await the results of   the pulmonary function test as well as the stool test, ultrasound and ENT   evaluation.      YE  dd: 06/26/2019 16:56:14 (CDT)  td: 06/27/2019 07:49:25 (CDT)  Doc ID   #[id number]  Job ID #[id number]    CC:

## 2019-06-29 ENCOUNTER — LAB VISIT (OUTPATIENT)
Dept: LAB | Facility: HOSPITAL | Age: 64
End: 2019-06-29
Attending: INTERNAL MEDICINE
Payer: COMMERCIAL

## 2019-06-29 DIAGNOSIS — R76.8 HELICOBACTER PYLORI ANTIBODY ELEVATION: ICD-10-CM

## 2019-06-29 PROCEDURE — 87338 HPYLORI STOOL AG IA: CPT

## 2019-07-02 ENCOUNTER — TELEPHONE (OUTPATIENT)
Dept: INTERNAL MEDICINE | Facility: CLINIC | Age: 64
End: 2019-07-02

## 2019-07-02 NOTE — TELEPHONE ENCOUNTER
----- Message from Evette Mccloud sent at 7/2/2019  7:55 AM CDT -----  Contact: Pt  Pt is calling for test results.    Pt can be reached at [phone].

## 2019-07-03 ENCOUNTER — HOSPITAL ENCOUNTER (OUTPATIENT)
Dept: RADIOLOGY | Facility: HOSPITAL | Age: 64
Discharge: HOME | End: 2019-07-03
Attending: INTERNAL MEDICINE
Payer: COMMERCIAL

## 2019-07-03 DIAGNOSIS — K76.89 HEPATIC CYST: ICD-10-CM

## 2019-07-03 PROCEDURE — 76700 US EXAM ABDOM COMPLETE: CPT | Mod: 26,,, | Performed by: RADIOLOGY

## 2019-07-03 PROCEDURE — 76700 US EXAM ABDOM COMPLETE: CPT | Mod: TC

## 2019-07-03 PROCEDURE — 76700 US ABDOMEN COMPLETE: ICD-10-PCS | Mod: 26,,, | Performed by: RADIOLOGY

## 2019-07-09 ENCOUNTER — OFFICE VISIT (OUTPATIENT)
Dept: OPTOMETRY | Facility: CLINIC | Age: 64
End: 2019-07-09
Payer: COMMERCIAL

## 2019-07-09 ENCOUNTER — OFFICE VISIT (OUTPATIENT)
Dept: OTOLARYNGOLOGY | Facility: CLINIC | Age: 64
End: 2019-07-09
Payer: COMMERCIAL

## 2019-07-09 ENCOUNTER — TELEPHONE (OUTPATIENT)
Dept: INTERNAL MEDICINE | Facility: CLINIC | Age: 64
End: 2019-07-09

## 2019-07-09 VITALS
BODY MASS INDEX: 28.06 KG/M2 | WEIGHT: 184.5 LBS | HEART RATE: 61 BPM | TEMPERATURE: 99 F | SYSTOLIC BLOOD PRESSURE: 130 MMHG | DIASTOLIC BLOOD PRESSURE: 79 MMHG

## 2019-07-09 DIAGNOSIS — H10.213 ACUTE CHEMICAL CONJUNCTIVITIS OF BOTH EYES: Primary | ICD-10-CM

## 2019-07-09 DIAGNOSIS — R10.13 DYSPEPSIA: Primary | ICD-10-CM

## 2019-07-09 LAB — H PYLORI AG STL QL IA: NOT DETECTED

## 2019-07-09 PROCEDURE — 3008F PR BODY MASS INDEX (BMI) DOCUMENTED: ICD-10-PCS | Mod: CPTII,S$GLB,, | Performed by: OTOLARYNGOLOGY

## 2019-07-09 PROCEDURE — 99203 OFFICE O/P NEW LOW 30 MIN: CPT | Mod: S$GLB,,, | Performed by: OTOLARYNGOLOGY

## 2019-07-09 PROCEDURE — 99999 PR PBB SHADOW E&M-EST. PATIENT-LVL II: CPT | Mod: PBBFAC,,, | Performed by: OPTOMETRIST

## 2019-07-09 PROCEDURE — 99999 PR PBB SHADOW E&M-EST. PATIENT-LVL III: ICD-10-PCS | Mod: PBBFAC,,, | Performed by: OTOLARYNGOLOGY

## 2019-07-09 PROCEDURE — 99999 PR PBB SHADOW E&M-EST. PATIENT-LVL II: ICD-10-PCS | Mod: PBBFAC,,, | Performed by: OPTOMETRIST

## 2019-07-09 PROCEDURE — 99203 PR OFFICE/OUTPT VISIT, NEW, LEVL III, 30-44 MIN: ICD-10-PCS | Mod: S$GLB,,, | Performed by: OTOLARYNGOLOGY

## 2019-07-09 PROCEDURE — 3075F PR MOST RECENT SYSTOLIC BLOOD PRESS GE 130-139MM HG: ICD-10-PCS | Mod: CPTII,S$GLB,, | Performed by: OTOLARYNGOLOGY

## 2019-07-09 PROCEDURE — 99999 PR PBB SHADOW E&M-EST. PATIENT-LVL III: CPT | Mod: PBBFAC,,, | Performed by: OTOLARYNGOLOGY

## 2019-07-09 PROCEDURE — 3008F BODY MASS INDEX DOCD: CPT | Mod: CPTII,S$GLB,, | Performed by: OTOLARYNGOLOGY

## 2019-07-09 PROCEDURE — 92012 PR EYE EXAM, EST PATIENT,INTERMED: ICD-10-PCS | Mod: S$GLB,,, | Performed by: OPTOMETRIST

## 2019-07-09 PROCEDURE — 3075F SYST BP GE 130 - 139MM HG: CPT | Mod: CPTII,S$GLB,, | Performed by: OTOLARYNGOLOGY

## 2019-07-09 PROCEDURE — 3078F PR MOST RECENT DIASTOLIC BLOOD PRESSURE < 80 MM HG: ICD-10-PCS | Mod: CPTII,S$GLB,, | Performed by: OTOLARYNGOLOGY

## 2019-07-09 PROCEDURE — 3078F DIAST BP <80 MM HG: CPT | Mod: CPTII,S$GLB,, | Performed by: OTOLARYNGOLOGY

## 2019-07-09 PROCEDURE — 92012 INTRM OPH EXAM EST PATIENT: CPT | Mod: S$GLB,,, | Performed by: OPTOMETRIST

## 2019-07-09 RX ORDER — NEOMYCIN SULFATE, POLYMYXIN B SULFATE AND DEXAMETHASONE 3.5; 10000; 1 MG/ML; [USP'U]/ML; MG/ML
1 SUSPENSION/ DROPS OPHTHALMIC 4 TIMES DAILY
Qty: 5 ML | Refills: 0 | Status: SHIPPED | OUTPATIENT
Start: 2019-07-09 | End: 2019-07-16

## 2019-07-09 NOTE — PROGRESS NOTES
Chief Complaint   Patient presents with    consult/ feels like something is in throat       HPI   64 y.o. female presents with recent history of abdominal discomfort after eating.  She denies head neck complaints.  She denies throat pain, dysphagia, shortness of breath or globus.    Review of Systems   Constitutional: Negative for fatigue and unexpected weight change.   HENT: Per HPI.  Eyes: Negative for visual disturbance.   Respiratory: Negative for shortness of breath, hemoptysis   Cardiovascular: Negative for chest pain and palpitations.   Musculoskeletal: Negative for decreased ROM, back pain.   Skin: Negative for rash, sunburn, itching.   Neurological: Negative for dizziness and seizures.   Hematological: Negative for adenopathy. Does not bruise/bleed easily.   Endocrine: Negative for rapid weight loss/weight gain, heat/cold intolerance.     Past Medical History   Patient Active Problem List   Diagnosis    Benign essential hypertension    Gastroesophageal reflux disease without esophagitis    DJD (degenerative joint disease) of cervical spine    Osteoarthritis of lumbar spine    Pain in joint involving multiple sites    Right leg swelling    Radiculopathy of cervical region    Acute pain of left shoulder    Incomplete tear of left rotator cuff    Biceps tendinitis on left    Primary osteoarthritis of left shoulder    Neck pain    Left shoulder pain    Stiffness of left shoulder joint    Numbness and tingling in left upper extremity    Acute costochondritis    Chronic midline low back pain without sciatica    Decreased strength of trunk and back    Decreased range of motion of lumbar spine    Localized swelling of lower leg    Pain in both feet    Muscle cramping    Bronchiectasis without complication           Past Surgical History   Past Surgical History:   Procedure Laterality Date    BREAST BIOPSY Right      SECTION      COLONOSCOPY N/A 2013    Performed by YVES Madera  MD Vu at [facility] (4[facility] F[facility])    KNEE ARTHROSCOPY W/ ACL RECONSTRUCTION      REFRACTIVE SURGERY Bilateral 1999 or 2000    Dr. Sinha OU harman         Family History   Family History   Problem Relation Age of Onset    Hypertension Mother     Heart disease Maternal Grandmother            Social History   .  Social History     Socioeconomic History    Marital status: Single     Spouse name: Not on file    Number of children: 1    Years of education: Not on file    Highest education level: Not on file   Occupational History    Not on file   Social Needs    Financial resource strain: Not on file    Food insecurity:     Worry: Not on file     Inability: Not on file    Transportation needs:     Medical: Not on file     Non-medical: Not on file   Tobacco Use    Smoking status: Never Smoker    Smokeless tobacco: Never Used   Substance and Sexual Activity    Alcohol use: No    Drug use: No    Sexual activity: Not on file   Lifestyle    Physical activity:     Days per week: Not on file     Minutes per session: Not on file    Stress: Not on file   Relationships    Social connections:     Talks on phone: Not on file     Gets together: Not on file     Attends Yarsanism service: Not on file     Active member of club or organization: Not on file     Attends meetings of clubs or organizations: Not on file     Relationship status: Not on file   Other Topics Concern    Not on file   Social History Narrative    Not on file         Allergies   Review of patient's allergies indicates:  No Known Allergies        Physical Exam     Vitals:    07/09/19 1613   BP: 130/79   Pulse: 61   Temp: 98.7 °F (37.1 °C)         Body mass index is 28.06 kg/m².      General: AOx3, NAD   Respiratory:  Symmetric chest rise, normal effort   Nose: No gross nasal septal deviation. Inferior Turbinates WNL bilaterally. No septal perforation. No masses/lesions.   Oral Cavity:  Oral Tongue mobile, no lesions noted. Hard Palate WNL. No  buccal or FOM lesions.  Oropharynx:  No masses/lesions of the posterior pharyngeal wall. Tonsillar fossa without lesions. Soft palate without masses. Midline uvula.   Neck: No scars.  No cervical lymphadenopathy, thyromegaly or thyroid nodules.  Normal range of motion.    Face: House Brackmann I bilaterally.     NP: No lesions of posterior wall  OP: No lesions of posterior wall or BOT. BOT is soft to palpation  Larynx: No lesions of glottic or supraglottic larynx. Normal vocal fold mobility    HP: No lesions of pyriform sinuses or postcricoid region  Mirror examination was performed.      Assessment/Plan  Problem List Items Addressed This Visit        GI    Dyspepsia - Primary     No lesions of the head neck noted.  Reassured.  I counseled her on the signs and symptoms of head neck cancer.  She is to keep her appointment with GI in September and return to see me on an as-needed basis.

## 2019-07-09 NOTE — ASSESSMENT & PLAN NOTE
No lesions of the head neck noted.  Reassured.  I counseled her on the signs and symptoms of head neck cancer.  She is to keep her appointment with GI in September and return to see me on an as-needed basis.

## 2019-07-09 NOTE — PATIENT INSTRUCTIONS
Bilateral nuclear sclerosis of lens, consistent with age.   Recent presentation of bilateral redness of bulbar conjunctiva with discharge following application of eyelid extension.  Mild ocular infection vs adverse reaction to adhesive used on eyelashes to apply extension.  Currently using Restasis ophthalmic emulsion two times per day in both eyes.    Discontinue Restasis ophthalmic emulsion temporarily in both eyes.    Prescribed maxitrol ophthalmic suspension (or generic equivalent) for instillation into both eyes four times per day until seen again.  Return in five to seven days for progress check on signs/symptoms - or prior if any apparent worsening of signs/symptoms in the interim.

## 2019-07-09 NOTE — LETTER
July 9, 2019      Jonny Willis MD  [address]  [facility] [address]           [facility]  [address]  [facility] [address]  Phone: [phone]  Fax: [phone]          Patient: Josi Gil   MR Number: [id number]   YOB: 1955   Date of Visit: 7/9/2019       Dear Dr. Jonny Willis:    Thank you for referring Josi Gil to me for evaluation. Attached you will find relevant portions of my assessment and plan of care.    If you have questions, please do not hesitate to call me. I look forward to following Josi Gil along with you.    Sincerely,    Kenn Henry MD    Enclosure  CC:  No Recipients    If you would like to receive this communication electronically, please contact [email or web address] or [phone] to request more information on [facility] Link access.    For providers and/or their staff who would like to refer a patient to [facility], please contact us through our one-stop-shop provider referral line, [facility], at [phone].    If you feel you have received this communication in error or would no longer like to receive these types of communications, please e-mail [email or web address]

## 2019-07-09 NOTE — PROGRESS NOTES
HPI     Eye Problem      Additional comments: Pt states OU has noted redness in both eyes with   yellowish mucoid discharge for about 5 days.  States she had eyelash extensions placed on lashes on 07/05/2019.  Noted   symptoms the following morning in the right eye, but then presented   bilaterally.               Comments     Patient's age: 64 y.o. AA female  Approximate date of last eye examination:  05/24/2019  Name of last eye doctor seen:   City/State: [place], [place]  Wears glasses? Yes      If yes, wears  Full-time or part-time?  Full time   Present glasses are: Bifocal, SV Distance, SV Reading?  Progressive lens  Approximate age of present glasses:  1 yr    Got new glasses following last exam, or subsequently?:  Yes    Any problem with VA with glasses?  Pt states vision is cloudy with   glasses   Wears CLs?:  no  Headaches?  Occasionally   Eye pain/discomfort?  Patient states pain scale 2                                                                                     Flashes?  No   Floaters?  Yes, OU   Diplopia/Double vision?  No   Patient's Ocular History:         Any eye surgeries? S/P LASIK OU           Any eye injury?  No          Any treatment for eye disease?  No   Family history of eye disease?  No   Significant patient medical history:         1. Diabetes?  No        If yes, IDDM or NIDDM?  n/a   2. HBP?  Yes, controlled               3. Other (describe):  No    ! OTC eyedrops currently using:  AT's prn OU   ! Prescription eye meds currently using:  No    ! Any history of allergy/adverse reaction to any eye meds used   previously?  No     ! Any history of allergy/adverse reaction to eyedrops used during prior   eye exam(s)? No     ! Any history of allergy/adverse reaction to Novacaine or similar meds?   No    ! Any history of allergy/adverse reaction to Epinephrine or similar meds?   No     ! Patient okay with use of anesthetic eyedrops to check eye pressure?    Yes         ! Patient okay  with use of eyedrops to dilate pupils today?  No    !  Allergies/Medications/Medical History/Family History reviewed today?    Yes                                                                          Last edited by Yung Smith, OD on 7/9/2019  3:18 PM. (History)            Assessment /Plan     For exam results, see Encounter Report.    1. Acute chemical conjunctivitis of both eyes                Bilateral nuclear sclerosis of lens, consistent with age.   Recent presentation of bilateral redness of bulbar conjunctiva with discharge following application of eyelid extension.  Mild ocular infection vs adverse reaction to adhesive used on eyelashes to apply extension.  Currently using Restasis ophthalmic emulsion two times per day in both eyes.    Discontinue Restasis ophthalmic emulsion temporarily in both eyes.    Prescribed maxitrol ophthalmic suspension (or generic equivalent) for instillation into both eyes four times per day until seen again.  Return in five to seven days for progress check on signs/symptoms - or prior if any apparent worsening of signs/symptoms in the interim.

## 2019-07-10 ENCOUNTER — DOCUMENTATION ONLY (OUTPATIENT)
Dept: INTERNAL MEDICINE | Facility: CLINIC | Age: 64
End: 2019-07-10

## 2019-07-10 NOTE — PROGRESS NOTES
Review note    The stool antigen for Helicobacter pylori was negative    It is noted that back in May 2008 she tested positive for antibody and was treated with a Prevpac      a follow-up stool antigen study was negative in January 2009      In  knowing this, the recent antibody test should not have been done    She is feeling better in regard to adjustment with her diet and in the morning is drinking celery juice with various fruits and is not having the uncomfortable feeling in the upper abdomen and upper chest      Also the recent abdominal ultrasound confirms hepatic cyst which is stable    no other abnormal findings was seen

## 2019-07-10 NOTE — TELEPHONE ENCOUNTER
----- Message from Lyndsey Peoples sent at 7/9/2019  4:23 PM CDT -----  Contact: Patient [phone]  Calling for test results.  Patient request call back.    Please call and advise  Thank you

## 2019-07-10 NOTE — TELEPHONE ENCOUNTER
----- Message from Dawn Tavarez sent at 7/10/2019  4:41 PM CDT -----  Contact: Patient [phone]  2nd Request    Test Results Request:    Test Performed: Lab    When & Where: 06/29/2019    Please call and advise.    Thank You

## 2019-07-10 NOTE — TELEPHONE ENCOUNTER
The stool antigen test for H pylori was negative   so she does not have active H pylori    she is not infected with the bacteria

## 2019-07-17 ENCOUNTER — OFFICE VISIT (OUTPATIENT)
Dept: OPTOMETRY | Facility: CLINIC | Age: 64
End: 2019-07-17
Payer: COMMERCIAL

## 2019-07-17 DIAGNOSIS — H10.213 ACUTE CHEMICAL CONJUNCTIVITIS OF BOTH EYES: Primary | ICD-10-CM

## 2019-07-17 DIAGNOSIS — H04.123 BILATERAL DRY EYES: ICD-10-CM

## 2019-07-17 PROCEDURE — 99499 NO LOS: ICD-10-PCS | Mod: S$GLB,,, | Performed by: OPTOMETRIST

## 2019-07-17 PROCEDURE — 99999 PR PBB SHADOW E&M-EST. PATIENT-LVL III: ICD-10-PCS | Mod: PBBFAC,,, | Performed by: OPTOMETRIST

## 2019-07-17 PROCEDURE — 99999 PR PBB SHADOW E&M-EST. PATIENT-LVL III: CPT | Mod: PBBFAC,,, | Performed by: OPTOMETRIST

## 2019-07-17 PROCEDURE — 99499 UNLISTED E&M SERVICE: CPT | Mod: S$GLB,,, | Performed by: OPTOMETRIST

## 2019-07-17 NOTE — PATIENT INSTRUCTIONS
Bilateral nuclear sclerosis of lens, consistent with age.   Recent presentation of bilateral redness of bulbar conjunctiva with discharge following application of eyelid extension.  Mild ocular infection vs adverse reaction to adhesive used on eyelashes to apply extension.  Had been using Restasis ophthalmic emulsion two times per day in both eyes, prior to last visit, but temporarily discontinued use following last visit.  Ocular inflammation secondary to adhesive on eyelash extension appears much improved.  Okay to discontinue (generic of) Maxitrol ophthalmic suspension.  Resume use of Restasis ophthalmic emulsion in both eyes two times per day.  Suggest regular and diligent use of a medium viscosity aritificial tear eye drop (Systane or Optive) in both eyes several times per day, and at bedtime.     Return within the next several days for general eye examination and refraction

## 2019-07-17 NOTE — PROGRESS NOTES
HPI     Concerns About Ocular Health      Additional comments: In for follow up on acute chemical conjunctivitis   of both eyes, presumably secondary to adhesive used to attach eyelash   extensions.  Using generic of Maxitrol ophthalmic suspension in both eyes four times   per day.  States eyes feel and look better.               Comments     Patient in for progress check.    Being followed for (diagnosis):   Acute chemical conjunctivitis of both   eyes    Date last seen:  07/09/2019    Doctor last seen:      Prescribed eye medications(s) using:  maxitrol     OTC eye medication(s) using:  AT's prn     Signs/symptoms of condition resolved/better/stable/worse?:  better                  Last edited by Yung Smith, OD on 7/17/2019  2:29 PM. (History)            Assessment /Plan     For exam results, see Encounter Report.    1. Acute chemical conjunctivitis of both eyes     2. Bilateral dry eyes                    Bilateral nuclear sclerosis of lens, consistent with age.   Recent presentation of bilateral redness of bulbar conjunctiva with discharge following application of eyelid extension.  Mild ocular infection vs adverse reaction to adhesive used on eyelashes to apply extension.  Had been using Restasis ophthalmic emulsion two times per day in both eyes, prior to last visit, but temporarily discontinued use following last visit.  Ocular inflammation secondary to adhesive on eyelash extension appears much improved.  Okay to discontinue (generic of) Maxitrol ophthalmic suspension.  Resume use of Restasis ophthalmic emulsion in both eyes two times per day.  Suggest regular and diligent use of a medium viscosity aritificial tear eye drop (Systane or Optive) in both eyes several times per day, and at bedtime.     Return within the next several days for general eye examination and refraction

## 2019-07-26 ENCOUNTER — OFFICE VISIT (OUTPATIENT)
Dept: OPTOMETRY | Facility: CLINIC | Age: 64
End: 2019-07-26
Payer: COMMERCIAL

## 2019-07-26 DIAGNOSIS — Z01.00 EXAMINATION OF EYES AND VISION: Primary | ICD-10-CM

## 2019-07-26 DIAGNOSIS — H52.4 PRESBYOPIA OF BOTH EYES: ICD-10-CM

## 2019-07-26 DIAGNOSIS — H52.7 REFRACTIVE ERRORS: ICD-10-CM

## 2019-07-26 PROCEDURE — 92014 PR EYE EXAM, EST PATIENT,COMPREHESV: ICD-10-PCS | Mod: S$GLB,,, | Performed by: OPTOMETRIST

## 2019-07-26 PROCEDURE — 92015 DETERMINE REFRACTIVE STATE: CPT | Mod: S$GLB,,, | Performed by: OPTOMETRIST

## 2019-07-26 PROCEDURE — 92014 COMPRE OPH EXAM EST PT 1/>: CPT | Mod: S$GLB,,, | Performed by: OPTOMETRIST

## 2019-07-26 PROCEDURE — 99999 PR PBB SHADOW E&M-EST. PATIENT-LVL III: CPT | Mod: PBBFAC,,, | Performed by: OPTOMETRIST

## 2019-07-26 PROCEDURE — 99999 PR PBB SHADOW E&M-EST. PATIENT-LVL III: ICD-10-PCS | Mod: PBBFAC,,, | Performed by: OPTOMETRIST

## 2019-07-26 PROCEDURE — 92015 PR REFRACTION: ICD-10-PCS | Mod: S$GLB,,, | Performed by: OPTOMETRIST

## 2019-07-26 NOTE — PROGRESS NOTES
HPI     eye examination       Additional comments: Patient in for general eye exam and refraction.  Recent history of bilateral chemical conjunctivitis, presumably secondary   to adhesive use for eyelash extenstions.  Now off antibiotic/steroid drops in both eyes, and using artificial tears   only               Comments     Patient's age: 64 y.o. AA female  Approximate date of last eye examination:  07/17/2019  Name of last eye doctor seen:    City/State: [place], [place]  Wears glasses? Yes  OTC readers.      If yes, wears  Full-time or part-time?  Full time   Present glasses are: Bifocal, SV Distance, SV Reading?  Progressive lens -   broken, so now using OTC glasses   Approximate age of present glasses:  1 yr    Got new glasses following last exam, or subsequently?:  Yes    Any problem with VA with glasses?  Pt states she cant see or read clearly   out her glasses (glasses broke)  Wears CLs?:  no  Headaches?  Occasionally   Eye pain/discomfort?  Patient reports eye discomfort with tearing OU ; pt   stated she straining her eyes constantly                                                                                      Flashes?  No   Floaters?  Yes, OU   Diplopia/Double vision?  No   Patient's Ocular History:         Any eye surgeries? S/P LASIK OU           Any eye injury?  No          Any treatment for eye disease?  No   Family history of eye disease?  No   Significant patient medical history:         1. Diabetes?  No        If yes, IDDM or NIDDM?  n/a   2. HBP?  Yes, controlled               3. Other (describe):  No    ! OTC eyedrops currently using: systane prn OU   ! Prescription eye meds currently using:  No    ! Any history of allergy/adverse reaction to any eye meds used   previously?  No     ! Any history of allergy/adverse reaction to eyedrops used during prior   eye exam(s)? No     ! Any history of allergy/adverse reaction to Novacaine or similar meds?   No    ! Any history of  "allergy/adverse reaction to Epinephrine or similar meds?   No     ! Patient okay with use of anesthetic eyedrops to check eye pressure?    Yes         ! Patient okay with use of eyedrops to dilate pupils today?  No    !  Allergies/Medications/Medical History/Family History reviewed today?    Yes     PD =   67/63  Reading distance = 17"                                                                        Last edited by Yung Smith, OD on 7/26/2019 12:46 PM. (History)            Assessment /Plan     For exam results, see Encounter Report.    1. Examination of eyes and vision     2. Refractive errors     3. Presbyopia of both eyes                    Bilateral early nuclear sclerosis of lens, consistent with age.     Recent presentation of bilateral redness of bulbar conjunctiva with discharge following application of eyelid extension.  Mild imflammatory ocular response to adhesive used on eyelashes to apply extension.  Has been using OTC artificial tears in both eyes.  Ocular surface does appear much improved.     S/P laser refractive surgery.  Prior diagnosis of dry eye.    S/p placement of non-dissolvable punctal plugs into lower punctum bilaterally, but subsequently removed from LLL.  Plug still in place RLL.    Using Restasis ophthalmic emulsion two times per day in both eyes, prior to last visit, but temporarily discontinued use following last visit.    Residual distance refractive error in each eye, greater in the right eye.  Better best-corrected VA in the right eye than in the left eye.  Ms. Gil this is typically true.  Presbyopia consistent with age.    New spectacle lens Rx issued for use as desired.     Continue Restasis Ophthalmic Emulsion in both eyes two times per day.   Suggest regular and diligent use of a medium viscosity aritificial tear eye drop (Systane or Optive) in both eyes several times per day, and at bedtime.     Return in one year - or prior if any problems or changes in vision " noted in the interim.

## 2019-07-26 NOTE — PATIENT INSTRUCTIONS
Bilateral early nuclear sclerosis of lens, consistent with age.     Recent presentation of bilateral redness of bulbar conjunctiva with discharge following application of eyelid extension.  Mild imflammatory ocular response to adhesive used on eyelashes to apply extension.  Has been using OTC artificial tears in both eyes.  Ocular surface does appear much improved.     S/P laser refractive surgery.  Prior diagnosis of dry eye.    S/p placement of non-dissolvable punctal plugs into lower punctum bilaterally, but subsequently removed from LLL.  Plug still in place RLL.    Using Restasis ophthalmic emulsion two times per day in both eyes, prior to last visit, but temporarily discontinued use following last visit.    Residual distance refractive error in each eye, greater in the right eye.  Better best-corrected VA in the right eye than in the left eye.  Ms. Gil this is typically true.  Presbyopia consistent with age.    New spectacle lens Rx issued for use as desired.     Continue Restasis Ophthalmic Emulsion in both eyes two times per day.   Suggest regular and diligent use of a medium viscosity aritificial tear eye drop (Systane or Optive) in both eyes several times per day, and at bedtime.     Return in one year - or prior if any problems or changes in vision noted in the interim.

## 2019-08-08 ENCOUNTER — TELEPHONE (OUTPATIENT)
Dept: INTERNAL MEDICINE | Facility: CLINIC | Age: 64
End: 2019-08-08

## 2019-08-08 NOTE — TELEPHONE ENCOUNTER
----- Message from Lyndsey Peoples sent at 8/8/2019  3:54 PM CDT -----  Contact: Patient   Patient is calling for an RX refill or new RX.  Is this a refill or new RX:  New RX  RX name and strength: tramadol (ULTRAM) 50 mg tablet   Directions (copy/paste from chart):  Take 50 mg by mouth every 6 (six) hours as needed for Pain. - Oral  Is this a 30 day or 90 day RX:    Local pharmacy or mail order pharmacy:  local  Pharmacy name and phone # [id number] - [place], [place] - [address] AT [address] [phone] (Phone) [phone] (Fax)    Comments:  Patient is experiencing pain in the knee and in the shoulder    Please call and advise  Thank you

## 2019-08-15 RX ORDER — METOPROLOL SUCCINATE 25 MG/1
25 TABLET, EXTENDED RELEASE ORAL DAILY
Qty: 90 TABLET | Refills: 3 | Status: CANCELLED | OUTPATIENT
Start: 2019-08-15

## 2019-08-15 RX ORDER — AMLODIPINE BESYLATE 5 MG/1
5 TABLET ORAL DAILY
Qty: 90 TABLET | Refills: 3 | Status: CANCELLED | OUTPATIENT
Start: 2019-08-15 | End: 2020-08-14

## 2019-08-15 RX ORDER — LOSARTAN POTASSIUM AND HYDROCHLOROTHIAZIDE 25; 100 MG/1; MG/1
1 TABLET ORAL DAILY
Qty: 90 TABLET | Refills: 3 | Status: CANCELLED | OUTPATIENT
Start: 2019-08-15 | End: 2020-08-14

## 2019-08-16 RX ORDER — AMLODIPINE BESYLATE 5 MG/1
5 TABLET ORAL DAILY
Qty: 90 TABLET | Refills: 3 | Status: SHIPPED | OUTPATIENT
Start: 2019-08-16 | End: 2020-02-08

## 2019-08-16 RX ORDER — LOSARTAN POTASSIUM AND HYDROCHLOROTHIAZIDE 25; 100 MG/1; MG/1
1 TABLET ORAL DAILY
Qty: 90 TABLET | Refills: 3 | Status: SHIPPED | OUTPATIENT
Start: 2019-08-16 | End: 2019-11-25 | Stop reason: SDUPTHER

## 2019-08-16 RX ORDER — METOPROLOL SUCCINATE 25 MG/1
25 TABLET, EXTENDED RELEASE ORAL DAILY
Qty: 90 TABLET | Refills: 3 | Status: SHIPPED | OUTPATIENT
Start: 2019-08-16 | End: 2020-02-08

## 2019-09-16 ENCOUNTER — TELEPHONE (OUTPATIENT)
Dept: GASTROENTEROLOGY | Facility: CLINIC | Age: 64
End: 2019-09-16

## 2019-09-16 NOTE — TELEPHONE ENCOUNTER
----- Message from Becca Cedeño sent at 9/16/2019  3:24 PM CDT -----  Contact: pt#[phone]  Pt states that she's calling to be about 30 mins late. Please call to confirm if this is ok

## 2019-09-19 ENCOUNTER — OFFICE VISIT (OUTPATIENT)
Dept: OPTOMETRY | Facility: CLINIC | Age: 64
End: 2019-09-19
Payer: COMMERCIAL

## 2019-09-19 DIAGNOSIS — H52.7 REFRACTIVE ERRORS: ICD-10-CM

## 2019-09-19 DIAGNOSIS — H53.8 BLURRED VISION, BILATERAL: Primary | ICD-10-CM

## 2019-09-19 PROCEDURE — 99499 NO LOS: ICD-10-PCS | Mod: S$GLB,,, | Performed by: OPTOMETRIST

## 2019-09-19 PROCEDURE — 99999 PR PBB SHADOW E&M-EST. PATIENT-LVL III: CPT | Mod: PBBFAC,,, | Performed by: OPTOMETRIST

## 2019-09-19 PROCEDURE — 99999 PR PBB SHADOW E&M-EST. PATIENT-LVL III: ICD-10-PCS | Mod: PBBFAC,,, | Performed by: OPTOMETRIST

## 2019-09-19 PROCEDURE — 99499 UNLISTED E&M SERVICE: CPT | Mod: S$GLB,,, | Performed by: OPTOMETRIST

## 2019-09-19 NOTE — PATIENT INSTRUCTIONS
Bilateral early nuclear sclerosis of lens, consistent with age.      Recent presentation of bilateral redness of bulbar conjunctiva with discharge following application of eyelid extension.  Each eye appears quiet today      S/P laser refractive surgery.  Prior diagnosis of dry eye.    S/p placement of non-dissolvable punctal plugs into lower punctum bilaterally, but subsequently removed from LLL.  Plug still in place RLL.     Using Restasis ophthalmic emulsion two times per day in both eyes, per previous noted.     Ms. Gil returns today with report of problems with last spectacle lenses prescribed.  Rechecked refraction.   Residual compound hyperopic astigmatism in each eye, and presbyopia consistent with age.  Note apparent change in refractive error in each eye  New spectacle lens Rx issued.  Referred to optical dispensary for remake of lenses.

## 2019-09-20 NOTE — PROGRESS NOTES
"HPI     Nena gls      Additional comments: Pt states she is unable to see with new rx gls              Comments     Patient in for progress check/recheck of refraction.    Patient states she is having trouble seeing with glasses.     Being followed for (diagnosis):       Date last seen:  07/26/2019    Doctor last seen:      Prescribed eye medications(s) using:  Restasis BID OU    OTC eye medication(s) using:  Systane artificial tears prn    PD 67/73  Reading distance = 17"               Last edited by Yung Smith, OD on 9/20/2019  8:18 AM. (History)            Assessment /Plan     For exam results, see Encounter Report.    1. Blurred vision, bilateral     2. Refractive errors                      Bilateral early nuclear sclerosis of lens, consistent with age.      Recent presentation of bilateral redness of bulbar conjunctiva with discharge following application of eyelid extension.  Each eye appears quiet today      S/P laser refractive surgery.  Prior diagnosis of dry eye.    S/p placement of non-dissolvable punctal plugs into lower punctum bilaterally, but subsequently removed from LLL.  Plug still in place RLL.     Using Restasis ophthalmic emulsion two times per day in both eyes, per previous noted.     Ms. Gil returns today with report of problems with last spectacle lenses prescribed.  Rechecked refraction.   Residual compound hyperopic astigmatism in each eye, and presbyopia consistent with age.  Note apparent change in refractive error in each eye  New spectacle lens Rx issued.  Referred to optical dispensary for remake of lenses.     "

## 2019-09-23 ENCOUNTER — OFFICE VISIT (OUTPATIENT)
Dept: GASTROENTEROLOGY | Facility: CLINIC | Age: 64
End: 2019-09-23
Payer: COMMERCIAL

## 2019-09-23 ENCOUNTER — HOSPITAL ENCOUNTER (OUTPATIENT)
Dept: RADIOLOGY | Facility: HOSPITAL | Age: 64
Discharge: HOME | End: 2019-09-23
Attending: NURSE PRACTITIONER
Payer: COMMERCIAL

## 2019-09-23 ENCOUNTER — TELEPHONE (OUTPATIENT)
Dept: GASTROENTEROLOGY | Facility: CLINIC | Age: 64
End: 2019-09-23

## 2019-09-23 VITALS
BODY MASS INDEX: 28.26 KG/M2 | HEIGHT: 68 IN | SYSTOLIC BLOOD PRESSURE: 116 MMHG | DIASTOLIC BLOOD PRESSURE: 71 MMHG | HEART RATE: 69 BPM | WEIGHT: 186.5 LBS

## 2019-09-23 DIAGNOSIS — R10.9 ABDOMINAL PAIN, UNSPECIFIED ABDOMINAL LOCATION: Primary | ICD-10-CM

## 2019-09-23 DIAGNOSIS — R10.9 ABDOMINAL PAIN, UNSPECIFIED ABDOMINAL LOCATION: ICD-10-CM

## 2019-09-23 PROCEDURE — 99999 PR PBB SHADOW E&M-EST. PATIENT-LVL III: CPT | Mod: PBBFAC,,, | Performed by: NURSE PRACTITIONER

## 2019-09-23 PROCEDURE — 3074F SYST BP LT 130 MM HG: CPT | Mod: CPTII,S$GLB,, | Performed by: NURSE PRACTITIONER

## 2019-09-23 PROCEDURE — 3078F PR MOST RECENT DIASTOLIC BLOOD PRESSURE < 80 MM HG: ICD-10-PCS | Mod: CPTII,S$GLB,, | Performed by: NURSE PRACTITIONER

## 2019-09-23 PROCEDURE — 99204 OFFICE O/P NEW MOD 45 MIN: CPT | Mod: S$GLB,,, | Performed by: NURSE PRACTITIONER

## 2019-09-23 PROCEDURE — 3008F BODY MASS INDEX DOCD: CPT | Mod: CPTII,S$GLB,, | Performed by: NURSE PRACTITIONER

## 2019-09-23 PROCEDURE — 74018 RADEX ABDOMEN 1 VIEW: CPT | Mod: TC

## 2019-09-23 PROCEDURE — 99999 PR PBB SHADOW E&M-EST. PATIENT-LVL III: ICD-10-PCS | Mod: PBBFAC,,, | Performed by: NURSE PRACTITIONER

## 2019-09-23 PROCEDURE — 3008F PR BODY MASS INDEX (BMI) DOCUMENTED: ICD-10-PCS | Mod: CPTII,S$GLB,, | Performed by: NURSE PRACTITIONER

## 2019-09-23 PROCEDURE — 74018 XR ABDOMEN AP 1 VIEW: ICD-10-PCS | Mod: 26,,, | Performed by: RADIOLOGY

## 2019-09-23 PROCEDURE — 74018 RADEX ABDOMEN 1 VIEW: CPT | Mod: 26,,, | Performed by: RADIOLOGY

## 2019-09-23 PROCEDURE — 3074F PR MOST RECENT SYSTOLIC BLOOD PRESSURE < 130 MM HG: ICD-10-PCS | Mod: CPTII,S$GLB,, | Performed by: NURSE PRACTITIONER

## 2019-09-23 PROCEDURE — 3078F DIAST BP <80 MM HG: CPT | Mod: CPTII,S$GLB,, | Performed by: NURSE PRACTITIONER

## 2019-09-23 PROCEDURE — 99204 PR OFFICE/OUTPT VISIT, NEW, LEVL IV, 45-59 MIN: ICD-10-PCS | Mod: S$GLB,,, | Performed by: NURSE PRACTITIONER

## 2019-09-23 RX ORDER — OMEPRAZOLE 20 MG/1
20 CAPSULE, DELAYED RELEASE ORAL DAILY
Qty: 45 CAPSULE | Refills: 0 | Status: SHIPPED | OUTPATIENT
Start: 2019-09-23 | End: 2020-02-07

## 2019-09-23 RX ORDER — OMEPRAZOLE 20 MG/1
20 CAPSULE, DELAYED RELEASE ORAL DAILY
Qty: 45 CAPSULE | Refills: 0 | Status: SHIPPED | OUTPATIENT
Start: 2019-09-23 | End: 2019-09-23

## 2019-09-23 NOTE — LETTER
September 24, 2019      Jonny Willis MD  [address]  [place] [address]           [facility] Gastroenterology  [address]  [place] [address]  Phone: [phone]  Fax: [phone]          Patient: Josi Gil   MR Number: [id number]   YOB: 1955   Date of Visit: 9/23/2019       Dear Dr. Jonny Willis:    Thank you for referring Josi Gil to me for evaluation. Attached you will find relevant portions of my assessment and plan of care.    If you have questions, please do not hesitate to call me. I look forward to following Josi Gil along with you.    Sincerely,    Skyla Amin, GEORGI    Enclosure  CC:  No Recipients    If you would like to receive this communication electronically, please contact [email or web address] or [phone] to request more information on [facility] Link access.    For providers and/or their staff who would like to refer a patient to [facility], please contact us through our one-stop-shop provider referral line, [facility], at [phone].    If you feel you have received this communication in error or would no longer like to receive these types of communications, please e-mail [email or web address]

## 2019-09-23 NOTE — PROGRESS NOTES
[facility] Consultation Note    Reason for Consult:  The encounter diagnosis was Abdominal pain, unspecified abdominal location.    PCP:   Jonny Willis   [address] / [place] [address]    Referring MD:  Jonny Willis Md  [address] [place] [address]    HPI:  This is a 64 y.o. female here for evaluation of abdominal cramping and positive H. Pylori IgG antibodies.      In  developed:  Nausea, abdominal cramping, sickening feeling.  Abdominal pain, cramping, located epigastric to navel.  Happens daily. Worse in the morning. And at night when she lays down. Wakes her up at night. Tries to drink water or cellery water, doesn't really help.   Denies constipation. Has diarrhea only when she eats out.  Denies heart burn, regurgitation. No vomiting. Denies black stool, hematochezia.  Denies globus sensation, but does have a very mild burning sensation that comes and goes w no rhyme or reason.  She accidentally swallowed wires about two weeks ago. It was in her food. She has been having a scratchy throat, decrease in appetite.     ROS:  Constitutional: No fevers, chills, + weight loss  ENT: No allergies  CV: No chest pain  Pulm: No cough, No shortness of breath  Ophtho: + vision changes  GI: see HPI  Derm: No rash  MSK: + joint pains  : No dysuria, No hematuria  Neuro: No syncope, No seizure  Psych: No anxiety, No depression    Medical History:  has a past medical history of Acute costochondritis (2012), Back pain, Benign essential hypertension, Gastroesophageal reflux disease without esophagitis, and Pain in joint involving multiple sites (2013).    Surgical History:  has a past surgical history that includes Knee arthroscopy w/ ACL reconstruction;  section; Refractive surgery (Bilateral,  or ); and Breast biopsy (Right).    Family History: family history includes Heart disease in her maternal grandmother; Hypertension in her mother..  "    Social History:  reports that she has never smoked. She has never used smokeless tobacco. She reports that she does not drink alcohol or use drugs.    Review of patient's allergies indicates:  No Known Allergies    Current Outpatient Medications on File Prior to Visit   Medication Sig Dispense Refill    amLODIPine (NORVASC) 5 MG tablet TAKE 1 TABLET BY MOUTH ONCE DAILY 90 tablet 3    aspirin 325 MG tablet Take 325 mg by mouth once daily.      losartan-hydrochlorothiazide 100-25 mg (HYZAAR) 100-25 mg per tablet Take 1 tablet by mouth once daily. 90 tablet 3    metoprolol succinate (TOPROL XL) 25 MG 24 hr tablet Take 1 tablet (25 mg total) by mouth once daily. 90 tablet 3    ciclopirox (PENLAC) 8 % Soln Apply topically nightly. (Patient not taking: Reported on 9/23/2019) 6.6 mL 3     No current facility-administered medications on file prior to visit.          Objective Findings:    Vital Signs:  /71 (BP Location: Right arm)   Pulse 69   Ht 5' 8" (1.727 m)   Wt 84.6 kg (186 lb 8.2 oz)   BMI 28.36 kg/m²   Body mass index is 28.36 kg/m².    Physical Exam:  General Appearance: Well appearing in no acute distress  Head:   Normocephalic, without obvious abnormality  Eyes:    No scleral icterus  ENT: Neck supple  Lungs: CTA bilaterally in anterior and posterior fields, no wheezes, no crackles.  Heart:  Regular rate and rhythm, S1, S2 normal, no murmurs heard  Abdomen: Soft, non distended with positive bowel sounds in all four quadrants. + Mild epigastric tenderness  Extremities: L hand discoloration  Skin: No rash  Neurologic: AAO x 3      Labs:  Lab Results   Component Value Date    WBC 5.21 06/24/2019    HGB 13.3 06/24/2019    HCT 41.2 06/24/2019     06/24/2019    CRP 2.9 05/23/2016    CHOL 281 (H) 06/12/2019    TRIG 94 06/12/2019    HDL 92 (H) 06/12/2019    ALT 15 06/12/2019    AST 19 06/12/2019     06/12/2019    K 4.2 06/12/2019     06/12/2019    CREATININE 0.8 06/12/2019    BUN 9 " 06/12/2019    CO2 24 06/12/2019    TSH 0.682 01/24/2019    INR 1.0 01/29/2019    HGBA1C 5.4 08/19/2016       Imaging:  Abd Us 7/2019 Reviewed. Hepatic cysts. No gallstones or CBD dil    Endoscopy:    Colonoscopy 2013  -The entire examined colon is normal.                        - The examined portion of the ileum was normal.  Recommendation:  - Repeat colonoscopy in 5-10 years for screening                         purposes.    Assessment:    Ms. Gil is a 63 y/o/ BF with:    1. Abdominal pain, unspecified abdominal location       Symptoms worse when she lies down at night and waking up in the am. Has not tried reflux meds yet as she does not like taking meds.  Discussed with patient that PPI therapy for the next is for 8 weeks is reasonable to see if that helps with her symptoms.  If she does not notice improvement in her symptoms then she may discontinue that therapy.  She did describe accidentally swallowing wires 2 weeks ago.  Will get an abdominal x-ray today to make sure she has completely passed all those wires.  She did have a positive H pylori IgG antibody test but then a subsequent negative stool antigen test.  Patient is unsure if she was on any reflux medications or antibiotics before she turned in that stool so I will repeat that today.  She knows to start PPI therapy after she turns in her stool.  If her blood work, x-ray, ultrasound are all normal and she is still having the symptoms will order an EGD.    Recommendations:  1.  Labs  2.  Stool studies  3.  X-ray  4.  Abdominal x-ray    Follow up in about 4 weeks (around 10/21/2019).      Order summary:  Orders Placed This Encounter    US Abdomen Limited    X-Ray Abdomen AP 1 View    H. pylori antigen, stool    CBC auto differential    Comprehensive metabolic panel    C-reactive protein    omeprazole (PRILOSEC) 20 MG capsule         Thank you so much for allowing me to participate in the care of Josi Gil    Skyla Amin,  FNP-C

## 2019-09-23 NOTE — TELEPHONE ENCOUNTER
MA gave pt a stool kit . Pt verbalized understanding on how to collect and where she can return her stool collect to. Pt was also give a sheet with different labs she can return it to. Pt verified her first, last name and , on stool collection cup and order sheets.

## 2019-09-24 ENCOUNTER — LAB VISIT (OUTPATIENT)
Dept: LAB | Facility: HOSPITAL | Age: 64
End: 2019-09-24
Attending: NURSE PRACTITIONER
Payer: COMMERCIAL

## 2019-09-24 ENCOUNTER — TELEPHONE (OUTPATIENT)
Dept: GASTROENTEROLOGY | Facility: CLINIC | Age: 64
End: 2019-09-24

## 2019-09-24 DIAGNOSIS — R10.9 ABDOMINAL PAIN, UNSPECIFIED ABDOMINAL LOCATION: ICD-10-CM

## 2019-09-24 LAB
ALBUMIN SERPL BCP-MCNC: 3.8 G/DL (ref 3.5–5.2)
ALP SERPL-CCNC: 75 U/L (ref 55–135)
ALT SERPL W/O P-5'-P-CCNC: 12 U/L (ref 10–44)
ANION GAP SERPL CALC-SCNC: 11 MMOL/L (ref 8–16)
AST SERPL-CCNC: 18 U/L (ref 10–40)
BASOPHILS # BLD AUTO: 0.03 K/UL (ref 0–0.2)
BASOPHILS NFR BLD: 0.5 % (ref 0–1.9)
BILIRUB SERPL-MCNC: 0.4 MG/DL (ref 0.1–1)
BUN SERPL-MCNC: 10 MG/DL (ref 8–23)
CALCIUM SERPL-MCNC: 9.9 MG/DL (ref 8.7–10.5)
CHLORIDE SERPL-SCNC: 104 MMOL/L (ref 95–110)
CO2 SERPL-SCNC: 26 MMOL/L (ref 23–29)
CREAT SERPL-MCNC: 1.1 MG/DL (ref 0.5–1.4)
CRP SERPL-MCNC: 3.6 MG/L (ref 0–8.2)
DIFFERENTIAL METHOD: NORMAL
EOSINOPHIL # BLD AUTO: 0.4 K/UL (ref 0–0.5)
EOSINOPHIL NFR BLD: 6.5 % (ref 0–8)
ERYTHROCYTE [DISTWIDTH] IN BLOOD BY AUTOMATED COUNT: 13.2 % (ref 11.5–14.5)
EST. GFR  (AFRICAN AMERICAN): >60 ML/MIN/1.73 M^2
EST. GFR  (NON AFRICAN AMERICAN): 53.2 ML/MIN/1.73 M^2
GLUCOSE SERPL-MCNC: 111 MG/DL (ref 70–110)
HCT VFR BLD AUTO: 39.2 % (ref 37–48.5)
HGB BLD-MCNC: 13.5 G/DL (ref 12–16)
LYMPHOCYTES # BLD AUTO: 2.5 K/UL (ref 1–4.8)
LYMPHOCYTES NFR BLD: 41.1 % (ref 18–48)
MCH RBC QN AUTO: 30.3 PG (ref 27–31)
MCHC RBC AUTO-ENTMCNC: 34.4 G/DL (ref 32–36)
MCV RBC AUTO: 88 FL (ref 82–98)
MONOCYTES # BLD AUTO: 0.4 K/UL (ref 0.3–1)
MONOCYTES NFR BLD: 6.7 % (ref 4–15)
NEUTROPHILS # BLD AUTO: 2.7 K/UL (ref 1.8–7.7)
NEUTROPHILS NFR BLD: 45.2 % (ref 38–73)
PLATELET # BLD AUTO: 241 K/UL (ref 150–350)
PMV BLD AUTO: 10.5 FL (ref 9.2–12.9)
POTASSIUM SERPL-SCNC: 3.3 MMOL/L (ref 3.5–5.1)
PROT SERPL-MCNC: 7.2 G/DL (ref 6–8.4)
RBC # BLD AUTO: 4.46 M/UL (ref 4–5.4)
SODIUM SERPL-SCNC: 141 MMOL/L (ref 136–145)
WBC # BLD AUTO: 5.98 K/UL (ref 3.9–12.7)

## 2019-09-24 PROCEDURE — 86140 C-REACTIVE PROTEIN: CPT

## 2019-09-24 PROCEDURE — 80053 COMPREHEN METABOLIC PANEL: CPT

## 2019-09-24 PROCEDURE — 85025 COMPLETE CBC W/AUTO DIFF WBC: CPT

## 2019-09-24 PROCEDURE — 36415 COLL VENOUS BLD VENIPUNCTURE: CPT

## 2019-09-24 NOTE — TELEPHONE ENCOUNTER
MA contacted pt to give test results per Skyla. Pt did not answer, MA could not leave message because voicemail was full.          ----- Message from Skyla Amin NP sent at 9/24/2019 11:29 AM CDT -----  Xray did not show any wires in her intestinal tract. It did show small calcifications in her LLQ, these could possibly be Renal Stones. Since she is not having urinary sx, I would say these are less likely to be causing her abdominal pain. She needs to contact her PCP with these findings to determine if she needs a Renal stone CT scan.

## 2019-09-24 NOTE — PROGRESS NOTES
Xray did not show any wires in her intestinal tract. It did show small calcifications in her LLQ, these could possibly be Renal Stones. Since she is not having urinary sx, I would say these are less likely to be causing her abdominal pain. She needs to contact her PCP with these findings to determine if she needs a Renal stone CT scan.

## 2019-09-25 ENCOUNTER — LAB VISIT (OUTPATIENT)
Dept: LAB | Facility: HOSPITAL | Age: 64
End: 2019-09-25
Payer: COMMERCIAL

## 2019-09-25 DIAGNOSIS — R10.9 ABDOMINAL PAIN, UNSPECIFIED ABDOMINAL LOCATION: ICD-10-CM

## 2019-09-25 PROCEDURE — 87338 HPYLORI STOOL AG IA: CPT

## 2019-09-28 ENCOUNTER — HOSPITAL ENCOUNTER (OUTPATIENT)
Dept: RADIOLOGY | Facility: HOSPITAL | Age: 64
Discharge: HOME | End: 2019-09-28
Attending: NURSE PRACTITIONER
Payer: COMMERCIAL

## 2019-09-28 DIAGNOSIS — R10.9 ABDOMINAL PAIN, UNSPECIFIED ABDOMINAL LOCATION: ICD-10-CM

## 2019-09-28 PROCEDURE — 76705 US ABDOMEN LIMITED: ICD-10-PCS | Mod: 26,,, | Performed by: RADIOLOGY

## 2019-09-28 PROCEDURE — 76705 ECHO EXAM OF ABDOMEN: CPT | Mod: TC

## 2019-09-28 PROCEDURE — 76705 ECHO EXAM OF ABDOMEN: CPT | Mod: 26,,, | Performed by: RADIOLOGY

## 2019-09-29 LAB — H PYLORI AG STL QL IA: NOT DETECTED

## 2019-09-30 ENCOUNTER — TELEPHONE (OUTPATIENT)
Dept: GASTROENTEROLOGY | Facility: CLINIC | Age: 64
End: 2019-09-30

## 2019-09-30 DIAGNOSIS — Z12.11 SCREENING FOR COLON CANCER: ICD-10-CM

## 2019-09-30 DIAGNOSIS — R10.10 UPPER ABDOMINAL PAIN: Primary | ICD-10-CM

## 2019-09-30 NOTE — TELEPHONE ENCOUNTER
See previous encounter.       ----- Message from Skyla Amin NP sent at 9/30/2019  8:16 AM CDT -----  Stool neg for h. Pylori. MA to ask Pt if she is ready to proceed with EGD

## 2019-09-30 NOTE — PROGRESS NOTES
Her abd us showed possible gall bladder polyp vs stone, it was very small, 2 mm. This is likely not causing her pain. Recommend doing the EGD for other sources causing her abd pain.

## 2019-09-30 NOTE — TELEPHONE ENCOUNTER
MA spoke with pt this morning 9/30 and gave her the schedulers number to proceed with getting her procedures scheduled .

## 2019-09-30 NOTE — TELEPHONE ENCOUNTER
MA contacted pt to give test results per Skyla.  MA gave pt results on her ab us and also her xray. Ma explained to pt in details about the findings on her test. MA also gave pt her stool results per Skyla. Pt verbalized understanding and said she is ready to move forward with the EGD. Ma gave her the number to the schedulers.         ----- Message from Skyla Amin NP sent at 9/30/2019  8:17 AM CDT -----  Her abd us showed possible gall bladder polyp vs stone, it was very small, 2 mm. This is likely not causing her pain. Recommend doing the EGD for other sources causing her abd pain.

## 2019-10-07 ENCOUNTER — TELEPHONE (OUTPATIENT)
Dept: ENDOSCOPY | Facility: HOSPITAL | Age: 64
End: 2019-10-07

## 2019-10-07 ENCOUNTER — TELEPHONE (OUTPATIENT)
Dept: GASTROENTEROLOGY | Facility: CLINIC | Age: 64
End: 2019-10-07

## 2019-10-07 NOTE — TELEPHONE ENCOUNTER
Called patient regarding scheduling EGD and Colonoscopy procedures ordered. No answer. Left message with my direct contact number for patient to return my call to schedule procedures.

## 2019-10-07 NOTE — TELEPHONE ENCOUNTER
MA called pt to tell her per negar It is not exactly clear what causes them to form. Some data suggests high cholesterol can cause this or if the gall bladder does not function correctly.   Not really a way for her to get rid of them. But eating a healthy diet and having a healthy weight may help prevent new ones from forming. Pt verbalized understanding, pt id ask about cholesterol levels. Ma told pt that is something her PCP would have to check. Pt verbalized understanding.

## 2019-10-07 NOTE — TELEPHONE ENCOUNTER
MA gave pt test results per Skyla . Pt verbalized understanding. Pt also asked for the number to the schedulers again because she missed placed it. Pt had a few questions about her old test result . Pt would like MA to ask skyla what can cause her gallbladders issue and also what can be done to get rid of it.              ----- Message from Skyla Amin NP sent at 10/6/2019  2:16 PM CDT -----  Labs look good

## 2019-10-08 ENCOUNTER — TELEPHONE (OUTPATIENT)
Dept: ENDOSCOPY | Facility: HOSPITAL | Age: 64
End: 2019-10-08

## 2019-10-10 ENCOUNTER — TELEPHONE (OUTPATIENT)
Dept: ENDOSCOPY | Facility: HOSPITAL | Age: 64
End: 2019-10-10

## 2019-10-10 ENCOUNTER — TELEPHONE (OUTPATIENT)
Dept: INTERNAL MEDICINE | Facility: CLINIC | Age: 64
End: 2019-10-10

## 2019-10-10 DIAGNOSIS — E78.5 HYPERLIPIDEMIA, UNSPECIFIED HYPERLIPIDEMIA TYPE: Primary | ICD-10-CM

## 2019-10-10 NOTE — TELEPHONE ENCOUNTER
Patient contacted multiple times regarding scheduling EGD and Colonoscopy procedures ordered. Multiple messages left and patient not returning phone calls.Unable to reach letter mailed to patient's address on file in her medical record.

## 2019-10-10 NOTE — TELEPHONE ENCOUNTER
Pt understood verbally , please place lipid order I schedule the appointment already  Just need labs linked

## 2019-10-10 NOTE — TELEPHONE ENCOUNTER
Pt has some concerns about her cholesterol she was told it was high and she want to recheck the lab work but she want to discuss it with you first

## 2019-10-10 NOTE — TELEPHONE ENCOUNTER
----- Message from Malia Smith sent at 10/10/2019  7:30 AM CDT -----  Contact: [phone]  Need to speak with you about her cholesterol. She has some question

## 2019-10-10 NOTE — TELEPHONE ENCOUNTER
Contact patient  The total cholesterol has been elevated in the past, however the HDL cholesterol or good cholesterol has also been elevated which is good and thus the LDL cholesterol or bad cholesterol readings have been acceptable    The last cholesterol test showed the total cholesterol to be little bit higher than before, but still with a elevated or good HDL cholesterol    I think it is reasonable to repeat the lipid profile in a fasting state    However I do not think the elevated cholesterol is a reason for any discomfort

## 2019-10-13 ENCOUNTER — NURSE TRIAGE (OUTPATIENT)
Dept: ADMINISTRATIVE | Facility: CLINIC | Age: 64
End: 2019-10-13

## 2019-10-13 NOTE — TELEPHONE ENCOUNTER
Reason for call:lab test, fasting    Reason for Disposition   Question about upcoming scheduled test, no triage required and triager able to answer question    Protocols used: INFORMATION ONLY CALL-A-AH

## 2019-10-14 ENCOUNTER — LAB VISIT (OUTPATIENT)
Dept: LAB | Facility: HOSPITAL | Age: 64
End: 2019-10-14
Payer: COMMERCIAL

## 2019-10-14 ENCOUNTER — LAB VISIT (OUTPATIENT)
Dept: LAB | Facility: HOSPITAL | Age: 64
End: 2019-10-14
Attending: NURSE PRACTITIONER
Payer: COMMERCIAL

## 2019-10-14 ENCOUNTER — TELEPHONE (OUTPATIENT)
Dept: INTERNAL MEDICINE | Facility: CLINIC | Age: 64
End: 2019-10-14

## 2019-10-14 ENCOUNTER — OFFICE VISIT (OUTPATIENT)
Dept: INTERNAL MEDICINE | Facility: CLINIC | Age: 64
End: 2019-10-14
Payer: COMMERCIAL

## 2019-10-14 VITALS
HEART RATE: 63 BPM | HEIGHT: 68 IN | SYSTOLIC BLOOD PRESSURE: 104 MMHG | TEMPERATURE: 98 F | WEIGHT: 180.56 LBS | DIASTOLIC BLOOD PRESSURE: 60 MMHG | OXYGEN SATURATION: 98 % | BODY MASS INDEX: 27.36 KG/M2

## 2019-10-14 DIAGNOSIS — I95.0 IDIOPATHIC HYPOTENSION: Primary | ICD-10-CM

## 2019-10-14 DIAGNOSIS — I95.0 IDIOPATHIC HYPOTENSION: ICD-10-CM

## 2019-10-14 DIAGNOSIS — R53.83 OTHER FATIGUE: ICD-10-CM

## 2019-10-14 DIAGNOSIS — E78.5 HYPERLIPIDEMIA, UNSPECIFIED HYPERLIPIDEMIA TYPE: ICD-10-CM

## 2019-10-14 DIAGNOSIS — E66.3 OVERWEIGHT (BMI 25.0-29.9): ICD-10-CM

## 2019-10-14 LAB
ALBUMIN SERPL BCP-MCNC: 3.7 G/DL (ref 3.5–5.2)
ALP SERPL-CCNC: 67 U/L (ref 55–135)
ALT SERPL W/O P-5'-P-CCNC: 11 U/L (ref 10–44)
ANION GAP SERPL CALC-SCNC: 9 MMOL/L (ref 8–16)
AST SERPL-CCNC: 15 U/L (ref 10–40)
BASOPHILS # BLD AUTO: 0.04 K/UL (ref 0–0.2)
BASOPHILS NFR BLD: 0.6 % (ref 0–1.9)
BILIRUB SERPL-MCNC: 0.4 MG/DL (ref 0.1–1)
BUN SERPL-MCNC: 9 MG/DL (ref 8–23)
CALCIUM SERPL-MCNC: 9.8 MG/DL (ref 8.7–10.5)
CHLORIDE SERPL-SCNC: 102 MMOL/L (ref 95–110)
CHOLEST SERPL-MCNC: 225 MG/DL (ref 120–199)
CHOLEST/HDLC SERPL: 3 {RATIO} (ref 2–5)
CO2 SERPL-SCNC: 29 MMOL/L (ref 23–29)
CREAT SERPL-MCNC: 0.9 MG/DL (ref 0.5–1.4)
DIFFERENTIAL METHOD: ABNORMAL
EOSINOPHIL # BLD AUTO: 0.5 K/UL (ref 0–0.5)
EOSINOPHIL NFR BLD: 7.2 % (ref 0–8)
ERYTHROCYTE [DISTWIDTH] IN BLOOD BY AUTOMATED COUNT: 13.3 % (ref 11.5–14.5)
EST. GFR  (AFRICAN AMERICAN): >60 ML/MIN/1.73 M^2
EST. GFR  (NON AFRICAN AMERICAN): >60 ML/MIN/1.73 M^2
GLUCOSE SERPL-MCNC: 83 MG/DL (ref 70–110)
HCT VFR BLD AUTO: 36.9 % (ref 37–48.5)
HDLC SERPL-MCNC: 75 MG/DL (ref 40–75)
HDLC SERPL: 33.3 % (ref 20–50)
HGB BLD-MCNC: 12.7 G/DL (ref 12–16)
LDLC SERPL CALC-MCNC: 136.4 MG/DL (ref 63–159)
LYMPHOCYTES # BLD AUTO: 2.3 K/UL (ref 1–4.8)
LYMPHOCYTES NFR BLD: 36.6 % (ref 18–48)
MAGNESIUM SERPL-MCNC: 1.8 MG/DL (ref 1.6–2.6)
MCH RBC QN AUTO: 30.5 PG (ref 27–31)
MCHC RBC AUTO-ENTMCNC: 34.4 G/DL (ref 32–36)
MCV RBC AUTO: 89 FL (ref 82–98)
MONOCYTES # BLD AUTO: 0.6 K/UL (ref 0.3–1)
MONOCYTES NFR BLD: 10.3 % (ref 4–15)
NEUTROPHILS # BLD AUTO: 2.8 K/UL (ref 1.8–7.7)
NEUTROPHILS NFR BLD: 45.3 % (ref 38–73)
NONHDLC SERPL-MCNC: 150 MG/DL
PLATELET # BLD AUTO: 244 K/UL (ref 150–350)
PMV BLD AUTO: 10.9 FL (ref 9.2–12.9)
POTASSIUM SERPL-SCNC: 3.4 MMOL/L (ref 3.5–5.1)
PROT SERPL-MCNC: 6.9 G/DL (ref 6–8.4)
RBC # BLD AUTO: 4.16 M/UL (ref 4–5.4)
SODIUM SERPL-SCNC: 140 MMOL/L (ref 136–145)
TRIGL SERPL-MCNC: 68 MG/DL (ref 30–150)
WBC # BLD AUTO: 6.23 K/UL (ref 3.9–12.7)

## 2019-10-14 PROCEDURE — 3078F PR MOST RECENT DIASTOLIC BLOOD PRESSURE < 80 MM HG: ICD-10-PCS | Mod: CPTII,S$GLB,, | Performed by: NURSE PRACTITIONER

## 2019-10-14 PROCEDURE — 99999 PR PBB SHADOW E&M-EST. PATIENT-LVL III: CPT | Mod: PBBFAC,,, | Performed by: NURSE PRACTITIONER

## 2019-10-14 PROCEDURE — 99999 PR PBB SHADOW E&M-EST. PATIENT-LVL III: ICD-10-PCS | Mod: PBBFAC,,, | Performed by: NURSE PRACTITIONER

## 2019-10-14 PROCEDURE — 99213 OFFICE O/P EST LOW 20 MIN: CPT | Mod: S$GLB,,, | Performed by: NURSE PRACTITIONER

## 2019-10-14 PROCEDURE — 85025 COMPLETE CBC W/AUTO DIFF WBC: CPT

## 2019-10-14 PROCEDURE — 3074F SYST BP LT 130 MM HG: CPT | Mod: CPTII,S$GLB,, | Performed by: NURSE PRACTITIONER

## 2019-10-14 PROCEDURE — 83735 ASSAY OF MAGNESIUM: CPT

## 2019-10-14 PROCEDURE — 36415 COLL VENOUS BLD VENIPUNCTURE: CPT

## 2019-10-14 PROCEDURE — 80053 COMPREHEN METABOLIC PANEL: CPT

## 2019-10-14 PROCEDURE — 3008F BODY MASS INDEX DOCD: CPT | Mod: CPTII,S$GLB,, | Performed by: NURSE PRACTITIONER

## 2019-10-14 PROCEDURE — 3074F PR MOST RECENT SYSTOLIC BLOOD PRESSURE < 130 MM HG: ICD-10-PCS | Mod: CPTII,S$GLB,, | Performed by: NURSE PRACTITIONER

## 2019-10-14 PROCEDURE — 80061 LIPID PANEL: CPT

## 2019-10-14 PROCEDURE — 99213 PR OFFICE/OUTPT VISIT, EST, LEVL III, 20-29 MIN: ICD-10-PCS | Mod: S$GLB,,, | Performed by: NURSE PRACTITIONER

## 2019-10-14 PROCEDURE — 3078F DIAST BP <80 MM HG: CPT | Mod: CPTII,S$GLB,, | Performed by: NURSE PRACTITIONER

## 2019-10-14 PROCEDURE — 3008F PR BODY MASS INDEX (BMI) DOCUMENTED: ICD-10-PCS | Mod: CPTII,S$GLB,, | Performed by: NURSE PRACTITIONER

## 2019-10-14 NOTE — TELEPHONE ENCOUNTER
----- Message from Georgia De Souza sent at 10/14/2019  2:22 PM CDT -----  Contact: self/[phone]  Patient called in regard needing to inform Dr Willis's medical assistant that around 2 oclock she took her blood pressure and it was 95/51 and pulse 72.    2nd reading 90/50  Pulse -76  3rd reading 90/51 pulse - 73  Please call and advise thank you
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
## 2019-10-14 NOTE — TELEPHONE ENCOUNTER
----- Message from Kandy Maria sent at 10/14/2019  8:27 AM CDT -----  Contact: pt         .Type:  Same Day Appointment Request    Caller is requesting a same day appointment.  Caller declined first available appointment listed below.    Name of Caller: pt  When is the first available appointment? n/a  Symptoms: nigh chest pains   Best Call Back Number: [phone] (home)    Additional Information: pt request appt today  After 11am

## 2019-10-14 NOTE — TELEPHONE ENCOUNTER
Spoke with patient she has been experiencing low blood pressure she felt fatigue she states pt schedule for same day appt

## 2019-10-14 NOTE — PATIENT INSTRUCTIONS
Stop Losartan-HCTZ 100/25mg Blood pressure pill. Continue Amlodipine 5mg daily and Toprol XL 25mg daily.    Take medications as prescribed.    Monitor BP at home, goal BP < or = 140/80, call office if consistently above this range.    Follow low salt DASH diet and exercise.    BMI reviewed.    Go to ED if Headaches, blurred vision, chest pain, or SOB occurs along with elevated readings > or = 160/90.    Check Labs to rule out dehydration, anemia, and low electrolytes as cause of fatigue and weakness    Keep f/u with Dr. Gonzalez tomorrow as scheduled to address pain issues

## 2019-10-15 ENCOUNTER — OFFICE VISIT (OUTPATIENT)
Dept: INTERNAL MEDICINE | Facility: CLINIC | Age: 64
End: 2019-10-15
Payer: COMMERCIAL

## 2019-10-15 ENCOUNTER — TELEPHONE (OUTPATIENT)
Dept: INTERNAL MEDICINE | Facility: CLINIC | Age: 64
End: 2019-10-15

## 2019-10-15 ENCOUNTER — TELEPHONE (OUTPATIENT)
Dept: GASTROENTEROLOGY | Facility: CLINIC | Age: 64
End: 2019-10-15

## 2019-10-15 VITALS
SYSTOLIC BLOOD PRESSURE: 118 MMHG | OXYGEN SATURATION: 98 % | HEART RATE: 55 BPM | BODY MASS INDEX: 27.13 KG/M2 | WEIGHT: 179 LBS | DIASTOLIC BLOOD PRESSURE: 70 MMHG | HEIGHT: 68 IN

## 2019-10-15 DIAGNOSIS — E87.6 HYPOKALEMIA: ICD-10-CM

## 2019-10-15 DIAGNOSIS — E78.5 HYPERLIPIDEMIA, UNSPECIFIED HYPERLIPIDEMIA TYPE: ICD-10-CM

## 2019-10-15 DIAGNOSIS — R07.89 OTHER CHEST PAIN: Primary | ICD-10-CM

## 2019-10-15 PROCEDURE — 3074F SYST BP LT 130 MM HG: CPT | Mod: CPTII,S$GLB,, | Performed by: INTERNAL MEDICINE

## 2019-10-15 PROCEDURE — 3008F PR BODY MASS INDEX (BMI) DOCUMENTED: ICD-10-PCS | Mod: CPTII,S$GLB,, | Performed by: INTERNAL MEDICINE

## 2019-10-15 PROCEDURE — 99999 PR PBB SHADOW E&M-EST. PATIENT-LVL III: CPT | Mod: PBBFAC,,, | Performed by: INTERNAL MEDICINE

## 2019-10-15 PROCEDURE — 99214 OFFICE O/P EST MOD 30 MIN: CPT | Mod: S$GLB,,, | Performed by: INTERNAL MEDICINE

## 2019-10-15 PROCEDURE — 99214 PR OFFICE/OUTPT VISIT, EST, LEVL IV, 30-39 MIN: ICD-10-PCS | Mod: S$GLB,,, | Performed by: INTERNAL MEDICINE

## 2019-10-15 PROCEDURE — 3078F DIAST BP <80 MM HG: CPT | Mod: CPTII,S$GLB,, | Performed by: INTERNAL MEDICINE

## 2019-10-15 PROCEDURE — 3074F PR MOST RECENT SYSTOLIC BLOOD PRESSURE < 130 MM HG: ICD-10-PCS | Mod: CPTII,S$GLB,, | Performed by: INTERNAL MEDICINE

## 2019-10-15 PROCEDURE — 3008F BODY MASS INDEX DOCD: CPT | Mod: CPTII,S$GLB,, | Performed by: INTERNAL MEDICINE

## 2019-10-15 PROCEDURE — 3078F PR MOST RECENT DIASTOLIC BLOOD PRESSURE < 80 MM HG: ICD-10-PCS | Mod: CPTII,S$GLB,, | Performed by: INTERNAL MEDICINE

## 2019-10-15 PROCEDURE — 99999 PR PBB SHADOW E&M-EST. PATIENT-LVL III: ICD-10-PCS | Mod: PBBFAC,,, | Performed by: INTERNAL MEDICINE

## 2019-10-15 RX ORDER — LOSARTAN POTASSIUM AND HYDROCHLOROTHIAZIDE 12.5; 5 MG/1; MG/1
1 TABLET ORAL DAILY
COMMUNITY
End: 2020-02-07

## 2019-10-15 NOTE — TELEPHONE ENCOUNTER
----- Message from Lexy Ornelas sent at 10/14/2019  5:05 PM CDT -----  Pt called to schedule EDG test and wish to have it done tomorrow. Pt asked for a call back.      Pt contact # [phone].    Thanks

## 2019-10-15 NOTE — PROGRESS NOTES
Labs overall are stable, potassium is 3.4 which is minimally low, normal low is 3.5, this has improved from 3 weeks ago. I think with us stopping the hyzaar this should fix this without having to put you on oral potassium. Be sure to eat a diet rich in potassium, bananas, green leafy veggies. Keep appt today with Dr. Gonzalez as scheduled.

## 2019-10-15 NOTE — TELEPHONE ENCOUNTER
----- Message from Jamaica Valladares DNP sent at 10/15/2019  8:30 AM CDT -----  Labs overall are stable, potassium is 3.4 which is minimally low, normal low is 3.5, this has improved from 3 weeks ago. I think with us stopping the hyzaar this should fix this without having to put you on oral potassium. Be sure to eat a diet rich in potassium, bananas, green leafy veggies. Keep appt today with Dr. Gonzalez as scheduled.

## 2019-10-15 NOTE — PROGRESS NOTES
REASON FOR VISIT:  This is a 64-year-old female who made this appointment.  For   a week or two weeks in the evening, sometimes she will feel the pain is sharp in   the mid chest that spreads out.  It can last for a while and then steadily go   away.  Maybe on two occasions, she knows to twice this is not waking her up at   night.  It has not occurred during the morning and afternoon.  It came on   randomly.  It was not associated with any food intake or physical activity.  She   is concerned because it has been a different type of pain in the chest that she   has had in the past.    She for number of years has had reoccurring chest discomfort.  She has had CTA   of the coronary vessels, which have been normal in the last time, but was back   in February.  Transthoracic echo was also normal at that time.    Recently, she was evaluated by Gastroenterology where an abdominal ultrasound   showed the possibility of a gallstone or gall polyp, but an ultrasound back in   July 2019 did not reveal this.    In regard to her cholesterol, yesterday a total cholesterol was 225 with HDL 75,   .  This is coming better than four months ago, but her lipid profile   throughout the years have been slightly elevated total cholesterol, but with a   slightly elevated or high normal LDL due to the fact that the HDL has been   elevated.    Also, it is noted that the chemistry test from yesterday had a potassium 3.4,   three weeks ago it was 3.3, magnesium was 1.8.    CURRENT MEDICATIONS:  Amlodipine 10 mg.  Metoprolol XL 25 mg.  Losartan HCT 12.5 mg.  She was given a prescription by GI for omeprazole, but   she has decided not to take the medication.  At present, she is not reporting   any symptoms of regurgitation or heartburn.  No any abdominal pain and bowel   function is regular.    PHYSICAL EXAMINATION:  VITAL SIGS:  Today, her weight is 179, a pulse of 60, blood pressure by me   128/66 to 70.  LUNGS:  Clear.  HEART:  Regular  rate and rhythm.  ABDOMEN:  Soft, nontender.  Presently, she is not tender over the costochondral   margin or chest wall.    IMPRESSION:  1. Sharp chest pain, which I think is likely to be muscular pain or   costochondritis.  2. Hypertension.  3. Hypokalemia.    PLAN:  After much discussion, it was discussed about taking magnesium   supplement, magnesium oxide 400 mg once a day and a repeat chemistry with   vitamin D next week.  She had a normal CT of the coronary vessels in April and I   do not think this needs to be redone.  Her pain is not consistent with angina.    Continue to be attentive mindful of diet and we will make arrangements to   repeat labs in about two weeks, which will be a chemistry with vitamin D and   magnesium.        JAM/HN  dd: 10/15/2019 11:10:45 (CDT)  td: 10/15/2019 23:26:15 (CDT)  Doc ID   #[id number]  Job ID #[id number]    CC:

## 2019-10-17 ENCOUNTER — PATIENT OUTREACH (OUTPATIENT)
Dept: ADMINISTRATIVE | Facility: OTHER | Age: 64
End: 2019-10-17

## 2019-10-21 ENCOUNTER — OFFICE VISIT (OUTPATIENT)
Dept: GASTROENTEROLOGY | Facility: CLINIC | Age: 64
End: 2019-10-21
Payer: COMMERCIAL

## 2019-10-21 VITALS
BODY MASS INDEX: 26.62 KG/M2 | SYSTOLIC BLOOD PRESSURE: 108 MMHG | DIASTOLIC BLOOD PRESSURE: 60 MMHG | WEIGHT: 179.69 LBS | HEART RATE: 64 BPM | HEIGHT: 69 IN

## 2019-10-21 DIAGNOSIS — K82.4 GALLBLADDER POLYP: ICD-10-CM

## 2019-10-21 DIAGNOSIS — R10.10 UPPER ABDOMINAL PAIN: Primary | ICD-10-CM

## 2019-10-21 PROCEDURE — 3008F PR BODY MASS INDEX (BMI) DOCUMENTED: ICD-10-PCS | Mod: CPTII,S$GLB,, | Performed by: NURSE PRACTITIONER

## 2019-10-21 PROCEDURE — 99999 PR PBB SHADOW E&M-EST. PATIENT-LVL III: CPT | Mod: PBBFAC,,, | Performed by: NURSE PRACTITIONER

## 2019-10-21 PROCEDURE — 3078F PR MOST RECENT DIASTOLIC BLOOD PRESSURE < 80 MM HG: ICD-10-PCS | Mod: CPTII,S$GLB,, | Performed by: NURSE PRACTITIONER

## 2019-10-21 PROCEDURE — 3008F BODY MASS INDEX DOCD: CPT | Mod: CPTII,S$GLB,, | Performed by: NURSE PRACTITIONER

## 2019-10-21 PROCEDURE — 99999 PR PBB SHADOW E&M-EST. PATIENT-LVL III: ICD-10-PCS | Mod: PBBFAC,,, | Performed by: NURSE PRACTITIONER

## 2019-10-21 PROCEDURE — 3078F DIAST BP <80 MM HG: CPT | Mod: CPTII,S$GLB,, | Performed by: NURSE PRACTITIONER

## 2019-10-21 PROCEDURE — 99214 PR OFFICE/OUTPT VISIT, EST, LEVL IV, 30-39 MIN: ICD-10-PCS | Mod: S$GLB,,, | Performed by: NURSE PRACTITIONER

## 2019-10-21 PROCEDURE — 3074F SYST BP LT 130 MM HG: CPT | Mod: CPTII,S$GLB,, | Performed by: NURSE PRACTITIONER

## 2019-10-21 PROCEDURE — 99214 OFFICE O/P EST MOD 30 MIN: CPT | Mod: S$GLB,,, | Performed by: NURSE PRACTITIONER

## 2019-10-21 PROCEDURE — 3074F PR MOST RECENT SYSTOLIC BLOOD PRESSURE < 130 MM HG: ICD-10-PCS | Mod: CPTII,S$GLB,, | Performed by: NURSE PRACTITIONER

## 2019-10-21 NOTE — PROGRESS NOTES
[facility] Consultation Note    Reason for Consult:  The primary encounter diagnosis was Upper abdominal pain. A diagnosis of Gallbladder polyp was also pertinent to this visit.    PCP:   Jonny Willis       Referring MD:  No referring provider defined for this encounter.    HPI:  Previous Note 10/22/19  This is a 64 y.o. female here for evaluation of abdominal cramping and positive H. Pylori IgG antibodies.      In June developed:  Nausea, abdominal cramping, sickening feeling.  Abdominal pain, cramping, located epigastric to navel.  Happens daily. Worse in the morning. And at night when she lays down. Wakes her up at night. Tries to drink water or cellery water, doesn't really help.   Denies constipation. Has diarrhea only when she eats out.  Denies heart burn, regurgitation. No vomiting. Denies black stool, hematochezia.  Denies globus sensation, but does have a very mild burning sensation that comes and goes w no rhyme or reason.  She accidentally swallowed wires about two weeks ago. It was in her food. She has been having a scratchy throat, decrease in appetite.       Today 10/21/19  Upper abdominal improved about 60%. Not completely subsided. Started PPI.  She is concerned about her small gallstone of 2mm.   Has been eating a health diet to try to lower cholesterol.    ROS:  Constitutional: No fevers, chills, + weight loss  ENT: No allergies  CV: No chest pain  Pulm: No cough, No shortness of breath  Ophtho: + vision changes  GI: see HPI  Derm: No rash  MSK: + joint pains  : No dysuria, No hematuria  Neuro: No syncope, No seizure  Psych: No anxiety, No depression    Medical History:  has a past medical history of Acute costochondritis (9/18/2012), Back pain, Benign essential hypertension, Gastroesophageal reflux disease without esophagitis, and Pain in joint involving multiple sites (7/9/2013).    Surgical History:  has a past surgical history that includes Knee arthroscopy w/ ACL  "reconstruction;  section; Refractive surgery (Bilateral,  or ); and Breast biopsy (Right).    Family History: family history includes Heart disease in her maternal grandmother; Hypertension in her mother..     Social History:  reports that she has never smoked. She has never used smokeless tobacco. She reports that she does not drink alcohol or use drugs.    Review of patient's allergies indicates:  No Known Allergies    Current Outpatient Medications on File Prior to Visit   Medication Sig Dispense Refill    amLODIPine (NORVASC) 5 MG tablet TAKE 1 TABLET BY MOUTH ONCE DAILY 90 tablet 3    aspirin 325 MG tablet Take 325 mg by mouth once daily.      ciclopirox (PENLAC) 8 % Soln Apply topically nightly. 6.6 mL 3    losartan-hydrochlorothiazide 50-12.5 mg (HYZAAR) 50-12.5 mg per tablet Take 1 tablet by mouth once daily.      metoprolol succinate (TOPROL XL) 25 MG 24 hr tablet Take 1 tablet (25 mg total) by mouth once daily. 90 tablet 3    omeprazole (PRILOSEC) 20 MG capsule Take 1 capsule (20 mg total) by mouth once daily. 45 capsule 0     No current facility-administered medications on file prior to visit.          Objective Findings:    Vital Signs:  /60 (BP Location: Left arm)   Pulse 64   Ht 5' 9" (1.753 m)   Wt 81.5 kg (179 lb 10.8 oz)   BMI 26.53 kg/m²   Body mass index is 26.53 kg/m².    Physical Exam:  General Appearance: Well appearing in no acute distress  Head:   Normocephalic, without obvious abnormality  Eyes:    No scleral icterus  ENT: Neck supple  Lungs: CTA bilaterally in anterior and posterior fields, no wheezes, no crackles.  Heart:  Regular rate and rhythm, S1, S2 normal, no murmurs heard  Abdomen: Soft, non distended with positive bowel sounds in all four quadrants. + Mild epigastric tenderness  Extremities: L hand discoloration  Skin: No rash  Neurologic: AAO x 3      Labs:  Lab Results   Component Value Date    WBC 6.23 10/14/2019    HGB 12.7 10/14/2019    HCT 36.9 " (L) 10/14/2019     10/14/2019    CRP 3.6 09/24/2019    CHOL 225 (H) 10/14/2019    TRIG 68 10/14/2019    HDL 75 10/14/2019    ALT 11 10/14/2019    AST 15 10/14/2019     10/14/2019    K 3.4 (L) 10/14/2019     10/14/2019    CREATININE 0.9 10/14/2019    BUN 9 10/14/2019    CO2 29 10/14/2019    TSH 0.682 01/24/2019    INR 1.0 01/29/2019    HGBA1C 5.4 08/19/2016       Imaging:  Limited abd us 9/2019 2 mm adherent stone versus polyp    Abd Us 7/2019 Reviewed. Hepatic cysts. No gallstones or CBD dil    Endoscopy:    Colonoscopy 2013  -The entire examined colon is normal.                        - The examined portion of the ileum was normal.  Recommendation:  - Repeat colonoscopy in 5-10 years for screening                         purposes.    Assessment:    Ms. Gil is a 63 y/o/ BF with:    1. Upper abdominal pain    2. Gallbladder polyp      Has had some improvement with PPI therapy. Number provided again to schedulers.   We discussed her 2 mm gallbladder polyp vs stone. Will repeat US in 6-12 mos.    Recommendations:  1.  EGD  2. Repeat US in 6/12 mos  3. Continue PPI    F/u pending EGD    Order summary:         Thank you so much for allowing me to participate in the care of Josi Gil    MAXIME Morris

## 2019-10-21 NOTE — PATIENT INSTRUCTIONS
Endoscopy Schedulers [phone]    Once you call the schedulers to get the EGD set up please contact my clinic to set up a follow-up appointment with me for 2 weeks after your EGD.    We need to repeat your ultrasound to assess year gallbladder polyp in about 6-12 months.

## 2019-10-22 ENCOUNTER — OFFICE VISIT (OUTPATIENT)
Dept: OTOLARYNGOLOGY | Facility: CLINIC | Age: 64
End: 2019-10-22
Payer: COMMERCIAL

## 2019-10-22 VITALS
BODY MASS INDEX: 26.76 KG/M2 | WEIGHT: 181.19 LBS | TEMPERATURE: 98 F | SYSTOLIC BLOOD PRESSURE: 118 MMHG | HEART RATE: 73 BPM | DIASTOLIC BLOOD PRESSURE: 69 MMHG

## 2019-10-22 DIAGNOSIS — K21.9 LARYNGOPHARYNGEAL REFLUX: Primary | ICD-10-CM

## 2019-10-22 PROCEDURE — 31575 DIAGNOSTIC LARYNGOSCOPY: CPT | Mod: S$GLB,,, | Performed by: OTOLARYNGOLOGY

## 2019-10-22 PROCEDURE — 99214 PR OFFICE/OUTPT VISIT, EST, LEVL IV, 30-39 MIN: ICD-10-PCS | Mod: 25,S$GLB,, | Performed by: OTOLARYNGOLOGY

## 2019-10-22 PROCEDURE — 3078F PR MOST RECENT DIASTOLIC BLOOD PRESSURE < 80 MM HG: ICD-10-PCS | Mod: CPTII,S$GLB,, | Performed by: OTOLARYNGOLOGY

## 2019-10-22 PROCEDURE — 99999 PR PBB SHADOW E&M-EST. PATIENT-LVL III: CPT | Mod: PBBFAC,,, | Performed by: OTOLARYNGOLOGY

## 2019-10-22 PROCEDURE — 3078F DIAST BP <80 MM HG: CPT | Mod: CPTII,S$GLB,, | Performed by: OTOLARYNGOLOGY

## 2019-10-22 PROCEDURE — 3008F PR BODY MASS INDEX (BMI) DOCUMENTED: ICD-10-PCS | Mod: CPTII,S$GLB,, | Performed by: OTOLARYNGOLOGY

## 2019-10-22 PROCEDURE — 3074F SYST BP LT 130 MM HG: CPT | Mod: CPTII,S$GLB,, | Performed by: OTOLARYNGOLOGY

## 2019-10-22 PROCEDURE — 3074F PR MOST RECENT SYSTOLIC BLOOD PRESSURE < 130 MM HG: ICD-10-PCS | Mod: CPTII,S$GLB,, | Performed by: OTOLARYNGOLOGY

## 2019-10-22 PROCEDURE — 99214 OFFICE O/P EST MOD 30 MIN: CPT | Mod: 25,S$GLB,, | Performed by: OTOLARYNGOLOGY

## 2019-10-22 PROCEDURE — 31575 PR LARYNGOSCOPY, FLEXIBLE; DIAGNOSTIC: ICD-10-PCS | Mod: S$GLB,,, | Performed by: OTOLARYNGOLOGY

## 2019-10-22 PROCEDURE — 99999 PR PBB SHADOW E&M-EST. PATIENT-LVL III: ICD-10-PCS | Mod: PBBFAC,,, | Performed by: OTOLARYNGOLOGY

## 2019-10-22 PROCEDURE — 3008F BODY MASS INDEX DOCD: CPT | Mod: CPTII,S$GLB,, | Performed by: OTOLARYNGOLOGY

## 2019-10-22 NOTE — PATIENT INSTRUCTIONS
ACID REFLUX   What is acid reflux?    When we eat, food passes from the throat and into the stomach through a tube called the esophagus. At the bottom of the esophagus is a ring of muscles that acts as a valve between the esophagus and stomach, called the lower esophageal sphincter. Smoking, alcohol, and certain types of food may weaken the sphincter, so it may stop closing properly. The contents in the stomach then may leak back, or reflux, into the esophagus. This problem is called gastroesophageal reflux disease (GERD). Symptoms of GERD include heartburn, belching, regurgitation of stomach contents, and swallowing difficulties.    Sometimes, the stomach acid travels up through the esophagus and spills into the larynx or pharynx (voice box). This is called laryngopharyngeal reflux (LPR) and is irritating to the vocal folds and surrounding tissues. Often, patients with LPR do not experience heartburn as a symptom. More commonly, symptoms of LPR include hoarseness, excessive mucous resulting in frequent throat clearing, post-nasal drip, coughing, throat soreness or burning, choking episodes, difficulty swallowing, and sensation of a lump in the throat.     How is acid reflux treated?   Treatment for acid reflux can involve any combination of medication, lifestyle modifications, and surgery.   · Medications. Your doctor may prescribe a proton pump inhibitor (PPI) or an H2 blocker. If you are prescribed a PPI, take in on an empty stomach in the morning 30 minutes prior to eating breakfast. Keep in mind that it may take 4-6 weeks before symptoms begin to resolve, so do not stop medications without consulting your doctor.   · Lifestyle and dietary modifications. Eat smaller meals at a slower pace. Avoid over-eating. If you are overweight, try to lose weight. Do not lie down or exercise directly after eating; eat your last meal of the day at least 2-3 hours prior to going to sleep. Avoid tight-fitting clothes. If you  are a smoker, reduce or quit smoking. Elevate your head of bed 4-6 inches by putting phone books under the legs at the head of your bed or buy a wedge pillow, but do not use more than two regular pillows as this causes the body to curl and compresses your stomach.     Food group Foods to avoid to reduce reflux   Beverages  Whole milk, 2% milk, chocolate milk/hot chocolate, alcohol, coffee (regular and decaf), caffeinated tea, mint tea, carbonated beverages, citrus juice    Breads/grains Commercial sweet rolls, doughnuts, croissants, and other high-fat pastries    Fruits and vegetables Fried or cream-style vegetables, tomatoes, tomato-based products, citrus fruits, hot peppers    Soups and seasonings Cream, cheese, tomato-based soups, vinegar    Meats and proteins Fatty or fried meat/fish, martines, sausage, pepperoni, lunch meat, fried eggs    Fats and oil Lard, martines drippings, salt pork, meat drippings, gravies, highly seasoned salad dressings, nuts    Sweets/desserts Anything made with or from chocolate, peppermint, spearmint, whole milk, or cream; high-fat pastries, gum, hard candy

## 2019-10-23 ENCOUNTER — TELEPHONE (OUTPATIENT)
Dept: ENDOSCOPY | Facility: HOSPITAL | Age: 64
End: 2019-10-23

## 2019-10-23 NOTE — PROGRESS NOTES
Chief Complaint   Patient presents with    consult/ throat issues and cough x 4 weeks         64 y.o. female presents with 4 week history of throat pain and cough. She also notes some mild hoarseness and increased throat clearing. She has a history of GERD and is followed by GI. Currently on Prilosec. No dysphagia or hemoptysis.      Review of Systems     Constitutional: Negative for fatigue and unexpected weight change.   HENT: per HPI.  Eyes: Negative for visual disturbance.   Respiratory: Negative for shortness of breath, hemoptysis  Cardiovascular: Negative for chest pain and palpitations.   Genitourinary: Negative for dysuria and difficulty urinating.   Musculoskeletal: Negative for decreased ROM, back pain.   Skin: Negative for rash, sunburn, itching.   Neurological: Negative for dizziness and seizures.   Hematological: Negative for adenopathy. Does not bruise/bleed easily.   Psychiatric/Behavioral: Negative for agitation. The patient is not nervous/anxious.   Endocrine: Negative for rapid weight loss/weight gain, heat/cold intolerance.     Past Medical History:   Diagnosis Date    Acute costochondritis 2012    Back pain     Benign essential hypertension     Gastroesophageal reflux disease without esophagitis     Pain in joint involving multiple sites 2013       Past Surgical History:   Procedure Laterality Date    BREAST BIOPSY Right      SECTION      KNEE ARTHROSCOPY W/ ACL RECONSTRUCTION      REFRACTIVE SURGERY Bilateral  or     Dr. Sinha OU distance       family history includes Heart disease in her maternal grandmother; Hypertension in her mother.    Pt  reports that she has never smoked. She has never used smokeless tobacco. She reports that she does not drink alcohol or use drugs.    Review of patient's allergies indicates:  No Known Allergies     Physical Exam    Vitals:    10/22/19 1318   BP: 118/69   Pulse: 73   Temp: 98.1 °F (36.7 °C)     Body mass index is 26.76  kg/m².    Physical Exam   Constitutional: She is oriented to person, place, and time. She appears well-developed and well-nourished. No distress.   HENT:   Head: Normocephalic and atraumatic.   Right Ear: Hearing, tympanic membrane, external ear and ear canal normal. Tympanic membrane mobility is normal. No middle ear effusion. No decreased hearing is noted.   Left Ear: Hearing, tympanic membrane, external ear and ear canal normal. Tympanic membrane mobility is normal.  No middle ear effusion. No decreased hearing is noted.   Nose: Nose normal.   Mouth/Throat: Oropharynx is clear and moist.   Eyes: Pupils are equal, round, and reactive to light. Conjunctivae, EOM and lids are normal. Right eye exhibits no discharge. Left eye exhibits no discharge.   Neck: Trachea normal, normal range of motion and phonation normal. Neck supple. No tracheal tenderness present. No tracheal deviation, no edema and no erythema present. No thyroid mass and no thyromegaly present.   Cardiovascular: Normal heart sounds.   Pulmonary/Chest: Breath sounds normal. No stridor.   Abdominal: Soft.   Lymphadenopathy:     She has no cervical adenopathy.   Neurological: She is alert and oriented to person, place, and time.   Skin: Skin is warm and dry. No rash noted. She is not diaphoretic. No erythema. No pallor.   Psychiatric: She has a normal mood and affect.   Nursing note and vitals reviewed.    Procedure: Flexible laryngoscopy  In order to fully examine the upper aerodigestive tract, including the larynx, in a patient with a hyperactive gag reflex, flexible endoscopy is required.  After explaining the procedure and obtaining verbal consent, a timeout was performed with the patient's participation according to the universal protocol. Both nasal cavities were anesthetized with 4% Xylocaine spray mixed with Michael-Synephrine. The flexible laryngoscope was inserted into the nasal cavity and advanced to visualize the nasal cavity, nasopharynx, the  posterior oropharynx, hypopharynx, and the endolarynx with the above findings noted. The scope was removed and the procedure terminated. The patient tolerated this procedure well without apparent complication.     NP: No lesions of torus or posterior wall  OP: No lesions of posterior wall or BOT. BOT is soft to palpation  Larynx: No lesions of glottic or supraglottic larynx. Normal vocal fold mobility. There is posterior commissure erythema and edema.  HP: No lesions of pyriform sinuses or postcricoid region    Assessment     1. Laryngopharyngeal reflux          Plan  In summary, Ms. Gil is a 64 year old female with symptoms and findings consistent with LPR. Discussed treatment with diet changes and lifestyle modifications. Handout given. Follow up with GI as scheduled. All questions were answered. Return to clinic if symptoms fail to improve or worsen.

## 2019-10-23 NOTE — TELEPHONE ENCOUNTER
Called patient regarding scheduling EGD and Colonoscopy procedures. Patient stated she would like to call back to schedule procedures at a later time. Provided patient with my direct contact number for when she is ready to call back to schedule procedures.

## 2019-11-25 RX ORDER — AMLODIPINE BESYLATE 5 MG/1
5 TABLET ORAL DAILY
Qty: 90 TABLET | Refills: 3 | Status: SHIPPED | OUTPATIENT
Start: 2019-11-25 | End: 2020-02-08

## 2019-11-25 RX ORDER — LOSARTAN POTASSIUM AND HYDROCHLOROTHIAZIDE 25; 100 MG/1; MG/1
1 TABLET ORAL DAILY
Qty: 90 TABLET | Refills: 3 | Status: SHIPPED | OUTPATIENT
Start: 2019-11-25 | End: 2020-02-08

## 2019-11-25 RX ORDER — METOPROLOL SUCCINATE 25 MG/1
25 TABLET, EXTENDED RELEASE ORAL DAILY
Qty: 90 TABLET | Refills: 3 | Status: SHIPPED | OUTPATIENT
Start: 2019-11-25 | End: 2020-02-08

## 2019-11-25 NOTE — TELEPHONE ENCOUNTER
Spoke with medication she want to know if it is ok for her to take losartan hctz she heard they not making it anymore she want to know if you want to change it to be safe

## 2019-11-25 NOTE — TELEPHONE ENCOUNTER
----- Message from Ana Maria Morillo sent at 11/25/2019  3:39 PM CST -----  Contact: Patient [phone]  Patient is calling for an RX refill or new RX.  Is this a refill or new RX:  refill  RX name and strength: metoprolol succinate (TOPROL XL) 25 MG 24 hr tablet  amLODIPine (NORVASC) 5 MG tablet  losartan-hydrochlorothiazide 100-25 mg (HYZAAR) 100-25 mg per tablet  Directions (copy/paste from chart):    Is this a 30 day or 90 day RX:    Local pharmacy or mail order pharmacy:  local  Pharmacy name and phone # (copy/paste from chart):  [id number] - [id number] - [address] AT [facility]   Comments:  Patient is waiting at pharmacy.    Please call and advise.    Thank You

## 2019-11-29 ENCOUNTER — TELEPHONE (OUTPATIENT)
Dept: INTERNAL MEDICINE | Facility: CLINIC | Age: 64
End: 2019-11-29

## 2019-11-29 NOTE — TELEPHONE ENCOUNTER
----- Message from Vickie Govea PharmD sent at 11/27/2019  6:17 PM CST -----  Good evening. Patient had a concern on her losartan medication. Unfortunately the TEVA  that the patient only wants have been discontinued and she is hesitant on taking any other brand. Please be aware as she may or may not be taking it. Thanks

## 2019-12-09 ENCOUNTER — TELEPHONE (OUTPATIENT)
Dept: INTERNAL MEDICINE | Facility: CLINIC | Age: 64
End: 2019-12-09

## 2019-12-09 NOTE — TELEPHONE ENCOUNTER
----- Message from Antoine Antonio sent at 12/9/2019 12:59 PM CST -----  Contact: Pt  Pt is requesting a appt for today     Please advise pt at [phone]

## 2019-12-09 NOTE — TELEPHONE ENCOUNTER
Spoke with patient she want you to give her a call in regards her blood pressure medication she is really nervous about taking it she states she heard it has been recalled , she want to discuss what else can she take in substitute. keith is requesting a call this evening to discuss

## 2019-12-10 ENCOUNTER — HOSPITAL ENCOUNTER (EMERGENCY)
Facility: HOSPITAL | Age: 64
Discharge: HOME | End: 2019-12-10
Attending: EMERGENCY MEDICINE
Payer: COMMERCIAL

## 2019-12-10 ENCOUNTER — NURSE TRIAGE (OUTPATIENT)
Dept: ADMINISTRATIVE | Facility: CLINIC | Age: 64
End: 2019-12-10

## 2019-12-10 VITALS
RESPIRATION RATE: 17 BRPM | SYSTOLIC BLOOD PRESSURE: 162 MMHG | WEIGHT: 180 LBS | HEART RATE: 76 BPM | HEIGHT: 69 IN | DIASTOLIC BLOOD PRESSURE: 81 MMHG | BODY MASS INDEX: 26.66 KG/M2 | OXYGEN SATURATION: 98 % | TEMPERATURE: 99 F

## 2019-12-10 DIAGNOSIS — R07.9 CHEST PAIN: ICD-10-CM

## 2019-12-10 DIAGNOSIS — I10 HYPERTENSION, UNSPECIFIED TYPE: Primary | ICD-10-CM

## 2019-12-10 LAB
ALBUMIN SERPL BCP-MCNC: 3.6 G/DL (ref 3.5–5.2)
ALP SERPL-CCNC: 67 U/L (ref 55–135)
ALT SERPL W/O P-5'-P-CCNC: 17 U/L (ref 10–44)
ANION GAP SERPL CALC-SCNC: 9 MMOL/L (ref 8–16)
AST SERPL-CCNC: 24 U/L (ref 10–40)
BASOPHILS # BLD AUTO: 0.05 K/UL (ref 0–0.2)
BASOPHILS NFR BLD: 0.9 % (ref 0–1.9)
BILIRUB SERPL-MCNC: 0.6 MG/DL (ref 0.1–1)
BNP SERPL-MCNC: 12 PG/ML (ref 0–99)
BUN SERPL-MCNC: 12 MG/DL (ref 8–23)
CALCIUM SERPL-MCNC: 9.7 MG/DL (ref 8.7–10.5)
CHLORIDE SERPL-SCNC: 103 MMOL/L (ref 95–110)
CO2 SERPL-SCNC: 27 MMOL/L (ref 23–29)
CREAT SERPL-MCNC: 0.9 MG/DL (ref 0.5–1.4)
DIFFERENTIAL METHOD: NORMAL
EOSINOPHIL # BLD AUTO: 0.3 K/UL (ref 0–0.5)
EOSINOPHIL NFR BLD: 4.8 % (ref 0–8)
ERYTHROCYTE [DISTWIDTH] IN BLOOD BY AUTOMATED COUNT: 13.6 % (ref 11.5–14.5)
EST. GFR  (AFRICAN AMERICAN): >60 ML/MIN/1.73 M^2
EST. GFR  (NON AFRICAN AMERICAN): >60 ML/MIN/1.73 M^2
GLUCOSE SERPL-MCNC: 100 MG/DL (ref 70–110)
HCT VFR BLD AUTO: 38.8 % (ref 37–48.5)
HGB BLD-MCNC: 13 G/DL (ref 12–16)
IMM GRANULOCYTES # BLD AUTO: 0.01 K/UL (ref 0–0.04)
IMM GRANULOCYTES NFR BLD AUTO: 0.2 % (ref 0–0.5)
LYMPHOCYTES # BLD AUTO: 2 K/UL (ref 1–4.8)
LYMPHOCYTES NFR BLD: 34.8 % (ref 18–48)
MCH RBC QN AUTO: 31 PG (ref 27–31)
MCHC RBC AUTO-ENTMCNC: 33.5 G/DL (ref 32–36)
MCV RBC AUTO: 92 FL (ref 82–98)
MONOCYTES # BLD AUTO: 0.5 K/UL (ref 0.3–1)
MONOCYTES NFR BLD: 8 % (ref 4–15)
NEUTROPHILS # BLD AUTO: 2.9 K/UL (ref 1.8–7.7)
NEUTROPHILS NFR BLD: 51.3 % (ref 38–73)
NRBC BLD-RTO: 0 /100 WBC
PLATELET # BLD AUTO: 255 K/UL (ref 150–350)
PMV BLD AUTO: 11 FL (ref 9.2–12.9)
POTASSIUM SERPL-SCNC: 4.1 MMOL/L (ref 3.5–5.1)
PROT SERPL-MCNC: 7.4 G/DL (ref 6–8.4)
RBC # BLD AUTO: 4.2 M/UL (ref 4–5.4)
SODIUM SERPL-SCNC: 139 MMOL/L (ref 136–145)
TROPONIN I SERPL DL<=0.01 NG/ML-MCNC: 0.01 NG/ML (ref 0–0.03)
TROPONIN I SERPL DL<=0.01 NG/ML-MCNC: 0.02 NG/ML (ref 0–0.03)
WBC # BLD AUTO: 5.6 K/UL (ref 3.9–12.7)

## 2019-12-10 PROCEDURE — 84484 ASSAY OF TROPONIN QUANT: CPT

## 2019-12-10 PROCEDURE — 93010 ELECTROCARDIOGRAM REPORT: CPT | Mod: ,,, | Performed by: INTERNAL MEDICINE

## 2019-12-10 PROCEDURE — 85025 COMPLETE CBC W/AUTO DIFF WBC: CPT

## 2019-12-10 PROCEDURE — 99285 PR EMERGENCY DEPT VISIT,LEVEL V: ICD-10-PCS | Mod: ,,, | Performed by: PHYSICIAN ASSISTANT

## 2019-12-10 PROCEDURE — 80053 COMPREHEN METABOLIC PANEL: CPT

## 2019-12-10 PROCEDURE — 83880 ASSAY OF NATRIURETIC PEPTIDE: CPT

## 2019-12-10 PROCEDURE — 99285 EMERGENCY DEPT VISIT HI MDM: CPT | Mod: ,,, | Performed by: PHYSICIAN ASSISTANT

## 2019-12-10 PROCEDURE — 99285 EMERGENCY DEPT VISIT HI MDM: CPT | Mod: 25

## 2019-12-10 PROCEDURE — 93010 EKG 12-LEAD: ICD-10-PCS | Mod: ,,, | Performed by: INTERNAL MEDICINE

## 2019-12-10 PROCEDURE — 93005 ELECTROCARDIOGRAM TRACING: CPT

## 2019-12-10 RX ORDER — ACETAMINOPHEN 500 MG
1000 TABLET ORAL
Status: DISCONTINUED | OUTPATIENT
Start: 2019-12-10 | End: 2019-12-10 | Stop reason: HOSPADM

## 2019-12-10 NOTE — ED TRIAGE NOTES
Patient states she has been under a lot of stress, states nurse checked her blood pressure 160/100. Took blood pressure meds this am. States symptoms of left CP at 0920, intermittent pain, lasted 1-2 min. Denies SOB

## 2019-12-10 NOTE — TELEPHONE ENCOUNTER
Reason for Disposition   Systolic BP >= 160 OR Diastolic >= 100, and any cardiac or neurologic symptoms (e.g., chest pain, difficulty breathing, unsteady gait, blurred vision)    Additional Information   Negative: Sounds like a life-threatening emergency to the triager   Negative: Pregnant > 20 weeks and new hand or face swelling   Negative: Pregnant > 20 weeks and BP > 140/90    Protocols used: HIGH BLOOD PRESSURE-A-OH    Pt called stated her Bp is elevated because of work. Bp 170/100 left 166/96 rt arm. Pt is c/o chest pain 6-7/10. Care advice recommend pt go to Er.

## 2019-12-10 NOTE — ED NOTES
Patient identifiers verified and correct for MS Gil  C/C: Cp, elevated blood pressure SEE NN  APPEARANCE: awake and alert in NAD.  SKIN: warm, dry and intact. No breakdown or bruising.  MUSCULOSKELETAL: Patient moving all extremities spontaneously, no obvious swelling or deformities noted. Ambulates independently.  RESPIRATORY: Positive shortness of breath.Respirations unlabored. Positive cough  CARDIAC: Positive left  CP, 2+ distal pulses; no peripheral edema, no CP upon admit to ED  ABDOMEN: S/ND/NT, Denies nausea  : voids spontaneously, denies difficulty  Neurologic: AAO x 4; follows commands equal strength in all extremities; denies numbness/tingling. Denies dizziness Denies weakness, headache yesterday

## 2019-12-10 NOTE — ED PROVIDER NOTES
Encounter Date: 12/10/2019    SCRIBE #1 NOTE: I, Silva Nunez, am scribing for, and in the presence of,  Dr. Rg. I have scribed the following portions of the note - the EKG reading.       History     Chief Complaint   Patient presents with    Hypertension     64-year-old female with PMHx of HTN, GERD, and costochondritis who presents to the ED with c/o HTN. Per pt, she reports being under a great deal of stress recently with work and recorded her BP as 170/100 in left arm and 166/96 in right arm, which is elevated compared to there baseline. She then developed sharp pain localized to her left sided chest about 2-2.5 hours ago with two episodes lasting 2-3 minutes each a few minutes apart. She contacted [facility] who recommended she be evaluated in the ER. She has been compliant with her BP medications- HCTZ, metoprolol, and amlodipine.She has mild substernal chest pain, worse with deep breathing, and rated 6/10 currently. She took a 500 mg ASA when her symptoms first developed. Denies having similar pain in the past. She has had a dry cough for multiple weeks. Denies palpitations, leg swelling, SOB, HA, dizziness, numbness, abdominal pain, n/v/d, urinary symptoms, or any other medical complaints.     The history is provided by the patient.     Review of patient's allergies indicates:  No Known Allergies  Past Medical History:   Diagnosis Date    Acute costochondritis 2012    Back pain     Benign essential hypertension     Gastroesophageal reflux disease without esophagitis     Pain in joint involving multiple sites 2013     Past Surgical History:   Procedure Laterality Date    BREAST BIOPSY Right      SECTION      KNEE ARTHROSCOPY W/ ACL RECONSTRUCTION      REFRACTIVE SURGERY Bilateral  or     Dr. Bharath hammer     Family History   Problem Relation Age of Onset    Hypertension Mother     Heart disease Maternal Grandmother     Celiac disease  Neg Hx     Colon cancer Neg Hx     Colon polyps Neg Hx     Crohn's disease Neg Hx     Cystic fibrosis Neg Hx     Esophageal cancer Neg Hx     Inflammatory bowel disease Neg Hx     Irritable bowel syndrome Neg Hx     Liver cancer Neg Hx     Liver disease Neg Hx     Rectal cancer Neg Hx     Stomach cancer Neg Hx      Social History     Tobacco Use    Smoking status: Never Smoker    Smokeless tobacco: Never Used   Substance Use Topics    Alcohol use: No    Drug use: No     Review of Systems   Constitutional: Negative for chills and fever.   HENT: Negative for congestion and rhinorrhea.    Eyes: Negative for visual disturbance.   Respiratory: Positive for cough. Negative for shortness of breath.    Cardiovascular: Positive for chest pain. Negative for palpitations and leg swelling.   Gastrointestinal: Negative for abdominal pain, constipation, diarrhea, nausea and vomiting.   Genitourinary: Negative for dysuria, flank pain, frequency and hematuria.   Musculoskeletal: Positive for back pain (chronic). Negative for neck pain.   Neurological: Negative for dizziness, weakness, light-headedness, numbness and headaches.   Psychiatric/Behavioral: Negative for confusion.       Physical Exam     Initial Vitals [12/10/19 1123]   BP Pulse Resp Temp SpO2   (!) 145/67 71 18 98.6 °F (37 °C) 100 %      MAP       --         Physical Exam    Nursing note and vitals reviewed.  Constitutional: She appears well-developed and well-nourished.   HENT:   Head: Normocephalic and atraumatic.   Mouth/Throat: Oropharynx is clear and moist. No oropharyngeal exudate.   Eyes: Conjunctivae and EOM are normal. Pupils are equal, round, and reactive to light.   Neck: Normal range of motion. Neck supple.   Cardiovascular: Normal rate, regular rhythm, normal heart sounds and intact distal pulses.   Pulmonary/Chest: Breath sounds normal. She has no wheezes. She has no rhonchi. She has no rales.   Abdominal: Soft. There is no tenderness. There  is no rebound and no guarding.   Musculoskeletal: Normal range of motion. She exhibits no edema or tenderness.   Neurological: She is alert and oriented to person, place, and time. She has normal strength.   Skin: Skin is warm.   Psychiatric: She has a normal mood and affect.         ED Course   Procedures  Labs Reviewed   CBC W/ AUTO DIFFERENTIAL   COMPREHENSIVE METABOLIC PANEL   TROPONIN I   TROPONIN I   B-TYPE NATRIURETIC PEPTIDE     EKG Readings: (Independently Interpreted)   Initial Reading: No STEMI. Rhythm: Normal Sinus Rhythm. Heart Rate: 68.     ECG Results          EKG 12-lead (Final result)  Result time 12/10/19 13:36:35    Final result by Interface, Lab In [facility] (12/10/19 13:36:35)                 Narrative:    Test Reason : R07.9,    Vent. Rate : 068 BPM     Atrial Rate : 068 BPM     P-R Int : 152 ms          QRS Dur : 072 ms      QT Int : 404 ms       P-R-T Axes : 050 044 026 degrees     QTc Int : 429 ms    Normal sinus rhythm  Possible Left atrial enlargement  Otherwise normal ECG    When compared with ECG of 29-JAN-2019 10:59,  Nonspecific T wave abnormality no longer evident in Anterior leads  Confirmed by TAHIR NEWSOME MD ([id number]) on 12/10/2019 1:36:30 PM    Referred By:             Confirmed By:TAHIR NEWSOME MD                            Imaging Results          X-Ray Chest PA And Lateral (Final result)  Result time 12/10/19 12:17:27    Final result by Satish Luke MD (12/10/19 12:17:27)                 Impression:      No significant intrathoracic abnormality.  No significant detrimental interval change in the appearance of the chest since the examinations referenced above.      Electronically signed by: Satish Luke MD  Date:    12/10/2019  Time:    12:17             Narrative:    EXAMINATION:  XR CHEST PA AND LATERAL    CLINICAL HISTORY:  Chest Pain;    TECHNIQUE:  Two views of the chest were obtained, with PA and lateral projections submitted.  Note is made of the fact that one of the patient's arms  is superimposed over the anterior aspect of the thorax on the current lateral projection.    COMPARISON:  Comparison is made to 01/29/2019 and 11/03/2018.    FINDINGS:  Heart size is normal, as is the appearance of the pulmonary vascularity.  Lung zones are clear, and free of significant airspace consolidation or volume loss.  No pleural fluid.  No abnormal mediastinal widening.  No pneumothorax.                                 Medical Decision Making:   History:   Old Medical Records: I decided to obtain old medical records.  Initial Assessment:   64-year-old female with PMHx of HTN, GERD, and costochondritis who presents to the ED with c/o HTN. Per pt, she reports being under a great deal of stress recently with work and recorded her BP as 170/100 in left arm and 166/96 in right arm, which is elevated compared to there baseline. She then developed sharp pain localized to her left sided chest about 2-2.5 hours ago with two episodes lasting 2-3 minutes each a few minutes apart. /67. VSS. Afebrile. RRR. Lungs CTA bilaterally. Abdomen soft and nontender. Neurovascularly intact throughout.   Differential Diagnosis:   DDx includes but is not limited to ACS, CHF, anxiety, costochondritis, GERD, PUD, hypertension, electrolyte abnormality. Considered but do not suspect PE as VSS and she has no risk factors.   Independently Interpreted Test(s):   I have ordered and independently interpreted X-rays - see summary below.  I have ordered and independently interpreted EKG Reading(s) - see prior notes  Clinical Tests:   Radiological Study: Ordered and Reviewed  Medical Tests: Ordered and Reviewed  ED Management:  Will obtain cardiac work up, including troponin x2, BNP, CBC, CMP, EKG, and CXR.     CXR with no significant intrathoracic abnormality. BNP 12. Troponin 0.008, will repeat in 3 hours. Electrolytes WNL. No leukocytosis or anemia on CBC.     Repeat troponin 0.019., I do no suspect ACS as etiology of pt's CP. She  remains hemodynamically stable in the ED and in no acute distress. She is stable for discharge with instructions to follow up with her PCP within 3-5 days to discuss HTN management. Strict ER return precautions given. All questions answered. Pt expressed understanding and is agreeable with the plan.     I have discussed the treatment and management of this patient with my supervising physician, and we agree on the plan of care.      DORINA Bermeo Attestation:   Scribe #1: I performed the above scribed service and the documentation accurately describes the services I performed. I attest to the accuracy of the note.    Attending Attestation:     Physician Attestation Statement for NP/PA:   I discussed this assessment and plan of this patient with the NP/PA, but I did not personally examine the patient. The face to face encounter was performed by the NP/PA.                                Clinical Impression:       ICD-10-CM ICD-9-CM   1. Hypertension, unspecified type I10 401.9   2. Chest pain R07.9 786.50         Disposition:   Disposition: Discharged  Condition: Stable                     Archie Rg MD  12/11/19 0812       Yelena Pope PA-C  12/12/19 0052

## 2019-12-10 NOTE — DISCHARGE INSTRUCTIONS
Your labs today are negative for any cardiac disease. You should continue to take your hypertension medications as directed. Please follow up with your primary care physician within 2-3 days to have this regimen adjusted if needed. Return to the ER if your symptoms worsen or if you develop any other concerning symptoms.

## 2019-12-26 ENCOUNTER — TELEPHONE (OUTPATIENT)
Dept: INTERNAL MEDICINE | Facility: CLINIC | Age: 64
End: 2019-12-26

## 2019-12-26 NOTE — TELEPHONE ENCOUNTER
Spoke with pt, she is concerned that her BP medication may cause cancer. She is not taking the BP medication daily due to these concerns. Her BP readings have increased due to being inconsistent in her medication regimen. Pt requesting to be seen ASAP to be placed on a different BP medication that will not cause cancer. Pt given an appt on 1/3, she is requesting to be seen later in the day on 1/3. Advised pt that I would send a message to his practice to let her know if she may be seen earlier. Please Advise

## 2019-12-26 NOTE — TELEPHONE ENCOUNTER
----- Message from Jose E Kohler sent at 12/26/2019  8:41 AM CST -----  Contact: self  Patient is requesting to be seen today due to BP issues and medication , offered for pt to speak to on call nurse while awaiting a callback  Pt refused .     Pt contact info is [phone]

## 2019-12-30 ENCOUNTER — OFFICE VISIT (OUTPATIENT)
Dept: INTERNAL MEDICINE | Facility: CLINIC | Age: 64
End: 2019-12-30
Payer: COMMERCIAL

## 2019-12-30 ENCOUNTER — LAB VISIT (OUTPATIENT)
Dept: LAB | Facility: HOSPITAL | Age: 64
End: 2019-12-30
Attending: INTERNAL MEDICINE
Payer: COMMERCIAL

## 2019-12-30 VITALS
HEART RATE: 76 BPM | SYSTOLIC BLOOD PRESSURE: 128 MMHG | WEIGHT: 186.06 LBS | HEIGHT: 68 IN | DIASTOLIC BLOOD PRESSURE: 64 MMHG | BODY MASS INDEX: 28.2 KG/M2 | OXYGEN SATURATION: 99 %

## 2019-12-30 DIAGNOSIS — E87.6 HYPOKALEMIA: ICD-10-CM

## 2019-12-30 DIAGNOSIS — I10 ESSENTIAL HYPERTENSION: Primary | ICD-10-CM

## 2019-12-30 DIAGNOSIS — R07.89 OTHER CHEST PAIN: ICD-10-CM

## 2019-12-30 DIAGNOSIS — E78.5 HYPERLIPIDEMIA, UNSPECIFIED HYPERLIPIDEMIA TYPE: ICD-10-CM

## 2019-12-30 DIAGNOSIS — M65.9 TENOSYNOVITIS: ICD-10-CM

## 2019-12-30 LAB
25(OH)D3+25(OH)D2 SERPL-MCNC: 26 NG/ML (ref 30–96)
ANION GAP SERPL CALC-SCNC: 6 MMOL/L (ref 8–16)
BUN SERPL-MCNC: 14 MG/DL (ref 8–23)
CALCIUM SERPL-MCNC: 9.8 MG/DL (ref 8.7–10.5)
CHLORIDE SERPL-SCNC: 101 MMOL/L (ref 95–110)
CO2 SERPL-SCNC: 31 MMOL/L (ref 23–29)
CREAT SERPL-MCNC: 1 MG/DL (ref 0.5–1.4)
EST. GFR  (AFRICAN AMERICAN): >60 ML/MIN/1.73 M^2
EST. GFR  (NON AFRICAN AMERICAN): 59.7 ML/MIN/1.73 M^2
GLUCOSE SERPL-MCNC: 78 MG/DL (ref 70–110)
MAGNESIUM SERPL-MCNC: 1.9 MG/DL (ref 1.6–2.6)
POTASSIUM SERPL-SCNC: 3.7 MMOL/L (ref 3.5–5.1)
SODIUM SERPL-SCNC: 138 MMOL/L (ref 136–145)

## 2019-12-30 PROCEDURE — 3008F PR BODY MASS INDEX (BMI) DOCUMENTED: ICD-10-PCS | Mod: CPTII,S$GLB,, | Performed by: INTERNAL MEDICINE

## 2019-12-30 PROCEDURE — 99999 PR PBB SHADOW E&M-EST. PATIENT-LVL V: CPT | Mod: PBBFAC,,, | Performed by: INTERNAL MEDICINE

## 2019-12-30 PROCEDURE — 80048 BASIC METABOLIC PNL TOTAL CA: CPT

## 2019-12-30 PROCEDURE — 99214 OFFICE O/P EST MOD 30 MIN: CPT | Mod: S$GLB,,, | Performed by: INTERNAL MEDICINE

## 2019-12-30 PROCEDURE — 36415 COLL VENOUS BLD VENIPUNCTURE: CPT

## 2019-12-30 PROCEDURE — 99999 PR PBB SHADOW E&M-EST. PATIENT-LVL V: ICD-10-PCS | Mod: PBBFAC,,, | Performed by: INTERNAL MEDICINE

## 2019-12-30 PROCEDURE — 3078F DIAST BP <80 MM HG: CPT | Mod: CPTII,S$GLB,, | Performed by: INTERNAL MEDICINE

## 2019-12-30 PROCEDURE — 82306 VITAMIN D 25 HYDROXY: CPT

## 2019-12-30 PROCEDURE — 99214 PR OFFICE/OUTPT VISIT, EST, LEVL IV, 30-39 MIN: ICD-10-PCS | Mod: S$GLB,,, | Performed by: INTERNAL MEDICINE

## 2019-12-30 PROCEDURE — 3008F BODY MASS INDEX DOCD: CPT | Mod: CPTII,S$GLB,, | Performed by: INTERNAL MEDICINE

## 2019-12-30 PROCEDURE — 3078F PR MOST RECENT DIASTOLIC BLOOD PRESSURE < 80 MM HG: ICD-10-PCS | Mod: CPTII,S$GLB,, | Performed by: INTERNAL MEDICINE

## 2019-12-30 PROCEDURE — 83735 ASSAY OF MAGNESIUM: CPT

## 2019-12-30 PROCEDURE — 3074F PR MOST RECENT SYSTOLIC BLOOD PRESSURE < 130 MM HG: ICD-10-PCS | Mod: CPTII,S$GLB,, | Performed by: INTERNAL MEDICINE

## 2019-12-30 PROCEDURE — 3074F SYST BP LT 130 MM HG: CPT | Mod: CPTII,S$GLB,, | Performed by: INTERNAL MEDICINE

## 2020-01-02 NOTE — PROGRESS NOTES
REASON FOR VISIT:  This is a 64-year-old female who is here to address her blood   pressure and medications for blood pressure.    She recently received a notice from her pharmacy about the recall on losartan.    Because of such, she also noted that the contaminating ingredient that was a   factor with the recall might have been also in amlodipine and metoprolol.  There   was one point where she stopped the medications, but saw her blood pressure   getting elevated and resumed it.  She went to the Emergency Room on December 12th because of elevated blood pressure and chest pain, and acute coronary   syndrome was ruled out.  A CBC and chemistry were normal, as well as   electrocardiogram.  At present she is taking amlodipine 5 mg, metoprolol XL 25   mg, and Losartan 100 mg, but she is very apprehensive about taking the losartan.    PHYSICAL EXAMINATION:  VITAL SIGNS:  Weight was 186 pounds, pulse rate by me 76, and blood pressure   taken by me 128/64.    IMPRESSION:  Hypertension.    PLAN:  We will have her try taking amlodipine and metoprolol in the morning and   put a hold on losartan.  Monitor blood pressure.  She will keep me informed.    She is going to provide me the names of the companies that she got from the   amlodipine and metoprolol.  She wants me to determine if there are any   ingredients with contamination.    At the end of the appointment when it was over, her daughter who was with her   noted that she has been having a nonproductive cough recently.  I listened to   her lungs and they were clear.  She had been evaluated by ENT, as well as   Pulmonary back in October for possible LPR.  It was recommended to give a trial   of antacids, but she has deferred on this.  She does not feel short of breath   and she will keep me informed.  Also, during the visit today, her magnesium,   chemistry and vitamin D were normal except that the vitamin D was low at 26.  I   left a message to consider taking  over-the-counter vitamin D3 supplement.        ESSENCE/CATHY  dd: 01/02/2020 15:07:11 (CST)  td: 01/03/2020 00:06:00 (CST)  Doc ID   #[id number]  Job ID #[id number]    CC:

## 2020-01-10 ENCOUNTER — OFFICE VISIT (OUTPATIENT)
Dept: INTERNAL MEDICINE | Facility: CLINIC | Age: 65
End: 2020-01-10
Payer: COMMERCIAL

## 2020-01-10 ENCOUNTER — NURSE TRIAGE (OUTPATIENT)
Dept: ADMINISTRATIVE | Facility: CLINIC | Age: 65
End: 2020-01-10

## 2020-01-10 VITALS
BODY MASS INDEX: 28.13 KG/M2 | OXYGEN SATURATION: 96 % | SYSTOLIC BLOOD PRESSURE: 130 MMHG | WEIGHT: 185.63 LBS | HEIGHT: 68 IN | HEART RATE: 73 BPM | DIASTOLIC BLOOD PRESSURE: 60 MMHG

## 2020-01-10 DIAGNOSIS — R07.9 CHEST PAIN, UNSPECIFIED TYPE: Primary | ICD-10-CM

## 2020-01-10 PROCEDURE — 99999 PR PBB SHADOW E&M-EST. PATIENT-LVL IV: ICD-10-PCS | Mod: PBBFAC,,, | Performed by: STUDENT IN AN ORGANIZED HEALTH CARE EDUCATION/TRAINING PROGRAM

## 2020-01-10 PROCEDURE — 99214 PR OFFICE/OUTPT VISIT, EST, LEVL IV, 30-39 MIN: ICD-10-PCS | Mod: S$GLB,,, | Performed by: STUDENT IN AN ORGANIZED HEALTH CARE EDUCATION/TRAINING PROGRAM

## 2020-01-10 PROCEDURE — 99999 PR PBB SHADOW E&M-EST. PATIENT-LVL IV: CPT | Mod: PBBFAC,,, | Performed by: STUDENT IN AN ORGANIZED HEALTH CARE EDUCATION/TRAINING PROGRAM

## 2020-01-10 PROCEDURE — 3075F SYST BP GE 130 - 139MM HG: CPT | Mod: CPTII,S$GLB,, | Performed by: STUDENT IN AN ORGANIZED HEALTH CARE EDUCATION/TRAINING PROGRAM

## 2020-01-10 PROCEDURE — 3078F PR MOST RECENT DIASTOLIC BLOOD PRESSURE < 80 MM HG: ICD-10-PCS | Mod: CPTII,S$GLB,, | Performed by: STUDENT IN AN ORGANIZED HEALTH CARE EDUCATION/TRAINING PROGRAM

## 2020-01-10 PROCEDURE — 3078F DIAST BP <80 MM HG: CPT | Mod: CPTII,S$GLB,, | Performed by: STUDENT IN AN ORGANIZED HEALTH CARE EDUCATION/TRAINING PROGRAM

## 2020-01-10 PROCEDURE — 3008F BODY MASS INDEX DOCD: CPT | Mod: CPTII,S$GLB,, | Performed by: STUDENT IN AN ORGANIZED HEALTH CARE EDUCATION/TRAINING PROGRAM

## 2020-01-10 PROCEDURE — 93010 ELECTROCARDIOGRAM REPORT: CPT | Mod: S$GLB,,, | Performed by: INTERNAL MEDICINE

## 2020-01-10 PROCEDURE — 93010 EKG 12-LEAD: ICD-10-PCS | Mod: S$GLB,,, | Performed by: INTERNAL MEDICINE

## 2020-01-10 PROCEDURE — 93005 ELECTROCARDIOGRAM TRACING: CPT | Mod: S$GLB,,, | Performed by: STUDENT IN AN ORGANIZED HEALTH CARE EDUCATION/TRAINING PROGRAM

## 2020-01-10 PROCEDURE — 99214 OFFICE O/P EST MOD 30 MIN: CPT | Mod: S$GLB,,, | Performed by: STUDENT IN AN ORGANIZED HEALTH CARE EDUCATION/TRAINING PROGRAM

## 2020-01-10 PROCEDURE — 93005 EKG 12-LEAD: ICD-10-PCS | Mod: S$GLB,,, | Performed by: STUDENT IN AN ORGANIZED HEALTH CARE EDUCATION/TRAINING PROGRAM

## 2020-01-10 PROCEDURE — 3008F PR BODY MASS INDEX (BMI) DOCUMENTED: ICD-10-PCS | Mod: CPTII,S$GLB,, | Performed by: STUDENT IN AN ORGANIZED HEALTH CARE EDUCATION/TRAINING PROGRAM

## 2020-01-10 PROCEDURE — 3075F PR MOST RECENT SYSTOLIC BLOOD PRESS GE 130-139MM HG: ICD-10-PCS | Mod: CPTII,S$GLB,, | Performed by: STUDENT IN AN ORGANIZED HEALTH CARE EDUCATION/TRAINING PROGRAM

## 2020-01-10 NOTE — TELEPHONE ENCOUNTER
Patient states that she has discomfort in her chest and weakness. Pain 3/10. Chronic right knee, shoulder and back.     Reason for Disposition   All other patients with chest pain    Additional Information   Negative: SEVERE difficulty breathing (e.g., struggling for each breath, speaks in single words)   Negative: Passed out (i.e., fainted, collapsed and was not responding)   Negative: Chest pain lasting longer than 5 minutes and ANY of the following:* Over 50 years old* Over 30 years old and at least one cardiac risk factor (i.e., high blood pressure, diabetes, high cholesterol, obesity, smoker or strong family history of heart disease)* Pain is crushing, pressure-like, or heavy * Took nitroglycerin and chest pain was not relieved* History of heart disease (i.e., angina, heart attack, bypass surgery, angioplasty, CHF)   Negative: Visible sweat on face or sweat dripping down face   Negative: Sounds like a life-threatening emergency to the triager   Negative: SEVERE chest pain   Negative: Pain also present in shoulder(s) or arm(s) or jaw   Negative: Difficulty breathing   Negative: Cocaine use within last 3 days   Negative: History of prior 'blood clot' in leg or lungs (i.e., deep vein thrombosis, pulmonary embolism)   Negative: Major surgery in the past month   Negative: Hip or leg fracture in past 2 months (e.g, or had cast on leg or ankle)   Negative: Recent illness requiring prolonged bed rest (i.e., immobilization)   Negative: Recent long-distance travel with prolonged time in car, bus, plane, or train (i.e., within past 2 weeks; 6 or more hours duration)   Negative: Heart beating irregularly or very rapidly   Negative: Chest pain lasting longer than 5 minutes   Negative: Intermittent chest pain and pain has been increasing in severity or frequency   Negative: Dizziness or lightheadedness   Negative: Coughing up blood   Negative: Patient sounds very sick or weak to the triager   Negative:  Fever > 100.5 F (38.1 C)    Protocols used: ST CHEST PAIN-A-OH

## 2020-01-10 NOTE — PROGRESS NOTES
Subjective:       Patient ID: Josi Gil is a 64 y.o. female.    Chief Complaint: Chest Pain    Ms. Gil is a 64 year old female with HTN, GERD, costochondritis, and OA that presents with chest pain.    Chest pain started around 5:30pm last night after particularly stressful day at work  Constant, sharp pain in center of chest, non-radiating  Pain lasted until she fell asleep at 2am, woke up with pain  Was worried about returning to work/stressful environment this morning  Took aspirin 500mg x 2 without relief  Denies experiencing this pain in past  Denies SOB, N/V    12/10 - evaluated in ED for worsening HTN  - Undergoing recent stress  - BP at home was 170s/100s  - Associated with sharp, L-sided chest pain x 2-2.5hrs  - C/o non-productive cough x several weeks  - EKG negative for ischemic changes  - CBC, CMP, BNP unremarkable  - Trop 0.008 > 0.019  - CXR negative for acute intrathoracic abnormalities    12/30 - evaluated by PCP for worsening HTN  - Patient was notified by pharmacy of losartan recall  - Stopped taking all anti-hypertensives, BP started increasing  - Resumed Toprol XL 25mg + amlodipine 5mg  - Held losartan 100mg 2/2 recall  - Has been taking all 3 BP meds    Cardiac Hx:  - Echo (2019) showed EF 65%, normal diastolic fxn, no significant valvular dysfunction    Review of Systems    Objective:      Physical Exam   Constitutional: She is oriented to person, place, and time. She appears well-developed and well-nourished. No distress.   HENT:   Head: Normocephalic and atraumatic.   Mouth/Throat: Oropharynx is clear and moist. No oropharyngeal exudate.   Eyes: Pupils are equal, round, and reactive to light. Conjunctivae and EOM are normal.   Neck: Normal range of motion. Neck supple.   Cardiovascular: Normal rate and regular rhythm.   Murmur (soft systolic murmur best heard at RSB in 2nd IC space) heard.  Pulmonary/Chest: Effort normal and breath sounds normal. No respiratory distress. She has no  wheezes. She has no rales.   Abdominal: Soft. Bowel sounds are normal. She exhibits no distension. There is no tenderness. There is no guarding.   Musculoskeletal: She exhibits no edema or tenderness.   Neurological: She is alert and oriented to person, place, and time. She displays normal reflexes. She exhibits normal muscle tone.   Skin: Skin is warm and dry. No rash noted.   Psychiatric: She has a normal mood and affect. Her behavior is normal.       Assessment/Plan:       Chest pain, unspecified type  EKG negative for ischemic changes  Pain unlikely cardiac or GI in nature  Likely 2/2 anxiety as patient is currently experiencing significant stress at work  Education on stress management provided  Instructed patient to present to ER if chest pain worsens, associated with SOB, or changes in character  Patient says work situation/stress is improving  Patient able to discuss issues with daughter (good support system)    Follow-up as needed is symptoms persist or worsen    Parker Greer MD  Medical Resident  [facility]  Pager: [phone]

## 2020-01-13 ENCOUNTER — TELEPHONE (OUTPATIENT)
Dept: INTERNAL MEDICINE | Facility: CLINIC | Age: 65
End: 2020-01-13

## 2020-01-13 DIAGNOSIS — Z12.31 ENCOUNTER FOR SCREENING MAMMOGRAM FOR BREAST CANCER: Primary | ICD-10-CM

## 2020-01-13 NOTE — PROGRESS NOTES
I have reviewed the notes, assessments, and/or procedures performed this visit, and I concur with the documentation.  Pt. With chest pain likely non cardiac with stress and anxiety a contributing factor.  Pt. Continues to be extremely concerned about her current bp meds.  Her BP is currently well controlled and pt. States has follow up appointment with PCP.

## 2020-01-13 NOTE — TELEPHONE ENCOUNTER
----- Message from Honey Loevtt sent at 1/13/2020 10:03 AM CST -----  Type:  Needs Medical Advice    Who Called: NATALIA     Would the patient rather a call back or a response via [facility]? CALL BACK     Best Call Back Number: [phone]    Additional Information: PT WOULD LIKE TO SPEAK WITH THE NURSE ABOUT HER MEDICATION.

## 2020-01-13 NOTE — TELEPHONE ENCOUNTER
Patient did not want to give any information about her call. Requested to speak to Dr Willis, or Marbella payne

## 2020-01-14 ENCOUNTER — HOSPITAL ENCOUNTER (EMERGENCY)
Facility: HOSPITAL | Age: 65
Discharge: HOME | End: 2020-01-14
Attending: EMERGENCY MEDICINE
Payer: COMMERCIAL

## 2020-01-14 ENCOUNTER — NURSE TRIAGE (OUTPATIENT)
Dept: ADMINISTRATIVE | Facility: CLINIC | Age: 65
End: 2020-01-14

## 2020-01-14 VITALS
OXYGEN SATURATION: 100 % | WEIGHT: 185 LBS | RESPIRATION RATE: 18 BRPM | TEMPERATURE: 98 F | BODY MASS INDEX: 28.04 KG/M2 | HEIGHT: 68 IN | SYSTOLIC BLOOD PRESSURE: 166 MMHG | DIASTOLIC BLOOD PRESSURE: 81 MMHG | HEART RATE: 73 BPM

## 2020-01-14 DIAGNOSIS — G89.29 CHRONIC HAND PAIN, RIGHT: ICD-10-CM

## 2020-01-14 DIAGNOSIS — G89.29 CHRONIC PAIN OF RIGHT KNEE: ICD-10-CM

## 2020-01-14 DIAGNOSIS — R07.9 CHEST PAIN: Primary | ICD-10-CM

## 2020-01-14 DIAGNOSIS — M25.511 CHRONIC RIGHT SHOULDER PAIN: ICD-10-CM

## 2020-01-14 DIAGNOSIS — G89.29 CHRONIC RIGHT SHOULDER PAIN: ICD-10-CM

## 2020-01-14 DIAGNOSIS — M25.561 CHRONIC PAIN OF RIGHT KNEE: ICD-10-CM

## 2020-01-14 DIAGNOSIS — M79.641 CHRONIC HAND PAIN, RIGHT: ICD-10-CM

## 2020-01-14 LAB
ALBUMIN SERPL BCP-MCNC: 3.9 G/DL (ref 3.5–5.2)
ALP SERPL-CCNC: 67 U/L (ref 55–135)
ALT SERPL W/O P-5'-P-CCNC: 17 U/L (ref 10–44)
ANION GAP SERPL CALC-SCNC: 10 MMOL/L (ref 8–16)
AST SERPL-CCNC: 20 U/L (ref 10–40)
BASOPHILS # BLD AUTO: 0.05 K/UL (ref 0–0.2)
BASOPHILS NFR BLD: 0.8 % (ref 0–1.9)
BILIRUB SERPL-MCNC: 0.8 MG/DL (ref 0.1–1)
BNP SERPL-MCNC: 11 PG/ML (ref 0–99)
BUN SERPL-MCNC: 9 MG/DL (ref 8–23)
CALCIUM SERPL-MCNC: 10 MG/DL (ref 8.7–10.5)
CHLORIDE SERPL-SCNC: 104 MMOL/L (ref 95–110)
CO2 SERPL-SCNC: 26 MMOL/L (ref 23–29)
CREAT SERPL-MCNC: 0.9 MG/DL (ref 0.5–1.4)
D DIMER PPP IA.FEU-MCNC: 1.02 MG/L FEU
DIFFERENTIAL METHOD: NORMAL
EOSINOPHIL # BLD AUTO: 0.3 K/UL (ref 0–0.5)
EOSINOPHIL NFR BLD: 5.4 % (ref 0–8)
ERYTHROCYTE [DISTWIDTH] IN BLOOD BY AUTOMATED COUNT: 12.9 % (ref 11.5–14.5)
EST. GFR  (AFRICAN AMERICAN): >60 ML/MIN/1.73 M^2
EST. GFR  (NON AFRICAN AMERICAN): >60 ML/MIN/1.73 M^2
GLUCOSE SERPL-MCNC: 85 MG/DL (ref 70–110)
HCT VFR BLD AUTO: 40.5 % (ref 37–48.5)
HGB BLD-MCNC: 13.2 G/DL (ref 12–16)
IMM GRANULOCYTES # BLD AUTO: 0.01 K/UL (ref 0–0.04)
IMM GRANULOCYTES NFR BLD AUTO: 0.2 % (ref 0–0.5)
LYMPHOCYTES # BLD AUTO: 2.4 K/UL (ref 1–4.8)
LYMPHOCYTES NFR BLD: 39.7 % (ref 18–48)
MCH RBC QN AUTO: 30.6 PG (ref 27–31)
MCHC RBC AUTO-ENTMCNC: 32.6 G/DL (ref 32–36)
MCV RBC AUTO: 94 FL (ref 82–98)
MONOCYTES # BLD AUTO: 0.5 K/UL (ref 0.3–1)
MONOCYTES NFR BLD: 8 % (ref 4–15)
NEUTROPHILS # BLD AUTO: 2.8 K/UL (ref 1.8–7.7)
NEUTROPHILS NFR BLD: 45.9 % (ref 38–73)
NRBC BLD-RTO: 0 /100 WBC
PLATELET # BLD AUTO: 246 K/UL (ref 150–350)
PMV BLD AUTO: 10.8 FL (ref 9.2–12.9)
POTASSIUM SERPL-SCNC: 3.7 MMOL/L (ref 3.5–5.1)
PROT SERPL-MCNC: 7.5 G/DL (ref 6–8.4)
RBC # BLD AUTO: 4.32 M/UL (ref 4–5.4)
SODIUM SERPL-SCNC: 140 MMOL/L (ref 136–145)
TROPONIN I SERPL DL<=0.01 NG/ML-MCNC: <0.006 NG/ML (ref 0–0.03)
WBC # BLD AUTO: 6.1 K/UL (ref 3.9–12.7)

## 2020-01-14 PROCEDURE — 99285 EMERGENCY DEPT VISIT HI MDM: CPT | Mod: ,,, | Performed by: EMERGENCY MEDICINE

## 2020-01-14 PROCEDURE — 93010 EKG 12-LEAD: ICD-10-PCS | Mod: ,,, | Performed by: INTERNAL MEDICINE

## 2020-01-14 PROCEDURE — 80053 COMPREHEN METABOLIC PANEL: CPT

## 2020-01-14 PROCEDURE — 85025 COMPLETE CBC W/AUTO DIFF WBC: CPT

## 2020-01-14 PROCEDURE — 99285 PR EMERGENCY DEPT VISIT,LEVEL V: ICD-10-PCS | Mod: ,,, | Performed by: EMERGENCY MEDICINE

## 2020-01-14 PROCEDURE — 84484 ASSAY OF TROPONIN QUANT: CPT

## 2020-01-14 PROCEDURE — 93005 ELECTROCARDIOGRAM TRACING: CPT

## 2020-01-14 PROCEDURE — 83880 ASSAY OF NATRIURETIC PEPTIDE: CPT

## 2020-01-14 PROCEDURE — 25000003 PHARM REV CODE 250: Performed by: EMERGENCY MEDICINE

## 2020-01-14 PROCEDURE — 99284 EMERGENCY DEPT VISIT MOD MDM: CPT | Mod: 25

## 2020-01-14 PROCEDURE — 85379 FIBRIN DEGRADATION QUANT: CPT

## 2020-01-14 PROCEDURE — 93010 ELECTROCARDIOGRAM REPORT: CPT | Mod: ,,, | Performed by: INTERNAL MEDICINE

## 2020-01-14 RX ORDER — ASPIRIN 325 MG
325 TABLET ORAL
Status: COMPLETED | OUTPATIENT
Start: 2020-01-14 | End: 2020-01-14

## 2020-01-14 RX ADMIN — ASPIRIN 325 MG ORAL TABLET 325 MG: 325 PILL ORAL at 05:01

## 2020-01-14 NOTE — TELEPHONE ENCOUNTER
----- Message from Ana Maria Morillo sent at 1/14/2020 12:44 PM CST -----  Contact: Patient [phone]  Patient would like to schedule their annual mammogram appointment. Please put in the orders and contact the patient to schedule.    Thank You

## 2020-01-14 NOTE — ED PROVIDER NOTES
Encounter Date: 1/14/2020    SCRIBE #1 NOTE: I, Nedra Qureshi, am scribing for, and in the presence of,  Dr. Bedolla. I have scribed the following portions of the note - Other sections scribed: ROS, PE.       History     Chief Complaint   Patient presents with    Chest Pain     Currently reports CP that is less than earlier this AM. Heaviness to center of chest. Denies any cardiac hx.     Shoulder Pain     Right shoulder constant x 1 year after a fall. Has been doing holistic treatment but not improving.      65 yo W with pmhx HTN, GERD, costochondritis presents with a chief complaint of chest pain and arthralgias.  With regard to the chest pain, patient reports she has had pain in her chest since last Thursday, 5 days prior to arrival.  Pain was excruciating at onset.  Pain lasted minutes to hours, but patient is not able to clarify exactly how long it lasted.  This occurred at rest.  Patient took 2 nereyda aspirins with progressive improvement in pain. Late that night, at approximately 2 or 3 in the morning patient reported a similar episode that was somewhat shorter.  Since that time, patient has not had any excruciating pain, but has had constant chest heaviness.  Patient has had a history of similar symptoms in the past that have been diagnosis both costochondritis and reflux.  Patient saw her PCP at onset of symptoms who felt this was inconsistent with ACS or GI in origin and attributed to stress.  Given the patient has had persistent heaviness for the past 5 days, she called her PCP who recommended she come to the emergency department for further evaluation.  Patient reports she has had an intermittent cough, but not more severe now than usual.  No shortness of breath, diaphoresis, nausea or vomiting. No family history of CAD.  No tobacco cocaine use.  No immobilization or recent travel.  No history of VTE.  No exogenous estrogens.  Patient does report to being under more stress than usual and is  concerned that that could be causing her symptoms. She reports her chest pain is worsened by lying flat.  No exertional component to the pain.    With regard to the arthralgias, patient reports pain in her right shoulder, right knee, and right MCP joint.  Patient reports that she has been suffering with these for 15 months.  Patient has tried holistic treatment including diet without relief of symptoms. Patient reports she has experienced relief with NSAIDs before but is hesitant to take NSAIDs every day.  She has not seen a orthopedic specialist for this.  No weakness or numbness.  No direct trauma. Patient is able to perform her ADLs but reports shoulder pain is more severe when trying to brush her hair or when wiping.    The history is provided by the patient.     Review of patient's allergies indicates:  No Known Allergies  Past Medical History:   Diagnosis Date    Acute costochondritis 2012    Back pain     Benign essential hypertension     Gastroesophageal reflux disease without esophagitis     Pain in joint involving multiple sites 2013     Past Surgical History:   Procedure Laterality Date    BREAST BIOPSY Right      SECTION      KNEE ARTHROSCOPY W/ ACL RECONSTRUCTION      REFRACTIVE SURGERY Bilateral  or     Dr. Sinha OU distance     Family History   Problem Relation Age of Onset    Hypertension Mother     Heart disease Maternal Grandmother     Celiac disease Neg Hx     Colon cancer Neg Hx     Colon polyps Neg Hx     Crohn's disease Neg Hx     Cystic fibrosis Neg Hx     Esophageal cancer Neg Hx     Inflammatory bowel disease Neg Hx     Irritable bowel syndrome Neg Hx     Liver cancer Neg Hx     Liver disease Neg Hx     Rectal cancer Neg Hx     Stomach cancer Neg Hx      Social History     Tobacco Use    Smoking status: Never Smoker    Smokeless tobacco: Never Used   Substance Use Topics    Alcohol use: No    Drug use: No     Review of Systems    Constitutional: Negative for chills.   HENT: Negative for congestion and rhinorrhea.    Respiratory: Positive for cough. Negative for shortness of breath.    Cardiovascular: Positive for chest pain. Negative for palpitations and leg swelling.   Gastrointestinal: Negative for abdominal pain.   Genitourinary: Negative for difficulty urinating.   Musculoskeletal: Positive for arthralgias. Negative for myalgias.   Skin: Negative for pallor.   Allergic/Immunologic: Negative for immunocompromised state.   Neurological: Negative for weakness and numbness.       Physical Exam     Initial Vitals [01/14/20 1557]   BP Pulse Resp Temp SpO2   (!) 166/81 73 18 98 °F (36.7 °C) 100 %      MAP       --         Physical Exam    Nursing note and vitals reviewed.  Constitutional: She appears well-developed and well-nourished. She is not diaphoretic. No distress.   HENT:   Head: Normocephalic and atraumatic.   Eyes: EOM are normal. Pupils are equal, round, and reactive to light. Right eye exhibits no discharge. Left eye exhibits no discharge. No scleral icterus.   Neck: Normal range of motion. Neck supple. No JVD present.   Cardiovascular: Normal rate, regular rhythm, normal heart sounds and intact distal pulses. Exam reveals no gallop and no friction rub.    No murmur heard.  Pulmonary/Chest: Breath sounds normal. No respiratory distress. She has no wheezes. She has no rhonchi. She has no rales. She exhibits no tenderness.   Abdominal: Soft. Bowel sounds are normal. She exhibits no distension and no mass. There is no tenderness. There is no rebound and no guarding.   Musculoskeletal: She exhibits no edema.        Right knee: She exhibits decreased range of motion (unable to achieve full active extension of right knee, otherwise good active ROM.) and effusion (trace ).        Right hand: She exhibits tenderness (right MCP joint) and swelling (right MCP joint).   No cords, asymmetry, or tenderness along deep veinous system.   Right  shoulder with pain when trying to AB-duct > 90°  Distal sensation, cap refill intact throughout extremities   Lymphadenopathy:     She has no cervical adenopathy.   Neurological: She is alert and oriented to person, place, and time. She has normal strength. No sensory deficit.   Skin: Skin is warm and dry. Capillary refill takes less than 2 seconds.   Psychiatric:   Somewhat anxious appearing         ED Course   Procedures  Labs Reviewed   D DIMER, QUANTITATIVE - Abnormal; Notable for the following components:       Result Value    D-Dimer 1.02 (*)     All other components within normal limits    Narrative:     SHARED LAV   CBC W/ AUTO DIFFERENTIAL    Narrative:     SHARED LAV   COMPREHENSIVE METABOLIC PANEL    Narrative:     SHARED LAV   TROPONIN I    Narrative:     SHARED LAV   B-TYPE NATRIURETIC PEPTIDE    Narrative:     SHARED LAV     EKG Readings: (Independently Interpreted)   Rhythm: Normal Sinus Rhythm. Heart Rate: 66. ST Segments: Normal ST Segments. T Waves Flipped: III. Axis: Normal.       Imaging Results          CTA Chest Non-Coronary (PE Study) (In process)                  Medical Decision Making:   History:   Old Medical Records: I decided to obtain old medical records.  Initial Assessment:   65 yo W with pmhx HTN, GERD, costochondritis presents with a chief complaint of chest pain and arthralgias.    Differential Diagnosis:   Acute coronary syndrome, pneumothorax, pleurisy, pericarditis, pneumonia,costochondritis, pulmonary embolus, muscular pain, reflux, diaphragmatic irritation, knee effusion, ligamentous instability      Clinical Tests:   Lab Tests: Ordered  Radiological Study: Ordered  Medical Tests: Ordered  ED Management:  Patient's overall well appearance and reassuring hemodynamics are inconsistent with aortic dissection or cardiac tamponade. Patient with no joint rigidity or fevers to suggest septic joint.  Patient has had no direct trauma to any of her joints, no rigidity or bony tenderness  "to suggest fracture and do not feel that x-rays are warranted.  This was discussed with the patient and she agrees.  I feel she would benefit from MRI of her shoulder.  She was offered NSAID but declined.  Will provide her with ambulatory referral to Sports Medicine for further evaluation of shoulder knee pain. Will administer aspirin.  Will obtain labs.    Reassessment:  CBC within normal limits.  CMP normal. Troponin and BNP are normal. D-dimer is elevated, I ordered a CTA chest.  Patient refused chest x-ray and CTA chest.  I had extensive discussion with the patient regarding the reasoning for the imaging.  Despite this, patient "has a bed feeling" and decided to leave against medical advice.  Patient is oriented x3 and has capacity to make her medical decisions.  She was informed that she may return to the emergency department at any point to continue her evaluation.       Additional MDM:     Well's Criteria Score:  -Clinical symptoms of DVT (leg swelling, pain with palpation) = 0.0  -Other diagnosis less likely than pulmonary embolism =            0.0  -Heart Rate >100 =   0.0  -Immobilization (= or > than 3 days) or surgery in the previous 4 weeks = 0.0  -Previous DVT/PE = 0.0  -Hemoptysis =          0.0  -Malignancy =           0.0  Well's Probability Score =    0      Heart Score:    History:          Slightly suspicious.  ECG:             Normal  Age:               45-65 years  Risk factors: 1-2 risk factors  Troponin:       Less than or equal to normal limit  Final Score: 2            Scribe Attestation:   Scribe #1: I performed the above scribed service and the documentation accurately describes the services I performed. I attest to the accuracy of the note.    Attending Attestation:           Physician Attestation for Scribe:      Comments: I, Dr. Farhad Bedolla, personally performed the services described in this documentation. All medical record entries made by the scribe were at my direction and in my " presence.  I have reviewed the chart and agree that the record reflects my personal performance and is accurate and complete. Farhad Bedolla MD.  6:46 PM 01/14/2020                            Clinical Impression:       ICD-10-CM ICD-9-CM   1. Chest pain R07.9 786.50   2. Chronic right shoulder pain M25.511 719.41    G89.29 338.29   3. Chronic pain of right knee M25.561 719.46    G89.29 338.29   4. Chronic hand pain, right M79.641 729.5    G89.29 338.29         Disposition:   Disposition: AMA                     Farhad Bedolla MD  01/14/20 2692

## 2020-01-14 NOTE — ED NOTES
"The patient c/o midsternal chest pain since last Thursday that was initially described as "excruciating". Pt took "2 nereyda aspirins" and the pain went away initially, but then came back as a heaviness. Pt also c/o right knee pain as well, right shoulder, and right thumb pain. The patient spoke to a nurse on call and was advised to come to the ER. The pt was seen at her pcp on 1/10/2020 for chest pain. States she has been using holistic methods of pain relief but is "tired of suffering"  "

## 2020-01-14 NOTE — TELEPHONE ENCOUNTER
Agree with going back to emergency room to be reassessed    She may need to have a CT of the chest to make sure that there is no acute blood clot in the pulmonary circulation    Mammogram orders in

## 2020-01-14 NOTE — TELEPHONE ENCOUNTER
Reason for Disposition   SEVERE chest pain    Additional Information   Negative: Severe difficulty breathing (e.g., struggling for each breath, speaks in single words)   Negative: Passed out (i.e., fainted, collapsed and was not responding)   Negative: Chest pain lasting longer than 5 minutes and ANY of the following:* Over 50 years old* Over 30 years old and at least one cardiac risk factor (i.e., high blood pressure, diabetes, high cholesterol, obesity, smoker or strong family history of heart disease)* Pain is crushing, pressure-like, or heavy * Took nitroglycerin and chest pain was not relieved* History of heart disease (i.e., angina, heart attack, bypass surgery, angioplasty, CHF)   Negative: Visible sweat on face or sweat dripping down face   Negative: Sounds like a life-threatening emergency to the triager    Protocols used: CHEST PAIN-A-OH    Pt stated she has been having chest pain since last Thursday evening and the pain is also present in her back and shoulder. Pt stated it's a crusihing spreading out pain and a heaviness that comes and goes. Pt rates the pain 9/10. Pt stated she don't know how long current pain has been going on if it's less than or more than 5 min. Pt stated she is not sitting around timing the pain. Care advice recommend pt go to Er now. Pt verbalized understanding.

## 2020-01-15 ENCOUNTER — NURSE TRIAGE (OUTPATIENT)
Dept: ADMINISTRATIVE | Facility: CLINIC | Age: 65
End: 2020-01-15

## 2020-01-15 ENCOUNTER — HOSPITAL ENCOUNTER (EMERGENCY)
Facility: HOSPITAL | Age: 65
Discharge: HOME | End: 2020-01-16
Attending: EMERGENCY MEDICINE
Payer: COMMERCIAL

## 2020-01-15 VITALS
BODY MASS INDEX: 27.54 KG/M2 | DIASTOLIC BLOOD PRESSURE: 71 MMHG | OXYGEN SATURATION: 100 % | HEART RATE: 68 BPM | WEIGHT: 181.69 LBS | SYSTOLIC BLOOD PRESSURE: 169 MMHG | RESPIRATION RATE: 18 BRPM | TEMPERATURE: 99 F | HEIGHT: 68 IN

## 2020-01-15 DIAGNOSIS — R07.9 CHEST PAIN: Primary | ICD-10-CM

## 2020-01-15 PROCEDURE — 93005 ELECTROCARDIOGRAM TRACING: CPT

## 2020-01-15 PROCEDURE — 25500020 PHARM REV CODE 255: Performed by: EMERGENCY MEDICINE

## 2020-01-15 PROCEDURE — 99284 EMERGENCY DEPT VISIT MOD MDM: CPT | Mod: ,,, | Performed by: EMERGENCY MEDICINE

## 2020-01-15 PROCEDURE — 99283 EMERGENCY DEPT VISIT LOW MDM: CPT | Mod: 25

## 2020-01-15 PROCEDURE — 99284 PR EMERGENCY DEPT VISIT,LEVEL IV: ICD-10-PCS | Mod: ,,, | Performed by: EMERGENCY MEDICINE

## 2020-01-15 PROCEDURE — 93010 ELECTROCARDIOGRAM REPORT: CPT | Mod: ,,, | Performed by: INTERNAL MEDICINE

## 2020-01-15 PROCEDURE — 93010 EKG 12-LEAD: ICD-10-PCS | Mod: ,,, | Performed by: INTERNAL MEDICINE

## 2020-01-15 RX ADMIN — IOHEXOL 100 ML: 350 INJECTION, SOLUTION INTRAVENOUS at 10:01

## 2020-01-15 NOTE — TELEPHONE ENCOUNTER
Pt calling for D-Dimer results. Advised I could not discuss. Transferred pt to ED where blood was drawn for further assistance.    Reason for Disposition   General information question, no triage required and triager able to answer question    Protocols used: INFORMATION ONLY CALL-A-AH

## 2020-01-15 NOTE — DISCHARGE INSTRUCTIONS
You are leaving against medical advice.  I recommend you stay in the emergency department for a CT scan of your chest to make sure you do not have a blood clot.  This blood clot, if present, could be life-threatening.  You may return to the emergency department at any point to continue to your evaluation.

## 2020-01-15 NOTE — ED NOTES
Pt signed AMA form after being explained the risks of leaving AMA. Form placed in scan bin to be put into pt chart

## 2020-01-16 ENCOUNTER — HOSPITAL ENCOUNTER (OUTPATIENT)
Dept: RADIOLOGY | Facility: HOSPITAL | Age: 65
Discharge: HOME | End: 2020-01-16
Attending: INTERNAL MEDICINE
Payer: COMMERCIAL

## 2020-01-16 ENCOUNTER — OFFICE VISIT (OUTPATIENT)
Dept: INTERNAL MEDICINE | Facility: CLINIC | Age: 65
End: 2020-01-16
Payer: COMMERCIAL

## 2020-01-16 VITALS
OXYGEN SATURATION: 99 % | DIASTOLIC BLOOD PRESSURE: 80 MMHG | HEART RATE: 69 BPM | SYSTOLIC BLOOD PRESSURE: 146 MMHG | HEIGHT: 68 IN | WEIGHT: 183 LBS | BODY MASS INDEX: 27.74 KG/M2

## 2020-01-16 DIAGNOSIS — R07.9 CHEST PAIN, UNSPECIFIED TYPE: ICD-10-CM

## 2020-01-16 DIAGNOSIS — Z12.31 ENCOUNTER FOR SCREENING MAMMOGRAM FOR BREAST CANCER: ICD-10-CM

## 2020-01-16 DIAGNOSIS — I10 ESSENTIAL HYPERTENSION: Primary | ICD-10-CM

## 2020-01-16 DIAGNOSIS — M17.10 KNEE ARTHROPATHY: ICD-10-CM

## 2020-01-16 DIAGNOSIS — R91.8 GROUND GLASS OPACITY PRESENT ON IMAGING OF LUNG: ICD-10-CM

## 2020-01-16 DIAGNOSIS — M65.9 TENOSYNOVITIS OF THUMB: ICD-10-CM

## 2020-01-16 DIAGNOSIS — K21.9 LARYNGOPHARYNGEAL REFLUX (LPR): ICD-10-CM

## 2020-01-16 DIAGNOSIS — M77.8 SHOULDER TENDONITIS, RIGHT: ICD-10-CM

## 2020-01-16 PROCEDURE — 77067 SCR MAMMO BI INCL CAD: CPT | Mod: TC

## 2020-01-16 PROCEDURE — 3008F PR BODY MASS INDEX (BMI) DOCUMENTED: ICD-10-PCS | Mod: CPTII,S$GLB,, | Performed by: INTERNAL MEDICINE

## 2020-01-16 PROCEDURE — 99214 OFFICE O/P EST MOD 30 MIN: CPT | Mod: S$GLB,,, | Performed by: INTERNAL MEDICINE

## 2020-01-16 PROCEDURE — 99999 PR PBB SHADOW E&M-EST. PATIENT-LVL III: ICD-10-PCS | Mod: PBBFAC,,, | Performed by: INTERNAL MEDICINE

## 2020-01-16 PROCEDURE — 3079F PR MOST RECENT DIASTOLIC BLOOD PRESSURE 80-89 MM HG: ICD-10-PCS | Mod: CPTII,S$GLB,, | Performed by: INTERNAL MEDICINE

## 2020-01-16 PROCEDURE — 99999 PR PBB SHADOW E&M-EST. PATIENT-LVL III: CPT | Mod: PBBFAC,,, | Performed by: INTERNAL MEDICINE

## 2020-01-16 PROCEDURE — 3077F SYST BP >= 140 MM HG: CPT | Mod: CPTII,S$GLB,, | Performed by: INTERNAL MEDICINE

## 2020-01-16 PROCEDURE — 77067 MAMMO DIGITAL SCREENING BILAT WITH TOMOSYNTHESIS_CAD: ICD-10-PCS | Mod: 26,,, | Performed by: RADIOLOGY

## 2020-01-16 PROCEDURE — 99214 PR OFFICE/OUTPT VISIT, EST, LEVL IV, 30-39 MIN: ICD-10-PCS | Mod: S$GLB,,, | Performed by: INTERNAL MEDICINE

## 2020-01-16 PROCEDURE — 77063 MAMMO DIGITAL SCREENING BILAT WITH TOMOSYNTHESIS_CAD: ICD-10-PCS | Mod: 26,,, | Performed by: RADIOLOGY

## 2020-01-16 PROCEDURE — 77067 SCR MAMMO BI INCL CAD: CPT | Mod: 26,,, | Performed by: RADIOLOGY

## 2020-01-16 PROCEDURE — 3077F PR MOST RECENT SYSTOLIC BLOOD PRESSURE >= 140 MM HG: ICD-10-PCS | Mod: CPTII,S$GLB,, | Performed by: INTERNAL MEDICINE

## 2020-01-16 PROCEDURE — 3008F BODY MASS INDEX DOCD: CPT | Mod: CPTII,S$GLB,, | Performed by: INTERNAL MEDICINE

## 2020-01-16 PROCEDURE — 77063 BREAST TOMOSYNTHESIS BI: CPT | Mod: 26,,, | Performed by: RADIOLOGY

## 2020-01-16 PROCEDURE — 3079F DIAST BP 80-89 MM HG: CPT | Mod: CPTII,S$GLB,, | Performed by: INTERNAL MEDICINE
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
## 2020-01-16 NOTE — ED PROVIDER NOTES
"Encounter Date: 1/15/2020    SCRIBE #1 NOTE: I, Nedra Qureshi, am scribing for, and in the presence of,  Dr. Crain. I have scribed the entire note.       History     Chief Complaint   Patient presents with    Chest Pain     reports being seen last night for chest pain and states still has pain. describes pain as non-radiating.  denies SOB, n/v.  denies cardiac hx. states wanted to do PE study last night and did not go through with it due to concerns about contrast.     Time patient was seen by the provider: 9:03 PM      The patient is a 64 y.o. female with co-morbidities including: acute costochondritis, HTN, and GERD, who presents to the ED with a complaint of chest pain. Patient reports chest pain that has been present intermittently for 6 days, sometimes sharp and sometimes "heavy". Non radiating. Denies SOB, leg edema, leg pain, exogenous hormones, or smoking. She was seen here yesterday and had a positive D-dimer. She was supposed to get a CT chest to r/o a PE, but she left because she was "scared" of possible results. Returns requesting a CT because chest pain is still present.     The history is provided by the patient.     Review of patient's allergies indicates:  No Known Allergies  Past Medical History:   Diagnosis Date    Acute costochondritis 2012    Back pain     Benign essential hypertension     Gastroesophageal reflux disease without esophagitis     Pain in joint involving multiple sites 2013     Past Surgical History:   Procedure Laterality Date    BREAST BIOPSY Right      SECTION      KNEE ARTHROSCOPY W/ ACL RECONSTRUCTION      REFRACTIVE SURGERY Bilateral  or     Dr. Sinha OU distance     Family History   Problem Relation Age of Onset    Hypertension Mother     Heart disease Maternal Grandmother     Celiac disease Neg Hx     Colon cancer Neg Hx     Colon polyps Neg Hx     Crohn's disease Neg Hx     Cystic fibrosis Neg Hx     Esophageal cancer Neg " Hx     Inflammatory bowel disease Neg Hx     Irritable bowel syndrome Neg Hx     Liver cancer Neg Hx     Liver disease Neg Hx     Rectal cancer Neg Hx     Stomach cancer Neg Hx      Social History     Tobacco Use    Smoking status: Never Smoker    Smokeless tobacco: Never Used   Substance Use Topics    Alcohol use: No    Drug use: No     Review of Systems   Constitutional: Negative for chills, diaphoresis and fever.   HENT: Negative for congestion and postnasal drip.    Eyes:        Neg vision changes   Respiratory: Negative for cough and shortness of breath.    Cardiovascular: Positive for chest pain. Negative for leg swelling.   Gastrointestinal: Negative for abdominal pain, diarrhea, nausea and vomiting.        Neg changes in stool   Genitourinary: Negative for difficulty urinating.        Neg changes in urination   Musculoskeletal: Negative for arthralgias, back pain, joint swelling and myalgias.   Skin: Negative for rash.   Neurological: Negative for syncope, weakness, light-headedness and headaches.   Hematological: Does not bruise/bleed easily.       Physical Exam     Initial Vitals [01/15/20 2044]   BP Pulse Resp Temp SpO2   (!) 144/67 73 16 98.3 °F (36.8 °C) 99 %      MAP       --         Physical Exam    Nursing note and vitals reviewed.  Constitutional: She appears well-developed and well-nourished. She is not diaphoretic. No distress.   HENT:   Head: Normocephalic and atraumatic.   Nose: Nose normal.   Eyes: EOM are normal. Pupils are equal, round, and reactive to light. No scleral icterus.   Neck: Normal range of motion. Neck supple.   Cardiovascular: Normal rate and regular rhythm.   Pulmonary/Chest: Breath sounds normal. No respiratory distress. She has no wheezes. She has no rhonchi. She has no rales. She exhibits no tenderness.   Abdominal: Soft. Bowel sounds are normal. She exhibits no distension. There is no tenderness.   Musculoskeletal: Normal range of motion. She exhibits no edema or  tenderness.   Neurological: She is alert and oriented to person, place, and time. She has normal strength. No sensory deficit.   Skin: Skin is warm and dry. Capillary refill takes less than 2 seconds. No rash noted. No pallor.   Vitiligo on hands   Psychiatric: She has a normal mood and affect. Her behavior is normal. Judgment and thought content normal.         ED Course   Procedures  Labs Reviewed - No data to display  EKG Readings: (Independently Interpreted)   Initial Reading: No STEMI.   S1Q3T3 present     ECG Results          EKG 12-lead (Final result)  Result time 01/16/20 13:43:41    Final result by Interface, Lab In [facility] (01/16/20 13:43:41)                 Narrative:    Test Reason : R07.9,    Vent. Rate : 069 BPM     Atrial Rate : 069 BPM     P-R Int : 150 ms          QRS Dur : 074 ms      QT Int : 406 ms       P-R-T Axes : 050 038 009 degrees     QTc Int : 435 ms    Normal sinus rhythm  Nonspecific T wave abnormality  Abnormal ECG  When compared with ECG of 14-JAN-2020 16:02,  No significant change was found  Confirmed by TAHIR NEWSOME MD ([id number]) on 1/16/2020 1:43:34 PM    Referred By: [email or web address]   SELF           Confirmed By:TAHIR NEWSOME MD                            Imaging Results          CTA Chest Non-Coronary (PE Study) (Final result)  Result time 01/15/20 23:21:23    Final result by Franck Posada MD (01/15/20 23:21:23)                 Impression:      1. No evidence of pulmonary embolism.  2. Mild bilateral ground-glass infiltrates may be associated mild pneumonitis or minimal edema.      Electronically signed by: Franck Posada  Date:    01/15/2020  Time:    23:21             Narrative:    EXAMINATION:  CTA CHEST NON CORONARY    CLINICAL HISTORY:  Chest pain, acute, PE suspected, intermed prob, positive D-dimer;    TECHNIQUE:  Low dose axial images, sagittal and coronal reformations were obtained from the thoracic inlet to the lung bases following the IV administration of 100 mL of Omnipaque 350.   Contrast timing was optimized to evaluate the pulmonary arteries.  MIP images were performed.    COMPARISON:  None    FINDINGS:  Pulmonary arteries are adequately enhanced with no filling defects to indicate pulmonary embolism.    Heart is borderline enlarged.    No pleural or pericardial effusion.    No mediastinal adenopathy.    No focal mass or consolidation.    Slight respiratory motion artifact.  Mild ground-glass infiltrates bilaterally.                                 Medical Decision Making:   History:   Old Medical Records: I decided to obtain old medical records.  Old Records Summarized: records from clinic visits and records from previous admission(s).       <> Summary of Records: Patient was seen in the ED yesterday where D-dimer is elevated to 1.02 and was recommended to have CTA, however patient left against medical advice.      Initial Assessment:   Nontoxic-appearing although anxious, vital signs stable, CTAB, RRR no MRG  Differential Diagnosis:   Pleuritis, costochondritis, pneumonia, pulmonary embolism, pleural effusion, malignancy, pericarditis or myocarditis less likely given no EKG changes/lab values consistent with these etiologies  Independently Interpreted Test(s):   I have ordered and independently interpreted EKG Reading(s) - see prior notes  Clinical Tests:   Radiological Study: Ordered and Reviewed  Medical Tests: Ordered and Reviewed  ED Management:  CT shows mild ground-glass infiltrates bilaterally, however O2 sat 100% and patient exhibits no dyspnea and is nontoxic-appearing.  I advised patient to follow up with her PCP regarding CT findings, may be secondary to an underlying autoimmune disease (given observed vitiligo) or inflammatory disorder, as well as following up with Cardiology given her recurrence of chest pain although I believe it is noncardiac in origin.  Discussed return precautions with patient, she appears reassured and is comfortable follow-up plan            Scribe  Attestation:   Scribe #1: I performed the above scribed service and the documentation accurately describes the services I performed. I attest to the accuracy of the note.                          Clinical Impression:       ICD-10-CM ICD-9-CM   1. Chest pain R07.9 786.50                             Marisa Crain MD  01/19/20 0848

## 2020-01-16 NOTE — DISCHARGE INSTRUCTIONS
"You were seen in the emergency department for a CTA for your ongoing chest pain. The CT did not show a blood clot, although it did show "mild bilateral ground-glass infiltrates may be associated mild pneumonitis or minimal edema." Please discuss the significance of this finding with your primary care physician.  Return to the emergency department for worsening symptoms.  "

## 2020-01-16 NOTE — ED NOTES
Discharge instructions, diagnosis, and follow up discussed with patient. Patient verbalized understanding. All questions and concerns answered. No needs expressed at the time. Pt is awake, alert and oriented x4 with no acute distress noted. Respirations even and unlabored. Ambulatory out of ED.

## 2020-01-17 ENCOUNTER — DOCUMENTATION ONLY (OUTPATIENT)
Dept: INTERNAL MEDICINE | Facility: CLINIC | Age: 65
End: 2020-01-17

## 2020-01-17 ENCOUNTER — TELEPHONE (OUTPATIENT)
Dept: INTERNAL MEDICINE | Facility: CLINIC | Age: 65
End: 2020-01-17

## 2020-01-17 ENCOUNTER — CLINICAL SUPPORT (OUTPATIENT)
Dept: INTERNAL MEDICINE | Facility: CLINIC | Age: 65
End: 2020-01-17
Payer: COMMERCIAL

## 2020-01-17 DIAGNOSIS — R91.8 PULMONARY INFILTRATES: Primary | ICD-10-CM

## 2020-01-17 DIAGNOSIS — M25.549 ARTHRALGIA OF HAND, UNSPECIFIED LATERALITY: Primary | ICD-10-CM

## 2020-01-17 PROCEDURE — 96372 THER/PROPH/DIAG INJ SC/IM: CPT | Mod: S$GLB,,, | Performed by: INTERNAL MEDICINE

## 2020-01-17 PROCEDURE — 96372 PR INJECTION,THERAP/PROPH/DIAG2ST, IM OR SUBCUT: ICD-10-PCS | Mod: S$GLB,,, | Performed by: INTERNAL MEDICINE

## 2020-01-17 RX ORDER — KETOROLAC TROMETHAMINE 30 MG/ML
60 INJECTION, SOLUTION INTRAMUSCULAR; INTRAVENOUS
Status: COMPLETED | OUTPATIENT
Start: 2020-01-17 | End: 2020-01-17

## 2020-01-17 RX ORDER — KETOROLAC TROMETHAMINE 30 MG/ML
60 INJECTION, SOLUTION INTRAMUSCULAR; INTRAVENOUS
Status: CANCELLED | OUTPATIENT
Start: 2020-01-17 | End: 2020-01-17

## 2020-01-17 RX ORDER — MOXIFLOXACIN HYDROCHLORIDE 400 MG/1
400 TABLET ORAL DAILY
Qty: 10 TABLET | Refills: 0 | Status: SHIPPED | OUTPATIENT
Start: 2020-01-17 | End: 2020-01-27

## 2020-01-17 RX ORDER — DICLOFENAC SODIUM 10 MG/G
2 GEL TOPICAL 4 TIMES DAILY PRN
Qty: 100 G | Refills: 0 | Status: SHIPPED | OUTPATIENT
Start: 2020-01-17 | End: 2021-12-23

## 2020-01-17 RX ORDER — MOXIFLOXACIN HYDROCHLORIDE 400 MG/1
400 TABLET ORAL DAILY
Qty: 10 TABLET | Refills: 0 | Status: SHIPPED | OUTPATIENT
Start: 2020-01-17 | End: 2020-01-17 | Stop reason: SDUPTHER

## 2020-01-17 RX ADMIN — KETOROLAC TROMETHAMINE 60 MG: 30 INJECTION, SOLUTION INTRAMUSCULAR; INTRAVENOUS at 04:01

## 2020-01-17 NOTE — PROGRESS NOTES
Phone review note    She had a CBC inflammatory markers  Both were normal  She is complaining of chest congestion and cough and will give a trial of Avelox 100 mg daily for 10 days  Repeat CT of the chest in 1 month    Voltaren ointment was prescribed for the thumb       At present, she defers on seeing Orthopedics in regard to her shoulder and knee      hold off on pulmonary appointment in 2 after repeat CT of the chest      in will see what response she gets from the Voltaren ointment regarding tenosynovitis of the in thumb

## 2020-01-20 ENCOUNTER — PATIENT OUTREACH (OUTPATIENT)
Dept: ADMINISTRATIVE | Facility: OTHER | Age: 65
End: 2020-01-20

## 2020-01-21 ENCOUNTER — HOSPITAL ENCOUNTER (OUTPATIENT)
Dept: RADIOLOGY | Facility: HOSPITAL | Age: 65
Discharge: HOME | End: 2020-01-21
Attending: ORTHOPAEDIC SURGERY
Payer: COMMERCIAL

## 2020-01-21 ENCOUNTER — OFFICE VISIT (OUTPATIENT)
Dept: SPORTS MEDICINE | Facility: CLINIC | Age: 65
End: 2020-01-21
Payer: COMMERCIAL

## 2020-01-21 ENCOUNTER — HOSPITAL ENCOUNTER (OUTPATIENT)
Dept: RADIOLOGY | Facility: HOSPITAL | Age: 65
Discharge: HOME | End: 2020-01-21
Attending: INTERNAL MEDICINE
Payer: COMMERCIAL

## 2020-01-21 VITALS
HEIGHT: 68 IN | WEIGHT: 183 LBS | DIASTOLIC BLOOD PRESSURE: 84 MMHG | HEART RATE: 60 BPM | SYSTOLIC BLOOD PRESSURE: 152 MMHG | BODY MASS INDEX: 27.74 KG/M2

## 2020-01-21 DIAGNOSIS — M65.9 TENOSYNOVITIS OF THUMB: ICD-10-CM

## 2020-01-21 DIAGNOSIS — M25.511 RIGHT SHOULDER PAIN, UNSPECIFIED CHRONICITY: ICD-10-CM

## 2020-01-21 DIAGNOSIS — M19.011 PRIMARY OSTEOARTHRITIS OF RIGHT SHOULDER: Primary | ICD-10-CM

## 2020-01-21 PROCEDURE — 72040 X-RAY EXAM NECK SPINE 2-3 VW: CPT | Mod: 26,,, | Performed by: RADIOLOGY

## 2020-01-21 PROCEDURE — 3008F PR BODY MASS INDEX (BMI) DOCUMENTED: ICD-10-PCS | Mod: CPTII,S$GLB,, | Performed by: ORTHOPAEDIC SURGERY

## 2020-01-21 PROCEDURE — 99214 OFFICE O/P EST MOD 30 MIN: CPT | Mod: S$GLB,,, | Performed by: ORTHOPAEDIC SURGERY

## 2020-01-21 PROCEDURE — 3077F SYST BP >= 140 MM HG: CPT | Mod: CPTII,S$GLB,, | Performed by: ORTHOPAEDIC SURGERY

## 2020-01-21 PROCEDURE — 3077F PR MOST RECENT SYSTOLIC BLOOD PRESSURE >= 140 MM HG: ICD-10-PCS | Mod: CPTII,S$GLB,, | Performed by: ORTHOPAEDIC SURGERY

## 2020-01-21 PROCEDURE — 73030 X-RAY EXAM OF SHOULDER: CPT | Mod: 26,RT,, | Performed by: RADIOLOGY

## 2020-01-21 PROCEDURE — 73030 X-RAY EXAM OF SHOULDER: CPT | Mod: TC,RT

## 2020-01-21 PROCEDURE — 3079F DIAST BP 80-89 MM HG: CPT | Mod: CPTII,S$GLB,, | Performed by: ORTHOPAEDIC SURGERY

## 2020-01-21 PROCEDURE — 72040 XR CERVICAL SPINE AP LATERAL: ICD-10-PCS | Mod: 26,,, | Performed by: RADIOLOGY

## 2020-01-21 PROCEDURE — 3008F BODY MASS INDEX DOCD: CPT | Mod: CPTII,S$GLB,, | Performed by: ORTHOPAEDIC SURGERY

## 2020-01-21 PROCEDURE — 99999 PR PBB SHADOW E&M-EST. PATIENT-LVL III: CPT | Mod: PBBFAC,,, | Performed by: ORTHOPAEDIC SURGERY

## 2020-01-21 PROCEDURE — 99214 PR OFFICE/OUTPT VISIT, EST, LEVL IV, 30-39 MIN: ICD-10-PCS | Mod: S$GLB,,, | Performed by: ORTHOPAEDIC SURGERY

## 2020-01-21 PROCEDURE — 99999 PR PBB SHADOW E&M-EST. PATIENT-LVL III: ICD-10-PCS | Mod: PBBFAC,,, | Performed by: ORTHOPAEDIC SURGERY

## 2020-01-21 PROCEDURE — 73140 X-RAY EXAM OF FINGER(S): CPT | Mod: 26,RT,, | Performed by: RADIOLOGY

## 2020-01-21 PROCEDURE — 72040 X-RAY EXAM NECK SPINE 2-3 VW: CPT | Mod: TC

## 2020-01-21 PROCEDURE — 3079F PR MOST RECENT DIASTOLIC BLOOD PRESSURE 80-89 MM HG: ICD-10-PCS | Mod: CPTII,S$GLB,, | Performed by: ORTHOPAEDIC SURGERY

## 2020-01-21 PROCEDURE — 73140 XR FINGER 2 OR MORE VIEWS RIGHT: ICD-10-PCS | Mod: 26,RT,, | Performed by: RADIOLOGY

## 2020-01-21 PROCEDURE — 73030 XR SHOULDER COMPLETE 2 OR MORE VIEWS RIGHT: ICD-10-PCS | Mod: 26,RT,, | Performed by: RADIOLOGY

## 2020-01-21 PROCEDURE — 73140 X-RAY EXAM OF FINGER(S): CPT | Mod: TC,RT
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
## 2020-01-24 ENCOUNTER — TELEPHONE (OUTPATIENT)
Dept: INTERNAL MEDICINE | Facility: CLINIC | Age: 65
End: 2020-01-24

## 2020-01-24 RX ORDER — KETOROLAC TROMETHAMINE 10 MG/1
10 TABLET, FILM COATED ORAL EVERY 8 HOURS PRN
Qty: 6 TABLET | Refills: 0 | Status: SHIPPED | OUTPATIENT
Start: 2020-01-24 | End: 2020-02-07

## 2020-01-24 NOTE — TELEPHONE ENCOUNTER
Help with the mammogram report  See if the breast Center is open on Saturday  If not demand a report on Monday

## 2020-01-24 NOTE — TELEPHONE ENCOUNTER
----- Message from Lyndsey Peoples sent at 1/24/2020  4:41 PM CST -----  Contact: Patient [phone]  Patient is returning a phone call.  Who left a message for the patient: Marbella SHORT  Does patient know what this is regarding:  Test Results  Comments:

## 2020-01-26 ENCOUNTER — PATIENT OUTREACH (OUTPATIENT)
Dept: ADMINISTRATIVE | Facility: OTHER | Age: 65
End: 2020-01-26

## 2020-01-26 NOTE — PROGRESS NOTES
LINKS immunization registry, Care Everywhere and Health Maintenance updated.  Chart reviewed for overdue Proactive [facility] Encounters health maintenance testing.  Patient has external order for mammogram.

## 2020-01-27 NOTE — TELEPHONE ENCOUNTER
Spoke with [facility] the radiologist is working on the mammograms results results should be in today

## 2020-01-29 ENCOUNTER — TELEPHONE (OUTPATIENT)
Dept: OPTOMETRY | Facility: CLINIC | Age: 65
End: 2020-01-29

## 2020-01-29 NOTE — TELEPHONE ENCOUNTER
Returned patient call after speaking with Dr.Boudreaux granger'd appointment 1/30/202 @ 7:00. No ans LVM

## 2020-02-06 ENCOUNTER — PATIENT OUTREACH (OUTPATIENT)
Dept: ADMINISTRATIVE | Facility: OTHER | Age: 65
End: 2020-02-06

## 2020-02-07 ENCOUNTER — OFFICE VISIT (OUTPATIENT)
Dept: INTERNAL MEDICINE | Facility: CLINIC | Age: 65
End: 2020-02-07
Payer: MEDICARE

## 2020-02-07 ENCOUNTER — OFFICE VISIT (OUTPATIENT)
Dept: OPTOMETRY | Facility: CLINIC | Age: 65
End: 2020-02-07
Payer: MEDICARE

## 2020-02-07 VITALS
BODY MASS INDEX: 27.4 KG/M2 | SYSTOLIC BLOOD PRESSURE: 148 MMHG | HEIGHT: 69 IN | DIASTOLIC BLOOD PRESSURE: 86 MMHG | WEIGHT: 185 LBS

## 2020-02-07 VITALS — DIASTOLIC BLOOD PRESSURE: 80 MMHG | SYSTOLIC BLOOD PRESSURE: 157 MMHG | HEART RATE: 66 BPM

## 2020-02-07 DIAGNOSIS — H53.15 VISUAL DISTORTIONS OF SHAPE AND SIZE: ICD-10-CM

## 2020-02-07 DIAGNOSIS — I10 ESSENTIAL HYPERTENSION: Primary | ICD-10-CM

## 2020-02-07 DIAGNOSIS — H25.13 NUCLEAR SCLEROSIS, BILATERAL: Primary | ICD-10-CM

## 2020-02-07 PROCEDURE — 92015 DETERMINE REFRACTIVE STATE: CPT | Mod: ,,, | Performed by: OPTOMETRIST

## 2020-02-07 PROCEDURE — 99213 PR OFFICE/OUTPT VISIT, EST, LEVL III, 20-29 MIN: ICD-10-PCS | Mod: S$PBB,,, | Performed by: INTERNAL MEDICINE

## 2020-02-07 PROCEDURE — 99999 PR PBB SHADOW E&M-EST. PATIENT-LVL II: ICD-10-PCS | Mod: PBBFAC,,, | Performed by: OPTOMETRIST

## 2020-02-07 PROCEDURE — 99999 PR PBB SHADOW E&M-EST. PATIENT-LVL III: ICD-10-PCS | Mod: PBBFAC,,, | Performed by: INTERNAL MEDICINE

## 2020-02-07 PROCEDURE — 92134 OCT, RETINA - OU - BOTH EYES: ICD-10-PCS | Mod: 26,S$PBB,, | Performed by: OPTOMETRIST

## 2020-02-07 PROCEDURE — 92015 PR REFRACTION: ICD-10-PCS | Mod: ,,, | Performed by: OPTOMETRIST

## 2020-02-07 PROCEDURE — 99213 OFFICE O/P EST LOW 20 MIN: CPT | Mod: PBBFAC,25 | Performed by: INTERNAL MEDICINE

## 2020-02-07 PROCEDURE — 99999 PR PBB SHADOW E&M-EST. PATIENT-LVL III: CPT | Mod: PBBFAC,,, | Performed by: INTERNAL MEDICINE

## 2020-02-07 PROCEDURE — 92014 PR EYE EXAM, EST PATIENT,COMPREHESV: ICD-10-PCS | Mod: S$PBB,,, | Performed by: OPTOMETRIST

## 2020-02-07 PROCEDURE — 99999 PR PBB SHADOW E&M-EST. PATIENT-LVL II: CPT | Mod: PBBFAC,,, | Performed by: OPTOMETRIST

## 2020-02-07 PROCEDURE — 99213 OFFICE O/P EST LOW 20 MIN: CPT | Mod: S$PBB,,, | Performed by: INTERNAL MEDICINE

## 2020-02-07 PROCEDURE — 92014 COMPRE OPH EXAM EST PT 1/>: CPT | Mod: S$PBB,,, | Performed by: OPTOMETRIST

## 2020-02-07 PROCEDURE — 92134 CPTRZ OPH DX IMG PST SGM RTA: CPT | Mod: PBBFAC,PO | Performed by: OPTOMETRIST

## 2020-02-07 PROCEDURE — 99212 OFFICE O/P EST SF 10 MIN: CPT | Mod: PBBFAC,25,27,PO | Performed by: OPTOMETRIST

## 2020-02-07 NOTE — PROGRESS NOTES
64-year-old female    She stopped her medications of amlodipine/losartan HCT/metoprolol due to the medicines were poorly being recalled  I am where of the situation with losartan  I am not aware of the situation regarding metoprolol and amlodipine unless it was amlodipine combined with valsartan      She is checked the blood pressure readings at home occasionally got 180-190 systolic and 90 diastolic  Heart rate seems to be fine  Does complain of frontal headache  Appears to be very anxious as well as angry about the recall    Exam today  Weight 139 lb  Heart rate 68  Blood pressure reading both arms   systolic range 160 to 168    diastolic 70 -72      Impression  Hypertension    Plan  I discussed that despite her best attempts to control this with diet as well as using herbal supplements   feel that she needs to be on medication to control blood pressure  Will start off on 1 medication  My recommendation is chlorthalidone 25 mg     other options for the future will be Benicar which is only arb was not aware of a recall,   or that of diltiazem or that of felodipine      She will look into this and get back with me later today

## 2020-02-07 NOTE — PROGRESS NOTES
"HPI     DLS: 9/19/2019 lynette  Pt states she is having blurry VA all day with dist and reading even with   her glasses on, pt did not bring her glasses with her. Pt states she went   to her PCP today and her BP is high. Did check pt BP   Occ. Floaters, no flashes   No gtts     Last edited by Felisa Madrid MA on 2/7/2020  1:31 PM. (History)        ROS     Positive for: Eyes (LASIK)    Negative for: Constitutional, Gastrointestinal, Neurological, Skin,   Genitourinary, Musculoskeletal, HENT, Endocrine, Cardiovascular,   Respiratory, Psychiatric, Allergic/Imm, Heme/Lymph    Last edited by Jack Gonzalez, OD on 2/7/2020  2:31 PM. (History)        Assessment /Plan     For exam results, see Encounter Report.    Nuclear sclerosis, bilateral    Visual distortions of shape and size  -     OCT, Retina - OU - Both Eyes      1. Sp LASIK OU  2. Cat OS>OD  3. Pt reports high BP recently, with blurry and distorted vision.  Macular OCT wnl today--no signs of mac edema/hypertens retinopathy  4, see Dr Smith notes: pt got new spex in the fall, but never felt she could see quite clear enough thru them.  She went back to Dr ELLIOTT once with no resolution, so came to see me today to "start from scratch".  She did NOT bring her old spex for me to check because she wanted to start anew.  Refraction today shows slight change from last exam, BUT explained to patient that the reason her vision wasn't "crisp" has to do with the catracts in her eyes, and that even with the best pair of glasses her vision will not be perfect.  Reassured patient that just making glasses "stronger" does NOT necessarily improve VA.  ALSO discussed further LASIK would NOT improve the quality of her vision--but when her cataracts are ready she will see improvement after    PLAN:    rtc 1 yr, or sooner if VA worsens                 "

## 2020-02-10 ENCOUNTER — TELEPHONE (OUTPATIENT)
Dept: ORTHOPEDICS | Facility: CLINIC | Age: 65
End: 2020-02-10

## 2020-02-10 NOTE — TELEPHONE ENCOUNTER
Called patient to remind her of her appointment tomorrow but her mailbox was full and the patient only has one phone number on file.

## 2020-02-10 NOTE — TELEPHONE ENCOUNTER
Spoke with pt's daughter informing her of pt's appt 2/11/2020.  Pt's daughter was inform pt will need to bring in new insurance card due to card on file is invalid.  She will have pt to call office back.

## 2020-02-13 ENCOUNTER — TELEPHONE (OUTPATIENT)
Dept: INTERNAL MEDICINE | Facility: CLINIC | Age: 65
End: 2020-02-13

## 2020-02-13 ENCOUNTER — HOSPITAL ENCOUNTER (OUTPATIENT)
Dept: RADIOLOGY | Facility: HOSPITAL | Age: 65
Discharge: HOME | End: 2020-02-13
Attending: INTERNAL MEDICINE

## 2020-02-13 DIAGNOSIS — R91.8 PULMONARY INFILTRATES: ICD-10-CM

## 2020-02-13 PROCEDURE — 71250 CT CHEST WITHOUT CONTRAST: ICD-10-PCS | Mod: 26,,, | Performed by: RADIOLOGY

## 2020-02-13 PROCEDURE — 71250 CT THORAX DX C-: CPT | Mod: TC

## 2020-02-13 PROCEDURE — 71250 CT THORAX DX C-: CPT | Mod: 26,,, | Performed by: RADIOLOGY

## 2020-02-13 NOTE — TELEPHONE ENCOUNTER
Pt has been researching to take a new blood pressure she stop taking the blood pressure medication she is taking beet juice instead and want to know what can she take in replace of the recalled medication she is on she states, pt want to know results  Of her ct scan test as well

## 2020-02-14 ENCOUNTER — DOCUMENTATION ONLY (OUTPATIENT)
Dept: INTERNAL MEDICINE | Facility: CLINIC | Age: 65
End: 2020-02-14

## 2020-02-14 RX ORDER — DILTIAZEM HYDROCHLORIDE 120 MG/1
120 CAPSULE, EXTENDED RELEASE ORAL DAILY
Qty: 30 CAPSULE | Refills: 2 | Status: SHIPPED | OUTPATIENT
Start: 2020-02-14 | End: 2020-11-17

## 2020-02-14 NOTE — PROGRESS NOTES
Result review note    The CT of the chest February 13th was reviewed    There was resolution of the ground-glass opacities that was noted in the lower lobes of a CT a in January 15th  She completed a course of Avelox once a day in which she had a productive cough  She was informed of the test results    Also noted was a chronic finding of a linear opacity and the left lingula    this is been a chronic finding since imaging studies as for his back 2008  In review of the chart, and March of 2008 a she was treated for left lingular pneumonitis  Follow-up imaging studies continue showed inflammatory changes  She followed up with Pulmonary in August 2008 and January 2009 which shows the current findings and is felt to be postinflammatory  There has been no change or compression is finding and is that of dubious significance    In regard to the ground-glass opacities in the lower lobes, she had a concern in fear that it was potentially a malignant issue, especially in view of the fact that she used to be on the medicine losartan for hypertension, which was recalled at 1 point    It is noted that because of the situation of the blood pressure recall, she is not taking the medications of metoprolol, amlodipine, and losartan   she is trying to manage this through proper diet and certain types of juice, particularly of celery and beet  She is still getting elevated readings  I made the recommendation of use of a diuretic such as Dyazide or chlorthalidone but defers on this because she read up on add and feels that it could hurt her body  Other recommendations have been that of diltiazem, Benicar, Bystolic  Benicar was recommended because it was not part of a previous recall of [facility]  Also I do not feel that there be any issues with the use of diltiazem or Bystolic  She will contact me if she would like to pursue any of these medications

## 2020-02-19 ENCOUNTER — NURSE TRIAGE (OUTPATIENT)
Dept: ADMINISTRATIVE | Facility: CLINIC | Age: 65
End: 2020-02-19

## 2020-02-19 NOTE — TELEPHONE ENCOUNTER
Reason for Disposition   Caller has cancelled the call before the first contact    Protocols used: NO CONTACT OR DUPLICATE CONTACT CALL-A-[place]    Pt stated she did not want to speak with the triage nurse she was waiting for Dr Willis and or his nurse to return her call. Pt stated she told the patient access rep that before she transferred her.

## 2020-02-26 ENCOUNTER — TELEPHONE (OUTPATIENT)
Dept: INTERNAL MEDICINE | Facility: CLINIC | Age: 65
End: 2020-02-26

## 2020-02-26 NOTE — TELEPHONE ENCOUNTER
Telephoned patient, was not able to leave message, stated mailbox full----- Message from Honey Lovett sent at 2/26/2020 11:11 AM CST -----  Type:  Needs Medical Advice    Who Called: nany    Symptoms (please be specific):      How long has patient had these symptoms:      Would the patient rather a call back or a response via [facility]? CALL BACK     Best Call Back Number: [phone]    Additional Information: PT WOULD LIKE TO SPEAK WITH THE NURSE REGARDING B/P MEDICATION

## 2020-05-22 ENCOUNTER — NURSE TRIAGE (OUTPATIENT)
Dept: ADMINISTRATIVE | Facility: CLINIC | Age: 65
End: 2020-05-22

## 2020-05-23 NOTE — TELEPHONE ENCOUNTER
"Pt calling about B/P its 107/61 with pulse 66, 105/60 66  95/57 70. With no other signs and symptoms pt denied any weakness, dizzy lightheaded. Told to call back if any problems   Reason for Disposition   Wants doctor to measure BP    Additional Information   Negative: Started suddenly after an allergic medicine, an allergic food, or bee sting   Negative: Shock suspected (e.g., cold/pale/clammy skin, too weak to stand, low BP, rapid pulse)   Negative: Difficult to awaken or acting confused (e.g., disoriented, slurred speech)   Negative: Fainted   Negative: [1] Systolic BP < 90 AND [2] dizzy, lightheaded, or weak   Negative: Chest pain   Negative: Bleeding (e.g., vomiting blood, rectal bleeding or tarry stools, severe vaginal bleeding)(Exception: fainted from sight of small amount of blood; small cut or abrasion)   Negative: Extra heart beats or heart is beating fast  (i.e., "palpitations")   Negative: Sounds like a life-threatening emergency to the triager   Negative: [1] Systolic BP < 80 AND [2] NOT dizzy, lightheaded or weak   Negative: Abdominal pain   Negative: Fever > 100.5 F (38.1 C)   Negative: Major surgery in the past month   Negative: [1] Drinking very little AND [2] dehydration suspected (e.g., no urine > 12 hours, very dry mouth, very lightheaded)   Negative: [1] Fall in systolic BP > 20 mm Hg from normal AND [2] dizzy, lightheaded, or weak   Negative: Patient sounds very sick or weak to the triager   Negative: [1] Systolic BP < 90 AND [2] NOT dizzy, lightheaded or weak   Negative: [1] Systolic BP  AND [2] taking blood pressure medications AND [3] dizzy, lightheaded or weak   Negative: [1] Systolic BP  AND [2] taking blood pressure medications AND [3] NOT dizzy, lightheaded or weak   Negative: Brief (now gone) dizziness or lightheadedness after standing up or eating   Negative: Diastolic BP < 50 mm Hg   Negative: Fall in systolic BP > 20 mmHg after eating a meal   " Negative: Fall in systolic BP > 20 mmHg after standing up   Negative: [1] Fall in systolic BP > 20 mm Hg from normal AND [2] NOT dizzy, lightheaded, or weak    Protocols used: LOW BLOOD PRESSURE-A-AH

## 2020-07-28 ENCOUNTER — OFFICE VISIT (OUTPATIENT)
Dept: INTERNAL MEDICINE | Facility: CLINIC | Age: 65
End: 2020-07-28
Payer: MEDICARE

## 2020-07-28 VITALS
HEART RATE: 88 BPM | BODY MASS INDEX: 26.66 KG/M2 | SYSTOLIC BLOOD PRESSURE: 118 MMHG | WEIGHT: 180 LBS | DIASTOLIC BLOOD PRESSURE: 70 MMHG | HEIGHT: 69 IN | OXYGEN SATURATION: 98 %

## 2020-07-28 DIAGNOSIS — Z79.899 OTHER LONG TERM (CURRENT) DRUG THERAPY: ICD-10-CM

## 2020-07-28 DIAGNOSIS — I10 ESSENTIAL HYPERTENSION: Primary | ICD-10-CM

## 2020-07-28 PROCEDURE — 99214 PR OFFICE/OUTPT VISIT, EST, LEVL IV, 30-39 MIN: ICD-10-PCS | Mod: S$PBB,,, | Performed by: INTERNAL MEDICINE

## 2020-07-28 PROCEDURE — 99999 PR PBB SHADOW E&M-EST. PATIENT-LVL III: ICD-10-PCS | Mod: PBBFAC,,, | Performed by: INTERNAL MEDICINE

## 2020-07-28 PROCEDURE — 99213 OFFICE O/P EST LOW 20 MIN: CPT | Mod: PBBFAC | Performed by: INTERNAL MEDICINE

## 2020-07-28 PROCEDURE — 99999 PR PBB SHADOW E&M-EST. PATIENT-LVL III: CPT | Mod: PBBFAC,,, | Performed by: INTERNAL MEDICINE

## 2020-07-28 PROCEDURE — 99214 OFFICE O/P EST MOD 30 MIN: CPT | Mod: S$PBB,,, | Performed by: INTERNAL MEDICINE

## 2020-07-28 NOTE — PROGRESS NOTES
65-year-old female is here to follow-up on blood    She is currently on amlodipine 5 mg, metoprolol XL 25 mg, losartan 100 or losartan -25 mg    Within the past few months she has noticed lower blood pressure readings in usual  She is eating much better  She is taking a number vitamin supplements which include zinc, vitamin-A omega-3 fatty acids, vitamin D3, black elderberry    She is notice of blood pressure readings to be around systolic 100-120 and diastolics in the 50s to 60s  She reports no dizziness or weakness  Last week after eating a bunch of cherry she felt faint for split 2nd but then it passed    No difficulty breathing on occasion but not often she will feel a mild discomfort in her mid chest that might last for minutes but it comes on randomly without any circumstances  No abdominal pain, bowel function regular no difficulty urinating    Examination  Weight 180 lb  Pulse 68  Blood pressure 122/68  Neck no thyromegaly no masses  Chest clear breath sounds  Heart regular rate and rhythm  Abdominal exam is bowel sounds soft nontender no hepatosplenomegaly abdominal masses  2+ carotid pulses no bruits new 2+ pedal pulses extremities no edema    Impression  Hypertension    Plan arrange for routine labs and continue with current medications  If she still gets lower blood pressure readings at home or any symptoms developed can consider make some adjustments which will prior be either the amlodipine or the losartan

## 2020-07-29 ENCOUNTER — LAB VISIT (OUTPATIENT)
Dept: LAB | Facility: HOSPITAL | Age: 65
End: 2020-07-29
Attending: INTERNAL MEDICINE
Payer: MEDICARE

## 2020-07-29 DIAGNOSIS — I10 ESSENTIAL HYPERTENSION: ICD-10-CM

## 2020-07-29 DIAGNOSIS — Z79.899 OTHER LONG TERM (CURRENT) DRUG THERAPY: ICD-10-CM

## 2020-07-29 LAB
25(OH)D3+25(OH)D2 SERPL-MCNC: 33 NG/ML (ref 30–96)
ALBUMIN SERPL BCP-MCNC: 4 G/DL (ref 3.5–5.2)
ALP SERPL-CCNC: 66 U/L (ref 55–135)
ALT SERPL W/O P-5'-P-CCNC: 14 U/L (ref 10–44)
ANION GAP SERPL CALC-SCNC: 7 MMOL/L (ref 8–16)
AST SERPL-CCNC: 17 U/L (ref 10–40)
BASOPHILS # BLD AUTO: 0.06 K/UL (ref 0–0.2)
BASOPHILS NFR BLD: 1.3 % (ref 0–1.9)
BILIRUB SERPL-MCNC: 0.5 MG/DL (ref 0.1–1)
BUN SERPL-MCNC: 9 MG/DL (ref 8–23)
CALCIUM SERPL-MCNC: 10.3 MG/DL (ref 8.7–10.5)
CHLORIDE SERPL-SCNC: 101 MMOL/L (ref 95–110)
CHOLEST SERPL-MCNC: 242 MG/DL (ref 120–199)
CHOLEST/HDLC SERPL: 2.9 {RATIO} (ref 2–5)
CO2 SERPL-SCNC: 30 MMOL/L (ref 23–29)
CREAT SERPL-MCNC: 1 MG/DL (ref 0.5–1.4)
DIFFERENTIAL METHOD: ABNORMAL
EOSINOPHIL # BLD AUTO: 0.4 K/UL (ref 0–0.5)
EOSINOPHIL NFR BLD: 7.8 % (ref 0–8)
ERYTHROCYTE [DISTWIDTH] IN BLOOD BY AUTOMATED COUNT: 13.2 % (ref 11.5–14.5)
EST. GFR  (AFRICAN AMERICAN): >60 ML/MIN/1.73 M^2
EST. GFR  (NON AFRICAN AMERICAN): 59.3 ML/MIN/1.73 M^2
GLUCOSE SERPL-MCNC: 90 MG/DL (ref 70–110)
HCT VFR BLD AUTO: 42.2 % (ref 37–48.5)
HDLC SERPL-MCNC: 83 MG/DL (ref 40–75)
HDLC SERPL: 34.3 % (ref 20–50)
HGB BLD-MCNC: 13.4 G/DL (ref 12–16)
IMM GRANULOCYTES # BLD AUTO: 0.01 K/UL (ref 0–0.04)
IMM GRANULOCYTES NFR BLD AUTO: 0.2 % (ref 0–0.5)
LDLC SERPL CALC-MCNC: 139 MG/DL (ref 63–159)
LYMPHOCYTES # BLD AUTO: 1.5 K/UL (ref 1–4.8)
LYMPHOCYTES NFR BLD: 32.7 % (ref 18–48)
MAGNESIUM SERPL-MCNC: 2 MG/DL (ref 1.6–2.6)
MCH RBC QN AUTO: 30.3 PG (ref 27–31)
MCHC RBC AUTO-ENTMCNC: 31.8 G/DL (ref 32–36)
MCV RBC AUTO: 96 FL (ref 82–98)
MONOCYTES # BLD AUTO: 0.4 K/UL (ref 0.3–1)
MONOCYTES NFR BLD: 8.2 % (ref 4–15)
NEUTROPHILS # BLD AUTO: 2.2 K/UL (ref 1.8–7.7)
NEUTROPHILS NFR BLD: 49.8 % (ref 38–73)
NONHDLC SERPL-MCNC: 159 MG/DL
NRBC BLD-RTO: 0 /100 WBC
PLATELET # BLD AUTO: 250 K/UL (ref 150–350)
PMV BLD AUTO: 11.4 FL (ref 9.2–12.9)
POTASSIUM SERPL-SCNC: 3.5 MMOL/L (ref 3.5–5.1)
PROT SERPL-MCNC: 7.5 G/DL (ref 6–8.4)
RBC # BLD AUTO: 4.42 M/UL (ref 4–5.4)
SODIUM SERPL-SCNC: 138 MMOL/L (ref 136–145)
TRIGL SERPL-MCNC: 100 MG/DL (ref 30–150)
TSH SERPL DL<=0.005 MIU/L-ACNC: 0.7 UIU/ML (ref 0.4–4)
WBC # BLD AUTO: 4.5 K/UL (ref 3.9–12.7)

## 2020-07-29 PROCEDURE — 84443 ASSAY THYROID STIM HORMONE: CPT

## 2020-07-29 PROCEDURE — 85025 COMPLETE CBC W/AUTO DIFF WBC: CPT

## 2020-07-29 PROCEDURE — 83735 ASSAY OF MAGNESIUM: CPT

## 2020-07-29 PROCEDURE — 82306 VITAMIN D 25 HYDROXY: CPT

## 2020-07-29 PROCEDURE — 80061 LIPID PANEL: CPT

## 2020-07-29 PROCEDURE — 36415 COLL VENOUS BLD VENIPUNCTURE: CPT

## 2020-07-29 PROCEDURE — 80053 COMPREHEN METABOLIC PANEL: CPT

## 2020-07-30 RX ORDER — AMLODIPINE BESYLATE 5 MG/1
5 TABLET ORAL DAILY
COMMUNITY
End: 2021-02-08

## 2020-07-30 RX ORDER — LOSARTAN POTASSIUM AND HYDROCHLOROTHIAZIDE 25; 100 MG/1; MG/1
1 TABLET ORAL DAILY
COMMUNITY
End: 2021-01-09

## 2020-07-30 RX ORDER — METOPROLOL SUCCINATE 50 MG/1
50 TABLET, EXTENDED RELEASE ORAL DAILY
COMMUNITY
End: 2021-02-08

## 2020-07-30 NOTE — PROGRESS NOTES
Test results reviewed    Cholesterol is elevated but the HDL as high at 83    Continue be attentive to diet physical activity      Also of losartan medication is actually losartan-hydrochlorothiazide 100-25

## 2020-08-01 ENCOUNTER — DOCUMENTATION ONLY (OUTPATIENT)
Dept: INTERNAL MEDICINE | Facility: CLINIC | Age: 65
End: 2020-08-01

## 2020-08-01 NOTE — PROGRESS NOTES
The test results were fine except elevated total cholesterol but very good HDL    She defers own medication for elevated cholesterol and was to continue with diet    Continue with current medication as well  As the monitor blood pressure

## 2020-08-07 ENCOUNTER — TELEPHONE (OUTPATIENT)
Dept: INTERNAL MEDICINE | Facility: CLINIC | Age: 65
End: 2020-08-07

## 2020-08-08 RX ORDER — GABAPENTIN 300 MG/1
300 CAPSULE ORAL NIGHTLY
Qty: 5 CAPSULE | Refills: 0 | Status: SHIPPED | OUTPATIENT
Start: 2020-08-08 | End: 2020-11-17

## 2020-08-08 RX ORDER — CYCLOBENZAPRINE HCL 5 MG
5 TABLET ORAL 2 TIMES DAILY
Qty: 10 TABLET | Refills: 0 | Status: SHIPPED | OUTPATIENT
Start: 2020-08-08 | End: 2020-08-13

## 2020-08-11 ENCOUNTER — TELEPHONE (OUTPATIENT)
Dept: INTERNAL MEDICINE | Facility: CLINIC | Age: 65
End: 2020-08-11

## 2020-08-11 RX ORDER — PREDNISONE 20 MG/1
TABLET ORAL
Qty: 12 TABLET | Refills: 0 | Status: SHIPPED | OUTPATIENT
Start: 2020-08-11 | End: 2020-11-17

## 2020-08-11 NOTE — TELEPHONE ENCOUNTER
----- Message from Evonne Hwang sent at 8/11/2020 12:35 PM CDT -----  Contact: [phone]  Patient is requesting a call back from the nurse in regards to a personal matter. Please advise

## 2020-08-11 NOTE — TELEPHONE ENCOUNTER
Pt states she would like some advice if a steroid could help with the overall pain she has been having either a injection or prescription pill she want to know if it can be prescribed and which one can work for her

## 2020-08-15 ENCOUNTER — CLINICAL SUPPORT (OUTPATIENT)
Dept: INTERNAL MEDICINE | Facility: CLINIC | Age: 65
End: 2020-08-15
Payer: MEDICARE

## 2020-08-15 PROCEDURE — 96372 THER/PROPH/DIAG INJ SC/IM: CPT | Mod: PBBFAC

## 2020-08-15 RX ORDER — KETOROLAC TROMETHAMINE 30 MG/ML
60 INJECTION, SOLUTION INTRAMUSCULAR; INTRAVENOUS
Status: COMPLETED | OUTPATIENT
Start: 2020-08-15 | End: 2020-08-15

## 2020-08-15 RX ADMIN — KETOROLAC TROMETHAMINE 60 MG: 30 INJECTION, SOLUTION INTRAMUSCULAR at 02:08

## 2020-11-12 ENCOUNTER — DOCUMENTATION ONLY (OUTPATIENT)
Dept: INTERNAL MEDICINE | Facility: CLINIC | Age: 65
End: 2020-11-12

## 2020-11-12 ENCOUNTER — TELEPHONE (OUTPATIENT)
Dept: INTERNAL MEDICINE | Facility: CLINIC | Age: 65
End: 2020-11-12

## 2020-11-12 DIAGNOSIS — R07.9 CHEST PAIN, UNSPECIFIED TYPE: ICD-10-CM

## 2020-11-12 NOTE — TELEPHONE ENCOUNTER
Pt want to have a echo done she is requesting it to make sure no blockages are in her heart she feel overwhelmed with work she want to make sure she is not  putting stress on her heart

## 2020-11-13 NOTE — PROGRESS NOTES
Patient has been experiencing recurring mid chest discomfort associated with left arm discomfort    This occurred in the past    She feels that may be coming more regular    Because of such arrange for CTA  coronary    she has history of hypertension, hyperlipidemia

## 2020-11-16 ENCOUNTER — NURSE TRIAGE (OUTPATIENT)
Dept: ADMINISTRATIVE | Facility: CLINIC | Age: 65
End: 2020-11-16

## 2020-11-17 ENCOUNTER — HOSPITAL ENCOUNTER (OUTPATIENT)
Dept: RADIOLOGY | Facility: HOSPITAL | Age: 65
Discharge: HOME | End: 2020-11-17
Attending: INTERNAL MEDICINE
Payer: MEDICARE

## 2020-11-17 VITALS — DIASTOLIC BLOOD PRESSURE: 63 MMHG | SYSTOLIC BLOOD PRESSURE: 126 MMHG | HEART RATE: 60 BPM

## 2020-11-17 DIAGNOSIS — R07.9 CHEST PAIN, UNSPECIFIED TYPE: ICD-10-CM

## 2020-11-17 LAB
CREAT SERPL-MCNC: 1 MG/DL (ref 0.5–1.4)
SAMPLE: NORMAL

## 2020-11-17 PROCEDURE — 25500020 PHARM REV CODE 255: Performed by: INTERNAL MEDICINE

## 2020-11-17 PROCEDURE — 25000242 PHARM REV CODE 250 ALT 637 W/ HCPCS: Performed by: INTERNAL MEDICINE

## 2020-11-17 PROCEDURE — 75574 CTA CARDIAC: ICD-10-PCS | Mod: 26,,, | Performed by: RADIOLOGY

## 2020-11-17 PROCEDURE — 75574 CT ANGIO HRT W/3D IMAGE: CPT | Mod: TC

## 2020-11-17 PROCEDURE — 75574 CT ANGIO HRT W/3D IMAGE: CPT | Mod: 26,,, | Performed by: RADIOLOGY

## 2020-11-17 RX ORDER — NITROGLYCERIN 0.4 MG/1
0.4 TABLET SUBLINGUAL ONCE
Status: COMPLETED | OUTPATIENT
Start: 2020-11-17 | End: 2020-11-17

## 2020-11-17 RX ADMIN — NITROGLYCERIN 0.4 MG: 0.4 TABLET, ORALLY DISINTEGRATING SUBLINGUAL at 01:11

## 2020-11-17 RX ADMIN — IOHEXOL 100 ML: 350 INJECTION, SOLUTION INTRAVENOUS at 01:11

## 2020-11-17 NOTE — CARE UPDATE
Patient arrived to CT very anxious. HR=57-61 bpm and b/p=117/53 -126/63. Patient states she is too nervous to take the medications ordered. Radiologist called and to bedside to explain to patient rationale for medications. Patient very scared and not sure if she wants the test today.

## 2020-11-17 NOTE — TELEPHONE ENCOUNTER
Pt calling arrival time for scheduled test tomorrow & pre op instructions. Informed pt test scheduled for 1200.  Instructions as follows:    Please fast for 4 hours prior to appointment. No caffeine day of the appointment. Arrive at check-in approximately 30 minutes before your scheduled appointment time. Bring all outside medical records and imaging, along with a list of your current medications and insurance card.  Lab & Imaging, Main Building, 2nd Floor  Please park in [address] and take escalator or Clinic elevator. Pt verbalized understanding, will callback if she has any further questions.     Reason for Disposition   Question about upcoming scheduled test, no triage required and triager able to answer question    Protocols used: INFORMATION ONLY CALL - NO TRIAGE-A-

## 2020-11-17 NOTE — CARE UPDATE
Patient decide to go forward with the test. Nitro sl given only due to hr=57 and B/p=126/69 prior. Patient tolerated test well. Post 126/51 and hr rate 60.

## 2020-11-18 RX ORDER — METOPROLOL SUCCINATE 25 MG/1
TABLET, EXTENDED RELEASE ORAL
COMMUNITY
Start: 2020-11-12 | End: 2021-02-08

## 2020-11-18 NOTE — PROGRESS NOTES
CTA chest/coronary revealed no calcification    Overall the test was fine    What prompted the test was high having mid chest and mid back discomfort that would come and go associated either with right or left arm pain.  No particular circumstances would account for the pain.  Possibly at times it did occur around her meal which could suggest the possibility esophageal spasm.    In talking with her today she feels that it is subsiding not happening as often and is feeling better.  She has been under some stress.  The pain certainly can still be muscular.  She will consider a course of Nexium or Prilosec once a day for the next 7-14 days    However was also discuss that at the pain still reoccurs a becomes unrelenting will need to consider the exercise stress test

## 2020-11-21 ENCOUNTER — NURSE TRIAGE (OUTPATIENT)
Dept: ADMINISTRATIVE | Facility: CLINIC | Age: 65
End: 2020-11-21
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
## 2021-02-02 ENCOUNTER — HOSPITAL ENCOUNTER (OUTPATIENT)
Dept: RADIOLOGY | Facility: HOSPITAL | Age: 66
Discharge: HOME | End: 2021-02-02
Attending: INTERNAL MEDICINE
Payer: MEDICARE

## 2021-02-02 ENCOUNTER — TELEPHONE (OUTPATIENT)
Dept: INTERNAL MEDICINE | Facility: CLINIC | Age: 66
End: 2021-02-02

## 2021-02-02 VITALS — HEIGHT: 69 IN | WEIGHT: 159 LBS | BODY MASS INDEX: 23.55 KG/M2

## 2021-02-02 DIAGNOSIS — Z12.31 ENCOUNTER FOR SCREENING MAMMOGRAM FOR BREAST CANCER: ICD-10-CM

## 2021-02-02 DIAGNOSIS — I10 ESSENTIAL HYPERTENSION: Primary | ICD-10-CM

## 2021-02-02 DIAGNOSIS — Z12.31 ENCOUNTER FOR SCREENING MAMMOGRAM FOR BREAST CANCER: Primary | ICD-10-CM

## 2021-02-02 DIAGNOSIS — E78.49 OTHER HYPERLIPIDEMIA: ICD-10-CM

## 2021-02-02 PROCEDURE — 77063 MAMMO DIGITAL SCREENING BILAT WITH TOMO: ICD-10-PCS | Mod: 26,,, | Performed by: RADIOLOGY

## 2021-02-02 PROCEDURE — 77063 BREAST TOMOSYNTHESIS BI: CPT | Mod: 26,,, | Performed by: RADIOLOGY

## 2021-02-02 PROCEDURE — 77067 SCR MAMMO BI INCL CAD: CPT | Mod: TC

## 2021-02-02 PROCEDURE — 77067 SCR MAMMO BI INCL CAD: CPT | Mod: 26,,, | Performed by: RADIOLOGY

## 2021-02-02 PROCEDURE — 77067 MAMMO DIGITAL SCREENING BILAT WITH TOMO: ICD-10-PCS | Mod: 26,,, | Performed by: RADIOLOGY

## 2021-02-09 ENCOUNTER — LAB VISIT (OUTPATIENT)
Dept: LAB | Facility: HOSPITAL | Age: 66
End: 2021-02-09
Attending: INTERNAL MEDICINE
Payer: MEDICARE

## 2021-02-09 DIAGNOSIS — E78.49 OTHER HYPERLIPIDEMIA: ICD-10-CM

## 2021-02-09 DIAGNOSIS — I10 ESSENTIAL HYPERTENSION: ICD-10-CM

## 2021-02-09 LAB
ALBUMIN SERPL BCP-MCNC: 3.9 G/DL (ref 3.5–5.2)
ALP SERPL-CCNC: 61 U/L (ref 55–135)
ALT SERPL W/O P-5'-P-CCNC: 16 U/L (ref 10–44)
ANION GAP SERPL CALC-SCNC: 12 MMOL/L (ref 8–16)
AST SERPL-CCNC: 18 U/L (ref 10–40)
BASOPHILS # BLD AUTO: 0.06 K/UL (ref 0–0.2)
BASOPHILS NFR BLD: 1.2 % (ref 0–1.9)
BILIRUB SERPL-MCNC: 0.8 MG/DL (ref 0.1–1)
BUN SERPL-MCNC: 14 MG/DL (ref 8–23)
CALCIUM SERPL-MCNC: 9.6 MG/DL (ref 8.7–10.5)
CHLORIDE SERPL-SCNC: 102 MMOL/L (ref 95–110)
CHOLEST SERPL-MCNC: 246 MG/DL (ref 120–199)
CHOLEST/HDLC SERPL: 2.9 {RATIO} (ref 2–5)
CO2 SERPL-SCNC: 26 MMOL/L (ref 23–29)
CREAT SERPL-MCNC: 0.9 MG/DL (ref 0.5–1.4)
DIFFERENTIAL METHOD: NORMAL
EOSINOPHIL # BLD AUTO: 0.4 K/UL (ref 0–0.5)
EOSINOPHIL NFR BLD: 7.2 % (ref 0–8)
ERYTHROCYTE [DISTWIDTH] IN BLOOD BY AUTOMATED COUNT: 13.4 % (ref 11.5–14.5)
EST. GFR  (AFRICAN AMERICAN): >60 ML/MIN/1.73 M^2
EST. GFR  (NON AFRICAN AMERICAN): >60 ML/MIN/1.73 M^2
GLUCOSE SERPL-MCNC: 89 MG/DL (ref 70–110)
HCT VFR BLD AUTO: 41.8 % (ref 37–48.5)
HDLC SERPL-MCNC: 84 MG/DL (ref 40–75)
HDLC SERPL: 34.1 % (ref 20–50)
HGB BLD-MCNC: 13.6 G/DL (ref 12–16)
IMM GRANULOCYTES # BLD AUTO: 0.01 K/UL (ref 0–0.04)
IMM GRANULOCYTES NFR BLD AUTO: 0.2 % (ref 0–0.5)
LDLC SERPL CALC-MCNC: 150.8 MG/DL (ref 63–159)
LYMPHOCYTES # BLD AUTO: 1.9 K/UL (ref 1–4.8)
LYMPHOCYTES NFR BLD: 39.8 % (ref 18–48)
MCH RBC QN AUTO: 30.3 PG (ref 27–31)
MCHC RBC AUTO-ENTMCNC: 32.5 G/DL (ref 32–36)
MCV RBC AUTO: 93 FL (ref 82–98)
MONOCYTES # BLD AUTO: 0.4 K/UL (ref 0.3–1)
MONOCYTES NFR BLD: 8 % (ref 4–15)
NEUTROPHILS # BLD AUTO: 2.1 K/UL (ref 1.8–7.7)
NEUTROPHILS NFR BLD: 43.6 % (ref 38–73)
NONHDLC SERPL-MCNC: 162 MG/DL
NRBC BLD-RTO: 0 /100 WBC
PLATELET # BLD AUTO: 249 K/UL (ref 150–350)
PMV BLD AUTO: 11.5 FL (ref 9.2–12.9)
POTASSIUM SERPL-SCNC: 3.3 MMOL/L (ref 3.5–5.1)
PROT SERPL-MCNC: 7.2 G/DL (ref 6–8.4)
RBC # BLD AUTO: 4.49 M/UL (ref 4–5.4)
SODIUM SERPL-SCNC: 140 MMOL/L (ref 136–145)
TRIGL SERPL-MCNC: 56 MG/DL (ref 30–150)
WBC # BLD AUTO: 4.88 K/UL (ref 3.9–12.7)

## 2021-02-09 PROCEDURE — 80061 LIPID PANEL: CPT

## 2021-02-09 PROCEDURE — 80053 COMPREHEN METABOLIC PANEL: CPT

## 2021-02-09 PROCEDURE — 36415 COLL VENOUS BLD VENIPUNCTURE: CPT

## 2021-02-09 PROCEDURE — 85025 COMPLETE CBC W/AUTO DIFF WBC: CPT

## 2021-03-29 ENCOUNTER — IMMUNIZATION (OUTPATIENT)
Dept: PRIMARY CARE CLINIC | Facility: CLINIC | Age: 66
End: 2021-03-29
Payer: MEDICARE

## 2021-03-29 ENCOUNTER — PATIENT OUTREACH (OUTPATIENT)
Dept: ADMINISTRATIVE | Facility: HOSPITAL | Age: 66
End: 2021-03-29

## 2021-03-29 DIAGNOSIS — Z12.11 COLON CANCER SCREENING: Primary | ICD-10-CM

## 2021-03-29 DIAGNOSIS — Z23 NEED FOR VACCINATION: Primary | ICD-10-CM

## 2021-03-29 PROCEDURE — 0011A PR IMMUNIZ ADMIN, SARS-COV-2 COVID-19 VACC, 100MCG/0.5ML, 1ST DOSE: ICD-10-PCS | Mod: CV19,S$GLB,, | Performed by: INTERNAL MEDICINE

## 2021-03-29 PROCEDURE — 0011A PR IMMUNIZ ADMIN, SARS-COV-2 COVID-19 VACC, 100MCG/0.5ML, 1ST DOSE: CPT | Mod: CV19,S$GLB,, | Performed by: INTERNAL MEDICINE

## 2021-03-29 PROCEDURE — 91301 PR SARS-COV-2 COVID-19 VACCINE, NO PRSV, 100MCG/0.5ML, IM: ICD-10-PCS | Mod: S$GLB,,, | Performed by: INTERNAL MEDICINE

## 2021-03-29 PROCEDURE — 91301 PR SARS-COV-2 COVID-19 VACCINE, NO PRSV, 100MCG/0.5ML, IM: CPT | Mod: S$GLB,,, | Performed by: INTERNAL MEDICINE

## 2021-03-29 RX ADMIN — Medication 0.5 ML: at 09:03

## 2021-03-30 ENCOUNTER — OFFICE VISIT (OUTPATIENT)
Dept: INTERNAL MEDICINE | Facility: CLINIC | Age: 66
End: 2021-03-30
Payer: MEDICARE

## 2021-03-30 ENCOUNTER — LAB VISIT (OUTPATIENT)
Dept: LAB | Facility: HOSPITAL | Age: 66
End: 2021-03-30
Attending: INTERNAL MEDICINE
Payer: MEDICARE

## 2021-03-30 VITALS
BODY MASS INDEX: 27.43 KG/M2 | WEIGHT: 185.19 LBS | HEIGHT: 69 IN | HEART RATE: 70 BPM | DIASTOLIC BLOOD PRESSURE: 82 MMHG | OXYGEN SATURATION: 98 % | SYSTOLIC BLOOD PRESSURE: 128 MMHG

## 2021-03-30 DIAGNOSIS — G89.29 CHRONIC CHEST PAIN: ICD-10-CM

## 2021-03-30 DIAGNOSIS — I10 ESSENTIAL HYPERTENSION: ICD-10-CM

## 2021-03-30 DIAGNOSIS — R07.9 CHRONIC CHEST PAIN: Primary | ICD-10-CM

## 2021-03-30 DIAGNOSIS — R07.9 CHRONIC CHEST PAIN: ICD-10-CM

## 2021-03-30 DIAGNOSIS — G89.29 CHRONIC CHEST PAIN: Primary | ICD-10-CM

## 2021-03-30 PROCEDURE — 82550 ASSAY OF CK (CPK): CPT | Performed by: INTERNAL MEDICINE

## 2021-03-30 PROCEDURE — 99214 OFFICE O/P EST MOD 30 MIN: CPT | Mod: S$PBB,,, | Performed by: INTERNAL MEDICINE

## 2021-03-30 PROCEDURE — 83735 ASSAY OF MAGNESIUM: CPT | Performed by: INTERNAL MEDICINE

## 2021-03-30 PROCEDURE — 82306 VITAMIN D 25 HYDROXY: CPT | Mod: GA | Performed by: INTERNAL MEDICINE

## 2021-03-30 PROCEDURE — 99213 OFFICE O/P EST LOW 20 MIN: CPT | Mod: PBBFAC | Performed by: INTERNAL MEDICINE

## 2021-03-30 PROCEDURE — 80048 BASIC METABOLIC PNL TOTAL CA: CPT | Performed by: INTERNAL MEDICINE

## 2021-03-30 PROCEDURE — 99999 PR PBB SHADOW E&M-EST. PATIENT-LVL III: CPT | Mod: PBBFAC,,, | Performed by: INTERNAL MEDICINE

## 2021-03-30 PROCEDURE — 99214 PR OFFICE/OUTPT VISIT, EST, LEVL IV, 30-39 MIN: ICD-10-PCS | Mod: S$PBB,,, | Performed by: INTERNAL MEDICINE

## 2021-03-30 PROCEDURE — 36415 COLL VENOUS BLD VENIPUNCTURE: CPT | Performed by: INTERNAL MEDICINE

## 2021-03-30 PROCEDURE — 84484 ASSAY OF TROPONIN QUANT: CPT | Performed by: INTERNAL MEDICINE

## 2021-03-30 PROCEDURE — 99999 PR PBB SHADOW E&M-EST. PATIENT-LVL III: ICD-10-PCS | Mod: PBBFAC,,, | Performed by: INTERNAL MEDICINE

## 2021-03-31 ENCOUNTER — DOCUMENTATION ONLY (OUTPATIENT)
Dept: INTERNAL MEDICINE | Facility: CLINIC | Age: 66
End: 2021-03-31

## 2021-03-31 ENCOUNTER — HOSPITAL ENCOUNTER (OUTPATIENT)
Dept: CARDIOLOGY | Facility: HOSPITAL | Age: 66
Discharge: HOME | End: 2021-03-31
Attending: INTERNAL MEDICINE
Payer: MEDICARE

## 2021-03-31 VITALS — HEIGHT: 69 IN | WEIGHT: 183 LBS | BODY MASS INDEX: 27.11 KG/M2

## 2021-03-31 DIAGNOSIS — R07.9 CHRONIC CHEST PAIN: ICD-10-CM

## 2021-03-31 DIAGNOSIS — G89.29 CHRONIC CHEST PAIN: ICD-10-CM

## 2021-03-31 DIAGNOSIS — I10 ESSENTIAL HYPERTENSION: ICD-10-CM

## 2021-03-31 LAB
25(OH)D3+25(OH)D2 SERPL-MCNC: 30 NG/ML (ref 30–96)
ANION GAP SERPL CALC-SCNC: 13 MMOL/L (ref 8–16)
ASCENDING AORTA: 2.82 CM
BSA FOR ECHO PROCEDURE: 2.01 M2
BUN SERPL-MCNC: 10 MG/DL (ref 8–23)
CALCIUM SERPL-MCNC: 9.4 MG/DL (ref 8.7–10.5)
CHLORIDE SERPL-SCNC: 101 MMOL/L (ref 95–110)
CK SERPL-CCNC: 88 U/L (ref 20–180)
CO2 SERPL-SCNC: 25 MMOL/L (ref 23–29)
CREAT SERPL-MCNC: 0.9 MG/DL (ref 0.5–1.4)
CV ECHO LV RWT: 0.37 CM
CV STRESS BASE HR: 63 BPM
DIASTOLIC BLOOD PRESSURE: 49 MMHG
DOP CALC LVOT AREA: 3.2 CM2
DOP CALC LVOT DIAMETER: 2.03 CM
DOP CALC LVOT PEAK VEL: 1.32 M/S
DOP CALC LVOT STROKE VOLUME: 88.28 CM3
DOP CALCLVOT PEAK VEL VTI: 27.29 CM
E WAVE DECELERATION TIME: 252.53 MSEC
E/A RATIO: 1.08
E/E' RATIO: 13 M/S
ECHO LV POSTERIOR WALL: 0.87 CM (ref 0.6–1.1)
EST. GFR  (AFRICAN AMERICAN): >60 ML/MIN/1.73 M^2
EST. GFR  (NON AFRICAN AMERICAN): >60 ML/MIN/1.73 M^2
FRACTIONAL SHORTENING: 45 % (ref 28–44)
GLUCOSE SERPL-MCNC: 82 MG/DL (ref 70–110)
INTERVENTRICULAR SEPTUM: 0.98 CM (ref 0.6–1.1)
IVRT: 85.63 MSEC
LA MAJOR: 5.73 CM
LA MINOR: 4.71 CM
LA WIDTH: 3.22 CM
LEFT ATRIUM SIZE: 4.32 CM
LEFT ATRIUM VOLUME INDEX: 30.7 ML/M2
LEFT ATRIUM VOLUME: 61.13 CM3
LEFT INTERNAL DIMENSION IN SYSTOLE: 2.58 CM (ref 2.1–4)
LEFT VENTRICLE DIASTOLIC VOLUME INDEX: 51.11 ML/M2
LEFT VENTRICLE DIASTOLIC VOLUME: 101.71 ML
LEFT VENTRICLE MASS INDEX: 74 G/M2
LEFT VENTRICLE SYSTOLIC VOLUME INDEX: 12.2 ML/M2
LEFT VENTRICLE SYSTOLIC VOLUME: 24.18 ML
LEFT VENTRICULAR INTERNAL DIMENSION IN DIASTOLE: 4.69 CM (ref 3.5–6)
LEFT VENTRICULAR MASS: 147.5 G
LV LATERAL E/E' RATIO: 11.38 M/S
LV SEPTAL E/E' RATIO: 15.17 M/S
MAGNESIUM SERPL-MCNC: 1.9 MG/DL (ref 1.6–2.6)
MV A" WAVE DURATION": 9.71 MSEC
MV PEAK A VEL: 0.84 M/S
MV PEAK E VEL: 0.91 M/S
MV STENOSIS PRESSURE HALF TIME: 73.23 MS
MV VALVE AREA P 1/2 METHOD: 3 CM2
OHS CV CPX 1 MINUTE RECOVERY HEART RATE: 98 BPM
OHS CV CPX 85 PERCENT MAX PREDICTED HEART RATE MALE: 126
OHS CV CPX ESTIMATED METS: 8
OHS CV CPX MAX PREDICTED HEART RATE: 148
OHS CV CPX PATIENT IS FEMALE: 1
OHS CV CPX PATIENT IS MALE: 0
OHS CV CPX PEAK DIASTOLIC BLOOD PRESSURE: 61 MMHG
OHS CV CPX PEAK HEAR RATE: 126 BPM
OHS CV CPX PEAK RATE PRESSURE PRODUCT: NORMAL
OHS CV CPX PEAK SYSTOLIC BLOOD PRESSURE: 196 MMHG
OHS CV CPX PERCENT MAX PREDICTED HEART RATE ACHIEVED: 85
OHS CV CPX RATE PRESSURE PRODUCT PRESENTING: 6300
PISA TR MAX VEL: 2.39 M/S
POTASSIUM SERPL-SCNC: 3.6 MMOL/L (ref 3.5–5.1)
PULM VEIN S/D RATIO: 1.31
PV PEAK D VEL: 0.49 M/S
PV PEAK S VEL: 0.64 M/S
RA MAJOR: 5.07 CM
RA PRESSURE: 3 MMHG
RA WIDTH: 2.88 CM
RIGHT VENTRICULAR END-DIASTOLIC DIMENSION: 3.31 CM
RV TISSUE DOPPLER FREE WALL SYSTOLIC VELOCITY 1 (APICAL 4 CHAMBER VIEW): 18.15 CM/S
SINUS: 2.95 CM
SODIUM SERPL-SCNC: 139 MMOL/L (ref 136–145)
STJ: 2.34 CM
STRESS ECHO POST EXERCISE DUR MIN: 5 MINUTES
STRESS ECHO POST EXERCISE DUR SEC: 16 SECONDS
SYSTOLIC BLOOD PRESSURE: 100 MMHG
TDI LATERAL: 0.08 M/S
TDI SEPTAL: 0.06 M/S
TDI: 0.07 M/S
TR MAX PG: 23 MMHG
TRICUSPID ANNULAR PLANE SYSTOLIC EXCURSION: 2.6 CM
TROPONIN I SERPL DL<=0.01 NG/ML-MCNC: <0.006 NG/ML (ref 0–0.03)
TV REST PULMONARY ARTERY PRESSURE: 26 MMHG

## 2021-03-31 PROCEDURE — 93351 STRESS TTE COMPLETE: CPT

## 2021-03-31 PROCEDURE — 93351 STRESS ECHO (CUPID ONLY): ICD-10-PCS | Mod: 26,,, | Performed by: INTERNAL MEDICINE

## 2021-03-31 PROCEDURE — 93351 STRESS TTE COMPLETE: CPT | Mod: 26,,, | Performed by: INTERNAL MEDICINE

## 2021-04-01 ENCOUNTER — TELEPHONE (OUTPATIENT)
Dept: INTERNAL MEDICINE | Facility: CLINIC | Age: 66
End: 2021-04-01

## 2021-04-28 ENCOUNTER — IMMUNIZATION (OUTPATIENT)
Dept: PRIMARY CARE CLINIC | Facility: CLINIC | Age: 66
End: 2021-04-28
Payer: MEDICAID

## 2021-04-28 DIAGNOSIS — Z23 NEED FOR VACCINATION: Primary | ICD-10-CM

## 2021-04-28 PROCEDURE — 0012A PR IMMUNIZ ADMIN, SARS-COV-2 COVID-19 VACC, 100MCG/0.5ML, 2ND DOSE: ICD-10-PCS | Mod: CV19,S$GLB,, | Performed by: INTERNAL MEDICINE

## 2021-04-28 PROCEDURE — 91301 PR SARS-COV-2 COVID-19 VACCINE, NO PRSV, 100MCG/0.5ML, IM: CPT | Mod: S$GLB,,, | Performed by: INTERNAL MEDICINE

## 2021-04-28 PROCEDURE — 91301 PR SARS-COV-2 COVID-19 VACCINE, NO PRSV, 100MCG/0.5ML, IM: ICD-10-PCS | Mod: S$GLB,,, | Performed by: INTERNAL MEDICINE

## 2021-04-28 PROCEDURE — 0012A PR IMMUNIZ ADMIN, SARS-COV-2 COVID-19 VACC, 100MCG/0.5ML, 2ND DOSE: CPT | Mod: CV19,S$GLB,, | Performed by: INTERNAL MEDICINE

## 2021-04-28 RX ADMIN — Medication 0.5 ML: at 09:04

## 2021-07-12 ENCOUNTER — OFFICE VISIT (OUTPATIENT)
Dept: OPTOMETRY | Facility: CLINIC | Age: 66
End: 2021-07-12
Payer: COMMERCIAL

## 2021-07-12 ENCOUNTER — TELEPHONE (OUTPATIENT)
Dept: OPHTHALMOLOGY | Facility: CLINIC | Age: 66
End: 2021-07-12

## 2021-07-12 ENCOUNTER — PATIENT OUTREACH (OUTPATIENT)
Dept: ADMINISTRATIVE | Facility: OTHER | Age: 66
End: 2021-07-12

## 2021-07-12 DIAGNOSIS — H25.13 NUCLEAR SCLEROSIS, BILATERAL: ICD-10-CM

## 2021-07-12 DIAGNOSIS — H02.9 DISORDER OF EYELID: Primary | ICD-10-CM

## 2021-07-12 PROCEDURE — 92012 PR EYE EXAM, EST PATIENT,INTERMED: ICD-10-PCS | Mod: S$GLB,,, | Performed by: OPTOMETRIST

## 2021-07-12 PROCEDURE — 99999 PR PBB SHADOW E&M-EST. PATIENT-LVL III: CPT | Mod: PBBFAC,,, | Performed by: OPTOMETRIST

## 2021-07-12 PROCEDURE — 99999 PR PBB SHADOW E&M-EST. PATIENT-LVL III: ICD-10-PCS | Mod: PBBFAC,,, | Performed by: OPTOMETRIST

## 2021-07-12 PROCEDURE — 99213 OFFICE O/P EST LOW 20 MIN: CPT | Mod: PBBFAC | Performed by: OPTOMETRIST

## 2021-07-12 PROCEDURE — 92012 INTRM OPH EXAM EST PATIENT: CPT | Mod: S$GLB,,, | Performed by: OPTOMETRIST

## 2021-07-14 ENCOUNTER — TELEPHONE (OUTPATIENT)
Dept: OPHTHALMOLOGY | Facility: CLINIC | Age: 66
End: 2021-07-14

## 2021-07-15 ENCOUNTER — TELEPHONE (OUTPATIENT)
Dept: OPHTHALMOLOGY | Facility: CLINIC | Age: 66
End: 2021-07-15

## 2021-08-03 ENCOUNTER — OFFICE VISIT (OUTPATIENT)
Dept: INTERNAL MEDICINE | Facility: CLINIC | Age: 66
End: 2021-08-03
Payer: COMMERCIAL

## 2021-08-03 ENCOUNTER — LAB VISIT (OUTPATIENT)
Dept: LAB | Facility: HOSPITAL | Age: 66
End: 2021-08-03
Attending: INTERNAL MEDICINE
Payer: COMMERCIAL

## 2021-08-03 VITALS
SYSTOLIC BLOOD PRESSURE: 138 MMHG | HEIGHT: 69 IN | HEART RATE: 69 BPM | DIASTOLIC BLOOD PRESSURE: 84 MMHG | BODY MASS INDEX: 27.11 KG/M2 | WEIGHT: 183 LBS | OXYGEN SATURATION: 99 %

## 2021-08-03 DIAGNOSIS — I10 ESSENTIAL HYPERTENSION: Primary | ICD-10-CM

## 2021-08-03 DIAGNOSIS — I10 ESSENTIAL HYPERTENSION: ICD-10-CM

## 2021-08-03 DIAGNOSIS — E78.49 OTHER HYPERLIPIDEMIA: ICD-10-CM

## 2021-08-03 PROCEDURE — 99999 PR PBB SHADOW E&M-EST. PATIENT-LVL III: CPT | Mod: PBBFAC,,, | Performed by: INTERNAL MEDICINE

## 2021-08-03 PROCEDURE — 99214 PR OFFICE/OUTPT VISIT, EST, LEVL IV, 30-39 MIN: ICD-10-PCS | Mod: S$GLB,,, | Performed by: INTERNAL MEDICINE

## 2021-08-03 PROCEDURE — 80053 COMPREHEN METABOLIC PANEL: CPT | Performed by: INTERNAL MEDICINE

## 2021-08-03 PROCEDURE — 99999 PR PBB SHADOW E&M-EST. PATIENT-LVL III: ICD-10-PCS | Mod: PBBFAC,,, | Performed by: INTERNAL MEDICINE

## 2021-08-03 PROCEDURE — 82550 ASSAY OF CK (CPK): CPT | Performed by: INTERNAL MEDICINE

## 2021-08-03 PROCEDURE — 80061 LIPID PANEL: CPT | Performed by: INTERNAL MEDICINE

## 2021-08-03 PROCEDURE — 99213 OFFICE O/P EST LOW 20 MIN: CPT | Mod: PBBFAC | Performed by: INTERNAL MEDICINE

## 2021-08-03 PROCEDURE — 99214 OFFICE O/P EST MOD 30 MIN: CPT | Mod: S$GLB,,, | Performed by: INTERNAL MEDICINE

## 2021-08-03 PROCEDURE — 84443 ASSAY THYROID STIM HORMONE: CPT | Performed by: INTERNAL MEDICINE

## 2021-08-03 PROCEDURE — 84484 ASSAY OF TROPONIN QUANT: CPT | Performed by: INTERNAL MEDICINE

## 2021-08-03 PROCEDURE — 85025 COMPLETE CBC W/AUTO DIFF WBC: CPT | Performed by: INTERNAL MEDICINE

## 2021-08-03 PROCEDURE — 36415 COLL VENOUS BLD VENIPUNCTURE: CPT | Performed by: INTERNAL MEDICINE

## 2021-08-04 LAB
ALBUMIN SERPL BCP-MCNC: 3.9 G/DL (ref 3.5–5.2)
ALP SERPL-CCNC: 58 U/L (ref 55–135)
ALT SERPL W/O P-5'-P-CCNC: 16 U/L (ref 10–44)
ANION GAP SERPL CALC-SCNC: 9 MMOL/L (ref 8–16)
AST SERPL-CCNC: 23 U/L (ref 10–40)
BASOPHILS # BLD AUTO: 0.05 K/UL (ref 0–0.2)
BASOPHILS NFR BLD: 0.9 % (ref 0–1.9)
BILIRUB SERPL-MCNC: 0.7 MG/DL (ref 0.1–1)
BUN SERPL-MCNC: 7 MG/DL (ref 8–23)
CALCIUM SERPL-MCNC: 9.8 MG/DL (ref 8.7–10.5)
CHLORIDE SERPL-SCNC: 104 MMOL/L (ref 95–110)
CHOLEST SERPL-MCNC: 236 MG/DL (ref 120–199)
CHOLEST/HDLC SERPL: 3 {RATIO} (ref 2–5)
CK SERPL-CCNC: 111 U/L (ref 20–180)
CO2 SERPL-SCNC: 27 MMOL/L (ref 23–29)
CREAT SERPL-MCNC: 0.9 MG/DL (ref 0.5–1.4)
DIFFERENTIAL METHOD: ABNORMAL
EOSINOPHIL # BLD AUTO: 0.5 K/UL (ref 0–0.5)
EOSINOPHIL NFR BLD: 8.5 % (ref 0–8)
ERYTHROCYTE [DISTWIDTH] IN BLOOD BY AUTOMATED COUNT: 13.7 % (ref 11.5–14.5)
EST. GFR  (AFRICAN AMERICAN): >60 ML/MIN/1.73 M^2
EST. GFR  (NON AFRICAN AMERICAN): >60 ML/MIN/1.73 M^2
GLUCOSE SERPL-MCNC: 90 MG/DL (ref 70–110)
HCT VFR BLD AUTO: 39.7 % (ref 37–48.5)
HDLC SERPL-MCNC: 79 MG/DL (ref 40–75)
HDLC SERPL: 33.5 % (ref 20–50)
HGB BLD-MCNC: 13.2 G/DL (ref 12–16)
IMM GRANULOCYTES # BLD AUTO: 0 K/UL (ref 0–0.04)
IMM GRANULOCYTES NFR BLD AUTO: 0 % (ref 0–0.5)
LDLC SERPL CALC-MCNC: 138.4 MG/DL (ref 63–159)
LYMPHOCYTES # BLD AUTO: 1.7 K/UL (ref 1–4.8)
LYMPHOCYTES NFR BLD: 30.9 % (ref 18–48)
MCH RBC QN AUTO: 30.2 PG (ref 27–31)
MCHC RBC AUTO-ENTMCNC: 33.2 G/DL (ref 32–36)
MCV RBC AUTO: 91 FL (ref 82–98)
MONOCYTES # BLD AUTO: 0.6 K/UL (ref 0.3–1)
MONOCYTES NFR BLD: 10.9 % (ref 4–15)
NEUTROPHILS # BLD AUTO: 2.6 K/UL (ref 1.8–7.7)
NEUTROPHILS NFR BLD: 48.8 % (ref 38–73)
NONHDLC SERPL-MCNC: 157 MG/DL
NRBC BLD-RTO: 0 /100 WBC
PLATELET # BLD AUTO: 237 K/UL (ref 150–450)
PMV BLD AUTO: 12.1 FL (ref 9.2–12.9)
POTASSIUM SERPL-SCNC: 3.8 MMOL/L (ref 3.5–5.1)
PROT SERPL-MCNC: 7.3 G/DL (ref 6–8.4)
RBC # BLD AUTO: 4.37 M/UL (ref 4–5.4)
SODIUM SERPL-SCNC: 140 MMOL/L (ref 136–145)
TRIGL SERPL-MCNC: 93 MG/DL (ref 30–150)
TROPONIN I SERPL DL<=0.01 NG/ML-MCNC: <0.006 NG/ML (ref 0–0.03)
TSH SERPL DL<=0.005 MIU/L-ACNC: 0.62 UIU/ML (ref 0.4–4)
WBC # BLD AUTO: 5.4 K/UL (ref 3.9–12.7)

## 2021-08-06 ENCOUNTER — OFFICE VISIT (OUTPATIENT)
Dept: OPTOMETRY | Facility: CLINIC | Age: 66
End: 2021-08-06
Payer: COMMERCIAL

## 2021-08-06 ENCOUNTER — TELEPHONE (OUTPATIENT)
Dept: INTERNAL MEDICINE | Facility: CLINIC | Age: 66
End: 2021-08-06

## 2021-08-06 DIAGNOSIS — Z13.5 GLAUCOMA SCREENING: ICD-10-CM

## 2021-08-06 DIAGNOSIS — H25.13 NUCLEAR SCLEROSIS, BILATERAL: ICD-10-CM

## 2021-08-06 DIAGNOSIS — H57.11 PAIN IN PERIORBITAL REGION OF RIGHT EYE: Primary | ICD-10-CM

## 2021-08-06 PROCEDURE — 92014 PR EYE EXAM, EST PATIENT,COMPREHESV: ICD-10-PCS | Mod: S$GLB,,, | Performed by: OPTOMETRIST

## 2021-08-06 PROCEDURE — 92014 COMPRE OPH EXAM EST PT 1/>: CPT | Mod: S$GLB,,, | Performed by: OPTOMETRIST

## 2021-08-06 PROCEDURE — 99999 PR PBB SHADOW E&M-EST. PATIENT-LVL II: CPT | Mod: PBBFAC,,, | Performed by: OPTOMETRIST

## 2021-08-06 PROCEDURE — 99999 PR PBB SHADOW E&M-EST. PATIENT-LVL II: ICD-10-PCS | Mod: PBBFAC,,, | Performed by: OPTOMETRIST

## 2021-08-06 PROCEDURE — 99212 OFFICE O/P EST SF 10 MIN: CPT | Mod: PBBFAC,PO | Performed by: OPTOMETRIST

## 2021-08-07 ENCOUNTER — NURSE TRIAGE (OUTPATIENT)
Dept: ADMINISTRATIVE | Facility: CLINIC | Age: 66
End: 2021-08-07

## 2021-08-07 RX ORDER — MELOXICAM 7.5 MG/1
7.5 TABLET ORAL DAILY
Qty: 10 TABLET | Refills: 0 | Status: SHIPPED | OUTPATIENT
Start: 2021-08-07 | End: 2021-08-17

## 2021-10-01 RX ORDER — LOSARTAN POTASSIUM AND HYDROCHLOROTHIAZIDE 25; 100 MG/1; MG/1
TABLET ORAL
Qty: 90 TABLET | Refills: 1 | OUTPATIENT
Start: 2021-10-01

## 2021-10-01 RX ORDER — MELOXICAM 7.5 MG/1
TABLET ORAL
Qty: 10 TABLET | Refills: 0 | OUTPATIENT
Start: 2021-10-01

## 2021-10-28 ENCOUNTER — OFFICE VISIT (OUTPATIENT)
Dept: OPHTHALMOLOGY | Facility: CLINIC | Age: 66
End: 2021-10-28
Payer: COMMERCIAL

## 2021-10-28 ENCOUNTER — PATIENT OUTREACH (OUTPATIENT)
Dept: ADMINISTRATIVE | Facility: OTHER | Age: 66
End: 2021-10-28
Payer: COMMERCIAL

## 2021-10-28 DIAGNOSIS — H57.11 ORBITAL PAIN, RIGHT: ICD-10-CM

## 2021-10-28 DIAGNOSIS — R51.9 FACE PAIN: Primary | ICD-10-CM

## 2021-10-28 DIAGNOSIS — G89.29 CHRONIC FACIAL PAIN: ICD-10-CM

## 2021-10-28 DIAGNOSIS — H04.123 BILATERAL DRY EYES: ICD-10-CM

## 2021-10-28 DIAGNOSIS — H43.811 PVD (POSTERIOR VITREOUS DETACHMENT), RIGHT: ICD-10-CM

## 2021-10-28 DIAGNOSIS — R51.9 CHRONIC FACIAL PAIN: ICD-10-CM

## 2021-10-28 PROCEDURE — 99213 OFFICE O/P EST LOW 20 MIN: CPT | Mod: PBBFAC | Performed by: OPHTHALMOLOGY

## 2021-10-28 PROCEDURE — 99203 OFFICE O/P NEW LOW 30 MIN: CPT | Mod: S$GLB,,, | Performed by: OPHTHALMOLOGY

## 2021-10-28 PROCEDURE — 99999 PR PBB SHADOW E&M-EST. PATIENT-LVL III: CPT | Mod: PBBFAC,,, | Performed by: OPHTHALMOLOGY

## 2021-10-28 PROCEDURE — 99203 PR OFFICE/OUTPT VISIT, NEW, LEVL III, 30-44 MIN: ICD-10-PCS | Mod: S$GLB,,, | Performed by: OPHTHALMOLOGY

## 2021-10-28 PROCEDURE — 99999 PR PBB SHADOW E&M-EST. PATIENT-LVL III: ICD-10-PCS | Mod: PBBFAC,,, | Performed by: OPHTHALMOLOGY

## 2021-11-03 ENCOUNTER — TELEPHONE (OUTPATIENT)
Dept: OPHTHALMOLOGY | Facility: CLINIC | Age: 66
End: 2021-11-03
Payer: COMMERCIAL

## 2021-11-04 ENCOUNTER — TELEPHONE (OUTPATIENT)
Dept: OPHTHALMOLOGY | Facility: CLINIC | Age: 66
End: 2021-11-04
Payer: COMMERCIAL

## 2021-11-08 ENCOUNTER — TELEPHONE (OUTPATIENT)
Dept: OPHTHALMOLOGY | Facility: CLINIC | Age: 66
End: 2021-11-08
Payer: COMMERCIAL

## 2021-11-08 ENCOUNTER — HOSPITAL ENCOUNTER (OUTPATIENT)
Dept: RADIOLOGY | Facility: HOSPITAL | Age: 66
Discharge: HOME | End: 2021-11-08
Attending: OPHTHALMOLOGY
Payer: COMMERCIAL

## 2021-11-08 DIAGNOSIS — H57.11 ORBITAL PAIN, RIGHT: ICD-10-CM

## 2021-11-08 DIAGNOSIS — R51.9 FACE PAIN: ICD-10-CM

## 2021-11-08 PROCEDURE — 70486 CT MAXILLOFACIAL W/O DYE: CPT | Mod: TC

## 2021-11-08 PROCEDURE — 70486 CT MAXILLOFACIAL WITHOUT CONTRAST: ICD-10-PCS | Mod: 26,,, | Performed by: RADIOLOGY

## 2021-11-08 PROCEDURE — 70486 CT MAXILLOFACIAL W/O DYE: CPT | Mod: 26,,, | Performed by: RADIOLOGY

## 2021-11-09 ENCOUNTER — TELEPHONE (OUTPATIENT)
Dept: OPHTHALMOLOGY | Facility: CLINIC | Age: 66
End: 2021-11-09
Payer: COMMERCIAL

## 2021-12-02 ENCOUNTER — TELEPHONE (OUTPATIENT)
Dept: NEUROLOGY | Facility: CLINIC | Age: 66
End: 2021-12-02
Payer: COMMERCIAL

## 2021-12-17 ENCOUNTER — NURSE TRIAGE (OUTPATIENT)
Dept: ADMINISTRATIVE | Facility: CLINIC | Age: 66
End: 2021-12-17
Payer: COMMERCIAL

## 2021-12-22 ENCOUNTER — IMMUNIZATION (OUTPATIENT)
Dept: INTERNAL MEDICINE | Facility: CLINIC | Age: 66
End: 2021-12-22
Payer: COMMERCIAL

## 2021-12-22 DIAGNOSIS — Z23 NEED FOR VACCINATION: Primary | ICD-10-CM

## 2021-12-22 PROCEDURE — 0064A COVID-19, MRNA, LNP-S, PF, 100 MCG/0.25 ML DOSE VACCINE (MODERNA BOOSTER): CPT | Mod: PBBFAC,CV19

## 2021-12-23 ENCOUNTER — OFFICE VISIT (OUTPATIENT)
Dept: INTERNAL MEDICINE | Facility: CLINIC | Age: 66
End: 2021-12-23
Payer: COMMERCIAL

## 2021-12-23 VITALS
HEIGHT: 69 IN | WEIGHT: 185.88 LBS | HEART RATE: 83 BPM | BODY MASS INDEX: 27.53 KG/M2 | DIASTOLIC BLOOD PRESSURE: 78 MMHG | SYSTOLIC BLOOD PRESSURE: 142 MMHG | OXYGEN SATURATION: 99 %

## 2021-12-23 DIAGNOSIS — R51.9 NONINTRACTABLE HEADACHE, UNSPECIFIED CHRONICITY PATTERN, UNSPECIFIED HEADACHE TYPE: ICD-10-CM

## 2021-12-23 DIAGNOSIS — M54.6 CHRONIC BILATERAL THORACIC BACK PAIN: ICD-10-CM

## 2021-12-23 DIAGNOSIS — M79.643 NON-ARTICULAR HAND PAIN, UNSPECIFIED LATERALITY: ICD-10-CM

## 2021-12-23 DIAGNOSIS — M54.50 LUMBOSACRAL PAIN: ICD-10-CM

## 2021-12-23 DIAGNOSIS — M25.559 PAIN IN JOINT OF PELVIC REGION: ICD-10-CM

## 2021-12-23 DIAGNOSIS — G89.29 CHRONIC BILATERAL THORACIC BACK PAIN: ICD-10-CM

## 2021-12-23 DIAGNOSIS — R07.2 PRECORDIAL PAIN: Primary | ICD-10-CM

## 2021-12-23 PROCEDURE — 99213 OFFICE O/P EST LOW 20 MIN: CPT | Mod: PBBFAC | Performed by: INTERNAL MEDICINE

## 2021-12-23 PROCEDURE — 99214 OFFICE O/P EST MOD 30 MIN: CPT | Mod: S$GLB,,, | Performed by: INTERNAL MEDICINE

## 2021-12-23 PROCEDURE — 99999 PR PBB SHADOW E&M-EST. PATIENT-LVL III: ICD-10-PCS | Mod: PBBFAC,,, | Performed by: INTERNAL MEDICINE

## 2021-12-23 PROCEDURE — 99999 PR PBB SHADOW E&M-EST. PATIENT-LVL III: CPT | Mod: PBBFAC,,, | Performed by: INTERNAL MEDICINE

## 2021-12-23 PROCEDURE — 99214 PR OFFICE/OUTPT VISIT, EST, LEVL IV, 30-39 MIN: ICD-10-PCS | Mod: S$GLB,,, | Performed by: INTERNAL MEDICINE

## 2021-12-23 RX ORDER — KETOROLAC TROMETHAMINE 10 MG/1
10 TABLET, FILM COATED ORAL 3 TIMES DAILY
Qty: 21 TABLET | Refills: 0 | Status: SHIPPED | OUTPATIENT
Start: 2021-12-23 | End: 2022-01-03

## 2021-12-23 RX ORDER — AMOXICILLIN 500 MG
CAPSULE ORAL DAILY
COMMUNITY

## 2021-12-28 ENCOUNTER — HOSPITAL ENCOUNTER (OUTPATIENT)
Dept: RADIOLOGY | Facility: HOSPITAL | Age: 66
Discharge: HOME | End: 2021-12-28
Attending: INTERNAL MEDICINE
Payer: COMMERCIAL

## 2021-12-28 ENCOUNTER — TELEPHONE (OUTPATIENT)
Dept: INTERNAL MEDICINE | Facility: CLINIC | Age: 66
End: 2021-12-28
Payer: COMMERCIAL

## 2021-12-28 DIAGNOSIS — G89.29 CHRONIC BILATERAL THORACIC BACK PAIN: ICD-10-CM

## 2021-12-28 DIAGNOSIS — R51.9 NONINTRACTABLE HEADACHE, UNSPECIFIED CHRONICITY PATTERN, UNSPECIFIED HEADACHE TYPE: ICD-10-CM

## 2021-12-28 DIAGNOSIS — M54.50 LUMBOSACRAL PAIN: ICD-10-CM

## 2021-12-28 DIAGNOSIS — R07.2 PRECORDIAL PAIN: Primary | ICD-10-CM

## 2021-12-28 DIAGNOSIS — M79.643 NON-ARTICULAR HAND PAIN, UNSPECIFIED LATERALITY: ICD-10-CM

## 2021-12-28 DIAGNOSIS — M25.559 PAIN IN JOINT OF PELVIC REGION: ICD-10-CM

## 2021-12-28 DIAGNOSIS — M54.6 CHRONIC BILATERAL THORACIC BACK PAIN: ICD-10-CM

## 2021-12-28 PROCEDURE — 73521 X-RAY EXAM HIPS BI 2 VIEWS: CPT | Mod: 26,,, | Performed by: RADIOLOGY

## 2021-12-28 PROCEDURE — 72100 X-RAY EXAM L-S SPINE 2/3 VWS: CPT | Mod: 26,,, | Performed by: RADIOLOGY

## 2021-12-28 PROCEDURE — 73521 X-RAY EXAM HIPS BI 2 VIEWS: CPT | Mod: TC

## 2021-12-28 PROCEDURE — 73521 XR HIPS BILATERAL 2 VIEW INCL AP PELVIS: ICD-10-PCS | Mod: 26,,, | Performed by: RADIOLOGY

## 2021-12-28 PROCEDURE — 72100 X-RAY EXAM L-S SPINE 2/3 VWS: CPT | Mod: TC

## 2021-12-28 PROCEDURE — 72100 XR LUMBAR SPINE AP AND LATERAL: ICD-10-PCS | Mod: 26,,, | Performed by: RADIOLOGY

## 2021-12-29 ENCOUNTER — LAB VISIT (OUTPATIENT)
Dept: LAB | Facility: HOSPITAL | Age: 66
End: 2021-12-29
Attending: INTERNAL MEDICINE
Payer: COMMERCIAL

## 2021-12-29 DIAGNOSIS — M54.6 CHRONIC BILATERAL THORACIC BACK PAIN: ICD-10-CM

## 2021-12-29 DIAGNOSIS — M79.643 NON-ARTICULAR HAND PAIN, UNSPECIFIED LATERALITY: ICD-10-CM

## 2021-12-29 DIAGNOSIS — M54.50 LUMBOSACRAL PAIN: ICD-10-CM

## 2021-12-29 DIAGNOSIS — M25.559 PAIN IN JOINT OF PELVIC REGION: ICD-10-CM

## 2021-12-29 DIAGNOSIS — R51.9 NONINTRACTABLE HEADACHE, UNSPECIFIED CHRONICITY PATTERN, UNSPECIFIED HEADACHE TYPE: ICD-10-CM

## 2021-12-29 DIAGNOSIS — G89.29 CHRONIC BILATERAL THORACIC BACK PAIN: ICD-10-CM

## 2021-12-29 DIAGNOSIS — R07.2 PRECORDIAL PAIN: ICD-10-CM

## 2021-12-29 LAB
ALBUMIN SERPL BCP-MCNC: 3.6 G/DL (ref 3.5–5.2)
ALP SERPL-CCNC: 65 U/L (ref 55–135)
ALT SERPL W/O P-5'-P-CCNC: 14 U/L (ref 10–44)
ANION GAP SERPL CALC-SCNC: 12 MMOL/L (ref 8–16)
AST SERPL-CCNC: 21 U/L (ref 10–40)
BASOPHILS # BLD AUTO: 0.05 K/UL (ref 0–0.2)
BASOPHILS NFR BLD: 1.1 % (ref 0–1.9)
BILIRUB SERPL-MCNC: 0.7 MG/DL (ref 0.1–1)
BNP SERPL-MCNC: 24 PG/ML (ref 0–99)
BUN SERPL-MCNC: 11 MG/DL (ref 8–23)
CALCIUM SERPL-MCNC: 9.6 MG/DL (ref 8.7–10.5)
CHLORIDE SERPL-SCNC: 102 MMOL/L (ref 95–110)
CK SERPL-CCNC: 68 U/L (ref 20–180)
CO2 SERPL-SCNC: 23 MMOL/L (ref 23–29)
CREAT SERPL-MCNC: 0.8 MG/DL (ref 0.5–1.4)
D DIMER PPP IA.FEU-MCNC: 1.15 MG/L FEU
DIFFERENTIAL METHOD: NORMAL
EOSINOPHIL # BLD AUTO: 0.2 K/UL (ref 0–0.5)
EOSINOPHIL NFR BLD: 5.5 % (ref 0–8)
ERYTHROCYTE [DISTWIDTH] IN BLOOD BY AUTOMATED COUNT: 13.2 % (ref 11.5–14.5)
EST. GFR  (AFRICAN AMERICAN): >60 ML/MIN/1.73 M^2
EST. GFR  (NON AFRICAN AMERICAN): >60 ML/MIN/1.73 M^2
GLUCOSE SERPL-MCNC: 75 MG/DL (ref 70–110)
HCT VFR BLD AUTO: 40 % (ref 37–48.5)
HGB BLD-MCNC: 12.8 G/DL (ref 12–16)
IMM GRANULOCYTES # BLD AUTO: 0.01 K/UL (ref 0–0.04)
IMM GRANULOCYTES NFR BLD AUTO: 0.2 % (ref 0–0.5)
LYMPHOCYTES # BLD AUTO: 1.8 K/UL (ref 1–4.8)
LYMPHOCYTES NFR BLD: 41.4 % (ref 18–48)
MAGNESIUM SERPL-MCNC: 1.9 MG/DL (ref 1.6–2.6)
MCH RBC QN AUTO: 29.7 PG (ref 27–31)
MCHC RBC AUTO-ENTMCNC: 32 G/DL (ref 32–36)
MCV RBC AUTO: 93 FL (ref 82–98)
MONOCYTES # BLD AUTO: 0.4 K/UL (ref 0.3–1)
MONOCYTES NFR BLD: 8.4 % (ref 4–15)
NEUTROPHILS # BLD AUTO: 1.9 K/UL (ref 1.8–7.7)
NEUTROPHILS NFR BLD: 43.4 % (ref 38–73)
NRBC BLD-RTO: 0 /100 WBC
PLATELET # BLD AUTO: 286 K/UL (ref 150–450)
PMV BLD AUTO: 10.9 FL (ref 9.2–12.9)
POTASSIUM SERPL-SCNC: 4 MMOL/L (ref 3.5–5.1)
PROT SERPL-MCNC: 7 G/DL (ref 6–8.4)
RBC # BLD AUTO: 4.31 M/UL (ref 4–5.4)
SODIUM SERPL-SCNC: 137 MMOL/L (ref 136–145)
WBC # BLD AUTO: 4.4 K/UL (ref 3.9–12.7)

## 2021-12-29 PROCEDURE — 80053 COMPREHEN METABOLIC PANEL: CPT | Performed by: INTERNAL MEDICINE

## 2021-12-29 PROCEDURE — 85379 FIBRIN DEGRADATION QUANT: CPT | Performed by: INTERNAL MEDICINE

## 2021-12-29 PROCEDURE — 85025 COMPLETE CBC W/AUTO DIFF WBC: CPT | Performed by: INTERNAL MEDICINE

## 2021-12-29 PROCEDURE — 83735 ASSAY OF MAGNESIUM: CPT | Performed by: INTERNAL MEDICINE

## 2021-12-29 PROCEDURE — 83880 ASSAY OF NATRIURETIC PEPTIDE: CPT | Performed by: INTERNAL MEDICINE

## 2021-12-29 PROCEDURE — 82550 ASSAY OF CK (CPK): CPT | Performed by: INTERNAL MEDICINE

## 2021-12-29 PROCEDURE — 36415 COLL VENOUS BLD VENIPUNCTURE: CPT | Performed by: INTERNAL MEDICINE

## 2021-12-31 ENCOUNTER — NURSE TRIAGE (OUTPATIENT)
Dept: ADMINISTRATIVE | Facility: CLINIC | Age: 66
End: 2021-12-31
Payer: COMMERCIAL

## 2022-01-03 ENCOUNTER — TELEPHONE (OUTPATIENT)
Dept: INTERNAL MEDICINE | Facility: CLINIC | Age: 67
End: 2022-01-03
Payer: COMMERCIAL

## 2022-01-03 DIAGNOSIS — M54.6 CHRONIC BILATERAL THORACIC BACK PAIN: ICD-10-CM

## 2022-01-03 DIAGNOSIS — M54.50 CHRONIC BILATERAL LOW BACK PAIN WITHOUT SCIATICA: ICD-10-CM

## 2022-01-03 DIAGNOSIS — M54.2 CHRONIC CERVICAL PAIN: Primary | ICD-10-CM

## 2022-01-03 DIAGNOSIS — G89.29 CHRONIC BILATERAL LOW BACK PAIN WITHOUT SCIATICA: ICD-10-CM

## 2022-01-03 DIAGNOSIS — G89.29 CHRONIC CERVICAL PAIN: Primary | ICD-10-CM

## 2022-01-03 DIAGNOSIS — G89.29 CHRONIC BILATERAL THORACIC BACK PAIN: ICD-10-CM

## 2022-01-03 RX ORDER — DICLOFENAC SODIUM 75 MG/1
75 TABLET, DELAYED RELEASE ORAL 2 TIMES DAILY
Qty: 20 TABLET | Refills: 0 | Status: SHIPPED | OUTPATIENT
Start: 2022-01-03 | End: 2022-01-13

## 2022-01-03 NOTE — TELEPHONE ENCOUNTER
----- Message from Jerzy Madrid sent at 1/3/2022  3:04 PM CST -----  Contact: self [phone]  Calling to get test results.  Name of test (lab, x-ray): labs  Date of test: 12/29/2021  Where was the test performed: [facility], Lab  Would you like a call back, or a response through your [facility] portal?:  call back  Comments:      Please call and advise

## 2022-01-04 ENCOUNTER — DOCUMENTATION ONLY (OUTPATIENT)
Dept: INTERNAL MEDICINE | Facility: CLINIC | Age: 67
End: 2022-01-04
Payer: COMMERCIAL
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
## 2022-01-06 ENCOUNTER — TELEPHONE (OUTPATIENT)
Dept: OPTOMETRY | Facility: CLINIC | Age: 67
End: 2022-01-06
Payer: COMMERCIAL

## 2022-01-06 NOTE — TELEPHONE ENCOUNTER
----- Message from Eveline Peterson sent at 1/6/2022  3:27 PM CST -----  Regarding: pt called  Name of Who is Calling: NATALIA LUGO [[id number]]      What is the request in detail: pt is requesting to reschedule her appt. Please advise      Can the clinic reply by [facility]: No      What Number to Call Back [facility]: [phone]

## 2022-01-10 ENCOUNTER — TELEPHONE (OUTPATIENT)
Dept: OPTOMETRY | Facility: CLINIC | Age: 67
End: 2022-01-10
Payer: COMMERCIAL

## 2022-01-10 NOTE — TELEPHONE ENCOUNTER
----- Message from Benito Dubose sent at 1/10/2022 11:41 AM CST -----  Pt inquiring if she can be seen today (instead of tomorrow's appt).    [phone]

## 2022-01-11 ENCOUNTER — TELEPHONE (OUTPATIENT)
Dept: OPTOMETRY | Facility: CLINIC | Age: 67
End: 2022-01-11
Payer: COMMERCIAL

## 2022-01-12 ENCOUNTER — TELEPHONE (OUTPATIENT)
Dept: OPTOMETRY | Facility: CLINIC | Age: 67
End: 2022-01-12
Payer: COMMERCIAL

## 2022-01-12 ENCOUNTER — OFFICE VISIT (OUTPATIENT)
Dept: OPTOMETRY | Facility: CLINIC | Age: 67
End: 2022-01-12
Payer: COMMERCIAL

## 2022-01-12 DIAGNOSIS — H53.71 GLARE SENSITIVITY: Primary | ICD-10-CM

## 2022-01-12 PROCEDURE — 99499 UNLISTED E&M SERVICE: CPT | Mod: S$GLB,,, | Performed by: OPTOMETRIST

## 2022-01-12 PROCEDURE — 99999 PR PBB SHADOW E&M-EST. PATIENT-LVL II: CPT | Mod: PBBFAC,,, | Performed by: OPTOMETRIST

## 2022-01-12 PROCEDURE — 99499 NO LOS: ICD-10-PCS | Mod: S$GLB,,, | Performed by: OPTOMETRIST

## 2022-01-12 PROCEDURE — 99999 PR PBB SHADOW E&M-EST. PATIENT-LVL II: ICD-10-PCS | Mod: PBBFAC,,, | Performed by: OPTOMETRIST

## 2022-01-12 PROCEDURE — 99212 OFFICE O/P EST SF 10 MIN: CPT | Mod: PBBFAC | Performed by: OPTOMETRIST

## 2022-01-12 NOTE — TELEPHONE ENCOUNTER
----- Message from Meche Jasso sent at 1/12/2022 10:31 AM CST -----  Regarding: Call back  Contact: [phone]  Type:  Patient Call Back    Who Called:pt  What this is regarding?:speak to nurse regarding missed appt  Would the patient rather a call back or a response via [facility]? Call back  Best Call Back Number:[phone]  Additional Information:     Advised to call back directly if there are further questions, or if these symptoms fail to improve as anticipated or worsen.
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
## 2022-01-12 NOTE — TELEPHONE ENCOUNTER
----- Message from Massiel Pike sent at 1/12/2022 11:19 AM CST -----  Regarding: Appt  Contact: Josi Kruger is calling to reschedule appt  for today if possible. Pt states that she was not aware that she had an appt on 01/11/22.    [phone]

## 2022-01-24 ENCOUNTER — OFFICE VISIT (OUTPATIENT)
Dept: OPHTHALMOLOGY | Facility: CLINIC | Age: 67
End: 2022-01-24
Payer: COMMERCIAL

## 2022-01-24 DIAGNOSIS — H25.13 NUCLEAR SCLEROSIS OF BOTH EYES: ICD-10-CM

## 2022-01-24 DIAGNOSIS — H52.7 REFRACTIVE ERROR: ICD-10-CM

## 2022-01-24 DIAGNOSIS — H53.71 GLARE SENSITIVITY: Primary | ICD-10-CM

## 2022-01-24 DIAGNOSIS — H04.123 BILATERAL DRY EYES: ICD-10-CM

## 2022-01-24 PROCEDURE — 99999 PR PBB SHADOW E&M-EST. PATIENT-LVL III: ICD-10-PCS | Mod: PBBFAC,,, | Performed by: OPHTHALMOLOGY

## 2022-01-24 PROCEDURE — 92012 PR EYE EXAM, EST PATIENT,INTERMED: ICD-10-PCS | Mod: S$GLB,,, | Performed by: OPHTHALMOLOGY

## 2022-01-24 PROCEDURE — 99213 OFFICE O/P EST LOW 20 MIN: CPT | Mod: PBBFAC | Performed by: OPHTHALMOLOGY

## 2022-01-24 PROCEDURE — 99999 PR PBB SHADOW E&M-EST. PATIENT-LVL III: CPT | Mod: PBBFAC,,, | Performed by: OPHTHALMOLOGY

## 2022-01-24 PROCEDURE — 92012 INTRM OPH EXAM EST PATIENT: CPT | Mod: S$GLB,,, | Performed by: OPHTHALMOLOGY
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
## 2022-01-25 ENCOUNTER — TELEPHONE (OUTPATIENT)
Dept: INTERNAL MEDICINE | Facility: CLINIC | Age: 67
End: 2022-01-25
Payer: COMMERCIAL

## 2022-01-25 NOTE — TELEPHONE ENCOUNTER
----- Message from Otto Sutton sent at 1/25/2022  9:54 AM CST -----  Contact: [phone] pt  Pt states she is still waiting on a call from the office in regards to her healthcare. Please call and advise  Thank you

## 2022-01-25 NOTE — TELEPHONE ENCOUNTER
Tried to call patient, no answer could not leave voice message due to patient's message box being full. Will call back later

## 2022-01-28 ENCOUNTER — TELEPHONE (OUTPATIENT)
Dept: INTERNAL MEDICINE | Facility: CLINIC | Age: 67
End: 2022-01-28

## 2022-01-28 RX ORDER — KETOROLAC TROMETHAMINE 10 MG/1
10 TABLET, FILM COATED ORAL 2 TIMES DAILY PRN
Qty: 10 TABLET | Refills: 0 | Status: SHIPPED | OUTPATIENT
Start: 2022-01-28 | End: 2022-02-02

## 2022-01-28 RX ORDER — AMLODIPINE BESYLATE 5 MG/1
5 TABLET ORAL DAILY
Qty: 90 TABLET | Refills: 1 | Status: SHIPPED | OUTPATIENT
Start: 2022-01-28 | End: 2022-04-20

## 2022-01-28 RX ORDER — METOPROLOL SUCCINATE 25 MG/1
25 TABLET, EXTENDED RELEASE ORAL DAILY
Qty: 90 TABLET | Refills: 1 | Status: SHIPPED | OUTPATIENT
Start: 2022-01-28 | End: 2022-04-20

## 2022-02-02 ENCOUNTER — TELEPHONE (OUTPATIENT)
Dept: INTERNAL MEDICINE | Facility: CLINIC | Age: 67
End: 2022-02-02
Payer: COMMERCIAL

## 2022-02-02 DIAGNOSIS — Z13.6 ENCOUNTER FOR SCREENING FOR CARDIOVASCULAR DISORDERS: ICD-10-CM

## 2022-02-02 NOTE — TELEPHONE ENCOUNTER
What happened is that the type a CT scan that was done about 2 years ago to evaluate the coronary arteries, this test is not being performed at [facility] but will set up what is known as a calcium score which is another way to evaluate the coronary arteries

## 2022-02-02 NOTE — TELEPHONE ENCOUNTER
----- Message from Malia Smith sent at 2/2/2022  7:30 AM CST -----  Contact: Josi Gil  @ [phone]  Still waiting on a call back for he testing information.

## 2022-02-03 ENCOUNTER — HOSPITAL ENCOUNTER (OUTPATIENT)
Dept: RADIOLOGY | Facility: HOSPITAL | Age: 67
Discharge: HOME | End: 2022-02-03
Attending: INTERNAL MEDICINE
Payer: COMMERCIAL

## 2022-02-03 DIAGNOSIS — Z13.6 ENCOUNTER FOR SCREENING FOR CARDIOVASCULAR DISORDERS: ICD-10-CM

## 2022-02-03 PROCEDURE — 75571 CT CALCIUM SCORING CARDIAC: ICD-10-PCS | Mod: 26,,, | Performed by: RADIOLOGY

## 2022-02-03 PROCEDURE — 75571 CT HRT W/O DYE W/CA TEST: CPT | Mod: 26,,, | Performed by: RADIOLOGY

## 2022-02-03 PROCEDURE — 75571 CT HRT W/O DYE W/CA TEST: CPT | Mod: TC

## 2022-02-04 ENCOUNTER — TELEPHONE (OUTPATIENT)
Dept: INTERNAL MEDICINE | Facility: CLINIC | Age: 67
End: 2022-02-04
Payer: COMMERCIAL

## 2022-02-04 NOTE — TELEPHONE ENCOUNTER
----- Message from Tiana Cottrell sent at 2/4/2022  9:57 AM CST -----  Contact: self/[phone]  Calling to get test results.  Name of test (lab, x-ray): test  Date of test:2-4-22   Where was the test performed: [facility]  Would you like a call back, or a response through your [facility] portal?:   call back  Comments:     Please advise

## 2022-02-11 ENCOUNTER — DOCUMENTATION ONLY (OUTPATIENT)
Dept: INTERNAL MEDICINE | Facility: CLINIC | Age: 67
End: 2022-02-11
Payer: COMMERCIAL
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
## 2022-02-15 ENCOUNTER — DOCUMENTATION ONLY (OUTPATIENT)
Dept: CARDIOTHORACIC SURGERY | Facility: CLINIC | Age: 67
End: 2022-02-15
Payer: COMMERCIAL

## 2022-02-15 DIAGNOSIS — R07.9 CHEST PAIN, UNSPECIFIED TYPE: Primary | ICD-10-CM

## 2022-02-15 NOTE — PROGRESS NOTES
Received basket message and returned patient's call. Explained Dr. Michelle is a heart surgeon, who usually receives patient after diagnosed by a cardiologist has ordered an angiogram and patient has been diagnosed. Patient agreed she needs a cardiologist and wanted an [facility] doctor. Asked if she would like to see Dr. Gilmer Russell and agreed. Message sent to Dr. Russell's office. Patient very satisfied and expects a call later today.

## 2022-02-23 ENCOUNTER — TELEPHONE (OUTPATIENT)
Dept: INTERNAL MEDICINE | Facility: CLINIC | Age: 67
End: 2022-02-23
Payer: COMMERCIAL

## 2022-02-23 DIAGNOSIS — Z12.31 ENCOUNTER FOR SCREENING MAMMOGRAM FOR BREAST CANCER: Primary | ICD-10-CM

## 2022-02-23 NOTE — TELEPHONE ENCOUNTER
----- Message from Alyssa Childress sent at 2/23/2022  9:20 AM CST -----        Requesting mammogram order be placed urgent  Please notify patient when order is placed so she can then call and get scheduled    @# [phone]

## 2022-02-24 ENCOUNTER — HOSPITAL ENCOUNTER (OUTPATIENT)
Dept: RADIOLOGY | Facility: HOSPITAL | Age: 67
Discharge: HOME | End: 2022-02-24
Attending: INTERNAL MEDICINE
Payer: COMMERCIAL

## 2022-02-24 ENCOUNTER — HOSPITAL ENCOUNTER (OUTPATIENT)
Dept: CARDIOLOGY | Facility: CLINIC | Age: 67
Discharge: HOME | End: 2022-02-24
Payer: COMMERCIAL

## 2022-02-24 ENCOUNTER — OFFICE VISIT (OUTPATIENT)
Dept: CARDIOLOGY | Facility: CLINIC | Age: 67
End: 2022-02-24
Payer: COMMERCIAL

## 2022-02-24 VITALS
WEIGHT: 183.88 LBS | HEIGHT: 69 IN | DIASTOLIC BLOOD PRESSURE: 67 MMHG | SYSTOLIC BLOOD PRESSURE: 142 MMHG | HEART RATE: 63 BPM | BODY MASS INDEX: 27.24 KG/M2

## 2022-02-24 VITALS — HEIGHT: 69 IN | WEIGHT: 180 LBS | BODY MASS INDEX: 26.66 KG/M2

## 2022-02-24 DIAGNOSIS — E78.5 HYPERLIPIDEMIA, UNSPECIFIED HYPERLIPIDEMIA TYPE: ICD-10-CM

## 2022-02-24 DIAGNOSIS — R07.9 CHEST PAIN, UNSPECIFIED TYPE: ICD-10-CM

## 2022-02-24 DIAGNOSIS — I10 PRIMARY HYPERTENSION: ICD-10-CM

## 2022-02-24 DIAGNOSIS — Z12.31 ENCOUNTER FOR SCREENING MAMMOGRAM FOR BREAST CANCER: ICD-10-CM

## 2022-02-24 DIAGNOSIS — R07.9 CHEST PAIN, UNSPECIFIED TYPE: Primary | ICD-10-CM

## 2022-02-24 PROCEDURE — 99203 OFFICE O/P NEW LOW 30 MIN: CPT | Mod: 25,GC,S$GLB, | Performed by: INTERNAL MEDICINE

## 2022-02-24 PROCEDURE — 99999 PR PBB SHADOW E&M-EST. PATIENT-LVL III: CPT | Mod: PBBFAC,GC,, | Performed by: INTERNAL MEDICINE

## 2022-02-24 PROCEDURE — 93005 EKG 12-LEAD: ICD-10-PCS | Mod: S$GLB,,, | Performed by: INTERNAL MEDICINE

## 2022-02-24 PROCEDURE — 77063 MAMMO DIGITAL SCREENING BILAT WITH TOMO: ICD-10-PCS | Mod: 26,,, | Performed by: RADIOLOGY

## 2022-02-24 PROCEDURE — 99999 PR PBB SHADOW E&M-EST. PATIENT-LVL III: ICD-10-PCS | Mod: PBBFAC,GC,, | Performed by: INTERNAL MEDICINE

## 2022-02-24 PROCEDURE — 77067 SCR MAMMO BI INCL CAD: CPT | Mod: 26,,, | Performed by: RADIOLOGY

## 2022-02-24 PROCEDURE — 99203 PR OFFICE/OUTPT VISIT, NEW, LEVL III, 30-44 MIN: ICD-10-PCS | Mod: 25,GC,S$GLB, | Performed by: INTERNAL MEDICINE

## 2022-02-24 PROCEDURE — 93010 ELECTROCARDIOGRAM REPORT: CPT | Mod: S$GLB,,, | Performed by: INTERNAL MEDICINE

## 2022-02-24 PROCEDURE — 77063 BREAST TOMOSYNTHESIS BI: CPT | Mod: TC

## 2022-02-24 PROCEDURE — 77067 MAMMO DIGITAL SCREENING BILAT WITH TOMO: ICD-10-PCS | Mod: 26,,, | Performed by: RADIOLOGY

## 2022-02-24 PROCEDURE — 77063 BREAST TOMOSYNTHESIS BI: CPT | Mod: 26,,, | Performed by: RADIOLOGY

## 2022-02-24 PROCEDURE — 93005 ELECTROCARDIOGRAM TRACING: CPT | Mod: S$GLB,,, | Performed by: INTERNAL MEDICINE

## 2022-02-24 PROCEDURE — 77067 SCR MAMMO BI INCL CAD: CPT | Mod: TC

## 2022-02-24 PROCEDURE — 93010 EKG 12-LEAD: ICD-10-PCS | Mod: S$GLB,,, | Performed by: INTERNAL MEDICINE

## 2022-02-24 RX ORDER — MAGNESIUM 30 MG
TABLET ORAL ONCE
COMMUNITY
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
## 2022-02-25 NOTE — PROGRESS NOTES
I have seen the patient, reviewed the Fellow's history and physical, assessment, and plan. I have personally interviewed and examined the patient at bedside and agree with the findings.     Gilmer Russell MD FACC [facility] [place]

## 2022-02-28 ENCOUNTER — PATIENT MESSAGE (OUTPATIENT)
Dept: INTERNAL MEDICINE | Facility: CLINIC | Age: 67
End: 2022-02-28
Payer: COMMERCIAL

## 2022-03-14 ENCOUNTER — DOCUMENTATION ONLY (OUTPATIENT)
Dept: INTERNAL MEDICINE | Facility: CLINIC | Age: 67
End: 2022-03-14
Payer: COMMERCIAL

## 2022-03-14 DIAGNOSIS — F43.20 ADJUSTMENT DISORDER, UNSPECIFIED TYPE: Primary | ICD-10-CM
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
## 2022-04-05 ENCOUNTER — HOSPITAL ENCOUNTER (EMERGENCY)
Facility: HOSPITAL | Age: 67
Discharge: LEFT BEFORE DISCHARGE | End: 2022-04-05
Attending: EMERGENCY MEDICINE
Payer: COMMERCIAL

## 2022-04-05 VITALS
TEMPERATURE: 99 F | HEART RATE: 82 BPM | DIASTOLIC BLOOD PRESSURE: 94 MMHG | WEIGHT: 155 LBS | HEIGHT: 69 IN | RESPIRATION RATE: 16 BRPM | BODY MASS INDEX: 22.96 KG/M2 | SYSTOLIC BLOOD PRESSURE: 194 MMHG | OXYGEN SATURATION: 98 %

## 2022-04-05 DIAGNOSIS — R07.9 CHEST PAIN: ICD-10-CM

## 2022-04-05 DIAGNOSIS — M79.672 LEFT FOOT PAIN: ICD-10-CM

## 2022-04-05 DIAGNOSIS — G89.29 CHRONIC BACK PAIN, UNSPECIFIED BACK LOCATION, UNSPECIFIED BACK PAIN LATERALITY: Primary | ICD-10-CM

## 2022-04-05 DIAGNOSIS — M54.9 CHRONIC BACK PAIN, UNSPECIFIED BACK LOCATION, UNSPECIFIED BACK PAIN LATERALITY: Primary | ICD-10-CM

## 2022-04-05 DIAGNOSIS — S92.402A CLOSED FRACTURE OF PHALANX OF LEFT GREAT TOE, PHYSEAL INVOLVEMENT UNSPECIFIED, UNSPECIFIED PHALANX, INITIAL ENCOUNTER: ICD-10-CM

## 2022-04-05 PROCEDURE — 99284 EMERGENCY DEPT VISIT MOD MDM: CPT | Mod: ,,, | Performed by: EMERGENCY MEDICINE

## 2022-04-05 PROCEDURE — 93005 ELECTROCARDIOGRAM TRACING: CPT

## 2022-04-05 PROCEDURE — 25000003 PHARM REV CODE 250: Performed by: EMERGENCY MEDICINE

## 2022-04-05 PROCEDURE — 99284 EMERGENCY DEPT VISIT MOD MDM: CPT | Mod: 25

## 2022-04-05 PROCEDURE — 99284 PR EMERGENCY DEPT VISIT,LEVEL IV: ICD-10-PCS | Mod: ,,, | Performed by: EMERGENCY MEDICINE

## 2022-04-05 PROCEDURE — 93010 ELECTROCARDIOGRAM REPORT: CPT | Mod: ,,, | Performed by: INTERNAL MEDICINE

## 2022-04-05 PROCEDURE — 93010 EKG 12-LEAD: ICD-10-PCS | Mod: ,,, | Performed by: INTERNAL MEDICINE

## 2022-04-05 RX ORDER — NAPROXEN 500 MG/1
500 TABLET ORAL
Status: COMPLETED | OUTPATIENT
Start: 2022-04-05 | End: 2022-04-05

## 2022-04-05 RX ORDER — KETOROLAC TROMETHAMINE 30 MG/ML
15 INJECTION, SOLUTION INTRAMUSCULAR; INTRAVENOUS
Status: DISCONTINUED | OUTPATIENT
Start: 2022-04-05 | End: 2022-04-05

## 2022-04-05 RX ORDER — ACETAMINOPHEN 500 MG
1000 TABLET ORAL
Status: COMPLETED | OUTPATIENT
Start: 2022-04-05 | End: 2022-04-05

## 2022-04-05 RX ORDER — LIDOCAINE 50 MG/G
1 PATCH TOPICAL
Status: DISCONTINUED | OUTPATIENT
Start: 2022-04-05 | End: 2022-04-06 | Stop reason: HOSPADM

## 2022-04-05 RX ORDER — METHOCARBAMOL 500 MG/1
500 TABLET, FILM COATED ORAL
Status: DISCONTINUED | OUTPATIENT
Start: 2022-04-05 | End: 2022-04-05

## 2022-04-05 RX ADMIN — LIDOCAINE 1 PATCH: 50 PATCH CUTANEOUS at 08:04

## 2022-04-05 RX ADMIN — ACETAMINOPHEN 1000 MG: 500 TABLET ORAL at 09:04

## 2022-04-05 RX ADMIN — NAPROXEN 500 MG: 500 TABLET ORAL at 09:04

## 2022-04-06 ENCOUNTER — PATIENT MESSAGE (OUTPATIENT)
Dept: EMERGENCY MEDICINE | Facility: HOSPITAL | Age: 67
End: 2022-04-06
Payer: COMMERCIAL

## 2022-04-06 NOTE — ED TRIAGE NOTES
Josi Gil, a 67 y.o. female presents to the ED w/ complaint of back pain x 1 year, worsening recently. Pain to bilateral lower back, states pain shoots down bilateral pain. Pt also dropped wood on toe, now complaining of L big toe pain    Triage note:  Chief Complaint   Patient presents with    Back Pain     Pt c/o back, neck and leg pain x 1year.  Pt also c/o left big toe pain after dropping a piece of wood on her toe 2wks ago     Review of patient's allergies indicates:  No Known Allergies  Past Medical History:   Diagnosis Date    Acute costochondritis 9/18/2012    Back pain     Benign essential hypertension     Gastroesophageal reflux disease without esophagitis     Pain in joint involving multiple sites 7/9/2013
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
## 2022-04-07 ENCOUNTER — TELEPHONE (OUTPATIENT)
Dept: INTERNAL MEDICINE | Facility: CLINIC | Age: 67
End: 2022-04-07
Payer: COMMERCIAL

## 2022-04-07 DIAGNOSIS — S92.912D UNSPECIFIED FRACTURE OF LEFT TOE(S), SUBSEQUENT ENCOUNTER FOR FRACTURE WITH ROUTINE HEALING: Primary | ICD-10-CM

## 2022-04-07 NOTE — TELEPHONE ENCOUNTER
Spoke with patient she is scheduled for Tuesday morning but she want to know about her lumbar spine xray

## 2022-04-07 NOTE — TELEPHONE ENCOUNTER
----- Message from Jennifer Espinosa sent at 4/6/2022  4:42 PM CDT -----  Contact: Self/[phone]  Pt said that she is calling in regards to she was told togo to the E.R on yesterday for her sprained toe , pt stated that the wait was too long and wants to know what the doctor recommends she do at this point. Please advise

## 2022-04-07 NOTE — TELEPHONE ENCOUNTER
Let patient know that the radiologist report of the x-ray taken showed possible fracture of the big toe .  It does not have the appearance of something  new  ,like within 1-2 days , but could have happen 2 weeks ago when she had the injury.  Do not feel further interventions needed but if pain still persists I put in referral to meet with orthopedics

## 2022-04-09 ENCOUNTER — NURSE TRIAGE (OUTPATIENT)
Dept: ADMINISTRATIVE | Facility: CLINIC | Age: 67
End: 2022-04-09
Payer: COMMERCIAL

## 2022-04-09 NOTE — TELEPHONE ENCOUNTER
Patient calling regarding pain medications for to. Reports she was given something in the ED. Per protocol, looked at EMR. Was given Tylenol and Naproxen in the ED. Verbalized understanding. Knows to call back with new or worsening symptoms.       Reason for Disposition   Caller has medicine question only, adult not sick, AND triager answers question    Protocols used: MEDICATION QUESTION CALL-A-AH

## 2022-04-11 ENCOUNTER — PATIENT OUTREACH (OUTPATIENT)
Dept: ADMINISTRATIVE | Facility: OTHER | Age: 67
End: 2022-04-11
Payer: COMMERCIAL

## 2022-04-11 NOTE — PROGRESS NOTES
Requested updates within Care Everywhere.  Patient's chart was reviewed for overdue ORACIO topics.  Health maintenance:updated  Immunizations:reconciled   Legacy:   Media:  Orders placed:  Tasked appts:  Labs Linked:  Upcoming appt:

## 2022-04-12 ENCOUNTER — TELEPHONE (OUTPATIENT)
Dept: INTERNAL MEDICINE | Facility: CLINIC | Age: 67
End: 2022-04-12
Payer: COMMERCIAL

## 2022-04-12 DIAGNOSIS — M43.06 LUMBAR SPONDYLOLYSIS: Primary | ICD-10-CM

## 2022-04-12 DIAGNOSIS — M54.9 DORSALGIA, UNSPECIFIED: ICD-10-CM

## 2022-04-12 RX ORDER — METHOCARBAMOL 500 MG/1
500 TABLET, FILM COATED ORAL 2 TIMES DAILY
Qty: 20 TABLET | Refills: 0 | Status: SHIPPED | OUTPATIENT
Start: 2022-04-12 | End: 2022-07-06

## 2022-04-12 RX ORDER — ETODOLAC 400 MG/1
400 TABLET, FILM COATED ORAL 2 TIMES DAILY
Qty: 30 TABLET | Refills: 0 | Status: SHIPPED | OUTPATIENT
Start: 2022-04-12 | End: 2022-04-20

## 2022-04-12 NOTE — TELEPHONE ENCOUNTER
----- Message from Su Darling sent at 4/12/2022  4:21 PM CDT -----  Contact: [phone]  Please f/u with pt regarding lab results! Py states this is her second time sending a message with no response !

## 2022-04-13 ENCOUNTER — TELEPHONE (OUTPATIENT)
Dept: SPINE | Facility: CLINIC | Age: 67
End: 2022-04-13
Payer: COMMERCIAL

## 2022-04-13 ENCOUNTER — TELEPHONE (OUTPATIENT)
Dept: INTERNAL MEDICINE | Facility: CLINIC | Age: 67
End: 2022-04-13
Payer: COMMERCIAL

## 2022-04-13 ENCOUNTER — DOCUMENTATION ONLY (OUTPATIENT)
Dept: INTERNAL MEDICINE | Facility: CLINIC | Age: 67
End: 2022-04-13
Payer: COMMERCIAL

## 2022-04-13 DIAGNOSIS — R07.9 CHRONIC CHEST PAIN: ICD-10-CM

## 2022-04-13 DIAGNOSIS — R51.9 NONINTRACTABLE HEADACHE, UNSPECIFIED CHRONICITY PATTERN, UNSPECIFIED HEADACHE TYPE: ICD-10-CM

## 2022-04-13 DIAGNOSIS — M54.9 DORSALGIA, UNSPECIFIED: ICD-10-CM

## 2022-04-13 DIAGNOSIS — G89.29 CHRONIC CHEST PAIN: ICD-10-CM

## 2022-04-13 DIAGNOSIS — I10 ESSENTIAL HYPERTENSION: Primary | ICD-10-CM

## 2022-04-13 DIAGNOSIS — E78.49 OTHER HYPERLIPIDEMIA: ICD-10-CM

## 2022-04-13 NOTE — TELEPHONE ENCOUNTER
----- Message from Kenny Madrid sent at 4/13/2022  4:15 PM CDT -----  Contact: pt  Pt is calling to speak with the nurse regarding labs she stated she is requesting to get a full lab work up done today she would like to have it done at [facility]  or at the hospital on [address] and it is very urgent she have this done today.Please advise

## 2022-04-13 NOTE — TELEPHONE ENCOUNTER
She want to do a complete work up of labs , she states she just want to make surer everything is okay with her she will go in the morning I will call her and set up in morning please place orders for fasting panel

## 2022-04-14 ENCOUNTER — OFFICE VISIT (OUTPATIENT)
Dept: ORTHOPEDICS | Facility: CLINIC | Age: 67
End: 2022-04-14
Payer: COMMERCIAL

## 2022-04-14 ENCOUNTER — PATIENT MESSAGE (OUTPATIENT)
Dept: INTERNAL MEDICINE | Facility: CLINIC | Age: 67
End: 2022-04-14
Payer: COMMERCIAL

## 2022-04-14 ENCOUNTER — TELEPHONE (OUTPATIENT)
Dept: SPINE | Facility: CLINIC | Age: 67
End: 2022-04-14
Payer: COMMERCIAL

## 2022-04-14 ENCOUNTER — LAB VISIT (OUTPATIENT)
Dept: LAB | Facility: HOSPITAL | Age: 67
End: 2022-04-14
Attending: INTERNAL MEDICINE
Payer: COMMERCIAL

## 2022-04-14 VITALS — BODY MASS INDEX: 22.96 KG/M2 | HEIGHT: 69 IN | WEIGHT: 155 LBS

## 2022-04-14 DIAGNOSIS — E78.49 OTHER HYPERLIPIDEMIA: ICD-10-CM

## 2022-04-14 DIAGNOSIS — R51.9 NONINTRACTABLE HEADACHE, UNSPECIFIED CHRONICITY PATTERN, UNSPECIFIED HEADACHE TYPE: ICD-10-CM

## 2022-04-14 DIAGNOSIS — R07.9 CHRONIC CHEST PAIN: ICD-10-CM

## 2022-04-14 DIAGNOSIS — G89.29 CHRONIC CHEST PAIN: ICD-10-CM

## 2022-04-14 DIAGNOSIS — S92.412A CLOSED FRACTURE OF PROXIMAL PHALANX OF LEFT GREAT TOE, INITIAL ENCOUNTER: ICD-10-CM

## 2022-04-14 DIAGNOSIS — I10 ESSENTIAL HYPERTENSION: ICD-10-CM

## 2022-04-14 DIAGNOSIS — M54.9 DORSALGIA, UNSPECIFIED: ICD-10-CM

## 2022-04-14 LAB
25(OH)D3+25(OH)D2 SERPL-MCNC: 22 NG/ML (ref 30–96)
ALBUMIN SERPL BCP-MCNC: 3.7 G/DL (ref 3.5–5.2)
ALP SERPL-CCNC: 69 U/L (ref 55–135)
ALT SERPL W/O P-5'-P-CCNC: 29 U/L (ref 10–44)
ANION GAP SERPL CALC-SCNC: 11 MMOL/L (ref 8–16)
AST SERPL-CCNC: 30 U/L (ref 10–40)
BASOPHILS # BLD AUTO: 0.08 K/UL (ref 0–0.2)
BASOPHILS NFR BLD: 1.4 % (ref 0–1.9)
BILIRUB SERPL-MCNC: 1 MG/DL (ref 0.1–1)
BUN SERPL-MCNC: 8 MG/DL (ref 8–23)
CALCIUM SERPL-MCNC: 9.8 MG/DL (ref 8.7–10.5)
CHLORIDE SERPL-SCNC: 100 MMOL/L (ref 95–110)
CHOLEST SERPL-MCNC: 247 MG/DL (ref 120–199)
CHOLEST/HDLC SERPL: 2.7 {RATIO} (ref 2–5)
CO2 SERPL-SCNC: 29 MMOL/L (ref 23–29)
CREAT SERPL-MCNC: 0.9 MG/DL (ref 0.5–1.4)
DIFFERENTIAL METHOD: NORMAL
EOSINOPHIL # BLD AUTO: 0.3 K/UL (ref 0–0.5)
EOSINOPHIL NFR BLD: 5.4 % (ref 0–8)
ERYTHROCYTE [DISTWIDTH] IN BLOOD BY AUTOMATED COUNT: 13.3 % (ref 11.5–14.5)
EST. GFR  (AFRICAN AMERICAN): >60 ML/MIN/1.73 M^2
EST. GFR  (NON AFRICAN AMERICAN): >60 ML/MIN/1.73 M^2
GLUCOSE SERPL-MCNC: 114 MG/DL (ref 70–110)
HCT VFR BLD AUTO: 42.4 % (ref 37–48.5)
HDLC SERPL-MCNC: 92 MG/DL (ref 40–75)
HDLC SERPL: 37.2 % (ref 20–50)
HGB BLD-MCNC: 13.9 G/DL (ref 12–16)
IMM GRANULOCYTES # BLD AUTO: 0 K/UL (ref 0–0.04)
IMM GRANULOCYTES NFR BLD AUTO: 0 % (ref 0–0.5)
LDLC SERPL CALC-MCNC: 140 MG/DL (ref 63–159)
LYMPHOCYTES # BLD AUTO: 1.9 K/UL (ref 1–4.8)
LYMPHOCYTES NFR BLD: 32.6 % (ref 18–48)
MAGNESIUM SERPL-MCNC: 2 MG/DL (ref 1.6–2.6)
MCH RBC QN AUTO: 29.9 PG (ref 27–31)
MCHC RBC AUTO-ENTMCNC: 32.8 G/DL (ref 32–36)
MCV RBC AUTO: 91 FL (ref 82–98)
MONOCYTES # BLD AUTO: 0.4 K/UL (ref 0.3–1)
MONOCYTES NFR BLD: 7.3 % (ref 4–15)
NEUTROPHILS # BLD AUTO: 3.1 K/UL (ref 1.8–7.7)
NEUTROPHILS NFR BLD: 53.3 % (ref 38–73)
NONHDLC SERPL-MCNC: 155 MG/DL
NRBC BLD-RTO: 0 /100 WBC
PLATELET # BLD AUTO: 193 K/UL (ref 150–450)
PMV BLD AUTO: 11.2 FL (ref 9.2–12.9)
POTASSIUM SERPL-SCNC: 3.7 MMOL/L (ref 3.5–5.1)
PROT SERPL-MCNC: 7.2 G/DL (ref 6–8.4)
RBC # BLD AUTO: 4.65 M/UL (ref 4–5.4)
SODIUM SERPL-SCNC: 140 MMOL/L (ref 136–145)
T4 FREE SERPL-MCNC: 0.98 NG/DL (ref 0.71–1.51)
TRIGL SERPL-MCNC: 75 MG/DL (ref 30–150)
TROPONIN I SERPL DL<=0.01 NG/ML-MCNC: 0.01 NG/ML (ref 0–0.03)
TSH SERPL DL<=0.005 MIU/L-ACNC: 0.39 UIU/ML (ref 0.4–4)
WBC # BLD AUTO: 5.79 K/UL (ref 3.9–12.7)

## 2022-04-14 PROCEDURE — 80053 COMPREHEN METABOLIC PANEL: CPT | Performed by: INTERNAL MEDICINE

## 2022-04-14 PROCEDURE — 80061 LIPID PANEL: CPT | Performed by: INTERNAL MEDICINE

## 2022-04-14 PROCEDURE — 99213 OFFICE O/P EST LOW 20 MIN: CPT | Mod: S$GLB,,, | Performed by: PHYSICIAN ASSISTANT

## 2022-04-14 PROCEDURE — 83735 ASSAY OF MAGNESIUM: CPT | Performed by: INTERNAL MEDICINE

## 2022-04-14 PROCEDURE — 82306 VITAMIN D 25 HYDROXY: CPT | Performed by: INTERNAL MEDICINE

## 2022-04-14 PROCEDURE — 99213 PR OFFICE/OUTPT VISIT, EST, LEVL III, 20-29 MIN: ICD-10-PCS | Mod: S$GLB,,, | Performed by: PHYSICIAN ASSISTANT

## 2022-04-14 PROCEDURE — 83036 HEMOGLOBIN GLYCOSYLATED A1C: CPT | Performed by: INTERNAL MEDICINE

## 2022-04-14 PROCEDURE — 84484 ASSAY OF TROPONIN QUANT: CPT | Performed by: INTERNAL MEDICINE

## 2022-04-14 PROCEDURE — 99999 PR PBB SHADOW E&M-EST. PATIENT-LVL III: CPT | Mod: PBBFAC,,, | Performed by: PHYSICIAN ASSISTANT

## 2022-04-14 PROCEDURE — 84443 ASSAY THYROID STIM HORMONE: CPT | Performed by: INTERNAL MEDICINE

## 2022-04-14 PROCEDURE — 84439 ASSAY OF FREE THYROXINE: CPT | Performed by: INTERNAL MEDICINE

## 2022-04-14 PROCEDURE — 36415 COLL VENOUS BLD VENIPUNCTURE: CPT | Performed by: INTERNAL MEDICINE

## 2022-04-14 PROCEDURE — 85025 COMPLETE CBC W/AUTO DIFF WBC: CPT | Performed by: INTERNAL MEDICINE

## 2022-04-14 PROCEDURE — 99999 PR PBB SHADOW E&M-EST. PATIENT-LVL III: ICD-10-PCS | Mod: PBBFAC,,, | Performed by: PHYSICIAN ASSISTANT

## 2022-04-14 NOTE — TELEPHONE ENCOUNTER
Spoke with patient.  She declined first available appointment with [facility].  Every effort was made to find a provider that had an earlier appointment but was unsuccessful.  Patient stated that she couldn't make the appointment that I offered her, and then she hung up.

## 2022-04-15 LAB
ESTIMATED AVG GLUCOSE: 103 MG/DL (ref 68–131)
HBA1C MFR BLD: 5.2 % (ref 4–5.6)

## 2022-04-15 NOTE — PATIENT INSTRUCTIONS
Weight bear as tolerated in boot.  Can obtain an EVENup Shoe   Take Lodine twice daily for 2 weeks  Do not take Advil or Aleve with the Lodine  Can take (2) Tylenol Arthritis twice daily with Lodine if needed for pain

## 2022-04-15 NOTE — PROGRESS NOTES
Subjective:      Patient ID: Josi Gil is a 67 y.o. female.    Chief Complaint: Pain and Injury of the Left Foot    HPI  67 year old female presents with chief complaint of left foot pain x 2-3 weeks. She dropped a piece of wood on her foot. She reports pain and swelling at the big toe and dorsal foot. It is worse with walking and putting her shoes on. She is taking Lodine BID with some relief.   Review of Systems   Constitutional: Negative for chills, fever and night sweats.   Cardiovascular: Negative for chest pain.   Respiratory: Negative for cough and shortness of breath.    Hematologic/Lymphatic: Does not bruise/bleed easily.   Skin: Negative for color change.   Gastrointestinal: Negative for heartburn.   Genitourinary: Negative for dysuria.   Neurological: Negative for numbness and paresthesias.   Psychiatric/Behavioral: Negative for altered mental status.   Allergic/Immunologic: Negative for persistent infections.         Objective:            General    Vitals reviewed.  Constitutional: She is oriented to person, place, and time. She appears well-developed and well-nourished.   Cardiovascular: Normal rate.    Neurological: She is alert and oriented to person, place, and time.         Left Ankle/Foot Exam     Inspection  Erythema: absent    Range of Motion   The patient has normal left ankle ROM.     Other   Sensation: normal    Comments:  Swelling present great toe. She is unable to move the great toe due to pain. TTP great toe and diffusely at the dorsal foot.         X-ray was independently reviewed by me. Fracture present 1st proximal phalanx.        Assessment:       Encounter Diagnosis   Name Primary?    Closed fracture of proximal phalanx of left great toe, initial encounter           Plan:       Patient was placed in a boot. She can WBAT. Ice and elevate for swelling. She will take Lodine BID x 2 weeks. She can also take it with Tylenol Arthritis. She can purchase [employer] shoe  for  the other foot online. RTC in 2 weeks for repeat x-ray.

## 2022-04-16 ENCOUNTER — NURSE TRIAGE (OUTPATIENT)
Dept: ADMINISTRATIVE | Facility: CLINIC | Age: 67
End: 2022-04-16
Payer: COMMERCIAL

## 2022-04-16 ENCOUNTER — PATIENT MESSAGE (OUTPATIENT)
Dept: INTERNAL MEDICINE | Facility: CLINIC | Age: 67
End: 2022-04-16
Payer: COMMERCIAL

## 2022-04-16 NOTE — TELEPHONE ENCOUNTER
Reason for Disposition   Lab result questions   Caller requesting lab results    Protocols used: INFORMATION ONLY CALL-A-AH, PCP CALL - NO TRIAGE-A-AH    Caller requesting lab results. Wanting Dr. Willis's office to call her on Monday please.

## 2022-04-18 ENCOUNTER — PATIENT MESSAGE (OUTPATIENT)
Dept: INTERNAL MEDICINE | Facility: CLINIC | Age: 67
End: 2022-04-18
Payer: COMMERCIAL

## 2022-04-18 ENCOUNTER — NURSE TRIAGE (OUTPATIENT)
Dept: ADMINISTRATIVE | Facility: CLINIC | Age: 67
End: 2022-04-18
Payer: COMMERCIAL

## 2022-04-18 ENCOUNTER — DOCUMENTATION ONLY (OUTPATIENT)
Dept: INTERNAL MEDICINE | Facility: CLINIC | Age: 67
End: 2022-04-18
Payer: COMMERCIAL

## 2022-04-18 NOTE — PROGRESS NOTES
Follow-up regarding labs.  Results discussed with the patient.    Lipid profile is similar which total cholesterol is elevated but has an elevated in good HDL cholesterol.    Vitamin D was slightly low.  She was not taking the medication or supplement consistently and she has been advised to take 2000 units daily.    Glucose was minimally elevated, hemoglobin A1c was added which was normal there is no signs of diabetes or pre diabetes    TSH was minimally low but free T4 was normal doubt this has any clinical significance recommend repeating labs in about 6-8 weeks

## 2022-04-19 ENCOUNTER — TELEPHONE (OUTPATIENT)
Dept: ORTHOPEDICS | Facility: CLINIC | Age: 67
End: 2022-04-19
Payer: COMMERCIAL

## 2022-04-19 NOTE — TELEPHONE ENCOUNTER
Returned patients call-she will come in on Thursday or Friday. Aware that when she gets to the front-let  staff know she's here to see Evelia so that he can come out to get her.  ----- Message from Angeline Casas sent at 4/19/2022 10:17 AM CDT -----  Regarding: pt  Pt is calling to speak with the nurse pt said she went to the clinic yesterday in ref to her boot pt said the lady at the  took the pts name and took the boot from the pt.pt was returning the boot. Pt was told that the nurse was going to call her back pt said no one has returned her call. Can you please call pt at [phone].    CHASE

## 2022-04-20 ENCOUNTER — OFFICE VISIT (OUTPATIENT)
Dept: INTERNAL MEDICINE | Facility: CLINIC | Age: 67
End: 2022-04-20
Payer: COMMERCIAL

## 2022-04-20 VITALS
BODY MASS INDEX: 23.51 KG/M2 | OXYGEN SATURATION: 99 % | HEART RATE: 62 BPM | DIASTOLIC BLOOD PRESSURE: 70 MMHG | HEIGHT: 69 IN | WEIGHT: 158.75 LBS | SYSTOLIC BLOOD PRESSURE: 122 MMHG

## 2022-04-20 DIAGNOSIS — I10 ESSENTIAL HYPERTENSION: Primary | ICD-10-CM

## 2022-04-20 PROCEDURE — 99999 PR PBB SHADOW E&M-EST. PATIENT-LVL III: ICD-10-PCS | Mod: PBBFAC,,, | Performed by: INTERNAL MEDICINE

## 2022-04-20 PROCEDURE — 99214 PR OFFICE/OUTPT VISIT, EST, LEVL IV, 30-39 MIN: ICD-10-PCS | Mod: S$GLB,,, | Performed by: INTERNAL MEDICINE

## 2022-04-20 PROCEDURE — 99214 OFFICE O/P EST MOD 30 MIN: CPT | Mod: S$GLB,,, | Performed by: INTERNAL MEDICINE

## 2022-04-20 PROCEDURE — 99999 PR PBB SHADOW E&M-EST. PATIENT-LVL III: CPT | Mod: PBBFAC,,, | Performed by: INTERNAL MEDICINE

## 2022-04-20 RX ORDER — AMLODIPINE BESYLATE 5 MG/1
10 TABLET ORAL DAILY
Qty: 90 TABLET | Refills: 1
Start: 2022-04-20 | End: 2022-08-12 | Stop reason: SDUPTHER

## 2022-04-20 NOTE — PROGRESS NOTES
Medical history  Hypertension  Elevated cholesterol with elevated HDL     Medications  Amlodipine 5 mg  Metoprolol 25 mg     Supplements Omega 3     Social history  Tobacco use and alcohol use none      67-year-old female  She has been monitor blood pressure both with a wrist as well as on cuff at home.  Yesterday she was consistently getting readings of systolic in the 150s and diastolic in the 60s and 70 he has been occasion she got in the 50s and was concern of it being low as well as the systolic being high    At present she reports no chest pain, shortness of breath abdominal pain  She has ongoing low back pain associated with leg pain and has an upcoming MRI the lumbar spine  Complain frontal headaches at times    Recently she had routine labs which was reviewed with her  She was reassured that she did not have diabetes although the glucose is minimally elevated, hemoglobin A1c was at 5.2    She recently followed up with orthopedics regarding remote fracture involving her left big toe.  She was initially placed in boot but could not tolerated.  She has appointment to get special shoe    She expresses a concern with taking medication for anything, particularly in regard to impurities  at 1 point she was on the medication losartan    Last week she was started on Lodine in methocarbamol.  She is not certain if is been helpful in regard to the pain and she has been having with her back and legs may be a mild degree    Examination  Weight 158 lb  Pulse 64  Blood pressure checked by me    right arm 152/60, left arm 152/62 then later 158/60  Chest clear breath sounds  Heart regular rate rhythm  Abdominal exam soft nontender  Trace knee and ankle jerk reflexes      Impression  Hypertension with labile control    Plan  Is been decided to put a hold on metoprolol.  Not certain if is been of help but she will need to monitor heart rate.  Increase amlodipine 5 mg 1 tablet a day to 2 tablets a day is will keep me informed  of her blood pressure readings.  Another option will be the use of a diuretic fluid pill.  In addition will stop the  etodlac, continue with methocarbamol  Left own

## 2022-04-23 ENCOUNTER — HOSPITAL ENCOUNTER (OUTPATIENT)
Dept: RADIOLOGY | Facility: OTHER | Age: 67
Discharge: HOME | End: 2022-04-23
Attending: INTERNAL MEDICINE
Payer: COMMERCIAL

## 2022-04-23 DIAGNOSIS — M54.9 DORSALGIA, UNSPECIFIED: ICD-10-CM

## 2022-04-23 DIAGNOSIS — M43.06 LUMBAR SPONDYLOLYSIS: ICD-10-CM

## 2022-04-23 NOTE — PROGRESS NOTES
"    Pt decided not to have MRI done today:    I brought the pt back to MRI & was going over  The cklist with her, then I was trying to let the pt know that her exam was still in "pending status" & she could be responsible for the whole MRI expense. The pt stopped me in the middle of the explanation & stated she knew where I was going with the conversation & it was NOT my job to worry about her billing &/or payment(s). I tried to explain to the pt that letting her know about the "pending approval" status was part of me trying to do what is best for my pt. The pt continued interrupting me & I could not finish my statement/explination of the exam & what to expect during the exam.   I tried to explain I needed the pt to stay very still for me during the exam or I would have to repeat the scan due to motion. I went on to explain that if I had to repeat 2 of the scans I would have to end the exam & she would have to come back another day. The pt continued to interrupt me.    The pt then told me I was being unprofessional & she detested liars.  I told the pt "I was sorry she felt this way".  The pt asked if we could move fwd with the exam, I asked her to change into a gown & we could get started. My co-worker took the pt to one of the dressing rooms & the pt stated she was getting faint, then the pt walked into the hallway & asked about the contrast & IV. The pt was told there would be no contrast &/or IV for this exam. The pt made the statement "she could trust this anymore" & wanted to leave.     I showed the pt to the lobby & the pt left. RANDALL       "

## 2022-04-29 ENCOUNTER — OFFICE VISIT (OUTPATIENT)
Dept: INTERNAL MEDICINE | Facility: CLINIC | Age: 67
End: 2022-04-29
Payer: COMMERCIAL

## 2022-04-29 ENCOUNTER — NURSE TRIAGE (OUTPATIENT)
Dept: ADMINISTRATIVE | Facility: CLINIC | Age: 67
End: 2022-04-29
Payer: COMMERCIAL

## 2022-04-29 VITALS
RESPIRATION RATE: 20 BRPM | SYSTOLIC BLOOD PRESSURE: 132 MMHG | DIASTOLIC BLOOD PRESSURE: 64 MMHG | HEIGHT: 69 IN | OXYGEN SATURATION: 99 % | HEART RATE: 74 BPM | BODY MASS INDEX: 23.97 KG/M2 | WEIGHT: 161.81 LBS

## 2022-04-29 DIAGNOSIS — I10 ESSENTIAL HYPERTENSION: ICD-10-CM

## 2022-04-29 DIAGNOSIS — M54.42 ACUTE BILATERAL LOW BACK PAIN WITH BILATERAL SCIATICA: Primary | ICD-10-CM

## 2022-04-29 DIAGNOSIS — M54.41 ACUTE BILATERAL LOW BACK PAIN WITH BILATERAL SCIATICA: Primary | ICD-10-CM

## 2022-04-29 PROCEDURE — 96372 PR INJECTION,THERAP/PROPH/DIAG2ST, IM OR SUBCUT: ICD-10-PCS | Mod: S$GLB,,, | Performed by: INTERNAL MEDICINE

## 2022-04-29 PROCEDURE — 99213 PR OFFICE/OUTPT VISIT, EST, LEVL III, 20-29 MIN: ICD-10-PCS | Mod: 25,S$GLB,, | Performed by: INTERNAL MEDICINE

## 2022-04-29 PROCEDURE — 96372 THER/PROPH/DIAG INJ SC/IM: CPT | Mod: S$GLB,,, | Performed by: INTERNAL MEDICINE

## 2022-04-29 PROCEDURE — 99213 OFFICE O/P EST LOW 20 MIN: CPT | Mod: 25,S$GLB,, | Performed by: INTERNAL MEDICINE

## 2022-04-29 PROCEDURE — 99999 PR PBB SHADOW E&M-EST. PATIENT-LVL III: ICD-10-PCS | Mod: PBBFAC,,, | Performed by: INTERNAL MEDICINE

## 2022-04-29 PROCEDURE — 99999 PR PBB SHADOW E&M-EST. PATIENT-LVL III: CPT | Mod: PBBFAC,,, | Performed by: INTERNAL MEDICINE

## 2022-04-29 RX ORDER — KETOROLAC TROMETHAMINE 30 MG/ML
60 INJECTION, SOLUTION INTRAMUSCULAR; INTRAVENOUS
Status: COMPLETED | OUTPATIENT
Start: 2022-04-29 | End: 2022-04-29

## 2022-04-29 RX ORDER — TRAMADOL HYDROCHLORIDE 50 MG/1
50 TABLET ORAL
Qty: 5 TABLET | Refills: 0 | Status: SHIPPED | OUTPATIENT
Start: 2022-04-29 | End: 2022-05-04

## 2022-04-29 RX ORDER — KETOROLAC TROMETHAMINE 10 MG/1
10 TABLET, FILM COATED ORAL EVERY 6 HOURS PRN
Qty: 20 TABLET | Refills: 0 | Status: SHIPPED | OUTPATIENT
Start: 2022-04-29 | End: 2022-06-24 | Stop reason: SDUPTHER

## 2022-04-29 RX ADMIN — KETOROLAC TROMETHAMINE 60 MG: 30 INJECTION, SOLUTION INTRAMUSCULAR; INTRAVENOUS at 05:04

## 2022-04-29 NOTE — PROGRESS NOTES
67-year-old female  She presents today with worsening low back pain and pain extends down the posterior lateral aspect of the legs.  Making it difficult to walk removed.  Actually the pain has been chronic but the has appeared worse today.    She was scheduled for MRI last week but because of technical circumstances it was not performed.  But has been rescheduled on May 12th.    Also since the visit last time metoprolol was discontinued she has been on amlodipine 5 mg 2 a day.  There have been moments when she has had diastolic blood pressure readings below 60 sometimes in the 50s or in the 40s.  However she does not report chest pain dizziness or weakness with this    Examination  Weight 161 lb  Pulse 68  Blood pressure 132/60 by me    Impression  Acute lumbar pain with radiculopathy  Hypertension which I feel is been under stable control.    For plan for now continue with amlodipine 5 mg 2 a day but if she continues to get low blood pressure particularly diastolic and is concerned can attempt to go back to 1 a day.    In regard to managing back pain, it has been difficult because of intolerance the other medications.  Particularly anti inflammatories, however the use the Toradol has always been effective.  In addition she agreed to try tramadol 50 mg once a day particularly at night to help    Toradol 60 mg IM given in the office, Toradol 10 mg every 6 hours as needed for 5 days although she can maybe use it once a day twice a day, tramadol 50 mg once a day as needed 5 tablets given
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
## 2022-05-02 ENCOUNTER — OFFICE VISIT (OUTPATIENT)
Dept: ORTHOPEDICS | Facility: CLINIC | Age: 67
End: 2022-05-02
Payer: COMMERCIAL

## 2022-05-02 ENCOUNTER — HOSPITAL ENCOUNTER (OUTPATIENT)
Dept: RADIOLOGY | Facility: HOSPITAL | Age: 67
Discharge: HOME | End: 2022-05-02
Attending: PHYSICIAN ASSISTANT
Payer: COMMERCIAL

## 2022-05-02 VITALS — HEIGHT: 69 IN | BODY MASS INDEX: 23.97 KG/M2 | WEIGHT: 161.81 LBS

## 2022-05-02 DIAGNOSIS — S92.412D CLOSED DISPLACED FRACTURE OF PROXIMAL PHALANX OF LEFT GREAT TOE WITH ROUTINE HEALING, SUBSEQUENT ENCOUNTER: Primary | ICD-10-CM

## 2022-05-02 DIAGNOSIS — S92.415D CLOSED NONDISPLACED FRACTURE OF PROXIMAL PHALANX OF LEFT GREAT TOE WITH ROUTINE HEALING, SUBSEQUENT ENCOUNTER: ICD-10-CM

## 2022-05-02 PROCEDURE — 99213 OFFICE O/P EST LOW 20 MIN: CPT | Mod: S$GLB,,, | Performed by: PHYSICIAN ASSISTANT

## 2022-05-02 PROCEDURE — 99213 PR OFFICE/OUTPT VISIT, EST, LEVL III, 20-29 MIN: ICD-10-PCS | Mod: S$GLB,,, | Performed by: PHYSICIAN ASSISTANT

## 2022-05-02 PROCEDURE — 73630 X-RAY EXAM OF FOOT: CPT | Mod: 26,LT,, | Performed by: RADIOLOGY

## 2022-05-02 PROCEDURE — 73630 XR FOOT COMPLETE 3 VIEW LEFT: ICD-10-PCS | Mod: 26,LT,, | Performed by: RADIOLOGY

## 2022-05-02 PROCEDURE — 99999 PR PBB SHADOW E&M-EST. PATIENT-LVL III: CPT | Mod: PBBFAC,,, | Performed by: PHYSICIAN ASSISTANT

## 2022-05-02 PROCEDURE — 73630 X-RAY EXAM OF FOOT: CPT | Mod: TC,LT

## 2022-05-02 PROCEDURE — 99999 PR PBB SHADOW E&M-EST. PATIENT-LVL III: ICD-10-PCS | Mod: PBBFAC,,, | Performed by: PHYSICIAN ASSISTANT

## 2022-05-02 NOTE — PROGRESS NOTES
Subjective:      Patient ID: Josi Gil is a 67 y.o. female.    Chief Complaint: Pain of the Left Foot    HPI  Patient returns for f/u for her left great toe fracture. She returned the boot because it was too heavy. It was aggravating her back. She has been wearing open toe sandals. Her pain with WB has gotten better. She reports less tenderness. She still has swelling. She has been taking Lodine BID without much relief.   Review of Systems   Constitutional: Negative for chills, fever and night sweats.   Cardiovascular: Negative for chest pain.   Respiratory: Negative for cough and shortness of breath.    Hematologic/Lymphatic: Does not bruise/bleed easily.   Skin: Negative for color change.   Gastrointestinal: Negative for heartburn.   Genitourinary: Negative for dysuria.   Neurological: Negative for numbness and paresthesias.   Psychiatric/Behavioral: Negative for altered mental status.   Allergic/Immunologic: Negative for persistent infections.         Objective:            General    Vitals reviewed.  Constitutional: She is oriented to person, place, and time. She appears well-developed and well-nourished.   Cardiovascular: Normal rate.    Neurological: She is alert and oriented to person, place, and time.         Left Ankle/Foot Exam     Inspection  Erythema: absent    Range of Motion   First MTP Joint: limited    Other   Sensation: normal    Comments:  TTP great toe proximal phalanx. Very limited ROM at the great toe- can only see a little flexion at the distal phalanx. Swelling at the great toe but it has decreased.           X-ray was ordered and independently reviewed by me and Dr. Wilder. Comminuted displaced fracture of the 1st proximal phalanx present.       Assessment:       Encounter Diagnosis   Name Primary?    Closed displaced fracture of proximal phalanx of left great toe with routine healing, subsequent encounter Yes          Plan:       Since patient's pain is getting better,   Meño recommends continuing conservative treatment at this time. Patient agrees with plan. She was given a po shoe. She can WBAT. RTC in 4 weeks for repeat x-ray.

## 2022-05-12 ENCOUNTER — NURSE TRIAGE (OUTPATIENT)
Dept: ADMINISTRATIVE | Facility: CLINIC | Age: 67
End: 2022-05-12
Payer: COMMERCIAL

## 2022-05-12 ENCOUNTER — HOSPITAL ENCOUNTER (OUTPATIENT)
Dept: RADIOLOGY | Facility: HOSPITAL | Age: 67
Discharge: HOME | End: 2022-05-12
Attending: INTERNAL MEDICINE
Payer: COMMERCIAL

## 2022-05-12 PROCEDURE — 72148 MRI LUMBAR SPINE WITHOUT CONTRAST: ICD-10-PCS | Mod: 26,,, | Performed by: RADIOLOGY

## 2022-05-12 PROCEDURE — 72148 MRI LUMBAR SPINE W/O DYE: CPT | Mod: TC

## 2022-05-12 PROCEDURE — 72148 MRI LUMBAR SPINE W/O DYE: CPT | Mod: 26,,, | Performed by: RADIOLOGY

## 2022-05-12 NOTE — TELEPHONE ENCOUNTER
"Pt called for c/o hypotension in 110/48 pt is concerned about the diastolic number and denies any weakness, lightheaded or dizzy. Pt triaged and care advice is to be seen by MD within 3 days pt told will reach out to PCP for appt and will send urgent message I told pt if becomes symptomatic with these to seek emergent care before hand or call back to [facility].      Reason for Disposition   Diastolic BP < 50 mm Hg    Additional Information   Negative: Started suddenly after an allergic medicine, an allergic food, or bee sting   Negative: Shock suspected (e.g., cold/pale/clammy skin, too weak to stand, low BP, rapid pulse)   Negative: Difficult to awaken or acting confused (e.g., disoriented, slurred speech)   Negative: Fainted   Negative: [1] Systolic BP < 90 AND [2] dizzy, lightheaded, or weak   Negative: Chest pain   Negative: Bleeding (e.g., vomiting blood, rectal bleeding or tarry stools, severe vaginal bleeding)(Exception: fainted from sight of small amount of blood; small cut or abrasion)   Negative: Extra heart beats or heart is beating fast  (i.e., "palpitations")   Negative: Sounds like a life-threatening emergency to the triager   Negative: [1] Systolic BP < 80 AND [2] NOT dizzy, lightheaded or weak   Negative: Abdominal pain   Negative: Fever > 100.4 F (38.0 C)   Negative: Major surgery in the past month   Negative: [1] Drinking very little AND [2] dehydration suspected (e.g., no urine > 12 hours, very dry mouth, very lightheaded)   Negative: [1] Fall in systolic BP > 20 mm Hg from normal AND [2] dizzy, lightheaded, or weak   Negative: Patient sounds very sick or weak to the triager   Negative: [1] Systolic BP < 90 AND [2] NOT dizzy, lightheaded or weak   Negative: [1] Systolic BP  AND [2] taking blood pressure medications AND [3] dizzy, lightheaded or weak   Negative: [1] Systolic BP  AND [2] taking blood pressure medications AND [3] NOT dizzy, lightheaded or weak   Negative: " [1] Fall in systolic BP > 20 mm Hg from normal AND [2] NOT dizzy, lightheaded, or weak   Negative: Fall in systolic BP > 20 mmHg after standing up   Negative: Fall in systolic BP > 20 mmHg after eating a meal    Protocols used: BLOOD PRESSURE - LOW-A-AH

## 2022-05-13 ENCOUNTER — TELEPHONE (OUTPATIENT)
Dept: PHYSICAL MEDICINE AND REHAB | Facility: CLINIC | Age: 67
End: 2022-05-13
Payer: COMMERCIAL

## 2022-05-13 NOTE — TELEPHONE ENCOUNTER
----- Message from Luba Goode sent at 5/13/2022  2:15 PM CDT -----  Contact: @ [phone]  Pt is calling stated she knows they give a 15 minute nishant period but she needs to come in around 3:45 pm I tried to see if any or times was available and no days were available please call @ [phone]

## 2022-05-14 ENCOUNTER — PATIENT MESSAGE (OUTPATIENT)
Dept: INTERNAL MEDICINE | Facility: CLINIC | Age: 67
End: 2022-05-14
Payer: COMMERCIAL

## 2022-05-14 RX ORDER — GABAPENTIN 100 MG/1
100 CAPSULE ORAL NIGHTLY
Qty: 10 CAPSULE | Refills: 0 | Status: SHIPPED | OUTPATIENT
Start: 2022-05-14 | End: 2022-05-17 | Stop reason: SDUPTHER

## 2022-05-14 RX ORDER — PREDNISONE 20 MG/1
TABLET ORAL
Qty: 9 TABLET | Refills: 0 | Status: SHIPPED | OUTPATIENT
Start: 2022-05-14 | End: 2022-05-17 | Stop reason: SDUPTHER

## 2022-05-16 ENCOUNTER — TELEPHONE (OUTPATIENT)
Dept: PHYSICAL MEDICINE AND REHAB | Facility: CLINIC | Age: 67
End: 2022-05-16
Payer: COMMERCIAL

## 2022-05-16 ENCOUNTER — PATIENT MESSAGE (OUTPATIENT)
Dept: INTERNAL MEDICINE | Facility: CLINIC | Age: 67
End: 2022-05-16
Payer: COMMERCIAL

## 2022-05-16 ENCOUNTER — OFFICE VISIT (OUTPATIENT)
Dept: PHYSICAL MEDICINE AND REHAB | Facility: CLINIC | Age: 67
End: 2022-05-16
Payer: COMMERCIAL

## 2022-05-16 ENCOUNTER — TELEPHONE (OUTPATIENT)
Dept: INTERNAL MEDICINE | Facility: CLINIC | Age: 67
End: 2022-05-16
Payer: COMMERCIAL

## 2022-05-16 ENCOUNTER — DOCUMENTATION ONLY (OUTPATIENT)
Dept: INTERNAL MEDICINE | Facility: CLINIC | Age: 67
End: 2022-05-16
Payer: COMMERCIAL

## 2022-05-16 VITALS
BODY MASS INDEX: 23.7 KG/M2 | DIASTOLIC BLOOD PRESSURE: 74 MMHG | SYSTOLIC BLOOD PRESSURE: 144 MMHG | WEIGHT: 160 LBS | HEIGHT: 69 IN | HEART RATE: 68 BPM

## 2022-05-16 DIAGNOSIS — M51.36 DDD (DEGENERATIVE DISC DISEASE), LUMBAR: ICD-10-CM

## 2022-05-16 DIAGNOSIS — M54.50 CHRONIC LOW BACK PAIN, UNSPECIFIED BACK PAIN LATERALITY, UNSPECIFIED WHETHER SCIATICA PRESENT: Primary | ICD-10-CM

## 2022-05-16 DIAGNOSIS — M54.2 CHRONIC CERVICAL PAIN: ICD-10-CM

## 2022-05-16 DIAGNOSIS — M48.061 NEURAL FORAMINAL STENOSIS OF LUMBAR SPINE: ICD-10-CM

## 2022-05-16 DIAGNOSIS — M47.816 LUMBAR SPONDYLOSIS: ICD-10-CM

## 2022-05-16 DIAGNOSIS — G89.29 CHRONIC LOW BACK PAIN, UNSPECIFIED BACK PAIN LATERALITY, UNSPECIFIED WHETHER SCIATICA PRESENT: Primary | ICD-10-CM

## 2022-05-16 DIAGNOSIS — M48.061 NEUROFORAMINAL STENOSIS OF LUMBAR SPINE: Primary | ICD-10-CM

## 2022-05-16 DIAGNOSIS — G89.29 CHRONIC CERVICAL PAIN: ICD-10-CM

## 2022-05-16 DIAGNOSIS — M47.812 OSTEOARTHRITIS OF CERVICAL SPINE, UNSPECIFIED SPINAL OSTEOARTHRITIS COMPLICATION STATUS: ICD-10-CM

## 2022-05-16 DIAGNOSIS — M47.816 LUMBAR FACET ARTHROPATHY: ICD-10-CM

## 2022-05-16 DIAGNOSIS — G89.29 CHRONIC BILATERAL THORACIC BACK PAIN: ICD-10-CM

## 2022-05-16 DIAGNOSIS — M54.6 CHRONIC BILATERAL THORACIC BACK PAIN: ICD-10-CM

## 2022-05-16 PROCEDURE — 99999 PR PBB SHADOW E&M-EST. PATIENT-LVL III: CPT | Mod: PBBFAC,,, | Performed by: PHYSICAL MEDICINE & REHABILITATION

## 2022-05-16 PROCEDURE — 99499 UNLISTED E&M SERVICE: CPT | Mod: S$GLB,,, | Performed by: PHYSICAL MEDICINE & REHABILITATION

## 2022-05-16 PROCEDURE — 99499 NO LOS: ICD-10-PCS | Mod: S$GLB,,, | Performed by: PHYSICAL MEDICINE & REHABILITATION

## 2022-05-16 PROCEDURE — 99999 PR PBB SHADOW E&M-EST. PATIENT-LVL III: ICD-10-PCS | Mod: PBBFAC,,, | Performed by: PHYSICAL MEDICINE & REHABILITATION
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
## 2022-05-16 NOTE — TELEPHONE ENCOUNTER
----- Message from [facility] sent at 5/16/2022  3:13 PM CDT -----  Pt states she is on her way to appt.  Callback number:[phone]
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
## 2022-05-17 RX ORDER — GABAPENTIN 100 MG/1
100 CAPSULE ORAL NIGHTLY
Qty: 10 CAPSULE | Refills: 0 | Status: SHIPPED | OUTPATIENT
Start: 2022-05-17 | End: 2022-07-06

## 2022-05-17 RX ORDER — PREDNISONE 20 MG/1
TABLET ORAL
Qty: 9 TABLET | Refills: 0 | Status: SHIPPED | OUTPATIENT
Start: 2022-05-17 | End: 2022-06-21

## 2022-05-26 ENCOUNTER — TELEPHONE (OUTPATIENT)
Dept: INTERNAL MEDICINE | Facility: CLINIC | Age: 67
End: 2022-05-26
Payer: COMMERCIAL

## 2022-05-26 DIAGNOSIS — M54.50 LUMBAR PAIN: Primary | ICD-10-CM

## 2022-05-26 NOTE — TELEPHONE ENCOUNTER
----- Message from Georgia De Souza sent at 5/26/2022  3:43 PM CDT -----  Contact: [phone]  Patient called, stated that medication is not working, enduring intense pain, has not receive a call from the doctor that pcp was going to refer her too.  Patient stated that she is not sure if that pain is affecting her blood pressure too. Please call and advise. Thank you.

## 2022-05-26 NOTE — TELEPHONE ENCOUNTER
The medication that she was given is not helping with the pain , she said when you guys talked you was going to send her to a physician or have a physician call her. The medication she was just prescribed is not fitting her she states. Pt states her blood pressure was 169 /89. Please advise thanks

## 2022-05-31 ENCOUNTER — OFFICE VISIT (OUTPATIENT)
Dept: ORTHOPEDICS | Facility: CLINIC | Age: 67
End: 2022-05-31
Payer: COMMERCIAL

## 2022-05-31 ENCOUNTER — HOSPITAL ENCOUNTER (OUTPATIENT)
Dept: RADIOLOGY | Facility: HOSPITAL | Age: 67
Discharge: HOME | End: 2022-05-31
Attending: PHYSICIAN ASSISTANT
Payer: COMMERCIAL

## 2022-05-31 VITALS — WEIGHT: 160.06 LBS | BODY MASS INDEX: 23.71 KG/M2 | HEIGHT: 69 IN

## 2022-05-31 DIAGNOSIS — S92.412D CLOSED DISPLACED FRACTURE OF PROXIMAL PHALANX OF LEFT GREAT TOE WITH ROUTINE HEALING, SUBSEQUENT ENCOUNTER: Primary | ICD-10-CM

## 2022-05-31 DIAGNOSIS — S92.412D CLOSED DISPLACED FRACTURE OF PROXIMAL PHALANX OF LEFT GREAT TOE WITH ROUTINE HEALING, SUBSEQUENT ENCOUNTER: ICD-10-CM

## 2022-05-31 PROCEDURE — 73630 X-RAY EXAM OF FOOT: CPT | Mod: TC,LT

## 2022-05-31 PROCEDURE — 99999 PR PBB SHADOW E&M-EST. PATIENT-LVL III: ICD-10-PCS | Mod: PBBFAC,,, | Performed by: PHYSICIAN ASSISTANT

## 2022-05-31 PROCEDURE — 73630 XR FOOT COMPLETE 3 VIEW LEFT: ICD-10-PCS | Mod: 26,LT,, | Performed by: INTERNAL MEDICINE

## 2022-05-31 PROCEDURE — 99999 PR PBB SHADOW E&M-EST. PATIENT-LVL III: CPT | Mod: PBBFAC,,, | Performed by: PHYSICIAN ASSISTANT

## 2022-05-31 PROCEDURE — 99213 OFFICE O/P EST LOW 20 MIN: CPT | Mod: S$GLB,,, | Performed by: PHYSICIAN ASSISTANT

## 2022-05-31 PROCEDURE — 73630 X-RAY EXAM OF FOOT: CPT | Mod: 26,LT,, | Performed by: INTERNAL MEDICINE

## 2022-05-31 PROCEDURE — 99213 PR OFFICE/OUTPT VISIT, EST, LEVL III, 20-29 MIN: ICD-10-PCS | Mod: S$GLB,,, | Performed by: PHYSICIAN ASSISTANT

## 2022-06-01 NOTE — PROGRESS NOTES
Subjective:      Patient ID: Josi Gil is a 67 y.o. female.    Chief Complaint: Pain of the Left Foot    HPI  Patient returns for f/u for her left great toe fracture. She d/c the po shoe about 2 weeks ago. She has been able to tolerate close toe shoes. She doesn't have much pain. She has been soaking it and massaging it and working on ROM. She says she is moving the big toe better.   Review of Systems   Constitutional: Negative for chills, fever and night sweats.   Cardiovascular: Negative for chest pain.   Respiratory: Negative for cough and shortness of breath.    Hematologic/Lymphatic: Does not bruise/bleed easily.   Skin: Negative for color change.   Gastrointestinal: Negative for heartburn.   Genitourinary: Negative for dysuria.   Neurological: Negative for numbness and paresthesias.   Psychiatric/Behavioral: Negative for altered mental status.   Allergic/Immunologic: Negative for persistent infections.         Objective:            General    Vitals reviewed.  Constitutional: She is oriented to person, place, and time. She appears well-developed and well-nourished.   Cardiovascular: Normal rate.    Neurological: She is alert and oriented to person, place, and time.         Left Ankle/Foot Exam     Inspection  Erythema: absent    Range of Motion   The patient has normal left ankle ROM.     Other   Sensation: normal    Comments:  Mild TTP great toe proximal phalanx. Very mild swelling. Her ROM at the big toe has improved.        X-ray was ordered and independently reviewed by me. The great toe proximal phalanx fracture shows some healing with callus formation.        Assessment:       Encounter Diagnosis   Name Primary?    Closed displaced fracture of proximal phalanx of left great toe with routine healing, subsequent encounter Yes          Plan:       Patient's foot is improving. She will continue soaking as needed and ROM. She will f/u as needed.

## 2022-06-06 ENCOUNTER — TELEPHONE (OUTPATIENT)
Dept: PAIN MEDICINE | Facility: CLINIC | Age: 67
End: 2022-06-06
Payer: COMMERCIAL

## 2022-06-06 NOTE — TELEPHONE ENCOUNTER
Patient was contacted and offered another appt with provider due to  being unavailable tomorrow for her appt. Patient was rescheduled.

## 2022-06-07 ENCOUNTER — OFFICE VISIT (OUTPATIENT)
Dept: CARDIOLOGY | Facility: CLINIC | Age: 67
End: 2022-06-07
Payer: COMMERCIAL

## 2022-06-07 ENCOUNTER — OFFICE VISIT (OUTPATIENT)
Dept: PAIN MEDICINE | Facility: CLINIC | Age: 67
End: 2022-06-07
Attending: ANESTHESIOLOGY
Payer: COMMERCIAL

## 2022-06-07 VITALS
WEIGHT: 184.81 LBS | DIASTOLIC BLOOD PRESSURE: 78 MMHG | BODY MASS INDEX: 27.29 KG/M2 | HEART RATE: 64 BPM | OXYGEN SATURATION: 98 % | SYSTOLIC BLOOD PRESSURE: 170 MMHG

## 2022-06-07 VITALS
RESPIRATION RATE: 18 BRPM | TEMPERATURE: 99 F | SYSTOLIC BLOOD PRESSURE: 144 MMHG | WEIGHT: 183 LBS | BODY MASS INDEX: 27.11 KG/M2 | HEIGHT: 69 IN | HEART RATE: 62 BPM | DIASTOLIC BLOOD PRESSURE: 84 MMHG

## 2022-06-07 DIAGNOSIS — R07.2 PRECORDIAL PAIN: Primary | ICD-10-CM

## 2022-06-07 DIAGNOSIS — M54.50 LUMBAR PAIN: ICD-10-CM

## 2022-06-07 PROCEDURE — 99999 PR PBB SHADOW E&M-EST. PATIENT-LVL III: ICD-10-PCS | Mod: PBBFAC,,, | Performed by: ANESTHESIOLOGY

## 2022-06-07 PROCEDURE — 99999 PR PBB SHADOW E&M-EST. PATIENT-LVL III: ICD-10-PCS | Mod: PBBFAC,,, | Performed by: INTERNAL MEDICINE

## 2022-06-07 PROCEDURE — 99215 PR OFFICE/OUTPT VISIT, EST, LEVL V, 40-54 MIN: ICD-10-PCS | Mod: S$GLB,,, | Performed by: INTERNAL MEDICINE

## 2022-06-07 PROCEDURE — 99999 PR PBB SHADOW E&M-EST. PATIENT-LVL III: CPT | Mod: PBBFAC,,, | Performed by: INTERNAL MEDICINE

## 2022-06-07 PROCEDURE — 99215 OFFICE O/P EST HI 40 MIN: CPT | Mod: S$GLB,,, | Performed by: INTERNAL MEDICINE

## 2022-06-07 PROCEDURE — 99499 NO LOS: ICD-10-PCS | Mod: S$GLB,,, | Performed by: ANESTHESIOLOGY

## 2022-06-07 PROCEDURE — 99499 UNLISTED E&M SERVICE: CPT | Mod: S$GLB,,, | Performed by: ANESTHESIOLOGY

## 2022-06-07 PROCEDURE — 99999 PR PBB SHADOW E&M-EST. PATIENT-LVL III: CPT | Mod: PBBFAC,,, | Performed by: ANESTHESIOLOGY
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
## 2022-06-07 NOTE — PROGRESS NOTES
[facility]    Chief Complaint  Chief Complaint   Patient presents with    Chest Pain       HPI:    Patient presents for evaluation of chest discomfort which occurred yesterday.  Describes a sticking pain across her anterior chest wall which began at rest.  No radiation.  No associated nausea, vomiting, dyspnea, or diaphoresis.  Last about 5-10 minutes before resolving without any specific intervention.  She has had similar discomfort in the past which prompted a stress test last year and a calcium score earlier this year.  Both of those evaluation showed no evidence of coronary artery disease.  But she presents today because the discomfort was more severe than in the past.  She has chronic pain in multiple parts of her body which she attributes to her back.  She is scheduled to see pain management later today.    Medications  Current Outpatient Medications   Medication Sig Dispense Refill    amLODIPine (NORVASC) 5 MG tablet Take 2 tablets (10 mg total) by mouth once daily. 90 tablet 1    aspirin 325 MG tablet Take 325 mg by mouth once daily.      ketorolac (TORADOL) 10 mg tablet Take 1 tablet (10 mg total) by mouth every 6 (six) hours as needed for Pain. 20 tablet 0    magnesium 30 mg Tab Take by mouth once.      methocarbamoL (ROBAXIN) 500 MG Tab Take 1 tablet (500 mg total) by mouth 2 (two) times a day. 20 tablet 0    omega-3 fatty acids/fish oil (FISH OIL-OMEGA-3 FATTY ACIDS) 300-1,000 mg capsule Take by mouth once daily.      predniSONE (DELTASONE) 20 MG tablet 2 tablets po daily for 3 days, then 1 tablets po daily for 3 dayss 9 tablet 0    gabapentin (NEURONTIN) 100 MG capsule Take 1 capsule (100 mg total) by mouth every evening. for 10 days 10 capsule 0     No current facility-administered medications for this visit.        History  Past Medical History:   Diagnosis Date    Acute costochondritis 9/18/2012    Back pain     Benign essential hypertension     Gastroesophageal reflux  disease without esophagitis     Pain in joint involving multiple sites 2013     Past Surgical History:   Procedure Laterality Date    BREAST BIOPSY Right      SECTION      KNEE ARTHROSCOPY W/ ACL RECONSTRUCTION      REFRACTIVE SURGERY Bilateral  or     Dr. Bharath hammer     Social History     Socioeconomic History    Marital status: Single    Number of children: 1   Tobacco Use    Smoking status: Never Smoker    Smokeless tobacco: Never Used   Substance and Sexual Activity    Alcohol use: No    Drug use: No     Family History   Problem Relation Age of Onset    Hypertension Mother     Heart disease Maternal Grandmother     Celiac disease Neg Hx     Colon cancer Neg Hx     Colon polyps Neg Hx     Crohn's disease Neg Hx     Cystic fibrosis Neg Hx     Esophageal cancer Neg Hx     Inflammatory bowel disease Neg Hx     Irritable bowel syndrome Neg Hx     Liver cancer Neg Hx     Liver disease Neg Hx     Rectal cancer Neg Hx     Stomach cancer Neg Hx         Allergies  Review of patient's allergies indicates:  No Known Allergies    Review of Systems   Review of Systems   Constitutional: Negative for malaise/fatigue, weight gain and weight loss.   Eyes: Negative for visual disturbance.   Cardiovascular: Positive for chest pain. Negative for claudication, cyanosis, dyspnea on exertion, irregular heartbeat, leg swelling, near-syncope, orthopnea, palpitations, paroxysmal nocturnal dyspnea and syncope.   Respiratory: Negative for cough, hemoptysis, shortness of breath, sleep disturbances due to breathing and wheezing.    Hematologic/Lymphatic: Negative for bleeding problem. Does not bruise/bleed easily.   Skin: Negative for poor wound healing.   Musculoskeletal: Positive for back pain and joint pain. Negative for muscle cramps and myalgias.   Gastrointestinal: Negative for abdominal pain, anorexia, diarrhea, heartburn, hematemesis, hematochezia, melena, nausea and vomiting.    Genitourinary: Negative for hematuria and nocturia.   Neurological: Negative for excessive daytime sleepiness, dizziness, focal weakness, light-headedness and weakness.       Physical Exam  Vitals:    06/07/22 1043   BP: (!) 170/78   Pulse: 64     Wt Readings from Last 1 Encounters:   06/07/22 83 kg (183 lb)     Physical Exam  Vitals and nursing note reviewed.   Constitutional:       General: She is not in acute distress.     Appearance: She is not toxic-appearing or diaphoretic.   HENT:      Head: Normocephalic and atraumatic.      Mouth/Throat:      Lips: Pink.      Mouth: Mucous membranes are moist.   Eyes:      General: No scleral icterus.     Conjunctiva/sclera: Conjunctivae normal.   Neck:      Thyroid: No thyromegaly.      Vascular: No carotid bruit, hepatojugular reflux or JVD.      Trachea: Trachea normal.   Cardiovascular:      Rate and Rhythm: Normal rate and regular rhythm.  No extrasystoles are present.     Chest Wall: PMI is not displaced.      Pulses:           Carotid pulses are 2+ on the right side and 2+ on the left side.       Radial pulses are 2+ on the right side and 2+ on the left side.        Dorsalis pedis pulses are 2+ on the right side and 2+ on the left side.        Posterior tibial pulses are 2+ on the right side and 2+ on the left side.      Heart sounds: S1 normal and S2 normal. No murmur heard.    No friction rub. No S3 or S4 sounds.   Pulmonary:      Effort: Pulmonary effort is normal. No accessory muscle usage or respiratory distress.      Breath sounds: Normal breath sounds and air entry. No decreased breath sounds, wheezing, rhonchi or rales.   Abdominal:      General: Bowel sounds are normal. There is no distension or abdominal bruit.      Palpations: Abdomen is soft. There is no hepatomegaly, splenomegaly or pulsatile mass.      Tenderness: There is no abdominal tenderness.   Musculoskeletal:         General: No tenderness or deformity.      Right lower leg: No edema.      Left  lower leg: No edema.   Skin:     General: Skin is warm and dry.      Capillary Refill: Capillary refill takes less than 2 seconds.      Coloration: Skin is not cyanotic or pale.      Nails: There is no clubbing.   Neurological:      General: No focal deficit present.      Mental Status: She is alert and oriented to person, place, and time.   Psychiatric:         Attention and Perception: Attention normal.         Mood and Affect: Mood normal.         Speech: Speech normal.         Behavior: Behavior normal. Behavior is cooperative.         Labs  Lab Visit on 04/14/2022   Component Date Value Ref Range Status    WBC 04/14/2022 5.79  3.90 - 12.70 K/uL Final    RBC 04/14/2022 4.65  4.00 - 5.40 M/uL Final    Hemoglobin 04/14/2022 13.9  12.0 - 16.0 g/dL Final    Hematocrit 04/14/2022 42.4  37.0 - 48.5 % Final    MCV 04/14/2022 91  82 - 98 fL Final    MCH 04/14/2022 29.9  27.0 - 31.0 pg Final    MCHC 04/14/2022 32.8  32.0 - 36.0 g/dL Final    RDW 04/14/2022 13.3  11.5 - 14.5 % Final    Platelets 04/14/2022 193  150 - 450 K/uL Final    MPV 04/14/2022 11.2  9.2 - 12.9 fL Final    Immature Granulocytes 04/14/2022 0.0  0.0 - 0.5 % Final    Gran # (ANC) 04/14/2022 3.1  1.8 - 7.7 K/uL Final    Immature Grans (Abs) 04/14/2022 0.00  0.00 - 0.04 K/uL Final    Comment: Mild elevation in immature granulocytes is non specific and   can be seen in a variety of conditions including stress response,   acute inflammation, trauma and pregnancy. Correlation with other   laboratory and clinical findings is essential.      Lymph # 04/14/2022 1.9  1.0 - 4.8 K/uL Final    Mono # 04/14/2022 0.4  0.3 - 1.0 K/uL Final    Eos # 04/14/2022 0.3  0.0 - 0.5 K/uL Final    Baso # 04/14/2022 0.08  0.00 - 0.20 K/uL Final    nRBC 04/14/2022 0  0 /100 WBC Final    Gran % 04/14/2022 53.3  38.0 - 73.0 % Final    Lymph % 04/14/2022 32.6  18.0 - 48.0 % Final    Mono % 04/14/2022 7.3  4.0 - 15.0 % Final    Eosinophil % 04/14/2022 5.4  0.0 -  8.0 % Final    Basophil % 04/14/2022 1.4  0.0 - 1.9 % Final    Differential Method 04/14/2022 Automated   Final    Sodium 04/14/2022 140  136 - 145 mmol/L Final    Potassium 04/14/2022 3.7  3.5 - 5.1 mmol/L Final    Chloride 04/14/2022 100  95 - 110 mmol/L Final    CO2 04/14/2022 29  23 - 29 mmol/L Final    Glucose 04/14/2022 114 (A) 70 - 110 mg/dL Final    BUN 04/14/2022 8  8 - 23 mg/dL Final    Creatinine 04/14/2022 0.9  0.5 - 1.4 mg/dL Final    Calcium 04/14/2022 9.8  8.7 - 10.5 mg/dL Final    Total Protein 04/14/2022 7.2  6.0 - 8.4 g/dL Final    Albumin 04/14/2022 3.7  3.5 - 5.2 g/dL Final    Total Bilirubin 04/14/2022 1.0  0.1 - 1.0 mg/dL Final    Comment: For infants and newborns, interpretation of results should be based  on gestational age, weight and in agreement with clinical  observations.    Premature Infant recommended reference ranges:  Up to 24 hours.............<8.0 mg/dL  Up to 48 hours............<12.0 mg/dL  3-5 days..................<15.0 mg/dL  6-29 days.................<15.0 mg/dL      Alkaline Phosphatase 04/14/2022 69  55 - 135 U/L Final    AST 04/14/2022 30  10 - 40 U/L Final    ALT 04/14/2022 29  10 - 44 U/L Final    Anion Gap 04/14/2022 11  8 - 16 mmol/L Final    eGFR if African American 04/14/2022 >60.0  >60 mL/min/1.73 m^2 Final    eGFR if non African American 04/14/2022 >60.0  >60 mL/min/1.73 m^2 Final    Comment: Calculation used to obtain the estimated glomerular filtration  rate (eGFR) is the CKD-EPI equation.       Cholesterol 04/14/2022 247 (A) 120 - 199 mg/dL Final    Comment: The National Cholesterol Education Program (NCEP) has set the  following guidelines (reference ranges) for Cholesterol:  Optimal.....................<200 mg/dL  Borderline High.............200-239 mg/dL  High........................> or = 240 mg/dL      Triglycerides 04/14/2022 75  30 - 150 mg/dL Final    Comment: The National Cholesterol Education Program (NCEP) has set the  following  guidelines (reference values) for triglycerides:  Normal......................<150 mg/dL  Borderline High.............150-199 mg/dL  High........................200-499 mg/dL      HDL 04/14/2022 92 (A) 40 - 75 mg/dL Final    Comment: The National Cholesterol Education Program (NCEP) has set the  following guidelines (reference values) for HDL Cholesterol:  Low...............<40 mg/dL  Optimal...........>60 mg/dL      LDL Cholesterol 04/14/2022 140.0  63.0 - 159.0 mg/dL Final    Comment: The National Cholesterol Education Program (NCEP) has set the  following guidelines (reference values) for LDL Cholesterol:  Optimal.......................<130 mg/dL  Borderline High...............130-159 mg/dL  High..........................160-189 mg/dL  Very High.....................>190 mg/dL      HDL/Cholesterol Ratio 04/14/2022 37.2  20.0 - 50.0 % Final    Total Cholesterol/HDL Ratio 04/14/2022 2.7  2.0 - 5.0 Final    Non-HDL Cholesterol 04/14/2022 155  mg/dL Final    Comment: Risk category and Non-HDL cholesterol goals:  Coronary heart disease (CHD)or equivalent (10-year risk of CHD >20%):  Non-HDL cholesterol goal     <130 mg/dL  Two or more CHD risk factors and 10-year risk of CHD <= 20%:  Non-HDL cholesterol goal     <160 mg/dL  0 to 1 CHD risk factor:  Non-HDL cholesterol goal     <190 mg/dL      Vit D, 25-Hydroxy 04/14/2022 22 (A) 30 - 96 ng/mL Final    Comment: Vitamin D deficiency.........<10 ng/mL                              Vitamin D insufficiency......10-29 ng/mL       Vitamin D sufficiency........> or equal to 30 ng/mL  Vitamin D toxicity............>100 ng/mL      Magnesium 04/14/2022 2.0  1.6 - 2.6 mg/dL Final    TSH 04/14/2022 0.386 (A) 0.400 - 4.000 uIU/mL Final    Troponin I 04/14/2022 0.007  0.000 - 0.026 ng/mL Final    Comment: The reference interval for Troponin I represents the 99th percentile   cutoff   for our facility and is consistent with 3rd generation assay   performance.      Free T4  04/14/2022 0.98  0.71 - 1.51 ng/dL Final    Hemoglobin A1C 04/14/2022 5.2  4.0 - 5.6 % Final    Comment: ADA Screening Guidelines:  5.7-6.4%  Consistent with prediabetes  >or=6.5%  Consistent with diabetes    High levels of fetal hemoglobin interfere with the HbA1C  assay. Heterozygous hemoglobin variants (HbS, HgC, etc)do  not significantly interfere with this assay.   However, presence of multiple variants may affect accuracy.      Estimated Avg Glucose 04/14/2022 103  68 - 131 mg/dL Final   Lab Visit on 12/29/2021   Component Date Value Ref Range Status    WBC 12/29/2021 4.40  3.90 - 12.70 K/uL Final    RBC 12/29/2021 4.31  4.00 - 5.40 M/uL Final    Hemoglobin 12/29/2021 12.8  12.0 - 16.0 g/dL Final    Hematocrit 12/29/2021 40.0  37.0 - 48.5 % Final    MCV 12/29/2021 93  82 - 98 fL Final    MCH 12/29/2021 29.7  27.0 - 31.0 pg Final    MCHC 12/29/2021 32.0  32.0 - 36.0 g/dL Final    RDW 12/29/2021 13.2  11.5 - 14.5 % Final    Platelets 12/29/2021 286  150 - 450 K/uL Final    MPV 12/29/2021 10.9  9.2 - 12.9 fL Final    Immature Granulocytes 12/29/2021 0.2  0.0 - 0.5 % Final    Gran # (ANC) 12/29/2021 1.9  1.8 - 7.7 K/uL Final    Immature Grans (Abs) 12/29/2021 0.01  0.00 - 0.04 K/uL Final    Comment: Mild elevation in immature granulocytes is non specific and   can be seen in a variety of conditions including stress response,   acute inflammation, trauma and pregnancy. Correlation with other   laboratory and clinical findings is essential.      Lymph # 12/29/2021 1.8  1.0 - 4.8 K/uL Final    Mono # 12/29/2021 0.4  0.3 - 1.0 K/uL Final    Eos # 12/29/2021 0.2  0.0 - 0.5 K/uL Final    Baso # 12/29/2021 0.05  0.00 - 0.20 K/uL Final    nRBC 12/29/2021 0  0 /100 WBC Final    Gran % 12/29/2021 43.4  38.0 - 73.0 % Final    Lymph % 12/29/2021 41.4  18.0 - 48.0 % Final    Mono % 12/29/2021 8.4  4.0 - 15.0 % Final    Eosinophil % 12/29/2021 5.5  0.0 - 8.0 % Final    Basophil % 12/29/2021 1.1  0.0 -  1.9 % Final    Differential Method 12/29/2021 Automated   Final    Sodium 12/29/2021 137  136 - 145 mmol/L Final    Potassium 12/29/2021 4.0  3.5 - 5.1 mmol/L Final    Chloride 12/29/2021 102  95 - 110 mmol/L Final    CO2 12/29/2021 23  23 - 29 mmol/L Final    Glucose 12/29/2021 75  70 - 110 mg/dL Final    BUN 12/29/2021 11  8 - 23 mg/dL Final    Creatinine 12/29/2021 0.8  0.5 - 1.4 mg/dL Final    Calcium 12/29/2021 9.6  8.7 - 10.5 mg/dL Final    Total Protein 12/29/2021 7.0  6.0 - 8.4 g/dL Final    Albumin 12/29/2021 3.6  3.5 - 5.2 g/dL Final    Total Bilirubin 12/29/2021 0.7  0.1 - 1.0 mg/dL Final    Comment: For infants and newborns, interpretation of results should be based  on gestational age, weight and in agreement with clinical  observations.    Premature Infant recommended reference ranges:  Up to 24 hours.............<8.0 mg/dL  Up to 48 hours............<12.0 mg/dL  3-5 days..................<15.0 mg/dL  6-29 days.................<15.0 mg/dL      Alkaline Phosphatase 12/29/2021 65  55 - 135 U/L Final    AST 12/29/2021 21  10 - 40 U/L Final    ALT 12/29/2021 14  10 - 44 U/L Final    Anion Gap 12/29/2021 12  8 - 16 mmol/L Final    eGFR if African American 12/29/2021 >60.0  >60 mL/min/1.73 m^2 Final    eGFR if non African American 12/29/2021 >60.0  >60 mL/min/1.73 m^2 Final    Comment: Calculation used to obtain the estimated glomerular filtration  rate (eGFR) is the CKD-EPI equation.       CPK 12/29/2021 68  20 - 180 U/L Final    BNP 12/29/2021 24  0 - 99 pg/mL Final    Values of less than 100 pg/ml are consistent with non-CHF populations.    D-Dimer 12/29/2021 1.15 (A) <0.50 mg/L FEU Final    Comment: The quantitative D-dimer assay should be used as an aid in   the diagnosis of deep vein thrombosis and pulmonary embolism  in patients with the appropriate presentation and clinical  history. The upper limit of the reference interval and the clinical   cut off   point are identical.  Causes of a positive (>0.50 mg/L FEU) D-Dimer   test  include, but are not limited to: DVT, PE, DIC, thrombolytic   therapy, anticoagulant therapy, recent surgery, trauma, or   pregnancy, disseminated malignancy, aortic aneurysm, cirrhosis,  and severe infection. False negative results may occur in   patients with distal DVT.      Magnesium 12/29/2021 1.9  1.6 - 2.6 mg/dL Final       Imaging  X-Ray Foot Complete Left    Result Date: 6/1/2022  EXAMINATION: XR FOOT COMPLETE 3 VIEW LEFT CLINICAL HISTORY: rm 4. wb;.  Displaced fracture of proximal phalanx of left great toe, subsequent encounter for fracture with routine healing TECHNIQUE: AP, lateral and oblique views of the left foot were performed. COMPARISON: Foot radiographs 05/02/2022 FINDINGS: Redemonstrated is a comminuted fracture the 1st proximal phalanx.  Unchanged alignment.  There is some callus formation, without significant bony bridging.  Persistent soft tissue swelling overlying the fracture. No new fracture or dislocation.  First MTP predominant degenerative changes.     Unchanged alignment of the 1st proximal phalanx fracture. Electronically signed by: William Stacy Date:    06/01/2022 Time:    08:29    MRI Lumbar Spine Without Contrast    Result Date: 5/13/2022  EXAMINATION: MRI LUMBAR SPINE WITHOUT CONTRAST CLINICAL HISTORY: Low back pain, symptoms persist with > 6wks conservative treatment; Dorsalgia, unspecified TECHNIQUE: Multiplanar, multisequence MR images were acquired from the thoracolumbar junction to the sacrum without contrast. COMPARISON: Radiograph 04/05/2022 FINDINGS: Alignment: Normal. Vertebrae: No fracture.  No diffuse marrow replacement process.  Marrow degenerative endplate changes at L3-L4 and L4-L5. Discs: Multilevel disc desiccation with moderate disc height loss at L3-L4 and L4-L5. Cord: Normal.  Conus terminates at L2. Degenerative findings: T12-L1: No spinal canal stenosis or neural foraminal narrowing. L1-L2: No spinal canal  stenosis or neural foraminal narrowing. L2-L3: No spinal canal stenosis or neural foraminal narrowing. L3-L4: Circumferential disc bulge and mild facet arthropathy resulting in mild effacement of the anterior thecal sac and mild bilateral neural foraminal narrowing. L4-L5: Circumferential disc bulge and mild facet arthropathy resulting in moderate left neural foraminal narrowing.  No spinal canal stenosis. L5-S1: Small broad-based disc bulge and facet arthropathy resulting in mild left foraminal narrowing. No spinal canal stenosis. Paraspinal muscles & soft tissues: Unremarkable.     1. Degenerative changes of the lower lumbar spine detailed above.  Moderate left neural foraminal narrowing noted at L4-L5. Electronically signed by resident: Jonny Traylor Date:    05/13/2022 Time:    09:15 Electronically signed by: Lane Arellano MD Date:    05/13/2022 Time:    11:47      Assessment  1. Precordial pain  Noncardiac.  Sounds musculoskeletal.  Chest pain just 1 part of her diffuse constellation of pain.      Plan and Discussion    Counseled the patient that she has had an extensive workup for coronary artery disease at this point.  Given that her symptoms are similar to those in the past and her workup has been normal, would not pursue additional testing.  Discussed noncardiac causes of chest pain.  Discussed the importance of risk factor modification for prevention of future cardiac events.  Discussed importance of blood pressure control.    The 10-year ASCVD risk score (Cintia DC Jr., et al., 2013) is: 15%    Values used to calculate the score:      Age: 67 years      Sex: Female      Is Non- : Yes      Diabetic: No      Tobacco smoker: No      Systolic Blood Pressure: 144 mmHg      Is BP treated: Yes      HDL Cholesterol: 92 mg/dL      Total Cholesterol: 247 mg/dL     Follow Up  Follow up if symptoms worsen or fail to improve.      Flash Martinez MD

## 2022-06-08 ENCOUNTER — TELEPHONE (OUTPATIENT)
Dept: CARDIOLOGY | Facility: CLINIC | Age: 67
End: 2022-06-08
Payer: COMMERCIAL

## 2022-06-08 NOTE — TELEPHONE ENCOUNTER
Patient notified.    ----- Message from Lana Ziegler RN sent at 6/8/2022 10:42 AM CDT -----   will not order any requested bloodwork if that is what she's asking.  ----- Message -----  From: Feng Darling  Sent: 6/8/2022  10:20 AM CDT  To: Michelle [facility]id[facility] Staff    Name of Who is Calling: NATALIA LUGO          What is the request in detail: The patient is calling to speak to the nurse in regards to a few questions. Please advise           Can the clinic reply by [facility]: no         [facility]: [phone]

## 2022-06-09 ENCOUNTER — NURSE TRIAGE (OUTPATIENT)
Dept: ADMINISTRATIVE | Facility: CLINIC | Age: 67
End: 2022-06-09
Payer: COMMERCIAL

## 2022-06-09 ENCOUNTER — HOSPITAL ENCOUNTER (EMERGENCY)
Facility: HOSPITAL | Age: 67
Discharge: HOME | End: 2022-06-09
Attending: EMERGENCY MEDICINE
Payer: COMMERCIAL

## 2022-06-09 VITALS
SYSTOLIC BLOOD PRESSURE: 162 MMHG | RESPIRATION RATE: 20 BRPM | BODY MASS INDEX: 26.66 KG/M2 | HEART RATE: 90 BPM | TEMPERATURE: 98 F | WEIGHT: 180 LBS | HEIGHT: 69 IN | DIASTOLIC BLOOD PRESSURE: 70 MMHG | OXYGEN SATURATION: 100 %

## 2022-06-09 DIAGNOSIS — R07.89 CHEST WALL PAIN: Primary | ICD-10-CM

## 2022-06-09 LAB
ALBUMIN SERPL BCP-MCNC: 3.7 G/DL (ref 3.5–5.2)
ALP SERPL-CCNC: 74 U/L (ref 55–135)
ALT SERPL W/O P-5'-P-CCNC: 12 U/L (ref 10–44)
ANION GAP SERPL CALC-SCNC: 11 MMOL/L (ref 8–16)
AST SERPL-CCNC: 19 U/L (ref 10–40)
BASOPHILS # BLD AUTO: 0.07 K/UL (ref 0–0.2)
BASOPHILS NFR BLD: 1.2 % (ref 0–1.9)
BILIRUB SERPL-MCNC: 0.6 MG/DL (ref 0.1–1)
BUN SERPL-MCNC: 10 MG/DL (ref 8–23)
CALCIUM SERPL-MCNC: 9.9 MG/DL (ref 8.7–10.5)
CHLORIDE SERPL-SCNC: 102 MMOL/L (ref 95–110)
CO2 SERPL-SCNC: 24 MMOL/L (ref 23–29)
CREAT SERPL-MCNC: 0.8 MG/DL (ref 0.5–1.4)
DIFFERENTIAL METHOD: NORMAL
EOSINOPHIL # BLD AUTO: 0.3 K/UL (ref 0–0.5)
EOSINOPHIL NFR BLD: 5.4 % (ref 0–8)
ERYTHROCYTE [DISTWIDTH] IN BLOOD BY AUTOMATED COUNT: 13.2 % (ref 11.5–14.5)
EST. GFR  (AFRICAN AMERICAN): >60 ML/MIN/1.73 M^2
EST. GFR  (NON AFRICAN AMERICAN): >60 ML/MIN/1.73 M^2
GLUCOSE SERPL-MCNC: 94 MG/DL (ref 70–110)
HCT VFR BLD AUTO: 42.3 % (ref 37–48.5)
HGB BLD-MCNC: 14 G/DL (ref 12–16)
IMM GRANULOCYTES # BLD AUTO: 0.01 K/UL (ref 0–0.04)
IMM GRANULOCYTES NFR BLD AUTO: 0.2 % (ref 0–0.5)
LYMPHOCYTES # BLD AUTO: 2.3 K/UL (ref 1–4.8)
LYMPHOCYTES NFR BLD: 38.6 % (ref 18–48)
MCH RBC QN AUTO: 30.9 PG (ref 27–31)
MCHC RBC AUTO-ENTMCNC: 33.1 G/DL (ref 32–36)
MCV RBC AUTO: 93 FL (ref 82–98)
MONOCYTES # BLD AUTO: 0.4 K/UL (ref 0.3–1)
MONOCYTES NFR BLD: 7.5 % (ref 4–15)
NEUTROPHILS # BLD AUTO: 2.8 K/UL (ref 1.8–7.7)
NEUTROPHILS NFR BLD: 47.1 % (ref 38–73)
NRBC BLD-RTO: 0 /100 WBC
PLATELET # BLD AUTO: 240 K/UL (ref 150–450)
PMV BLD AUTO: 10.7 FL (ref 9.2–12.9)
POTASSIUM SERPL-SCNC: 4.2 MMOL/L (ref 3.5–5.1)
PROT SERPL-MCNC: 7.1 G/DL (ref 6–8.4)
RBC # BLD AUTO: 4.53 M/UL (ref 4–5.4)
SODIUM SERPL-SCNC: 137 MMOL/L (ref 136–145)
TROPONIN I SERPL DL<=0.01 NG/ML-MCNC: <0.006 NG/ML (ref 0–0.03)
WBC # BLD AUTO: 5.9 K/UL (ref 3.9–12.7)

## 2022-06-09 PROCEDURE — 99285 PR EMERGENCY DEPT VISIT,LEVEL V: ICD-10-PCS | Mod: ,,, | Performed by: EMERGENCY MEDICINE

## 2022-06-09 PROCEDURE — 99285 EMERGENCY DEPT VISIT HI MDM: CPT | Mod: ,,, | Performed by: EMERGENCY MEDICINE

## 2022-06-09 PROCEDURE — 85025 COMPLETE CBC W/AUTO DIFF WBC: CPT | Performed by: EMERGENCY MEDICINE

## 2022-06-09 PROCEDURE — 84484 ASSAY OF TROPONIN QUANT: CPT | Performed by: EMERGENCY MEDICINE

## 2022-06-09 PROCEDURE — 99283 EMERGENCY DEPT VISIT LOW MDM: CPT

## 2022-06-09 PROCEDURE — 80053 COMPREHEN METABOLIC PANEL: CPT | Performed by: EMERGENCY MEDICINE

## 2022-06-09 NOTE — ED NOTES
Pt is alert and oriented x 4 and states went to see her cardiologist x 2 days , but having chest pain x 3 days radiates to both upper arms. Pain 10/10 sharp/pressure, denies nausea/vomiting and appears non-diaphoretic

## 2022-06-09 NOTE — ED NOTES
"Dr Palacios at bedside     66 yo woman Pt reports to ED with L sided chest pain 2-3 days ago. Came on "powerful". Pt took 2 500 mg aspirins with no relief. Reports previous "powerful" chest discomfort in the past. Also reports pain in arms bilaterally for the last 4-6 weeks.   "
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
## 2022-06-09 NOTE — ED PROVIDER NOTES
"Encounter Date: 6/9/2022    SCRIBE #1 NOTE: I, Alicia Goel, am scribing for, and in the presence of,  Dr. Torito Palacios MD. I have scribed the following portions of the note - Other sections scribed: HPI, ROS, PE.       History     Chief Complaint   Patient presents with    Chest Pain     Pt c/o chest pain intermittently x 2-3 days ago.  Also states her BP has been elevated     Time patient was seen by the provider: 1:55 PM      The patient is a 67 y.o. female with co-morbidities including: benign HTN and gastroesophageal reflux disease, who presents to the ED with a complaint of left sided chest pain onset 2-3 days ago. Patient reports she was drinking a beet, tumeric and maxine juice when a "powerful" chest pain suddenly began. She is unsure of the length of her chest pain, but she suspects the episode was less than an hour. She reports taking two 500 mg of nereyda with no relief. She notes her chest pain had subsided after a period of time. Today, she notes her chest pain is not apparent. Denies SOB or nausea. Of note, she reports left upper extremity pain ongoing for 6 weeks. Patient does not smoke tobacco, ingest alcohol, or abuse drugs. Denies any family members that have passed before 55 y.o.    Patient was seen by arched her back this Cardiolog last week.  It was the impression of the cardiologist that this was musculoskeletal.    The history is provided by the patient and medical records. No  was used.   Chest Pain  The current episode started several days ago. Chest pain occurs intermittently. The chest pain is improving. Pertinent negatives for primary symptoms include no fever, no shortness of breath and no nausea.   Pertinent negatives for associated symptoms include no weakness.     Review of patient's allergies indicates:  No Known Allergies  Past Medical History:   Diagnosis Date    Acute costochondritis 9/18/2012    Back pain     Benign essential hypertension     " Gastroesophageal reflux disease without esophagitis     Pain in joint involving multiple sites 2013     Past Surgical History:   Procedure Laterality Date    BREAST BIOPSY Right      SECTION      KNEE ARTHROSCOPY W/ ACL RECONSTRUCTION      REFRACTIVE SURGERY Bilateral  or     Dr. Bharath DAWSON distance     Family History   Problem Relation Age of Onset    Hypertension Mother     Heart disease Maternal Grandmother     Celiac disease Neg Hx     Colon cancer Neg Hx     Colon polyps Neg Hx     Crohn's disease Neg Hx     Cystic fibrosis Neg Hx     Esophageal cancer Neg Hx     Inflammatory bowel disease Neg Hx     Irritable bowel syndrome Neg Hx     Liver cancer Neg Hx     Liver disease Neg Hx     Rectal cancer Neg Hx     Stomach cancer Neg Hx      Social History     Tobacco Use    Smoking status: Never Smoker    Smokeless tobacco: Never Used   Substance Use Topics    Alcohol use: No    Drug use: No     Review of Systems   Constitutional: Negative for fever.   HENT: Negative for sore throat.    Respiratory: Negative for shortness of breath.    Cardiovascular: Positive for chest pain.   Gastrointestinal: Negative for nausea.   Genitourinary: Negative for dysuria.   Musculoskeletal: Positive for myalgias (LUE). Negative for back pain.   Skin: Negative for rash.   Neurological: Negative for weakness.   Hematological: Does not bruise/bleed easily.       Physical Exam     Initial Vitals [22 1325]   BP Pulse Resp Temp SpO2   (!) 150/70 69 16 98.2 °F (36.8 °C) 99 %      MAP       --         Physical Exam    Constitutional: She appears well-developed and well-nourished. No distress.   HENT:   Head: Normocephalic and atraumatic.   Right Ear: External ear normal.   Left Ear: Hearing and external ear normal.   Nose: Nose normal.   Mouth/Throat: Uvula is midline and oropharynx is clear and moist. No oropharyngeal exudate.   Eyes: Conjunctivae, EOM and lids are normal. Pupils are equal,  round, and reactive to light.   Neck: Trachea normal. Neck supple. No JVD present.   Normal range of motion.  Cardiovascular: Normal rate, regular rhythm and normal heart sounds. Exam reveals no gallop and no friction rub.    No murmur heard.  Pulmonary/Chest: Breath sounds normal. No respiratory distress. She has no wheezes. She has no rhonchi. She has no rales.   Abdominal: Abdomen is soft. Bowel sounds are normal. There is no abdominal tenderness.   Musculoskeletal:         General: No edema. Normal range of motion.      Cervical back: Normal range of motion and neck supple.     Lymphadenopathy:     She has no cervical adenopathy.   Neurological: She is alert and oriented to person, place, and time. She has normal strength. No cranial nerve deficit or sensory deficit. GCS eye subscore is 4. GCS verbal subscore is 5. GCS motor subscore is 6.   Skin: Skin is warm and dry. Capillary refill takes less than 2 seconds. No rash noted.   Psychiatric: She has a normal mood and affect. Her speech is normal and behavior is normal.         ED Course   Procedures  Labs Reviewed   CBC W/ AUTO DIFFERENTIAL   COMPREHENSIVE METABOLIC PANEL   TROPONIN I     EKG Readings: (Independently Interpreted)   Initial Reading: No STEMI.   Normal sinus rhythm 65 beats per minute normal axis no ST changes concerning for ischemia no QT prolongation no STEMI.       Imaging Results    None          Medications - No data to display  Medical Decision Making:   Differential Diagnosis:   Differential diagnosis includes but is not limited to musculoskeletal pain, angina, GERD  Clinical Tests:   Lab Tests: Ordered and Reviewed       <> Summary of Lab: Troponin negative.  Medical Tests: Ordered  ED Management:  The patient was seen and examined.  EKG was obtained which is unremarkable.  Cardiac troponin will also be obtained.  The patient does not have any risk factors with the signs hypertension for heart disease.    1619-heart risk score of 3.  Troponin negative PE most likely musculoskeletal discussed with patient.    Additional MDM:   Heart Score:    History:          Slightly suspicious.  ECG:             Normal  Age:               >65 years  Risk factors: 1-2 risk factors  Troponin:       Less than or equal to normal limit  Final Score: 3           Scribe Attestation:   Scribe #1: I performed the above scribed service and the documentation accurately describes the services I performed. I attest to the accuracy of the note.    Attending Attestation:           Physician Attestation for Scribe:      Comments: I, Dr. Palacios, personally performed the services described in this documentation. All medical record entries made by the scribe were at my direction and in my presence.  I have reviewed the chart and agree that the record reflects my personal performance and is accurate and complete. Torito Palacios,   2:26 PM 06/09/2022                   Clinical Impression:   Final diagnoses:  [R07.89] Chest wall pain (Primary)          ED Disposition Condition    Discharge Stable        ED Prescriptions     None        Follow-up Information     Follow up With Specialties Details Why Contact Info    Jonny Willis MD Internal Medicine  If symptoms worsen [address]  [place], [address]  [facility] [address]  [phone]             Torito Palacios, DO  06/09/22 8385

## 2022-06-09 NOTE — ED PROVIDER NOTES
The patient left after being triaged and before I saw her.     Torito Palacios, DO  06/09/22 8251

## 2022-06-13 ENCOUNTER — TELEPHONE (OUTPATIENT)
Dept: NEUROSURGERY | Facility: CLINIC | Age: 67
End: 2022-06-13

## 2022-06-13 ENCOUNTER — OFFICE VISIT (OUTPATIENT)
Dept: NEUROSURGERY | Facility: CLINIC | Age: 67
End: 2022-06-13
Payer: COMMERCIAL

## 2022-06-13 VITALS
WEIGHT: 180 LBS | DIASTOLIC BLOOD PRESSURE: 79 MMHG | HEIGHT: 69 IN | SYSTOLIC BLOOD PRESSURE: 141 MMHG | HEART RATE: 61 BPM | BODY MASS INDEX: 26.66 KG/M2

## 2022-06-13 DIAGNOSIS — M48.061 NEUROFORAMINAL STENOSIS OF LUMBAR SPINE: Primary | ICD-10-CM

## 2022-06-13 DIAGNOSIS — M48.061 NEUROFORAMINAL STENOSIS OF LUMBAR SPINE: ICD-10-CM

## 2022-06-13 DIAGNOSIS — M47.816 LUMBAR SPONDYLOSIS: ICD-10-CM

## 2022-06-13 PROCEDURE — 99205 OFFICE O/P NEW HI 60 MIN: CPT | Mod: S$GLB,,, | Performed by: NEUROLOGICAL SURGERY

## 2022-06-13 PROCEDURE — 99999 PR PBB SHADOW E&M-EST. PATIENT-LVL III: ICD-10-PCS | Mod: PBBFAC,,, | Performed by: NEUROLOGICAL SURGERY

## 2022-06-13 PROCEDURE — 99999 PR PBB SHADOW E&M-EST. PATIENT-LVL III: CPT | Mod: PBBFAC,,, | Performed by: NEUROLOGICAL SURGERY

## 2022-06-13 PROCEDURE — 99205 PR OFFICE/OUTPT VISIT, NEW, LEVL V, 60-74 MIN: ICD-10-PCS | Mod: S$GLB,,, | Performed by: NEUROLOGICAL SURGERY
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
## 2022-06-14 ENCOUNTER — HOSPITAL ENCOUNTER (OUTPATIENT)
Dept: RADIOLOGY | Facility: HOSPITAL | Age: 67
Discharge: HOME | End: 2022-06-14
Attending: NEUROLOGICAL SURGERY
Payer: COMMERCIAL

## 2022-06-14 DIAGNOSIS — M48.061 NEUROFORAMINAL STENOSIS OF LUMBAR SPINE: ICD-10-CM

## 2022-06-14 PROCEDURE — 72120 X-RAY BEND ONLY L-S SPINE: CPT | Mod: 26,,, | Performed by: INTERNAL MEDICINE

## 2022-06-14 PROCEDURE — 72120 X-RAY BEND ONLY L-S SPINE: CPT | Mod: TC

## 2022-06-14 PROCEDURE — 72120 XR LUMBAR SPINE FLEXION AND EXTENSION ONLY: ICD-10-PCS | Mod: 26,,, | Performed by: INTERNAL MEDICINE

## 2022-06-15 ENCOUNTER — DOCUMENTATION ONLY (OUTPATIENT)
Dept: REHABILITATION | Facility: HOSPITAL | Age: 67
End: 2022-06-15
Payer: COMMERCIAL
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
## 2022-06-16 ENCOUNTER — TELEPHONE (OUTPATIENT)
Dept: INTERNAL MEDICINE | Facility: CLINIC | Age: 67
End: 2022-06-16
Payer: COMMERCIAL

## 2022-06-16 NOTE — TELEPHONE ENCOUNTER
----- Message from Jennifer Espinosa sent at 6/15/2022  4:54 PM CDT -----  Contact: Self/[phone]  Pt said that she is calling in regards to needing to speak with the doctor about a lot of concerns that he Is aware of. Please advise

## 2022-06-17 ENCOUNTER — DOCUMENTATION ONLY (OUTPATIENT)
Dept: REHABILITATION | Facility: HOSPITAL | Age: 67
End: 2022-06-17
Payer: COMMERCIAL

## 2022-06-21 NOTE — TELEPHONE ENCOUNTER
----- Message from Ananth Black sent at 6/21/2022  2:42 PM CDT -----  Contact: [phone]  Pt would like a call back and she wouldn't not give any info on what it's about.

## 2022-06-24 ENCOUNTER — TELEPHONE (OUTPATIENT)
Dept: INTERNAL MEDICINE | Facility: CLINIC | Age: 67
End: 2022-06-24
Payer: COMMERCIAL

## 2022-06-24 RX ORDER — ETODOLAC 400 MG/1
TABLET, FILM COATED ORAL
Qty: 30 TABLET | Refills: 0 | OUTPATIENT
Start: 2022-06-24

## 2022-06-24 RX ORDER — KETOROLAC TROMETHAMINE 10 MG/1
10 TABLET, FILM COATED ORAL 3 TIMES DAILY PRN
Qty: 30 TABLET | Refills: 0 | Status: SHIPPED | OUTPATIENT
Start: 2022-06-24 | End: 2023-01-30 | Stop reason: SDUPTHER

## 2022-06-24 RX ORDER — DICLOFENAC SODIUM 75 MG/1
TABLET, DELAYED RELEASE ORAL
Qty: 20 TABLET | Refills: 0 | OUTPATIENT
Start: 2022-06-24

## 2022-06-24 NOTE — TELEPHONE ENCOUNTER
Please call patient she only want to speak with you about her health concerns she states she has been calling I informed her you called her but her message box was full

## 2022-06-24 NOTE — TELEPHONE ENCOUNTER
----- Message from Lyndsey Peoples sent at 6/24/2022 11:56 AM CDT -----  Regarding: requesting call  Contact: patient [phone]  Patient is requesting a call back to  speak with Dr. Willis about her health issues.  Patient has called several times with no response.

## 2022-06-29 ENCOUNTER — TELEPHONE (OUTPATIENT)
Dept: INTERNAL MEDICINE | Facility: CLINIC | Age: 67
End: 2022-06-29
Payer: COMMERCIAL

## 2022-06-29 NOTE — TELEPHONE ENCOUNTER
----- Message from Kavon Clancy sent at 6/27/2022 11:25 AM CDT -----  Contact: PT @ [phone]  Pharmacy is calling to clarify an RX.    RX name:  ketorolac (TORADOL) 10 mg tablet    What do they need to clarify:  Prior Auth     Comments:   Patient is requesting prior auth for the above patient because she is in a lot of pain. Please advise.

## 2022-06-29 NOTE — TELEPHONE ENCOUNTER
R/t call and spoke w/ pt and informed her that I would speak w/ pharmacy and get back with her. Uv.    ----- Message from Annamaria Jasso sent at 6/28/2022  2:18 PM CDT -----  Contact: Josi   Josi would like a call back from [place] about the status of the script Toradol 10 mg. She said it is very important that she get a call back right away.

## 2022-06-30 ENCOUNTER — TELEPHONE (OUTPATIENT)
Dept: INTERNAL MEDICINE | Facility: CLINIC | Age: 67
End: 2022-06-30
Payer: COMMERCIAL

## 2022-06-30 NOTE — TELEPHONE ENCOUNTER
----- Message from Radha Kimble sent at 6/30/2022 12:43 PM CDT -----  Contact: [phone]  Please call patient back about her PA.

## 2022-07-07 ENCOUNTER — HOSPITAL ENCOUNTER (OUTPATIENT)
Dept: RADIOLOGY | Facility: HOSPITAL | Age: 67
Discharge: HOME | End: 2022-07-07
Attending: NEUROLOGICAL SURGERY
Payer: COMMERCIAL

## 2022-07-07 DIAGNOSIS — M48.061 NEUROFORAMINAL STENOSIS OF LUMBAR SPINE: ICD-10-CM

## 2022-07-07 PROCEDURE — 72141 MRI CERVICAL SPINE WITHOUT CONTRAST: ICD-10-PCS | Mod: 26,,, | Performed by: RADIOLOGY

## 2022-07-07 PROCEDURE — 72146 MRI THORACIC SPINE WITHOUT CONTRAST: ICD-10-PCS | Mod: 26,,, | Performed by: RADIOLOGY

## 2022-07-07 PROCEDURE — 72146 MRI CHEST SPINE W/O DYE: CPT | Mod: TC

## 2022-07-07 PROCEDURE — 72146 MRI CHEST SPINE W/O DYE: CPT | Mod: 26,,, | Performed by: RADIOLOGY

## 2022-07-07 PROCEDURE — 72141 MRI NECK SPINE W/O DYE: CPT | Mod: TC

## 2022-07-07 PROCEDURE — 72141 MRI NECK SPINE W/O DYE: CPT | Mod: 26,,, | Performed by: RADIOLOGY

## 2022-07-11 ENCOUNTER — TELEPHONE (OUTPATIENT)
Dept: INTERNAL MEDICINE | Facility: CLINIC | Age: 67
End: 2022-07-11
Payer: COMMERCIAL

## 2022-07-11 NOTE — TELEPHONE ENCOUNTER
----- Message from Christin Salmon sent at 7/11/2022  3:18 PM CDT -----  Contact: [phone] Patient  Calling to get test results.   Name of test (lab, x-ray): MRI  Date of test: 07/07  Where was the test performed[facility]  Would you like a call back, or a response through your [facility] portal?:   call back  Comments:

## 2022-07-11 NOTE — TELEPHONE ENCOUNTER
----- Message from Christin Salmon sent at 7/11/2022  3:18 PM CDT -----  Contact: [phone] Patient  Calling to get test results.   Name of test (lab, x-ray): MRI  Date of test: 07/07  Where was the test performed: [facility]  Would you like a call back, or a response through your [facility] portal?:   call back  Comments:

## 2022-07-13 ENCOUNTER — DOCUMENTATION ONLY (OUTPATIENT)
Dept: INTERNAL MEDICINE | Facility: CLINIC | Age: 67
End: 2022-07-13
Payer: COMMERCIAL

## 2022-07-13 NOTE — PROGRESS NOTES
Internal medicine note       Patient had MRIs of the cervical and thoracic vertebrae July 7, 2022   the MRI as well ordered by neurosurgeon from from the neuro surgery visit June 13, 2022    The results reveal areas of osteophyte disc complex, facet arthropathy, neural foraminal narrowing of the cervical vertebrae.  It was explained that this was a degenerative issue involving the facet joints which was explain as well as the meaning of osteophyte disc complex, as well as neural foraminal narrowing explain regarding the openings of in which the nerve roots come from a spine through the vertebrae.  It was explained that facet arthropathy such general term indicating inflammation or irritation involving the facet joints that can be due to arthritis compression injury.    The MRI the thoracic spine also noted did degenerative disc changes but no other the noted findings.    Certainly this could be is  A source of pain involving her neck, back.  Also I feel that part of the pain is muscular.    Made the recommendation of proceeding with anti-inflammatory medicine and muscle relaxants  other than Toradol on a daily basis and referral back to back and spine clinic in physical therapy    She was upset that there was no professionalism in the explanation or  Any other reason why she is in persistent pain.  She expressed that it was unprofessional as well as insulting to her , regarding me to use another physician to explain or manage her situation and condition.    I explained to her that everything has been done was appropriate with recommendation of certain class of medications, referred to physical therapy, referral to pain clinic, referral to back and spine, referral to physical medicine rehab and finally referral to neurosurgery although I did not feel that there wasn't any surgical findings noted but this was mainly involving the MRI the lumbar spine.    After such the conversation ended.    I do not know what other  course to take other than to follow-up with the upcoming neurosurgery appointment for further insight and explanation      It is noted that she did not have a satisfactory encounter when she recently met with pain clinic, physical Medicine and rehab, and physical therapy

## 2022-07-15 ENCOUNTER — TELEPHONE (OUTPATIENT)
Dept: NEUROSURGERY | Facility: CLINIC | Age: 67
End: 2022-07-15
Payer: COMMERCIAL

## 2022-07-15 NOTE — TELEPHONE ENCOUNTER
----- Message from Vinny Austin sent at 7/15/2022  3:25 PM CDT -----  Contact: @[phone]  Pt is calling in to have her results explained to her better . Please call back to discuss further

## 2022-07-15 NOTE — TELEPHONE ENCOUNTER
Called and spoke with pt. Explained Dr. Rivero would need her EMG results together with all of her imaging in order to discuss the results. Pt requested her EMG to be rescheduled because she is in a lot of pain and can not wait. Rescheduled EMG to a sooner appt. Pt confirmed rescheduling of EMG.

## 2022-07-21 ENCOUNTER — TELEPHONE (OUTPATIENT)
Dept: NEUROLOGY | Facility: CLINIC | Age: 67
End: 2022-07-21
Payer: COMMERCIAL

## 2022-07-21 DIAGNOSIS — M48.061 NEUROFORAMINAL STENOSIS OF LUMBAR SPINE: Primary | ICD-10-CM

## 2022-07-29 ENCOUNTER — TELEPHONE (OUTPATIENT)
Dept: INTERNAL MEDICINE | Facility: CLINIC | Age: 67
End: 2022-07-29
Payer: COMMERCIAL

## 2022-07-29 NOTE — TELEPHONE ENCOUNTER
----- Message from Meaghan Almazan sent at 7/29/2022  1:56 PM CDT -----  Contact: [phone]  Pt states she was scheduled to take a test but the office she states was having some issues and she states she has not heard from anyone in regards to the nerve test

## 2022-07-29 NOTE — TELEPHONE ENCOUNTER
Tried to call patient to inform her the neurosurgeon ordered the testing and it was scheduled for September unable to leave voice message

## 2022-08-04 ENCOUNTER — TELEPHONE (OUTPATIENT)
Dept: INTERNAL MEDICINE | Facility: CLINIC | Age: 67
End: 2022-08-04
Payer: COMMERCIAL

## 2022-08-04 ENCOUNTER — TELEPHONE (OUTPATIENT)
Dept: NEUROSURGERY | Facility: CLINIC | Age: 67
End: 2022-08-04
Payer: COMMERCIAL

## 2022-08-04 NOTE — TELEPHONE ENCOUNTER
Called patient back. Informed her I can get her scheduled with XENIA George on Monday 8/8 to be evaluated and review her imaging. Pt declined. Pt reports she wants Dr. Rivero to call her today. I informed her Dr. Rivero is not in clinic today and is in procedures all day. Pt reports she has called this office multiple times and is upset she has not heard from anyone. I informed her I was able to see that Brittany Ramachandran RN has called her back multiple times. Pt reports she does not recall that. Pt stated she had another call coming in. I advised the patient to call back if she would like the appointment with Irlanda Conte on Monday. Pt took her other call and I was disconnected.     ----- Message from Estephanie Jefferson sent at 8/4/2022  4:25 PM CDT -----  Regarding: Patient advice  Contact: Pt  Pt called in regards to getting an sooner appointment, Pt would like a call back       Pt can be reached at  [phone]

## 2022-08-06 NOTE — TELEPHONE ENCOUNTER
Unable to retrieve patient chart and identify care gaps.  [facility] Embedded Care Gaps. Reference number: [id number]. 8/06/2022   10:16:28 AM CDT

## 2022-08-12 RX ORDER — AMLODIPINE BESYLATE 5 MG/1
5 TABLET ORAL DAILY
Qty: 90 TABLET | Refills: 1 | Status: SHIPPED | OUTPATIENT
Start: 2022-08-12 | End: 2022-09-13

## 2022-08-12 RX ORDER — METOPROLOL SUCCINATE 25 MG/1
25 TABLET, EXTENDED RELEASE ORAL DAILY
Qty: 90 TABLET | Refills: 1 | Status: SHIPPED | OUTPATIENT
Start: 2022-08-12 | End: 2022-09-13

## 2022-08-17 ENCOUNTER — TELEPHONE (OUTPATIENT)
Dept: NEUROSURGERY | Facility: CLINIC | Age: 67
End: 2022-08-17
Payer: COMMERCIAL

## 2022-08-17 NOTE — TELEPHONE ENCOUNTER
Called and spoke with pt. She does not want to speak with anyone other than Dr. Rivero. Explained Dr. Rivero is out until Monday. She requested when he returns to please call her. Explained I will leave a message for him. Pt verbalized understanding.

## 2022-08-17 NOTE — TELEPHONE ENCOUNTER
----- Message from Claus Lord MA sent at 8/17/2022 12:59 PM CDT -----  Contact: [phone]  Patient would like to speak to the doctor. She is requesting to get the doctors advise on what steps she needs take? Darby call and advise.

## 2022-08-21 ENCOUNTER — NURSE TRIAGE (OUTPATIENT)
Dept: ADMINISTRATIVE | Facility: CLINIC | Age: 67
End: 2022-08-21
Payer: COMMERCIAL

## 2022-08-21 NOTE — TELEPHONE ENCOUNTER
Reason for Disposition   Numbness in one foot (i.e., loss of sensation)    Additional Information   Negative: Followed a foot injury   Negative: Diabetes mellitus   Negative: Ankle pain is main symptom   Negative: Thigh or calf pain is main symptom   Negative: Entire foot is cool or blue in comparison to other foot   Negative: Purple or black skin on foot or toe   Negative: [1] Red area or streak AND [2] fever   Negative: [1] Swollen foot AND [2] fever   Negative: Patient sounds very sick or weak to the triager   Negative: [1] SEVERE pain (e.g., excruciating, unable to do any normal activities) AND [2] not improved after 2 hours of pain medicine   Negative: [1] Looks infected (spreading redness, pus) AND [2] large red area (> 2 in. or 5 cm)   Negative: Looks like a boil, infected sore, or deep ulcer   Negative: [1] Redness of the skin AND [2] no fever   Negative: [1] Swollen foot AND [2] no fever  (Exceptions: localized bump from bunions, calluses, insect bite, sting)    Protocols used: FOOT PAIN-A-AH  pt called re sudden severe pain at bottom of feet. lasted 2 mins. just now subsiding. hx back pblms. rating pain 3-5. no CP, no SOB. Pt states the pain was a 100 on a 1/10 scale during the episode. Denies injury. rec to see dr within 24 hoiurs. Pt agrees. OV set 8/22 at 315pm. Call back with questions

## 2022-08-22 ENCOUNTER — OFFICE VISIT (OUTPATIENT)
Dept: INTERNAL MEDICINE | Facility: CLINIC | Age: 67
End: 2022-08-22
Payer: COMMERCIAL

## 2022-08-22 VITALS
SYSTOLIC BLOOD PRESSURE: 128 MMHG | OXYGEN SATURATION: 98 % | BODY MASS INDEX: 26.36 KG/M2 | HEART RATE: 71 BPM | DIASTOLIC BLOOD PRESSURE: 74 MMHG | HEIGHT: 69 IN | WEIGHT: 178 LBS

## 2022-08-22 DIAGNOSIS — M79.672 PAIN IN BOTH FEET: Primary | ICD-10-CM

## 2022-08-22 DIAGNOSIS — M79.671 PAIN IN BOTH FEET: Primary | ICD-10-CM

## 2022-08-22 PROCEDURE — 3008F PR BODY MASS INDEX (BMI) DOCUMENTED: ICD-10-PCS | Mod: CPTII,S$GLB,, | Performed by: INTERNAL MEDICINE

## 2022-08-22 PROCEDURE — 1159F PR MEDICATION LIST DOCUMENTED IN MEDICAL RECORD: ICD-10-PCS | Mod: CPTII,S$GLB,, | Performed by: INTERNAL MEDICINE

## 2022-08-22 PROCEDURE — 1160F RVW MEDS BY RX/DR IN RCRD: CPT | Mod: CPTII,S$GLB,, | Performed by: INTERNAL MEDICINE

## 2022-08-22 PROCEDURE — 1101F PR PT FALLS ASSESS DOC 0-1 FALLS W/OUT INJ PAST YR: ICD-10-PCS | Mod: CPTII,S$GLB,, | Performed by: INTERNAL MEDICINE

## 2022-08-22 PROCEDURE — 3078F DIAST BP <80 MM HG: CPT | Mod: CPTII,S$GLB,, | Performed by: INTERNAL MEDICINE

## 2022-08-22 PROCEDURE — 99999 PR PBB SHADOW E&M-EST. PATIENT-LVL III: ICD-10-PCS | Mod: PBBFAC,,, | Performed by: INTERNAL MEDICINE

## 2022-08-22 PROCEDURE — 1101F PT FALLS ASSESS-DOCD LE1/YR: CPT | Mod: CPTII,S$GLB,, | Performed by: INTERNAL MEDICINE

## 2022-08-22 PROCEDURE — 3044F HG A1C LEVEL LT 7.0%: CPT | Mod: CPTII,S$GLB,, | Performed by: INTERNAL MEDICINE

## 2022-08-22 PROCEDURE — 3008F BODY MASS INDEX DOCD: CPT | Mod: CPTII,S$GLB,, | Performed by: INTERNAL MEDICINE

## 2022-08-22 PROCEDURE — 99213 OFFICE O/P EST LOW 20 MIN: CPT | Mod: S$GLB,,, | Performed by: INTERNAL MEDICINE

## 2022-08-22 PROCEDURE — 3288F FALL RISK ASSESSMENT DOCD: CPT | Mod: CPTII,S$GLB,, | Performed by: INTERNAL MEDICINE

## 2022-08-22 PROCEDURE — 3288F PR FALLS RISK ASSESSMENT DOCUMENTED: ICD-10-PCS | Mod: CPTII,S$GLB,, | Performed by: INTERNAL MEDICINE

## 2022-08-22 PROCEDURE — 99213 PR OFFICE/OUTPT VISIT, EST, LEVL III, 20-29 MIN: ICD-10-PCS | Mod: S$GLB,,, | Performed by: INTERNAL MEDICINE

## 2022-08-22 PROCEDURE — 1160F PR REVIEW ALL MEDS BY PRESCRIBER/CLIN PHARMACIST DOCUMENTED: ICD-10-PCS | Mod: CPTII,S$GLB,, | Performed by: INTERNAL MEDICINE

## 2022-08-22 PROCEDURE — 1125F AMNT PAIN NOTED PAIN PRSNT: CPT | Mod: CPTII,S$GLB,, | Performed by: INTERNAL MEDICINE

## 2022-08-22 PROCEDURE — 1159F MED LIST DOCD IN RCRD: CPT | Mod: CPTII,S$GLB,, | Performed by: INTERNAL MEDICINE

## 2022-08-22 PROCEDURE — 3074F SYST BP LT 130 MM HG: CPT | Mod: CPTII,S$GLB,, | Performed by: INTERNAL MEDICINE

## 2022-08-22 PROCEDURE — 3078F PR MOST RECENT DIASTOLIC BLOOD PRESSURE < 80 MM HG: ICD-10-PCS | Mod: CPTII,S$GLB,, | Performed by: INTERNAL MEDICINE

## 2022-08-22 PROCEDURE — 3074F PR MOST RECENT SYSTOLIC BLOOD PRESSURE < 130 MM HG: ICD-10-PCS | Mod: CPTII,S$GLB,, | Performed by: INTERNAL MEDICINE

## 2022-08-22 PROCEDURE — 3044F PR MOST RECENT HEMOGLOBIN A1C LEVEL <7.0%: ICD-10-PCS | Mod: CPTII,S$GLB,, | Performed by: INTERNAL MEDICINE

## 2022-08-22 PROCEDURE — 1125F PR PAIN SEVERITY QUANTIFIED, PAIN PRESENT: ICD-10-PCS | Mod: CPTII,S$GLB,, | Performed by: INTERNAL MEDICINE

## 2022-08-22 PROCEDURE — 99999 PR PBB SHADOW E&M-EST. PATIENT-LVL III: CPT | Mod: PBBFAC,,, | Performed by: INTERNAL MEDICINE

## 2022-08-23 ENCOUNTER — OFFICE VISIT (OUTPATIENT)
Dept: NEUROSURGERY | Facility: CLINIC | Age: 67
End: 2022-08-23
Payer: COMMERCIAL

## 2022-08-23 ENCOUNTER — TELEPHONE (OUTPATIENT)
Dept: INTERNAL MEDICINE | Facility: CLINIC | Age: 67
End: 2022-08-23
Payer: COMMERCIAL

## 2022-08-23 VITALS
DIASTOLIC BLOOD PRESSURE: 78 MMHG | SYSTOLIC BLOOD PRESSURE: 174 MMHG | HEART RATE: 69 BPM | BODY MASS INDEX: 26.36 KG/M2 | HEIGHT: 69 IN | WEIGHT: 178 LBS

## 2022-08-23 DIAGNOSIS — M47.816 LUMBAR SPONDYLOSIS: Primary | ICD-10-CM

## 2022-08-23 DIAGNOSIS — M48.061 NEUROFORAMINAL STENOSIS OF LUMBAR SPINE: ICD-10-CM

## 2022-08-23 DIAGNOSIS — M79.2 NEUROPATHIC PAIN: ICD-10-CM

## 2022-08-23 PROCEDURE — 1101F PT FALLS ASSESS-DOCD LE1/YR: CPT | Mod: CPTII,S$GLB,, | Performed by: NEUROLOGICAL SURGERY

## 2022-08-23 PROCEDURE — 99999 PR PBB SHADOW E&M-EST. PATIENT-LVL III: ICD-10-PCS | Mod: PBBFAC,,, | Performed by: NEUROLOGICAL SURGERY

## 2022-08-23 PROCEDURE — 3288F FALL RISK ASSESSMENT DOCD: CPT | Mod: CPTII,S$GLB,, | Performed by: NEUROLOGICAL SURGERY

## 2022-08-23 PROCEDURE — 99417 PROLNG OP E/M EACH 15 MIN: CPT | Mod: S$GLB,,, | Performed by: NEUROLOGICAL SURGERY

## 2022-08-23 PROCEDURE — 1159F MED LIST DOCD IN RCRD: CPT | Mod: CPTII,S$GLB,, | Performed by: NEUROLOGICAL SURGERY

## 2022-08-23 PROCEDURE — 1160F PR REVIEW ALL MEDS BY PRESCRIBER/CLIN PHARMACIST DOCUMENTED: ICD-10-PCS | Mod: CPTII,S$GLB,, | Performed by: NEUROLOGICAL SURGERY

## 2022-08-23 PROCEDURE — 1101F PR PT FALLS ASSESS DOC 0-1 FALLS W/OUT INJ PAST YR: ICD-10-PCS | Mod: CPTII,S$GLB,, | Performed by: NEUROLOGICAL SURGERY

## 2022-08-23 PROCEDURE — 99999 PR PBB SHADOW E&M-EST. PATIENT-LVL III: CPT | Mod: PBBFAC,,, | Performed by: NEUROLOGICAL SURGERY

## 2022-08-23 PROCEDURE — 1125F AMNT PAIN NOTED PAIN PRSNT: CPT | Mod: CPTII,S$GLB,, | Performed by: NEUROLOGICAL SURGERY

## 2022-08-23 PROCEDURE — 3078F PR MOST RECENT DIASTOLIC BLOOD PRESSURE < 80 MM HG: ICD-10-PCS | Mod: CPTII,S$GLB,, | Performed by: NEUROLOGICAL SURGERY

## 2022-08-23 PROCEDURE — 1160F RVW MEDS BY RX/DR IN RCRD: CPT | Mod: CPTII,S$GLB,, | Performed by: NEUROLOGICAL SURGERY

## 2022-08-23 PROCEDURE — 3077F SYST BP >= 140 MM HG: CPT | Mod: CPTII,S$GLB,, | Performed by: NEUROLOGICAL SURGERY

## 2022-08-23 PROCEDURE — 3077F PR MOST RECENT SYSTOLIC BLOOD PRESSURE >= 140 MM HG: ICD-10-PCS | Mod: CPTII,S$GLB,, | Performed by: NEUROLOGICAL SURGERY

## 2022-08-23 PROCEDURE — 3288F PR FALLS RISK ASSESSMENT DOCUMENTED: ICD-10-PCS | Mod: CPTII,S$GLB,, | Performed by: NEUROLOGICAL SURGERY

## 2022-08-23 PROCEDURE — 1159F PR MEDICATION LIST DOCUMENTED IN MEDICAL RECORD: ICD-10-PCS | Mod: CPTII,S$GLB,, | Performed by: NEUROLOGICAL SURGERY

## 2022-08-23 PROCEDURE — 99215 PR OFFICE/OUTPT VISIT, EST, LEVL V, 40-54 MIN: ICD-10-PCS | Mod: S$GLB,,, | Performed by: NEUROLOGICAL SURGERY

## 2022-08-23 PROCEDURE — 99417 PR PROLONGED SVC, OUTPT, W/WO DIRECT PT CONTACT,  EA ADDTL 15 MIN: ICD-10-PCS | Mod: S$GLB,,, | Performed by: NEUROLOGICAL SURGERY

## 2022-08-23 PROCEDURE — 3078F DIAST BP <80 MM HG: CPT | Mod: CPTII,S$GLB,, | Performed by: NEUROLOGICAL SURGERY

## 2022-08-23 PROCEDURE — 3008F BODY MASS INDEX DOCD: CPT | Mod: CPTII,S$GLB,, | Performed by: NEUROLOGICAL SURGERY

## 2022-08-23 PROCEDURE — 99215 OFFICE O/P EST HI 40 MIN: CPT | Mod: S$GLB,,, | Performed by: NEUROLOGICAL SURGERY

## 2022-08-23 PROCEDURE — 3044F PR MOST RECENT HEMOGLOBIN A1C LEVEL <7.0%: ICD-10-PCS | Mod: CPTII,S$GLB,, | Performed by: NEUROLOGICAL SURGERY

## 2022-08-23 PROCEDURE — 1125F PR PAIN SEVERITY QUANTIFIED, PAIN PRESENT: ICD-10-PCS | Mod: CPTII,S$GLB,, | Performed by: NEUROLOGICAL SURGERY

## 2022-08-23 PROCEDURE — 3008F PR BODY MASS INDEX (BMI) DOCUMENTED: ICD-10-PCS | Mod: CPTII,S$GLB,, | Performed by: NEUROLOGICAL SURGERY

## 2022-08-23 PROCEDURE — 3044F HG A1C LEVEL LT 7.0%: CPT | Mod: CPTII,S$GLB,, | Performed by: NEUROLOGICAL SURGERY
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
## 2022-08-23 NOTE — TELEPHONE ENCOUNTER
----- Message from Meaghan Almazan sent at 8/23/2022  3:46 PM CDT -----  Contact: [phone]  Pt is calling she states the paperwork that the dr was suppose to send she has never got for her blood pressure medication please give return call

## 2022-08-23 NOTE — TELEPHONE ENCOUNTER
Called and spoke with pt. Explained I spoke with  yesterday. He requested that she come in for a visit or possible a vv. He stated he really needs the EMG to see the whole picture of what is going on. Pt stated she did not want to come in but would like a phone call. Explained  is not wanting to do a phone call and is asking for pt to make appt. Appt scheduled today at 3pm. Pt confirmed appt.

## 2022-08-24 ENCOUNTER — TELEPHONE (OUTPATIENT)
Dept: NEUROSURGERY | Facility: CLINIC | Age: 67
End: 2022-08-24
Payer: COMMERCIAL

## 2022-08-24 DIAGNOSIS — M79.2 NEUROPATHIC PAIN: ICD-10-CM

## 2022-08-24 DIAGNOSIS — M47.816 LUMBAR SPONDYLOSIS: Primary | ICD-10-CM

## 2022-08-30 ENCOUNTER — CLINICAL SUPPORT (OUTPATIENT)
Dept: REHABILITATION | Facility: HOSPITAL | Age: 67
End: 2022-08-30
Payer: COMMERCIAL

## 2022-08-30 DIAGNOSIS — R68.89 DECREASED ACTIVITY TOLERANCE: ICD-10-CM

## 2022-08-30 DIAGNOSIS — M47.816 LUMBAR SPONDYLOSIS: ICD-10-CM

## 2022-08-30 DIAGNOSIS — R26.89 DECREASED FUNCTIONAL MOBILITY: ICD-10-CM

## 2022-08-30 DIAGNOSIS — M79.2 NEUROPATHIC PAIN: ICD-10-CM

## 2022-08-30 DIAGNOSIS — R26.89 IMPAIRED GAIT AND MOBILITY: ICD-10-CM

## 2022-08-30 PROCEDURE — 97162 PT EVAL MOD COMPLEX 30 MIN: CPT
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
## 2022-08-31 DIAGNOSIS — Z78.0 MENOPAUSE: ICD-10-CM

## 2022-09-01 ENCOUNTER — TELEPHONE (OUTPATIENT)
Dept: OPHTHALMOLOGY | Facility: CLINIC | Age: 67
End: 2022-09-01
Payer: COMMERCIAL

## 2022-09-01 NOTE — TELEPHONE ENCOUNTER
----- Message from Ariadne Nguyen sent at 9/1/2022 11:52 AM CDT -----  Regarding: Schedule  Pt called about having pain in both eyes that started last night.     Pts call back: [phone]

## 2022-09-01 NOTE — TELEPHONE ENCOUNTER
----- Message from Olena Orosco sent at 9/1/2022 12:27 PM CDT -----  Regarding: Call Back  Contact: Josi Randall  Patient is having pain in both of her eye that have been going on for awhile now but pain is getting worst. Josi Gil call back number is [phone].

## 2022-09-01 NOTE — TELEPHONE ENCOUNTER
----- Message from Ariadne Nguyen sent at 9/1/2022 11:52 AM CDT -----  Regarding: Schedule  Pt called about having pain in both eyes that started last night.     Pts call back: [phone]

## 2022-09-02 ENCOUNTER — OFFICE VISIT (OUTPATIENT)
Dept: OPHTHALMOLOGY | Facility: CLINIC | Age: 67
End: 2022-09-02
Payer: COMMERCIAL

## 2022-09-02 ENCOUNTER — TELEPHONE (OUTPATIENT)
Dept: INTERNAL MEDICINE | Facility: CLINIC | Age: 67
End: 2022-09-02
Payer: COMMERCIAL

## 2022-09-02 DIAGNOSIS — H57.13 EYE PAIN, BILATERAL: Primary | ICD-10-CM

## 2022-09-02 DIAGNOSIS — H04.123 BILATERAL DRY EYES: ICD-10-CM

## 2022-09-02 DIAGNOSIS — H25.13 NUCLEAR SCLEROSIS OF BOTH EYES: ICD-10-CM

## 2022-09-02 PROCEDURE — 99999 PR PBB SHADOW E&M-EST. PATIENT-LVL II: CPT | Mod: PBBFAC,,, | Performed by: OPHTHALMOLOGY

## 2022-09-02 PROCEDURE — 99214 PR OFFICE/OUTPT VISIT, EST, LEVL IV, 30-39 MIN: ICD-10-PCS | Mod: GC,S$GLB,, | Performed by: OPHTHALMOLOGY

## 2022-09-02 PROCEDURE — 3044F PR MOST RECENT HEMOGLOBIN A1C LEVEL <7.0%: ICD-10-PCS | Mod: CPTII,S$GLB,, | Performed by: OPHTHALMOLOGY

## 2022-09-02 PROCEDURE — 3044F HG A1C LEVEL LT 7.0%: CPT | Mod: CPTII,S$GLB,, | Performed by: OPHTHALMOLOGY

## 2022-09-02 PROCEDURE — 1125F AMNT PAIN NOTED PAIN PRSNT: CPT | Mod: CPTII,S$GLB,, | Performed by: OPHTHALMOLOGY

## 2022-09-02 PROCEDURE — 1159F PR MEDICATION LIST DOCUMENTED IN MEDICAL RECORD: ICD-10-PCS | Mod: CPTII,S$GLB,, | Performed by: OPHTHALMOLOGY

## 2022-09-02 PROCEDURE — 3288F FALL RISK ASSESSMENT DOCD: CPT | Mod: CPTII,S$GLB,, | Performed by: OPHTHALMOLOGY

## 2022-09-02 PROCEDURE — 99999 PR PBB SHADOW E&M-EST. PATIENT-LVL II: ICD-10-PCS | Mod: PBBFAC,,, | Performed by: OPHTHALMOLOGY

## 2022-09-02 PROCEDURE — 1160F PR REVIEW ALL MEDS BY PRESCRIBER/CLIN PHARMACIST DOCUMENTED: ICD-10-PCS | Mod: CPTII,S$GLB,, | Performed by: OPHTHALMOLOGY

## 2022-09-02 PROCEDURE — 3288F PR FALLS RISK ASSESSMENT DOCUMENTED: ICD-10-PCS | Mod: CPTII,S$GLB,, | Performed by: OPHTHALMOLOGY

## 2022-09-02 PROCEDURE — 1159F MED LIST DOCD IN RCRD: CPT | Mod: CPTII,S$GLB,, | Performed by: OPHTHALMOLOGY

## 2022-09-02 PROCEDURE — 1101F PT FALLS ASSESS-DOCD LE1/YR: CPT | Mod: CPTII,S$GLB,, | Performed by: OPHTHALMOLOGY

## 2022-09-02 PROCEDURE — 99214 OFFICE O/P EST MOD 30 MIN: CPT | Mod: GC,S$GLB,, | Performed by: OPHTHALMOLOGY

## 2022-09-02 PROCEDURE — 1160F RVW MEDS BY RX/DR IN RCRD: CPT | Mod: CPTII,S$GLB,, | Performed by: OPHTHALMOLOGY

## 2022-09-02 PROCEDURE — 1125F PR PAIN SEVERITY QUANTIFIED, PAIN PRESENT: ICD-10-PCS | Mod: CPTII,S$GLB,, | Performed by: OPHTHALMOLOGY

## 2022-09-02 PROCEDURE — 1101F PR PT FALLS ASSESS DOC 0-1 FALLS W/OUT INJ PAST YR: ICD-10-PCS | Mod: CPTII,S$GLB,, | Performed by: OPHTHALMOLOGY

## 2022-09-02 RX ORDER — AMOXICILLIN 500 MG/1
500 TABLET, FILM COATED ORAL 3 TIMES DAILY
COMMUNITY
Start: 2022-09-02 | End: 2022-09-13
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
## 2022-09-02 NOTE — PATIENT INSTRUCTIONS
- No pathology indicated on eye exam that would be a cause for her pain  - Continue artificial tears  - Return as needed.

## 2022-09-02 NOTE — TELEPHONE ENCOUNTER
----- Message from Ananth Black sent at 9/2/2022  4:47 PM CDT -----  Contact: [phone]  Pt needs a call back about her BP meds that she said she has been calling about and nobody has called her back she said.

## 2022-09-07 ENCOUNTER — TELEPHONE (OUTPATIENT)
Dept: INTERNAL MEDICINE | Facility: CLINIC | Age: 67
End: 2022-09-07
Payer: COMMERCIAL

## 2022-09-07 ENCOUNTER — TELEPHONE (OUTPATIENT)
Dept: NEUROSURGERY | Facility: CLINIC | Age: 67
End: 2022-09-07
Payer: COMMERCIAL

## 2022-09-07 DIAGNOSIS — F43.20 ADJUSTMENT DISORDER, UNSPECIFIED TYPE: Primary | ICD-10-CM

## 2022-09-07 NOTE — TELEPHONE ENCOUNTER
R/t call and gave pt the number to r/s her [phone]. Also gave pt the name of bp meds as requested that she discussed with Dr. Willis- hctz and Chlorthalidone. Understanding verbalized.

## 2022-09-07 NOTE — TELEPHONE ENCOUNTER
----- Message from Amparo Toledo sent at 9/7/2022  2:28 PM CDT -----  Contact: [phone]  Pt states she was suppose to be scheduled for a nerve conduction test but could not have it done. She would like a call to rs. Also, pt states she was waiting to hear from Dr Willis about new BP medication he had recommended but she did not get any info on new medication. She would like a call to discuss.

## 2022-09-07 NOTE — TELEPHONE ENCOUNTER
"----- Message from Killian Rivero MD sent at 9/6/2022  5:53 PM CDT -----  Regarding: FW: PT update  Can we get her a psychiatry appointment please. Thank you.       ----- Message -----  From: Dana Cottrell, PT  Sent: 8/30/2022   2:22 PM CDT  To: Killian Rivero MD  Subject: PT update                                        Hi Dr. Rivero,    I saw Ms Gil today in physical therapy for her initial evaluation. She was 25 minutes late to her appointment. She was offered to rescheduled, but she declined and requested she be seen today. I explained to her that we would not be able to perform a physical exam due to time restraints or perform any exercises. While speaking with Ms Gil and gathering her history, she said she had "nothing to look forward to." I asked her if she would like assistance in seeking mental health counseling which she agreed to. She denied wanting to harm herself. I was hoping you could place a referral for psychiatry. Her next scheduled PT appointment is Tuesday 9/13/2022 to complete her initial evaluation.        "

## 2022-09-13 ENCOUNTER — TELEPHONE (OUTPATIENT)
Dept: INTERNAL MEDICINE | Facility: CLINIC | Age: 67
End: 2022-09-13
Payer: COMMERCIAL

## 2022-09-13 ENCOUNTER — DOCUMENTATION ONLY (OUTPATIENT)
Dept: REHABILITATION | Facility: HOSPITAL | Age: 67
End: 2022-09-13
Payer: COMMERCIAL

## 2022-09-13 RX ORDER — HYDROCHLOROTHIAZIDE 12.5 MG/1
12.5 TABLET ORAL DAILY
Qty: 30 TABLET | Refills: 0 | Status: SHIPPED | OUTPATIENT
Start: 2022-09-13 | End: 2023-02-09

## 2022-09-13 NOTE — TELEPHONE ENCOUNTER
Pt states she stopped the medication,  she want to discuss another medication to take , I explained to her that she has to see cardiology for evaluation for angiogram our department don't do angiogram

## 2022-09-13 NOTE — TELEPHONE ENCOUNTER
----- Message from Lyndsey Peoples sent at 9/13/2022 11:10 AM CDT -----  Regarding: order reqeusted  Contact: patient [phone]  Patient is requesting an order to have a Angiogram done today.  Patient has been calling for several weeks with no response.  Please call and advise

## 2022-09-13 NOTE — PROGRESS NOTES
9/13/2022  1:50 pm    Patient cancelled today's PT appointment (9/13/2022 for 2 pm)  and rescheduled for Monday 9/19/2022. Reason given for cancellation: liz Cottrell PT DPT

## 2022-09-19 ENCOUNTER — DOCUMENTATION ONLY (OUTPATIENT)
Dept: REHABILITATION | Facility: HOSPITAL | Age: 67
End: 2022-09-19
Payer: COMMERCIAL

## 2022-09-19 ENCOUNTER — LAB VISIT (OUTPATIENT)
Dept: LAB | Facility: HOSPITAL | Age: 67
End: 2022-09-19
Attending: INTERNAL MEDICINE
Payer: COMMERCIAL

## 2022-09-19 DIAGNOSIS — E78.49 OTHER HYPERLIPIDEMIA: ICD-10-CM

## 2022-09-19 DIAGNOSIS — R07.9 CHRONIC CHEST PAIN: ICD-10-CM

## 2022-09-19 DIAGNOSIS — I10 ESSENTIAL HYPERTENSION: ICD-10-CM

## 2022-09-19 DIAGNOSIS — G89.29 CHRONIC CHEST PAIN: ICD-10-CM

## 2022-09-19 LAB
ALBUMIN SERPL BCP-MCNC: 3.8 G/DL (ref 3.5–5.2)
ALP SERPL-CCNC: 81 U/L (ref 55–135)
ALT SERPL W/O P-5'-P-CCNC: 19 U/L (ref 10–44)
ANION GAP SERPL CALC-SCNC: 7 MMOL/L (ref 8–16)
AST SERPL-CCNC: 19 U/L (ref 10–40)
BILIRUB SERPL-MCNC: 0.6 MG/DL (ref 0.1–1)
BUN SERPL-MCNC: 7 MG/DL (ref 8–23)
CALCIUM SERPL-MCNC: 9.8 MG/DL (ref 8.7–10.5)
CHLORIDE SERPL-SCNC: 103 MMOL/L (ref 95–110)
CHOLEST SERPL-MCNC: 230 MG/DL (ref 120–199)
CHOLEST/HDLC SERPL: 2.6 {RATIO} (ref 2–5)
CO2 SERPL-SCNC: 28 MMOL/L (ref 23–29)
CREAT SERPL-MCNC: 0.9 MG/DL (ref 0.5–1.4)
EST. GFR  (NO RACE VARIABLE): >60 ML/MIN/1.73 M^2
ESTIMATED AVG GLUCOSE: 97 MG/DL (ref 68–131)
GLUCOSE SERPL-MCNC: 86 MG/DL (ref 70–110)
HBA1C MFR BLD: 5 % (ref 4–5.6)
HDLC SERPL-MCNC: 88 MG/DL (ref 40–75)
HDLC SERPL: 38.3 % (ref 20–50)
LDLC SERPL CALC-MCNC: 124 MG/DL (ref 63–159)
MAGNESIUM SERPL-MCNC: 1.9 MG/DL (ref 1.6–2.6)
NONHDLC SERPL-MCNC: 142 MG/DL
POTASSIUM SERPL-SCNC: 3.8 MMOL/L (ref 3.5–5.1)
PROT SERPL-MCNC: 7.2 G/DL (ref 6–8.4)
SODIUM SERPL-SCNC: 138 MMOL/L (ref 136–145)
TRIGL SERPL-MCNC: 90 MG/DL (ref 30–150)
TSH SERPL DL<=0.005 MIU/L-ACNC: 1.32 UIU/ML (ref 0.4–4)

## 2022-09-19 PROCEDURE — 83036 HEMOGLOBIN GLYCOSYLATED A1C: CPT | Performed by: INTERNAL MEDICINE

## 2022-09-19 PROCEDURE — 80053 COMPREHEN METABOLIC PANEL: CPT | Performed by: INTERNAL MEDICINE

## 2022-09-19 PROCEDURE — 36415 COLL VENOUS BLD VENIPUNCTURE: CPT | Performed by: INTERNAL MEDICINE

## 2022-09-19 PROCEDURE — 84443 ASSAY THYROID STIM HORMONE: CPT | Performed by: INTERNAL MEDICINE

## 2022-09-19 PROCEDURE — 83735 ASSAY OF MAGNESIUM: CPT | Performed by: INTERNAL MEDICINE

## 2022-09-19 PROCEDURE — 80061 LIPID PANEL: CPT | Performed by: INTERNAL MEDICINE

## 2022-09-19 NOTE — PROGRESS NOTES
"Physician Orders: PT Eval ONLY  Medical Diagnosis from Referral:   M79.2 (ICD-10-CM) - Neuropathic pain   M47.816 (ICD-10-CM) - Lumbar spondylosis    Evaluation Date: 8/30/2022  Authorization Period Expiration: 12/31/2022  Plan of Care Expiration: 11/11/2022  Time In:12:15 pm - patient late arrival (patient arrived at 12:09 and went to the restroom)  Time Out: 12:30 pm    BP: 170/89 mmHg HR: 73 bpm  1 minute interval  BP: 163/92 HR: 71 bpm    Patient reports she has been having chest pains and has a new "lump" in L anterior arm; palpable margins, small nodule. Patient denies jaw pain or new onset tingling into hands. PT took two BP readings. PT asked if patient was taking her BP medication, patient became very upset and states "I want to be off of all medication." Patient reported that she would like a therapist that better matched her needs. PT offered to schedule patient with another therapist if that would make her more comfortable. Patient declined and said that she would find a physical therapist by herself. PT educated patient that she should call [place] [facility] floor [facility] for assistance if she was unable to find a therapist on her own.     Dana Cottrell PT DPT  "

## 2022-09-20 ENCOUNTER — OFFICE VISIT (OUTPATIENT)
Dept: CARDIOLOGY | Facility: CLINIC | Age: 67
End: 2022-09-20
Payer: COMMERCIAL

## 2022-09-20 ENCOUNTER — DOCUMENTATION ONLY (OUTPATIENT)
Dept: INTERNAL MEDICINE | Facility: CLINIC | Age: 67
End: 2022-09-20
Payer: COMMERCIAL

## 2022-09-20 VITALS
HEART RATE: 80 BPM | SYSTOLIC BLOOD PRESSURE: 154 MMHG | HEIGHT: 69 IN | OXYGEN SATURATION: 99 % | WEIGHT: 183.38 LBS | BODY MASS INDEX: 27.16 KG/M2 | DIASTOLIC BLOOD PRESSURE: 74 MMHG

## 2022-09-20 DIAGNOSIS — R07.2 PRECORDIAL PAIN: ICD-10-CM

## 2022-09-20 DIAGNOSIS — R07.9 CHRONIC CHEST PAIN: ICD-10-CM

## 2022-09-20 DIAGNOSIS — I10 PRIMARY HYPERTENSION: Primary | ICD-10-CM

## 2022-09-20 DIAGNOSIS — G89.29 CHRONIC CHEST PAIN: ICD-10-CM

## 2022-09-20 PROCEDURE — 3078F PR MOST RECENT DIASTOLIC BLOOD PRESSURE < 80 MM HG: ICD-10-PCS | Mod: CPTII,S$GLB,, | Performed by: INTERNAL MEDICINE

## 2022-09-20 PROCEDURE — 3078F DIAST BP <80 MM HG: CPT | Mod: CPTII,S$GLB,, | Performed by: INTERNAL MEDICINE

## 2022-09-20 PROCEDURE — 3044F HG A1C LEVEL LT 7.0%: CPT | Mod: CPTII,S$GLB,, | Performed by: INTERNAL MEDICINE

## 2022-09-20 PROCEDURE — 99214 OFFICE O/P EST MOD 30 MIN: CPT | Mod: S$GLB,,, | Performed by: INTERNAL MEDICINE

## 2022-09-20 PROCEDURE — 1159F PR MEDICATION LIST DOCUMENTED IN MEDICAL RECORD: ICD-10-PCS | Mod: CPTII,S$GLB,, | Performed by: INTERNAL MEDICINE

## 2022-09-20 PROCEDURE — 99999 PR PBB SHADOW E&M-EST. PATIENT-LVL III: ICD-10-PCS | Mod: PBBFAC,,, | Performed by: INTERNAL MEDICINE

## 2022-09-20 PROCEDURE — 1159F MED LIST DOCD IN RCRD: CPT | Mod: CPTII,S$GLB,, | Performed by: INTERNAL MEDICINE

## 2022-09-20 PROCEDURE — 3008F BODY MASS INDEX DOCD: CPT | Mod: CPTII,S$GLB,, | Performed by: INTERNAL MEDICINE

## 2022-09-20 PROCEDURE — 3008F PR BODY MASS INDEX (BMI) DOCUMENTED: ICD-10-PCS | Mod: CPTII,S$GLB,, | Performed by: INTERNAL MEDICINE

## 2022-09-20 PROCEDURE — 3077F SYST BP >= 140 MM HG: CPT | Mod: CPTII,S$GLB,, | Performed by: INTERNAL MEDICINE

## 2022-09-20 PROCEDURE — 3077F PR MOST RECENT SYSTOLIC BLOOD PRESSURE >= 140 MM HG: ICD-10-PCS | Mod: CPTII,S$GLB,, | Performed by: INTERNAL MEDICINE

## 2022-09-20 PROCEDURE — 99999 PR PBB SHADOW E&M-EST. PATIENT-LVL III: CPT | Mod: PBBFAC,,, | Performed by: INTERNAL MEDICINE

## 2022-09-20 PROCEDURE — 1125F AMNT PAIN NOTED PAIN PRSNT: CPT | Mod: CPTII,S$GLB,, | Performed by: INTERNAL MEDICINE

## 2022-09-20 PROCEDURE — 99214 PR OFFICE/OUTPT VISIT, EST, LEVL IV, 30-39 MIN: ICD-10-PCS | Mod: S$GLB,,, | Performed by: INTERNAL MEDICINE

## 2022-09-20 PROCEDURE — 3044F PR MOST RECENT HEMOGLOBIN A1C LEVEL <7.0%: ICD-10-PCS | Mod: CPTII,S$GLB,, | Performed by: INTERNAL MEDICINE

## 2022-09-20 PROCEDURE — 1125F PR PAIN SEVERITY QUANTIFIED, PAIN PRESENT: ICD-10-PCS | Mod: CPTII,S$GLB,, | Performed by: INTERNAL MEDICINE

## 2022-09-20 NOTE — PROGRESS NOTES
Cardiology    9/20/2022  9:55 AM    Problem list  Patient Active Problem List   Diagnosis    Primary hypertension    Gastroesophageal reflux disease without esophagitis    DJD (degenerative joint disease) of cervical spine    Osteoarthritis of lumbar spine    Pain in joint involving multiple sites    Right leg swelling    Radiculopathy of cervical region    Acute pain of left shoulder    Incomplete tear of left rotator cuff    Biceps tendinitis on left    Primary osteoarthritis of left shoulder    Neck pain    Left shoulder pain    Stiffness of left shoulder joint    Numbness and tingling in left upper extremity    Acute costochondritis    Chronic midline low back pain without sciatica    Decreased strength of trunk and back    Decreased range of motion of lumbar spine    Localized swelling of lower leg    Pain in both feet    Muscle cramping    Bronchiectasis without complication    Dyspepsia    Decreased functional mobility    Impaired gait and mobility    Decreased activity tolerance    Precordial pain       CC:  Second opinion    HPI:  Patient is here for 2nd opinion regarding chest pain.  She was evaluated by my associate, Dr Martinez in June.  She no longer has any chest pain.  She is worried about heart.  Main complaint is back pain and joint pain.  She is no longer taking amlodipine and metoprolol as prescribed by her PCP because of potential side effect of memory loss.  She did discuss her stopping this medication with her PCP.  She is treating herself with the juice and Pomegranate juice.  She denies any chest pain on exertion.  She states she walks daily and does not quantify how long she walks.  She had a negative stress test in 2021.  She has a cardiac calcium score earlier this year with score of 0.    Medications  Current Outpatient Medications   Medication Sig Dispense Refill    aspirin 325 MG tablet Take 325 mg by mouth once daily.      magnesium 30 mg Tab Take by mouth once.      omega-3 fatty  acids/fish oil (FISH OIL-OMEGA-3 FATTY ACIDS) 300-1,000 mg capsule Take by mouth once daily.      hydroCHLOROthiazide (HYDRODIURIL) 12.5 MG Tab Take 1 tablet (12.5 mg total) by mouth once daily. (Patient not taking: Reported on 2022) 30 tablet 0    ketorolac (TORADOL) 10 mg tablet Take 1 tablet (10 mg total) by mouth 3 (three) times daily as needed for Pain. (Patient not taking: Reported on 2022) 30 tablet 0     No current facility-administered medications for this visit.      Prior to Admission medications    Medication Sig Start Date End Date Taking? Authorizing Provider   aspirin 325 MG tablet Take 325 mg by mouth once daily.   Yes Historical Provider   magnesium 30 mg Tab Take by mouth once.   Yes Historical Provider   omega-3 fatty acids/fish oil (FISH OIL-OMEGA-3 FATTY ACIDS) 300-1,000 mg capsule Take by mouth once daily.   Yes Historical Provider   hydroCHLOROthiazide (HYDRODIURIL) 12.5 MG Tab Take 1 tablet (12.5 mg total) by mouth once daily.  Patient not taking: Reported on 2022   Jonny Willis MD   ketorolac (TORADOL) 10 mg tablet Take 1 tablet (10 mg total) by mouth 3 (three) times daily as needed for Pain.  Patient not taking: Reported on 2022   Jonny Willis MD         History  Past Medical History:   Diagnosis Date    Acute costochondritis 2012    Back pain     Benign essential hypertension     Gastroesophageal reflux disease without esophagitis     Pain in joint involving multiple sites 2013     Past Surgical History:   Procedure Laterality Date    BREAST BIOPSY Right      SECTION      KNEE ARTHROSCOPY W/ ACL RECONSTRUCTION      REFRACTIVE SURGERY Bilateral  or     Dr. Bharath hammer     Social History     Socioeconomic History    Marital status: Single    Number of children: 1   Tobacco Use    Smoking status: Never    Smokeless tobacco: Never   Substance and Sexual Activity    Alcohol use: No    Drug use: No          Allergies  Review of patient's allergies indicates:  No Known Allergies      Review of Systems   Review of Systems   Constitutional: Negative for malaise/fatigue, weight gain and weight loss.   Eyes:  Negative for visual disturbance.   Cardiovascular:  Negative for chest pain, claudication, cyanosis, dyspnea on exertion, irregular heartbeat, leg swelling, near-syncope, orthopnea, palpitations, paroxysmal nocturnal dyspnea and syncope.   Respiratory:  Negative for cough, hemoptysis, shortness of breath, sleep disturbances due to breathing and wheezing.    Hematologic/Lymphatic: Negative for bleeding problem. Does not bruise/bleed easily.   Skin:  Negative for poor wound healing.   Musculoskeletal:  Positive for back pain and joint pain. Negative for muscle cramps and myalgias.   Gastrointestinal:  Negative for abdominal pain, anorexia, diarrhea, heartburn, hematemesis, hematochezia, melena, nausea and vomiting.   Genitourinary:  Negative for hematuria and nocturia.   Neurological:  Negative for excessive daytime sleepiness, dizziness, focal weakness, light-headedness and weakness.       Physical Exam  Wt Readings from Last 1 Encounters:   09/20/22 83.2 kg (183 lb 6.4 oz)     BP Readings from Last 3 Encounters:   09/20/22 (!) 154/74   08/23/22 (!) 174/78   08/22/22 128/74     Pulse Readings from Last 1 Encounters:   09/20/22 80     Body mass index is 27.08 kg/m².    Physical Exam  Constitutional:       Appearance: She is well-developed.   HENT:      Head: Atraumatic.   Eyes:      General: No scleral icterus.  Neck:      Vascular: Normal carotid pulses. No carotid bruit, hepatojugular reflux or JVD.   Cardiovascular:      Rate and Rhythm: Normal rate and regular rhythm.      Chest Wall: PMI is not displaced.      Pulses: Intact distal pulses.           Carotid pulses are 2+ on the right side and 2+ on the left side.       Radial pulses are 2+ on the right side and 2+ on the left side.        Dorsalis pedis  pulses are 2+ on the right side and 2+ on the left side.      Heart sounds: Normal heart sounds, S1 normal and S2 normal. No murmur heard.    No friction rub.   Pulmonary:      Effort: Pulmonary effort is normal. No respiratory distress.      Breath sounds: Normal breath sounds. No stridor. No wheezing or rales.   Chest:      Chest wall: No tenderness.   Abdominal:      General: Bowel sounds are normal.      Palpations: Abdomen is soft.   Musculoskeletal:      Cervical back: Neck supple. No edema.      Right lower leg: No edema.      Left lower leg: No edema.   Skin:     General: Skin is warm and dry.      Nails: There is no clubbing.   Neurological:      Mental Status: She is alert and oriented to person, place, and time.   Psychiatric:         Behavior: Behavior normal.         Thought Content: Thought content normal.           Assessment  1. Chronic chest pain  Unchanged  - Ambulatory referral/consult to Cardiology    2. Primary hypertension  Unchanged.  Self treating    3. Precordial pain  Resolved        Plan and Discussion  Reassured that with a negative stress echocardiogram and 0 calcium score and no exertional chest pain, she does not have any evidence of obstructive coronary artery disease.  Encouraged to continue her heart healthy diet and physical activity for least 30 minutes a day 5 days a week.  F/u with PCP for BP.    Follow Up  PRN      Jim Calderon MD, F.A.C.C, F.S.C.A.I.

## 2022-09-20 NOTE — PROGRESS NOTES
Internal medicine note    Patient recently had labs, mainly to follow-up on being initiated on hydrochlorothiazide 12.5 mg on September 13th.    Previously she was on the medicine metoprolol XL 25 mg and amlodipine 5 mg a day.  She wanted to get off the medication because she felt that it was affecting memory it was not feeling well with it    In addition she has appointment with cardiology look later today in regard to recurrent chronic chest pain.  In the past she has had stress test and calcium score scan    Chemistry test looks fine in general.  Calcium was 3.8    Hemoglobin A1c was normal, she has always expressed a concern of diabetes    TSH was normal, this was repeated because in the past to TSH was minimally elevated    Lipid profile cans things to show a slightly elevated total cholesterol however this profile looks little bit better compared to before in which the HDL was 88 and LDL is slightly down at 124

## 2022-10-12 ENCOUNTER — TELEPHONE (OUTPATIENT)
Dept: INTERNAL MEDICINE | Facility: CLINIC | Age: 67
End: 2022-10-12
Payer: COMMERCIAL

## 2022-10-12 NOTE — TELEPHONE ENCOUNTER
----- Message from Jerad Caal LPN sent at 10/12/2022  2:33 PM CDT -----  Contact: [phone]  Patient is calling to schedule an apt for a check up.  Thank you.   ----- Message -----  From: Alec Aguillon  Sent: 10/12/2022   2:27 PM CDT  To: [facility]    Pt is called for mammo I let her know she'll a order to be placed for a mammo she stated she just wants to be simon for a check up instead --- please give her a call back [phone]

## 2022-10-19 ENCOUNTER — TELEPHONE (OUTPATIENT)
Dept: INTERNAL MEDICINE | Facility: CLINIC | Age: 67
End: 2022-10-19
Payer: COMMERCIAL

## 2022-10-19 NOTE — TELEPHONE ENCOUNTER
Spoke with patient she feel that she is not getting the care she need for the pain , she stated she would like to speak with you in regards to seeing someone . He feel that the physicians are making her as being crazy she states and being rude

## 2022-10-24 ENCOUNTER — TELEPHONE (OUTPATIENT)
Dept: INTERNAL MEDICINE | Facility: CLINIC | Age: 67
End: 2022-10-24
Payer: COMMERCIAL

## 2022-10-24 ENCOUNTER — HOSPITAL ENCOUNTER (OUTPATIENT)
Dept: RADIOLOGY | Facility: HOSPITAL | Age: 67
Discharge: HOME | End: 2022-10-24
Attending: INTERNAL MEDICINE
Payer: COMMERCIAL

## 2022-10-24 DIAGNOSIS — Z12.31 ENCOUNTER FOR SCREENING MAMMOGRAM FOR BREAST CANCER: ICD-10-CM

## 2022-10-24 DIAGNOSIS — Z12.31 ENCOUNTER FOR SCREENING MAMMOGRAM FOR BREAST CANCER: Primary | ICD-10-CM

## 2022-10-24 PROCEDURE — 77063 BREAST TOMOSYNTHESIS BI: CPT | Mod: 26,,, | Performed by: RADIOLOGY

## 2022-10-24 PROCEDURE — 77067 MAMMO DIGITAL SCREENING BILAT WITH TOMO: ICD-10-PCS | Mod: 26,,, | Performed by: RADIOLOGY

## 2022-10-24 PROCEDURE — 77063 MAMMO DIGITAL SCREENING BILAT WITH TOMO: ICD-10-PCS | Mod: 26,,, | Performed by: RADIOLOGY

## 2022-10-24 PROCEDURE — 77067 SCR MAMMO BI INCL CAD: CPT | Mod: 26,,, | Performed by: RADIOLOGY

## 2022-10-24 PROCEDURE — 77063 BREAST TOMOSYNTHESIS BI: CPT | Mod: TC

## 2022-10-24 NOTE — TELEPHONE ENCOUNTER
----- Message from Jerzy Madrid sent at 10/24/2022 10:57 AM CDT -----  Contact: self [phone]  Caller is requesting to schedule their annual screening mammogram appointment. Order is not listed in Epic.  Please enter order and contact patient to schedule.  Would the patient like a call back, or a response through their [facility] portal?:   call back per pt urgent want a mammo today    Please call and advise

## 2022-10-26 ENCOUNTER — HOSPITAL ENCOUNTER (EMERGENCY)
Facility: HOSPITAL | Age: 67
Discharge: HOME | End: 2022-10-26
Attending: EMERGENCY MEDICINE
Payer: COMMERCIAL

## 2022-10-26 ENCOUNTER — NURSE TRIAGE (OUTPATIENT)
Dept: ADMINISTRATIVE | Facility: CLINIC | Age: 67
End: 2022-10-26
Payer: COMMERCIAL

## 2022-10-26 VITALS
TEMPERATURE: 98 F | OXYGEN SATURATION: 100 % | BODY MASS INDEX: 26.58 KG/M2 | HEART RATE: 74 BPM | DIASTOLIC BLOOD PRESSURE: 93 MMHG | WEIGHT: 180 LBS | SYSTOLIC BLOOD PRESSURE: 212 MMHG | RESPIRATION RATE: 14 BRPM

## 2022-10-26 DIAGNOSIS — M54.41 ACUTE RIGHT-SIDED LOW BACK PAIN WITH RIGHT-SIDED SCIATICA: Primary | ICD-10-CM

## 2022-10-26 DIAGNOSIS — M47.896 OTHER OSTEOARTHRITIS OF SPINE, LUMBAR REGION: ICD-10-CM

## 2022-10-26 PROCEDURE — 99284 EMERGENCY DEPT VISIT MOD MDM: CPT | Mod: ,,, | Performed by: EMERGENCY MEDICINE

## 2022-10-26 PROCEDURE — 99284 PR EMERGENCY DEPT VISIT,LEVEL IV: ICD-10-PCS | Mod: ,,, | Performed by: EMERGENCY MEDICINE

## 2022-10-26 PROCEDURE — 99284 EMERGENCY DEPT VISIT MOD MDM: CPT

## 2022-10-26 RX ORDER — TRIAMCINOLONE ACETONIDE 40 MG/ML
80 INJECTION, SUSPENSION INTRA-ARTICULAR; INTRAMUSCULAR
Status: DISCONTINUED | OUTPATIENT
Start: 2022-10-26 | End: 2022-10-27 | Stop reason: HOSPADM

## 2022-10-26 RX ORDER — NAPROXEN 375 MG/1
375 TABLET ORAL 2 TIMES DAILY WITH MEALS
Qty: 10 TABLET | Refills: 0 | Status: SHIPPED | OUTPATIENT
Start: 2022-10-26 | End: 2022-10-31

## 2022-10-26 RX ORDER — METHOCARBAMOL 500 MG/1
500 TABLET, FILM COATED ORAL 3 TIMES DAILY PRN
Qty: 15 TABLET | Refills: 0 | Status: SHIPPED | OUTPATIENT
Start: 2022-10-26 | End: 2022-10-31

## 2022-10-26 RX ORDER — KETOROLAC TROMETHAMINE 30 MG/ML
30 INJECTION, SOLUTION INTRAMUSCULAR; INTRAVENOUS
Status: DISCONTINUED | OUTPATIENT
Start: 2022-10-26 | End: 2022-10-27 | Stop reason: HOSPADM
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
## 2022-10-27 NOTE — DISCHARGE INSTRUCTIONS
Today, your evaluation for your symptoms shows concerning findings for sciatica.  We will refer you to physical therapy and treat you with anti-inflammatory medication and muscle relaxant as an outpatient.  You received a steroid injection today and anti-inflammatory injection.  Follow-up with your primary care or back and Spine surgery Clinic as needed.    Our goal in the emergency department is to always give you outstanding care and exceptional service. You may receive a survey by mail or e-mail in the next week regarding your experience in our ED. We would greatly appreciate your completing and returning the survey. Your feedback provides us with a way to recognize our staff who give very good care and it helps us learn how to improve when your experience was below our aspiration of excellence.

## 2022-11-01 ENCOUNTER — CLINICAL SUPPORT (OUTPATIENT)
Dept: REHABILITATION | Facility: HOSPITAL | Age: 67
End: 2022-11-01
Attending: EMERGENCY MEDICINE
Payer: COMMERCIAL

## 2022-11-01 DIAGNOSIS — R29.898 DECREASED STRENGTH OF TRUNK AND BACK: ICD-10-CM

## 2022-11-01 DIAGNOSIS — M47.896 OTHER OSTEOARTHRITIS OF SPINE, LUMBAR REGION: ICD-10-CM

## 2022-11-01 DIAGNOSIS — M54.41 ACUTE RIGHT-SIDED LOW BACK PAIN WITH RIGHT-SIDED SCIATICA: ICD-10-CM

## 2022-11-01 DIAGNOSIS — M53.86 DECREASED RANGE OF MOTION OF LUMBAR SPINE: Primary | ICD-10-CM

## 2022-11-01 PROCEDURE — 97110 THERAPEUTIC EXERCISES: CPT | Mod: PO

## 2022-11-01 PROCEDURE — 97161 PT EVAL LOW COMPLEX 20 MIN: CPT | Mod: PO
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
## 2022-11-09 ENCOUNTER — CLINICAL SUPPORT (OUTPATIENT)
Dept: REHABILITATION | Facility: HOSPITAL | Age: 67
End: 2022-11-09
Attending: EMERGENCY MEDICINE
Payer: COMMERCIAL

## 2022-11-09 DIAGNOSIS — R29.898 DECREASED STRENGTH OF TRUNK AND BACK: Primary | ICD-10-CM

## 2022-11-09 PROCEDURE — 97110 THERAPEUTIC EXERCISES: CPT | Mod: PO

## 2022-11-09 PROCEDURE — 97140 MANUAL THERAPY 1/> REGIONS: CPT | Mod: PO
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
## 2022-12-06 ENCOUNTER — TELEPHONE (OUTPATIENT)
Dept: PRIMARY CARE CLINIC | Facility: CLINIC | Age: 67
End: 2022-12-06
Payer: COMMERCIAL

## 2022-12-06 DIAGNOSIS — R03.0 ELEVATED BLOOD PRESSURE READING: ICD-10-CM

## 2022-12-06 DIAGNOSIS — R60.0 BILATERAL LEG EDEMA: ICD-10-CM

## 2022-12-06 DIAGNOSIS — R07.9 CHEST PAIN, UNSPECIFIED TYPE: Primary | ICD-10-CM

## 2022-12-06 NOTE — TELEPHONE ENCOUNTER
----- Message from Ananth Black sent at 12/6/2022  8:58 AM CST -----  Contact: [phone]  Pt said she has been leaving messages and nobody has called her back about getting labs that she needs done and a test. Please call pt back.

## 2022-12-07 ENCOUNTER — HOSPITAL ENCOUNTER (OUTPATIENT)
Dept: RADIOLOGY | Facility: OTHER | Age: 67
Discharge: HOME | End: 2022-12-07
Attending: INTERNAL MEDICINE
Payer: COMMERCIAL

## 2022-12-07 ENCOUNTER — HOSPITAL ENCOUNTER (OUTPATIENT)
Dept: VASCULAR SURGERY | Facility: CLINIC | Age: 67
Discharge: HOME | End: 2022-12-07
Attending: INTERNAL MEDICINE
Payer: COMMERCIAL

## 2022-12-07 ENCOUNTER — HOSPITAL ENCOUNTER (OUTPATIENT)
Dept: CARDIOLOGY | Facility: HOSPITAL | Age: 67
Discharge: HOME | End: 2022-12-07
Attending: INTERNAL MEDICINE
Payer: COMMERCIAL

## 2022-12-07 VITALS — WEIGHT: 180 LBS | BODY MASS INDEX: 26.66 KG/M2 | HEIGHT: 69 IN

## 2022-12-07 DIAGNOSIS — Z78.0 MENOPAUSE: ICD-10-CM

## 2022-12-07 DIAGNOSIS — R03.0 ELEVATED BLOOD PRESSURE READING: ICD-10-CM

## 2022-12-07 DIAGNOSIS — R07.9 CHEST PAIN, UNSPECIFIED TYPE: ICD-10-CM

## 2022-12-07 DIAGNOSIS — R60.0 BILATERAL LEG EDEMA: ICD-10-CM

## 2022-12-07 LAB
ASCENDING AORTA: 3.2 CM
BSA FOR ECHO PROCEDURE: 1.99 M2
CV ECHO LV RWT: 0.4 CM
CV STRESS BASE HR: 77 BPM
DIASTOLIC BLOOD PRESSURE: 67 MMHG
DOP CALC LVOT AREA: 4 CM2
DOP CALC LVOT DIAMETER: 2.27 CM
DOP CALC LVOT PEAK VEL: 1.25 M/S
DOP CALC LVOT STROKE VOLUME: 100.64 CM3
DOP CALCLVOT PEAK VEL VTI: 24.88 CM
E WAVE DECELERATION TIME: 186.46 MSEC
E/A RATIO: 0.7
E/E' RATIO: 13.11 M/S
ECHO LV POSTERIOR WALL: 0.95 CM (ref 0.6–1.1)
EJECTION FRACTION: 65 %
FRACTIONAL SHORTENING: 38 % (ref 28–44)
INTERVENTRICULAR SEPTUM: 1 CM (ref 0.6–1.1)
LA MAJOR: 5.01 CM
LA MINOR: 4.32 CM
LA WIDTH: 3.71 CM
LEFT ATRIUM SIZE: 4.27 CM
LEFT ATRIUM VOLUME INDEX MOD: 31.3 ML/M2
LEFT ATRIUM VOLUME INDEX: 31.6 ML/M2
LEFT ATRIUM VOLUME MOD: 61.94 CM3
LEFT ATRIUM VOLUME: 62.47 CM3
LEFT INTERNAL DIMENSION IN SYSTOLE: 2.98 CM (ref 2.1–4)
LEFT VENTRICLE DIASTOLIC VOLUME INDEX: 54.67 ML/M2
LEFT VENTRICLE DIASTOLIC VOLUME: 108.24 ML
LEFT VENTRICLE MASS INDEX: 83 G/M2
LEFT VENTRICLE SYSTOLIC VOLUME INDEX: 17.4 ML/M2
LEFT VENTRICLE SYSTOLIC VOLUME: 34.53 ML
LEFT VENTRICULAR INTERNAL DIMENSION IN DIASTOLE: 4.81 CM (ref 3.5–6)
LEFT VENTRICULAR MASS: 165.03 G
LV LATERAL E/E' RATIO: 11.8 M/S
LV SEPTAL E/E' RATIO: 14.75 M/S
MV A" WAVE DURATION": 8.56 MSEC
MV PEAK A VEL: 0.84 M/S
MV PEAK E VEL: 0.59 M/S
MV STENOSIS PRESSURE HALF TIME: 54.07 MS
MV VALVE AREA P 1/2 METHOD: 4.07 CM2
OHS CV CPX 1 MINUTE RECOVERY HEART RATE: 94 BPM
OHS CV CPX 85 PERCENT MAX PREDICTED HEART RATE MALE: 125
OHS CV CPX ESTIMATED METS: 3
OHS CV CPX MAX PREDICTED HEART RATE: 147
OHS CV CPX PATIENT IS FEMALE: 1
OHS CV CPX PATIENT IS MALE: 0
OHS CV CPX PEAK DIASTOLIC BLOOD PRESSURE: 60 MMHG
OHS CV CPX PEAK HEAR RATE: 120 BPM
OHS CV CPX PEAK RATE PRESSURE PRODUCT: NORMAL
OHS CV CPX PEAK SYSTOLIC BLOOD PRESSURE: 186 MMHG
OHS CV CPX PERCENT MAX PREDICTED HEART RATE ACHIEVED: 82
OHS CV CPX RATE PRESSURE PRODUCT PRESENTING: NORMAL
PULM VEIN S/D RATIO: 1.24
PV PEAK D VEL: 0.37 M/S
PV PEAK S VEL: 0.46 M/S
RA MAJOR: 4.02 CM
RA WIDTH: 2.99 CM
RIGHT VENTRICULAR END-DIASTOLIC DIMENSION: 3.02 CM
RV TISSUE DOPPLER FREE WALL SYSTOLIC VELOCITY 1 (APICAL 4 CHAMBER VIEW): 16.29 CM/S
SINUS: 2.69 CM
STJ: 2.7 CM
STRESS ECHO POST EXERCISE DUR MIN: 1 MINUTES
STRESS ECHO POST EXERCISE DUR SEC: 56 SECONDS
SYSTOLIC BLOOD PRESSURE: 145 MMHG
TDI LATERAL: 0.05 M/S
TDI SEPTAL: 0.04 M/S
TDI: 0.05 M/S
TRICUSPID ANNULAR PLANE SYSTOLIC EXCURSION: 2.04 CM

## 2022-12-07 PROCEDURE — 93351 STRESS TTE COMPLETE: CPT | Mod: 26,,, | Performed by: INTERNAL MEDICINE

## 2022-12-07 PROCEDURE — 93970 EXTREMITY STUDY: CPT | Mod: PBBFAC | Performed by: SURGERY

## 2022-12-07 PROCEDURE — 77080 DEXA BONE DENSITY SPINE HIP: ICD-10-PCS | Mod: 26,,, | Performed by: RADIOLOGY

## 2022-12-07 PROCEDURE — 77080 DXA BONE DENSITY AXIAL: CPT | Mod: 26,,, | Performed by: RADIOLOGY

## 2022-12-07 PROCEDURE — 93351 STRESS ECHO (CUPID ONLY): ICD-10-PCS | Mod: 26,,, | Performed by: INTERNAL MEDICINE

## 2022-12-07 PROCEDURE — 93970 PR US DUPLEX, UPPER OR LOWER EXT VENOUS,COMPLETE BILAT: ICD-10-PCS | Mod: S$GLB,,, | Performed by: SURGERY

## 2022-12-07 PROCEDURE — 93351 STRESS TTE COMPLETE: CPT

## 2022-12-07 PROCEDURE — 93970 EXTREMITY STUDY: CPT | Mod: S$GLB,,, | Performed by: SURGERY

## 2022-12-07 PROCEDURE — 77080 DXA BONE DENSITY AXIAL: CPT | Mod: TC

## 2022-12-10 ENCOUNTER — NURSE TRIAGE (OUTPATIENT)
Dept: ADMINISTRATIVE | Facility: CLINIC | Age: 67
End: 2022-12-10
Payer: COMMERCIAL

## 2022-12-10 NOTE — TELEPHONE ENCOUNTER
Patient states she is calling to obtain the results of her recent lab work and other test results. Upon reviewing her chart, it appears she had routine scans and lab work completed. Care advice is to contact PCP when the office is open. Patient was not happy with the disposition and asked to have someone call her back today. She states she spoke with another nurse who stated someone would call her back today. When reviewing the chart, I do not see where this call was documented. Reiterated care advice. Patient became rude and ended the call. She stated she is not satisfied with waiting until Monday morning.     Reason for Disposition   Caller requesting routine or non-urgent lab result    Additional Information   Negative: ED call to PCP (i.e., primary care provider; doctor, NP, or PA)   Negative: Doctor (or NP/PA) call to PCP   Negative: Call about patient who is currently hospitalized   Negative: Lab or radiology calling with CRITICAL test results   Negative: [1] Follow-up call from patient regarding patient's clinical status AND [2] information urgent   Negative: [1] Caller requests to speak ONLY to PCP AND [2] URGENT question   Negative: [1] Caller requests to speak to PCP now AND [2] won't tell us reason for call  (Exception: If 10 pm to 6 am, caller must first discuss reason for the call.)   Negative: Notification of hospital admission   Negative: Notification of death   Lab result questions   Negative: Caller requesting lab results  (Exception: Routine or non-urgent lab result.)   Negative: Lab or radiology calling with test results   Negative: [1] Follow-up call from patient regarding patient's clinical status AND [2] information NON-URGENT   Negative: [1] Caller requests to speak ONLY to PCP AND [2] NON-URGENT question   Negative: Caller requesting an appointment, triage offered and declined    Protocols used: Information Only Call - No Triage-A-AH, PCP Call - No Triage-A-AH

## 2022-12-20 ENCOUNTER — TELEPHONE (OUTPATIENT)
Dept: INTERNAL MEDICINE | Facility: CLINIC | Age: 67
End: 2022-12-20
Payer: COMMERCIAL

## 2022-12-20 DIAGNOSIS — M54.9 CHRONIC BILATERAL BACK PAIN, UNSPECIFIED BACK LOCATION: Primary | ICD-10-CM

## 2022-12-20 DIAGNOSIS — G89.29 CHRONIC BILATERAL BACK PAIN, UNSPECIFIED BACK LOCATION: Primary | ICD-10-CM

## 2022-12-20 NOTE — TELEPHONE ENCOUNTER
----- Message from Jonny Willis MD sent at 12/20/2022  7:36 AM CST -----  The physical therapy order at the [facility]  is in.  This is where she went last time    She was seen by  therapist  Amaury Curtis

## 2022-12-29 ENCOUNTER — HOSPITAL ENCOUNTER (EMERGENCY)
Facility: HOSPITAL | Age: 67
Discharge: HOME | End: 2022-12-29
Attending: EMERGENCY MEDICINE
Payer: MEDICAID

## 2022-12-29 VITALS
TEMPERATURE: 98 F | RESPIRATION RATE: 20 BRPM | HEIGHT: 69 IN | WEIGHT: 180 LBS | BODY MASS INDEX: 26.66 KG/M2 | HEART RATE: 70 BPM | DIASTOLIC BLOOD PRESSURE: 91 MMHG | OXYGEN SATURATION: 99 % | SYSTOLIC BLOOD PRESSURE: 187 MMHG

## 2022-12-29 DIAGNOSIS — R03.0 ELEVATED BLOOD PRESSURE READING: ICD-10-CM

## 2022-12-29 DIAGNOSIS — R49.0 HOARSENESS: Primary | ICD-10-CM

## 2022-12-29 DIAGNOSIS — J02.9 SORE THROAT: ICD-10-CM

## 2022-12-29 LAB
GROUP A STREP, MOLECULAR: NEGATIVE
INFLUENZA A, MOLECULAR: NOT DETECTED
INFLUENZA B, MOLECULAR: NOT DETECTED
RSV AG BY MOLECULAR METHOD: NOT DETECTED
SARS-COV-2 RNA RESP QL NAA+PROBE: NOT DETECTED

## 2022-12-29 PROCEDURE — 99284 PR EMERGENCY DEPT VISIT,LEVEL IV: ICD-10-PCS | Mod: CS,,, | Performed by: PHYSICIAN ASSISTANT

## 2022-12-29 PROCEDURE — 99284 EMERGENCY DEPT VISIT MOD MDM: CPT | Mod: CS,,, | Performed by: PHYSICIAN ASSISTANT

## 2022-12-29 PROCEDURE — 63600175 PHARM REV CODE 636 W HCPCS: Performed by: PHYSICIAN ASSISTANT

## 2022-12-29 PROCEDURE — 99283 EMERGENCY DEPT VISIT LOW MDM: CPT | Mod: 25

## 2022-12-29 PROCEDURE — 0241U SARS-COV2 (COVID) WITH FLU/RSV BY PCR: CPT | Performed by: PHYSICIAN ASSISTANT

## 2022-12-29 PROCEDURE — 25000003 PHARM REV CODE 250: Performed by: PHYSICIAN ASSISTANT

## 2022-12-29 PROCEDURE — 87651 STREP A DNA AMP PROBE: CPT | Performed by: PHYSICIAN ASSISTANT

## 2022-12-29 RX ORDER — DEXAMETHASONE 4 MG/1
8 TABLET ORAL
Status: COMPLETED | OUTPATIENT
Start: 2022-12-29 | End: 2022-12-29

## 2022-12-29 RX ORDER — ACETAMINOPHEN 325 MG/1
650 TABLET ORAL
Status: COMPLETED | OUTPATIENT
Start: 2022-12-29 | End: 2022-12-29

## 2022-12-29 RX ADMIN — DEXAMETHASONE 8 MG: 4 TABLET ORAL at 12:12

## 2022-12-29 RX ADMIN — ACETAMINOPHEN 650 MG: 325 TABLET ORAL at 12:12

## 2022-12-29 NOTE — ED TRIAGE NOTES
Josi Gil, a 67 y.o. female presents to the ED via personal transportation with CC sore throat/hoarseness. Also states out of all home medications. Also reports chronic shoulder back and bilateral leg pain.

## 2022-12-29 NOTE — DISCHARGE INSTRUCTIONS
Take motrin and tylenol for throat discomfort.  Return to the ER for any change or worsening of symptoms.    Follow-up closely with your primary care physician within 1 week.

## 2022-12-29 NOTE — ED PROVIDER NOTES
Encounter Date: 2022       History     Chief Complaint   Patient presents with    Hoarse     Patient reports feeling hoarse since yesterday morning. Patient denies cough or congestion.      66 y/o female with history of hypertension, arthritis, presents to the ER for evaluation due to hoarseness and mild sore throat for 2 days.  Patient says she was seen by dentist a few days ago and had a normal checkup and x-rays, but was concerned that her symptoms might be related to recent dental infection.  She denies dental pain, fever, facial swelling, difficulty swallowing.  Her throat pain is worse with swallowing and if coughing.  She denies recent fever, chills, nausea, vomiting, chest pain, shortness of breath.  She took Tylenol and Aleve 3 days ago for arthritis pain, no pain medication since then.  She did take a cough drop.  Patient denies additional complaints at this time.      Review of patient's allergies indicates:  No Known Allergies  Past Medical History:   Diagnosis Date    Acute costochondritis 2012    Back pain     Benign essential hypertension     Gastroesophageal reflux disease without esophagitis     Pain in joint involving multiple sites 2013     Past Surgical History:   Procedure Laterality Date    BREAST BIOPSY Right      SECTION      KNEE ARTHROSCOPY W/ ACL RECONSTRUCTION      REFRACTIVE SURGERY Bilateral  or     Dr. Sinha OU distance     Family History   Problem Relation Age of Onset    Hypertension Mother     Heart disease Maternal Grandmother     Celiac disease Neg Hx     Colon cancer Neg Hx     Colon polyps Neg Hx     Crohn's disease Neg Hx     Cystic fibrosis Neg Hx     Esophageal cancer Neg Hx     Inflammatory bowel disease Neg Hx     Irritable bowel syndrome Neg Hx     Liver cancer Neg Hx     Liver disease Neg Hx     Rectal cancer Neg Hx     Stomach cancer Neg Hx      Social History     Tobacco Use    Smoking status: Never    Smokeless tobacco: Never   Substance  Use Topics    Alcohol use: No    Drug use: No     Review of Systems   Constitutional:  Negative for chills and fever.   HENT:  Positive for sore throat and voice change (mild hoarseness). Negative for congestion and trouble swallowing.    Respiratory:  Negative for cough and shortness of breath.    Cardiovascular:  Negative for chest pain.   Gastrointestinal:  Negative for nausea.   Genitourinary:  Negative for dysuria.   Musculoskeletal:  Negative for back pain.   Skin:  Negative for rash.   Neurological:  Negative for dizziness, syncope, weakness and light-headedness.   Hematological:  Does not bruise/bleed easily.     Physical Exam     Initial Vitals [12/29/22 0940]   BP Pulse Resp Temp SpO2   (!) 188/102 86 16 98.1 °F (36.7 °C) 100 %      MAP       --         Physical Exam    Nursing note and vitals reviewed.  Constitutional: She appears well-developed and well-nourished.   HENT:   Head: Atraumatic.   Eyes: Conjunctivae and EOM are normal. Pupils are equal, round, and reactive to light.   Neck: Neck supple.   Normal range of motion.  Cardiovascular:  Normal rate, regular rhythm and intact distal pulses.           Pulmonary/Chest: Breath sounds normal. No respiratory distress. She has no wheezes. She has no rhonchi. She has no rales.   Musculoskeletal:      Cervical back: Normal range of motion and neck supple.     Neurological: She is alert and oriented to person, place, and time. She has normal strength. GCS score is 15. GCS eye subscore is 4. GCS verbal subscore is 5. GCS motor subscore is 6.   Skin: No rash noted.   Psychiatric: She has a normal mood and affect.       ED Course   Procedures  Labs Reviewed   GROUP A STREP, MOLECULAR   SARS-COV2 (COVID) WITH FLU/RSV BY PCR          Imaging Results    None          Medications   acetaminophen tablet 650 mg (650 mg Oral Given 12/29/22 1243)   dexAMETHasone tablet 8 mg (8 mg Oral Given 12/29/22 1243)           APC / Resident Notes:   The patient's symptoms are  consistent with viral pharyngitis or laryngitis.   She has mild hoarseness and mild sore throat.  Patients rapid strep testing, covid, and flu swabs are negative.  She has declined decadron ordered in the ED.   The patient doesn't appear to have any stridor, drooling, hoarseness, uvular deviation, facial swelling, meningismus to suggest peritonsillar abscess, retropharyngeal abscess, epiglotitis, meningitis, airway compromise.  Will treat with supportive care. She is given ER return precautions and advised to follow up with PCP within 1 week for ER follow exam.                            Clinical Impression:   Final diagnoses:  [R49.0] Hoarseness (Primary)  [J02.9] Sore throat  [R03.0] Elevated blood pressure reading        ED Disposition Condition    Discharge Stable          ED Prescriptions    None       Follow-up Information       Follow up With Specialties Details Why Contact Info    Jonny Willis MD Internal Medicine Call in 1 day To discuss ER visit and schedule follow up appointment within 1 week [address]  [address], [address]  [facility] [address]  [phone]               XENIA Ayala  12/29/22 7246

## 2023-01-10 ENCOUNTER — TELEPHONE (OUTPATIENT)
Dept: INTERNAL MEDICINE | Facility: CLINIC | Age: 68
End: 2023-01-10
Payer: COMMERCIAL

## 2023-01-10 DIAGNOSIS — M54.50 CHRONIC LUMBOSACRAL PAIN: Primary | ICD-10-CM

## 2023-01-10 DIAGNOSIS — M54.6 CHRONIC THORACIC BACK PAIN, UNSPECIFIED BACK PAIN LATERALITY: ICD-10-CM

## 2023-01-10 DIAGNOSIS — G89.29 CHRONIC LUMBOSACRAL PAIN: Primary | ICD-10-CM

## 2023-01-10 DIAGNOSIS — G89.29 CHRONIC THORACIC BACK PAIN, UNSPECIFIED BACK PAIN LATERALITY: ICD-10-CM
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
## 2023-01-10 NOTE — TELEPHONE ENCOUNTER
Spoke to patient. Some type of misunderstanding occurred with her insurance so she hasn't been to PT and she states she's been suffering with her pain. Please resubmit another PT referral and I will assist her with scheduling. Thanks!

## 2023-01-10 NOTE — TELEPHONE ENCOUNTER
Order for physical therapy was put in again light that of 12/27/2022    I believe she would [facility]

## 2023-01-12 ENCOUNTER — CLINICAL SUPPORT (OUTPATIENT)
Dept: REHABILITATION | Facility: HOSPITAL | Age: 68
End: 2023-01-12
Attending: INTERNAL MEDICINE
Payer: COMMERCIAL

## 2023-01-12 DIAGNOSIS — G89.29 CHRONIC LUMBOSACRAL PAIN: ICD-10-CM

## 2023-01-12 DIAGNOSIS — G89.29 CHRONIC THORACIC BACK PAIN, UNSPECIFIED BACK PAIN LATERALITY: ICD-10-CM

## 2023-01-12 DIAGNOSIS — M54.50 CHRONIC LUMBOSACRAL PAIN: ICD-10-CM

## 2023-01-12 DIAGNOSIS — M54.6 CHRONIC THORACIC BACK PAIN, UNSPECIFIED BACK PAIN LATERALITY: ICD-10-CM

## 2023-01-12 PROCEDURE — 97162 PT EVAL MOD COMPLEX 30 MIN: CPT | Mod: PO
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
## 2023-01-24 ENCOUNTER — CLINICAL SUPPORT (OUTPATIENT)
Dept: REHABILITATION | Facility: HOSPITAL | Age: 68
End: 2023-01-24
Attending: INTERNAL MEDICINE
Payer: COMMERCIAL

## 2023-01-24 DIAGNOSIS — M54.50 CHRONIC LUMBOSACRAL PAIN: Primary | ICD-10-CM

## 2023-01-24 DIAGNOSIS — R29.898 DECREASED STRENGTH OF TRUNK AND BACK: ICD-10-CM

## 2023-01-24 DIAGNOSIS — M54.6 CHRONIC THORACIC BACK PAIN, UNSPECIFIED BACK PAIN LATERALITY: ICD-10-CM

## 2023-01-24 DIAGNOSIS — G89.29 CHRONIC THORACIC BACK PAIN, UNSPECIFIED BACK PAIN LATERALITY: ICD-10-CM

## 2023-01-24 DIAGNOSIS — G89.29 CHRONIC LUMBOSACRAL PAIN: Primary | ICD-10-CM

## 2023-01-24 PROCEDURE — 97110 THERAPEUTIC EXERCISES: CPT | Mod: PO,CQ

## 2023-01-24 NOTE — PROGRESS NOTES
"[facility]   Physical Therapy Treatment Note     Name: Josi Gil  Clinic Number: [id number]    Therapy Diagnosis: No diagnosis found.  Physician: Jonny Willis MD    Visit Date: 1/24/2023    Physician Orders: PT Eval and Treat   Medical Diagnosis from Referral:   M54.50,G89.29 (ICD-10-CM) - Chronic lumbosacral pain   M54.6,G89.29 (ICD-10-CM) - Chronic thoracic back pain, unspecified back pain laterality      Evaluation Date: 1/12/2023  Authorization Period Expiration: 12/29/2023  Plan of Care Expiration: 4/12/2023  Progress Note Due: 2/11/23  Visit # / Visits authorized: 1/ 20  FOTO: 1/3  Precautions: Standard, HTN    PTA Visit #: 1/5     Time In: 5:00 PM  Time Out: 5:55 PM  Total Billable Time: 55 minutes    SUBJECTIVE     Pt reports: "I can't live like this any longer. The pain is excruciating."  She was not compliant with home exercise program due to IE treatment.  Response to previous treatment: pain  Functional change: ongoing    Pain: 10/10  Location: bilateral thoracic spine, and "all over"    OBJECTIVE     Objective Measures updated at progress report unless specified.     Treatment     Josi received the treatments listed below:    (pt refused to complete exercises in reclined supine position)    therapeutic exercises to develop strength, ROM, and posture for 55 minutes including:  Shoulder Shrugs 1x10  Shoulder Circles (backwards) 1x10  Seated HR/TR 1x10  LAQ 1x5 ea LE    Patient Education and Home Exercises     Home Exercises Provided and Patient Education Provided     Education provided:   - HEP update  - medication/food for inflammatory relief   - nature of symptoms    Written Home Exercises Provided: yes. Exercises were reviewed and Josi was able to demonstrate them prior to the end of the session.  Josi demonstrated fair  understanding of the education provided. See EMR under Patient Instructions for exercises provided during therapy " sessions    ASSESSMENT     Patient feels her medical information regarding her back has been misconstrued and that is why she continues to have pain. Significant time was spent with the patient educating her regarding the longevity of her symptoms as well as my job as her treating therapist and how I have reviewed her chart. I have provided her with much positive reinforcement that I want to help her, but that she needs to assist me in trying to exercise to address her objective deficits.   No adverse affects with gentle AROM activities, but pt demonstrates significant difficulty with transfers, as well as requiring HHA ambulating to and from therapy.     Jois is progressing well towards her goals.   Pt prognosis is Guarded.     Pt will continue to benefit from skilled outpatient physical therapy to address the deficits listed in the problem list box on initial evaluation, provide pt/family education and to maximize pt's level of independence in the home and community environment.     Pt's spiritual, cultural and educational needs considered and pt agreeable to plan of care and goals.     Anticipated barriers to physical therapy: Compliance, Attitudes, Chronicity, Psychosocial Factors    Goals:   Short Term Goals (4 Weeks):  1. Pt will be compliant with HEP to supplement PT in decreasing pain with functional mobility.  2. Pt will be able to ambulate 500ft with pain rating of 8/10 to demonstrated improved pain tolerance.   3. Pt will improve lumbar ROM by 20% in all planes to improve functional mobility.  4. Pt will improve impaired LE MMTs by 1/2 score to improve strength for functional tasks.    Long Term Goals (8 Weeks):   1. Pt will be able to ambulate 500ft with 5/10 pain rating.   2. Pt will perform paloff press with good control to demonstrate improved core strength.  3. Pt will improve impaired LE MMTs by 1 score to improve strength for functional tasks.  4. Pt will improve lumbar ROM by 40% in all planes  to improve functional mobility.      PLAN     Continue per PLAN OF CARE and progress as pt tolerates.     Monica Ledezma, PTA

## 2023-01-29 ENCOUNTER — NURSE TRIAGE (OUTPATIENT)
Dept: ADMINISTRATIVE | Facility: CLINIC | Age: 68
End: 2023-01-29
Payer: COMMERCIAL

## 2023-01-29 ENCOUNTER — HOSPITAL ENCOUNTER (EMERGENCY)
Facility: HOSPITAL | Age: 68
Discharge: HOME | End: 2023-01-29
Attending: EMERGENCY MEDICINE
Payer: COMMERCIAL

## 2023-01-29 VITALS
BODY MASS INDEX: 26.66 KG/M2 | WEIGHT: 180 LBS | HEIGHT: 69 IN | DIASTOLIC BLOOD PRESSURE: 90 MMHG | TEMPERATURE: 99 F | OXYGEN SATURATION: 98 % | RESPIRATION RATE: 18 BRPM | HEART RATE: 69 BPM | SYSTOLIC BLOOD PRESSURE: 193 MMHG

## 2023-01-29 DIAGNOSIS — G89.29 CHRONIC MIDLINE LOW BACK PAIN WITHOUT SCIATICA: Primary | ICD-10-CM

## 2023-01-29 DIAGNOSIS — M54.50 CHRONIC MIDLINE LOW BACK PAIN WITHOUT SCIATICA: Primary | ICD-10-CM

## 2023-01-29 DIAGNOSIS — R07.9 CHEST PAIN: ICD-10-CM

## 2023-01-29 LAB
ALBUMIN SERPL BCP-MCNC: 3.6 G/DL (ref 3.5–5.2)
ALP SERPL-CCNC: 69 U/L (ref 55–135)
ALT SERPL W/O P-5'-P-CCNC: 17 U/L (ref 10–44)
ANION GAP SERPL CALC-SCNC: 9 MMOL/L (ref 8–16)
AST SERPL-CCNC: 21 U/L (ref 10–40)
BASOPHILS # BLD AUTO: 0.05 K/UL (ref 0–0.2)
BASOPHILS NFR BLD: 1.1 % (ref 0–1.9)
BILIRUB SERPL-MCNC: 0.9 MG/DL (ref 0.1–1)
BNP SERPL-MCNC: 37 PG/ML (ref 0–99)
BUN SERPL-MCNC: 10 MG/DL (ref 8–23)
BUN SERPL-MCNC: 11 MG/DL (ref 6–30)
CALCIUM SERPL-MCNC: 9.5 MG/DL (ref 8.7–10.5)
CHLORIDE SERPL-SCNC: 104 MMOL/L (ref 95–110)
CHLORIDE SERPL-SCNC: 106 MMOL/L (ref 95–110)
CO2 SERPL-SCNC: 24 MMOL/L (ref 23–29)
CREAT SERPL-MCNC: 0.8 MG/DL (ref 0.5–1.4)
CREAT SERPL-MCNC: 0.8 MG/DL (ref 0.5–1.4)
DIFFERENTIAL METHOD: NORMAL
EOSINOPHIL # BLD AUTO: 0.3 K/UL (ref 0–0.5)
EOSINOPHIL NFR BLD: 6.6 % (ref 0–8)
ERYTHROCYTE [DISTWIDTH] IN BLOOD BY AUTOMATED COUNT: 13.2 % (ref 11.5–14.5)
EST. GFR  (NO RACE VARIABLE): >60 ML/MIN/1.73 M^2
GLUCOSE SERPL-MCNC: 103 MG/DL (ref 70–110)
GLUCOSE SERPL-MCNC: 104 MG/DL (ref 70–110)
HCT VFR BLD AUTO: 41.7 % (ref 37–48.5)
HCT VFR BLD CALC: 42 %PCV (ref 36–54)
HCV AB SERPL QL IA: NORMAL
HGB BLD-MCNC: 13.6 G/DL (ref 12–16)
HIV 1+2 AB+HIV1 P24 AG SERPL QL IA: NORMAL
IMM GRANULOCYTES # BLD AUTO: 0.01 K/UL (ref 0–0.04)
IMM GRANULOCYTES NFR BLD AUTO: 0.2 % (ref 0–0.5)
LYMPHOCYTES # BLD AUTO: 1.7 K/UL (ref 1–4.8)
LYMPHOCYTES NFR BLD: 38.2 % (ref 18–48)
MCH RBC QN AUTO: 30.6 PG (ref 27–31)
MCHC RBC AUTO-ENTMCNC: 32.6 G/DL (ref 32–36)
MCV RBC AUTO: 94 FL (ref 82–98)
MONOCYTES # BLD AUTO: 0.5 K/UL (ref 0.3–1)
MONOCYTES NFR BLD: 11.4 % (ref 4–15)
NEUTROPHILS # BLD AUTO: 1.9 K/UL (ref 1.8–7.7)
NEUTROPHILS NFR BLD: 42.5 % (ref 38–73)
NRBC BLD-RTO: 0 /100 WBC
PLATELET # BLD AUTO: 210 K/UL (ref 150–450)
PMV BLD AUTO: 10.8 FL (ref 9.2–12.9)
POC CARDIAC TROPONIN I: 0.01 NG/ML (ref 0–0.08)
POC IONIZED CALCIUM: 1.18 MMOL/L (ref 1.06–1.42)
POC TCO2 (MEASURED): 28 MMOL/L (ref 23–29)
POTASSIUM BLD-SCNC: 4.2 MMOL/L (ref 3.5–5.1)
POTASSIUM SERPL-SCNC: 4.1 MMOL/L (ref 3.5–5.1)
PROT SERPL-MCNC: 6.9 G/DL (ref 6–8.4)
RBC # BLD AUTO: 4.44 M/UL (ref 4–5.4)
SAMPLE: NORMAL
SAMPLE: NORMAL
SODIUM BLD-SCNC: 140 MMOL/L (ref 136–145)
SODIUM SERPL-SCNC: 139 MMOL/L (ref 136–145)
TROPONIN I SERPL DL<=0.01 NG/ML-MCNC: 0.01 NG/ML (ref 0–0.03)
TROPONIN I SERPL DL<=0.01 NG/ML-MCNC: 0.02 NG/ML (ref 0–0.03)
WBC # BLD AUTO: 4.55 K/UL (ref 3.9–12.7)

## 2023-01-29 PROCEDURE — 80047 BASIC METABLC PNL IONIZED CA: CPT

## 2023-01-29 PROCEDURE — 93010 ELECTROCARDIOGRAM REPORT: CPT | Mod: ,,, | Performed by: INTERNAL MEDICINE

## 2023-01-29 PROCEDURE — 93010 EKG 12-LEAD: ICD-10-PCS | Mod: ,,, | Performed by: INTERNAL MEDICINE

## 2023-01-29 PROCEDURE — 87389 HIV-1 AG W/HIV-1&-2 AB AG IA: CPT | Performed by: PHYSICIAN ASSISTANT

## 2023-01-29 PROCEDURE — 99285 EMERGENCY DEPT VISIT HI MDM: CPT | Mod: 25

## 2023-01-29 PROCEDURE — 86803 HEPATITIS C AB TEST: CPT | Performed by: PHYSICIAN ASSISTANT

## 2023-01-29 PROCEDURE — 93005 ELECTROCARDIOGRAM TRACING: CPT

## 2023-01-29 PROCEDURE — 94761 N-INVAS EAR/PLS OXIMETRY MLT: CPT

## 2023-01-29 PROCEDURE — 84484 ASSAY OF TROPONIN QUANT: CPT | Performed by: PHYSICIAN ASSISTANT

## 2023-01-29 PROCEDURE — 85025 COMPLETE CBC W/AUTO DIFF WBC: CPT | Performed by: PHYSICIAN ASSISTANT

## 2023-01-29 PROCEDURE — 83880 ASSAY OF NATRIURETIC PEPTIDE: CPT | Performed by: PHYSICIAN ASSISTANT

## 2023-01-29 PROCEDURE — 25000003 PHARM REV CODE 250: Performed by: PHYSICIAN ASSISTANT

## 2023-01-29 PROCEDURE — 80053 COMPREHEN METABOLIC PANEL: CPT | Performed by: PHYSICIAN ASSISTANT

## 2023-01-29 PROCEDURE — 99284 EMERGENCY DEPT VISIT MOD MDM: CPT | Mod: ,,, | Performed by: PHYSICIAN ASSISTANT

## 2023-01-29 PROCEDURE — 99284 PR EMERGENCY DEPT VISIT,LEVEL IV: ICD-10-PCS | Mod: ,,, | Performed by: PHYSICIAN ASSISTANT

## 2023-01-29 RX ORDER — ASPIRIN 325 MG
325 TABLET ORAL
Status: COMPLETED | OUTPATIENT
Start: 2023-01-29 | End: 2023-01-29

## 2023-01-29 RX ORDER — KETOROLAC TROMETHAMINE 10 MG/1
10 TABLET, FILM COATED ORAL
Status: COMPLETED | OUTPATIENT
Start: 2023-01-29 | End: 2023-01-29

## 2023-01-29 RX ADMIN — KETOROLAC TROMETHAMINE 10 MG: 10 TABLET, FILM COATED ORAL at 01:01

## 2023-01-29 RX ADMIN — ASPIRIN 325 MG: 325 TABLET ORAL at 11:01

## 2023-01-29 NOTE — ED NOTES
I-STAT Chem-8+ Results:   Value Reference Range   Sodium 140 136-145 mmol/L   Potassium  4.2 3.5-5.1 mmol/L   Chloride 104  mmol/L   Ionized Calcium 1.18 1.06-1.42 mmol/L   CO2 (measured) 28 23-29 mmol/L   Glucose 103  mg/dL   BUN 11 6-30 mg/dL   Creatinine 0.8 0.5-1.4 mg/dL   Hematocrit 42 36-54%

## 2023-01-29 NOTE — ED PROVIDER NOTES
"Encounter Date: 2023       History     Chief Complaint   Patient presents with    Chest Pain     Pt complains of intermittent chest pain x days. Pain now radiating to left arm. Pt denies any cardiac hx. Rates pain 6 out of 10.      67 y.o. female with significant past medical history of HTN, GERD, arthritis, back pain presented to the ER complaining of intermittent chest pain x 1 month.  Pt came to the ER today b/c this morning she woke up with the pain in her chest and L arm.  The pain is located substernal and increasing in frequency.  The pain is described as pressure.  The pain is radiating down the L arm.  She also reports "burning" sensation constantly mid thoracic spine and under R scapula x months.  Pt is in physical therapy.  Pt reports the pain aggravated by none.  Chest pain is alleviated by nereyda aspirin, took last 2 days ago (two 325 mg tablets).  The pain is not reproducible.  Pt denies nausea, vomiting, SOB, diaphoresis, or palpitations.  Pt denies any trauma or heavy lifting.  Pt does not have family history of heart disease.  Treadmill stress 2022 Overall, this study is negative for myocardial ischemia but sensitivity is impaired due to the patient's failure to achieve target heart rate and the inability to exercise for even 2 minutes on the intermediate ramp protocol (limited by back pain). Pt denies fever, chills, SOB, cough, vocal changes, globus sensation, leg swelling or pain, fatigue, weakness, confusion, syncope.  BP prior to arrival 138/86 HR 70 and 114/53 HR 75.        Review of patient's allergies indicates:  No Known Allergies  Past Medical History:   Diagnosis Date    Acute costochondritis 2012    Back pain     Benign essential hypertension     Gastroesophageal reflux disease without esophagitis     Pain in joint involving multiple sites 2013     Past Surgical History:   Procedure Laterality Date    BREAST BIOPSY Right      SECTION      KNEE ARTHROSCOPY W/ ACL " RECONSTRUCTION      REFRACTIVE SURGERY Bilateral 1999 or 2000    Dr. Sinha [facility] distance     Family History   Problem Relation Age of Onset    Hypertension Mother     Heart disease Maternal Grandmother     Celiac disease Neg Hx     Colon cancer Neg Hx     Colon polyps Neg Hx     Crohn's disease Neg Hx     Cystic fibrosis Neg Hx     Esophageal cancer Neg Hx     Inflammatory bowel disease Neg Hx     Irritable bowel syndrome Neg Hx     Liver cancer Neg Hx     Liver disease Neg Hx     Rectal cancer Neg Hx     Stomach cancer Neg Hx      Social History     Tobacco Use    Smoking status: Never    Smokeless tobacco: Never   Substance Use Topics    Alcohol use: No    Drug use: No     Review of Systems   Constitutional:  Negative for diaphoresis, fatigue, fever and unexpected weight change.   HENT:  Negative for sore throat, trouble swallowing and voice change.    Eyes:  Negative for photophobia and visual disturbance.   Respiratory:  Negative for cough, shortness of breath and wheezing.    Cardiovascular:  Positive for chest pain. Negative for palpitations and leg swelling.   Gastrointestinal:  Negative for abdominal pain, constipation, diarrhea, nausea and vomiting.   Endocrine: Negative for polydipsia, polyphagia and polyuria.   Genitourinary:  Negative for dysuria, flank pain and frequency.   Musculoskeletal:  Positive for back pain (chronic). Negative for arthralgias, gait problem, myalgias and neck pain.   Skin:  Negative for rash and wound.   Neurological:  Negative for dizziness, tremors, seizures, syncope, facial asymmetry, weakness, light-headedness, numbness and headaches.   Hematological:  Negative for adenopathy. Does not bruise/bleed easily.   Psychiatric/Behavioral:  Negative for confusion and dysphoric mood. The patient is not nervous/anxious.      Physical Exam     Initial Vitals [01/29/23 1003]   BP Pulse Resp Temp SpO2   (!) 157/76 76 18 98 °F (36.7 °C) 100 %      MAP       --         Physical Exam    Nursing  note and vitals reviewed.  Constitutional: She appears well-developed and well-nourished. She is not diaphoretic. No distress.   HENT:   Head: Normocephalic and atraumatic.   Right Ear: External ear normal.   Left Ear: External ear normal.   Eyes: Conjunctivae and EOM are normal. Pupils are equal, round, and reactive to light.   Neck: Neck supple.   Normal range of motion.  Cardiovascular:  Normal rate and regular rhythm.           Pulmonary/Chest: Breath sounds normal. No respiratory distress. She has no wheezes.   Abdominal: Abdomen is soft. Bowel sounds are normal. There is no abdominal tenderness.   Musculoskeletal:         General: No edema. Normal range of motion.      Cervical back: Normal range of motion and neck supple. No tenderness or bony tenderness.      Thoracic back: Tenderness present. No bony tenderness. Normal range of motion.      Lumbar back: Tenderness present. No bony tenderness. Normal range of motion.     Lymphadenopathy:     She has no cervical adenopathy.   Neurological: She is alert and oriented to person, place, and time.   Skin: Skin is warm and dry.   Psychiatric: She has a normal mood and affect.       ED Course   Procedures  Labs Reviewed   HIV 1 / 2 ANTIBODY   HEPATITIS C ANTIBODY     EKG Readings: (Independently Interpreted)   Initial Reading: No STEMI. Rhythm: Normal Sinus Rhythm. T Waves Flipped: III and V3.     Imaging Results    None          Medications - No data to display  Medical Decision Making:   Clinical Tests:   Lab Tests: Ordered  Radiological Study: Ordered  Medical Tests: Ordered  ED Management:  67 y.o. year old female presenting with CP x 3-4 weeks.  She is also reporting chronic back pain.  She denies recent fall or acute injury.  On exam patient is afebrile and nontoxic. HEENT exam normal. Heart rate and rhythm are regular. Lungs with clear breath sounds throughout. Abdomen is soft, nontender. No edema.    At time of evaluation and throughout ED visit pt remained  CP free.    DDx includes but is not limited to GERD, MSK strain/sprain.  Low suspicion for ACS or PE.    ED workup reveals EKG NSR, inverted t wave III and V3 seen previously.  Troponin 0.018->0.006.  CBC WNL.  CMP WNL, no significant electrolytes abnormalities and kidney function at baseline.  HEART Score 3.      Upon reassessment pt requested toradol for back pain.    Plan refer to cardiology for repeat stress testing.  Refer to pain management for chronic back pain.    Discussed findings and plan with patient who verbalized understanding and agrees with the plan and course of treatment. Return to ED precautions discussed. Patient is stable for discharge. I discussed the care of this patient with my supervising physician.                         Clinical Impression:   Final diagnoses:  [R07.9] Chest pain               Analilia Vargas PA-C  01/29/23 1443

## 2023-01-29 NOTE — ED TRIAGE NOTES
Reports intermittent c/p that radiates down L arm. Pt denies SOB, cardiac hx.     LOC: The patient is awake, alert and aware of environment with an appropriate affect, the patient is oriented x 3 and speaking appropriately.  APPEARANCE: Patient resting comfortably and in no acute distress, patient is clean and well groomed, patient's clothing is properly fastened.  SKIN: The skin is warm and dry, color consistent with ethnicity, patient has normal skin turgor and moist mucus membranes, skin intact, no breakdown or bruising noted.  MUSCULOSKELETAL: Patient moving all extremities spontaneously, no obvious swelling or deformities noted.  RESPIRATORY: Airway is open and patent, respirations are spontaneous, patient has a normal effort and rate, no accessory muscle use noted,   CARDIAC: Patient has a normal rate and regular rhythm, no periphreal edema noted, capillary refill < 3 seconds.  ABDOMEN: Soft and non tender to palpation, no distention noted,   NEUROLOGIC:  facial expression is symmetrical, patient moving all extremities spontaneously, normal sensation in all extremities when touched with a finger.  Follows all commands appropriately.

## 2023-01-29 NOTE — TELEPHONE ENCOUNTER
"Concerned about her heart. Woke up with pain in the left arm and has had pain in her chest and back.   Reason for Disposition   [1] Age > 40 AND [2] no obvious cause AND [3] pain even when not moving the arm  (Exception: pain is clearly made worse by moving arm or bending neck)    Additional Information   Negative: Shock suspected (e.g., cold/pale/clammy skin, too weak to stand, low BP, rapid pulse)   Negative: [1] Similar pain previously AND [2] it was from "heart attack"   Negative: [1] Similar pain previously AND [2] it was from "angina" AND [3] not relieved by nitroglycerin   Negative: Sounds like a life-threatening emergency to the triager   Negative: Followed an arm injury   Negative: Chest pain   Negative: Pain, redness, or swelling at intravenous (IV) site or along course of vein   Negative: Wound looks infected   Negative: Elbow pain is main symptom   Negative: Wrist pain is main symptom   Negative: Difficulty breathing or unusual sweating (e.g., sweating without exertion)   Negative: [1] Age > 40 AND [2] associated chest or jaw pain AND [3] pain lasts > 5 minutes    Protocols used: Arm Pain-A-AH    "

## 2023-01-30 RX ORDER — KETOROLAC TROMETHAMINE 10 MG/1
10 TABLET, FILM COATED ORAL 3 TIMES DAILY PRN
Qty: 30 TABLET | Refills: 0 | Status: SHIPPED | OUTPATIENT
Start: 2023-01-30 | End: 2023-02-11

## 2023-01-30 NOTE — TELEPHONE ENCOUNTER
----- Message from Meaghan Almazan sent at 1/30/2023  4:42 PM CST -----  Contact: [phone]  Pt is calling she states she was is in pain and she states she was given Teva and sgcynthia is asking for you to call it in so she can get this today main [facility] pharmacy she would like for you to call this ASAP

## 2023-02-02 ENCOUNTER — TELEPHONE (OUTPATIENT)
Dept: INTERNAL MEDICINE | Facility: CLINIC | Age: 68
End: 2023-02-02
Payer: COMMERCIAL

## 2023-02-02 NOTE — TELEPHONE ENCOUNTER
----- Message from Rosanna Shah sent at 2/2/2023  4:44 PM CST -----  Contact: [phone]  Pt called to speak to the provider. She says she would like a call back today. Advised that the message normally is returned in 72 hors. Please Advise

## 2023-02-08 ENCOUNTER — CLINICAL SUPPORT (OUTPATIENT)
Dept: REHABILITATION | Facility: HOSPITAL | Age: 68
End: 2023-02-08
Attending: INTERNAL MEDICINE
Payer: COMMERCIAL

## 2023-02-08 DIAGNOSIS — G89.29 CHRONIC LUMBOSACRAL PAIN: Primary | ICD-10-CM

## 2023-02-08 DIAGNOSIS — M54.6 CHRONIC THORACIC BACK PAIN, UNSPECIFIED BACK PAIN LATERALITY: ICD-10-CM

## 2023-02-08 DIAGNOSIS — M54.50 CHRONIC LUMBOSACRAL PAIN: Primary | ICD-10-CM

## 2023-02-08 DIAGNOSIS — R29.898 DECREASED STRENGTH OF TRUNK AND BACK: ICD-10-CM

## 2023-02-08 DIAGNOSIS — G89.29 CHRONIC THORACIC BACK PAIN, UNSPECIFIED BACK PAIN LATERALITY: ICD-10-CM

## 2023-02-08 PROCEDURE — 97110 THERAPEUTIC EXERCISES: CPT | Mod: PO,CQ

## 2023-02-08 NOTE — PROGRESS NOTES
"[facility]   Physical Therapy Treatment Note     Name: Josi Gil  Clinic Number: [id number]    Therapy Diagnosis: No diagnosis found.  Physician: Jonny Willis MD    Visit Date: 2/8/2023    Physician Orders: PT Eval and Treat   Medical Diagnosis from Referral:   M54.50,G89.29 (ICD-10-CM) - Chronic lumbosacral pain   M54.6,G89.29 (ICD-10-CM) - Chronic thoracic back pain, unspecified back pain laterality      Evaluation Date: 1/12/2023  Authorization Period Expiration: 12/29/2023  Plan of Care Expiration: 4/12/2023  Progress Note Due: 2/11/23  Visit # / Visits authorized: 2 / 20  FOTO: 1/3  Precautions: Standard, HTN    PTA Visit #: 2/5     Time In: 5:45 PM  Time Out: 6:15 PM  Total Billable Time: 30 minutes    SUBJECTIVE     Pt reports: she bought hibiscus tea and an herbal pill at [employer] to help with her pain. "I don't want to be like this. It's becoming more bothersome to get through my day."  She was compliant with home exercise program.   Response to previous treatment: pain  Functional change: ongoing    Pain: 10/10  Location: left shin     OBJECTIVE     Objective Measures updated at progress report unless specified.     Treatment     Josi received the treatments listed below:    (pt completes all exercises in seated position)     therapeutic exercises to develop strength, ROM, and posture for 30 minutes including:  Shoulder Shrugs 1x10  Shoulder Circles (backwards) 1x10  Seated HR/TR 1x10  LAQ 1x10 ea LE  Seated hip flexion (march) 1x10   SB lumbar flexion rollout 5" 10x  Bilateral clam YTB 1x10  No money YTB 1x10    Patient Education and Home Exercises     Home Exercises Provided and Patient Education Provided     Education provided:   - HEP update  - nature of symptoms    Written Home Exercises Provided: yes. Exercises were reviewed and Josi was able to demonstrate them prior to the end of the session.  Josi demonstrated fair  understanding of the " education provided. See EMR under Patient Instructions for exercises provided during therapy sessions    ASSESSMENT   Patient more willing to exercise this date and does report HEP compliance from last visit. However, she reports increasing pain and discomfort with the therex performed today. I attempt to address pt's flexibility deficits and weakness and provided positive reinforcement throughout her session.     Josi is progressing well towards her goals.   Pt prognosis is Guarded.     Pt will continue to benefit from skilled outpatient physical therapy to address the deficits listed in the problem list box on initial evaluation, provide pt/family education and to maximize pt's level of independence in the home and community environment.     Pt's spiritual, cultural and educational needs considered and pt agreeable to plan of care and goals.     Anticipated barriers to physical therapy: Compliance, Attitudes, Chronicity, Psychosocial Factors    Goals:   Short Term Goals (4 Weeks):  1. Pt will be compliant with HEP to supplement PT in decreasing pain with functional mobility.  2. Pt will be able to ambulate 500ft with pain rating of 8/10 to demonstrated improved pain tolerance.   3. Pt will improve lumbar ROM by 20% in all planes to improve functional mobility.  4. Pt will improve impaired LE MMTs by 1/2 score to improve strength for functional tasks.    Long Term Goals (8 Weeks):   1. Pt will be able to ambulate 500ft with 5/10 pain rating.   2. Pt will perform paloff press with good control to demonstrate improved core strength.  3. Pt will improve impaired LE MMTs by 1 score to improve strength for functional tasks.  4. Pt will improve lumbar ROM by 40% in all planes to improve functional mobility.      PLAN     Continue per POC and progress as pt tolerates.     Monica Ledezma, PTA

## 2023-02-09 ENCOUNTER — OFFICE VISIT (OUTPATIENT)
Dept: INTERNAL MEDICINE | Facility: CLINIC | Age: 68
End: 2023-02-09
Payer: COMMERCIAL

## 2023-02-09 VITALS
HEIGHT: 69 IN | OXYGEN SATURATION: 99 % | DIASTOLIC BLOOD PRESSURE: 70 MMHG | SYSTOLIC BLOOD PRESSURE: 170 MMHG | BODY MASS INDEX: 27.36 KG/M2 | WEIGHT: 184.75 LBS | HEART RATE: 76 BPM

## 2023-02-09 DIAGNOSIS — H61.23 BILATERAL IMPACTED CERUMEN: ICD-10-CM

## 2023-02-09 DIAGNOSIS — M79.605 ACUTE PAIN OF LEFT LOWER EXTREMITY: Primary | ICD-10-CM

## 2023-02-09 DIAGNOSIS — M79.602 UPPER EXTREMITY PAIN, ANTERIOR, LEFT: ICD-10-CM

## 2023-02-09 PROCEDURE — 1160F PR REVIEW ALL MEDS BY PRESCRIBER/CLIN PHARMACIST DOCUMENTED: ICD-10-PCS | Mod: CPTII,S$GLB,, | Performed by: INTERNAL MEDICINE

## 2023-02-09 PROCEDURE — 3078F DIAST BP <80 MM HG: CPT | Mod: CPTII,S$GLB,, | Performed by: INTERNAL MEDICINE

## 2023-02-09 PROCEDURE — 1125F AMNT PAIN NOTED PAIN PRSNT: CPT | Mod: CPTII,S$GLB,, | Performed by: INTERNAL MEDICINE

## 2023-02-09 PROCEDURE — 3288F FALL RISK ASSESSMENT DOCD: CPT | Mod: CPTII,S$GLB,, | Performed by: INTERNAL MEDICINE

## 2023-02-09 PROCEDURE — 3077F PR MOST RECENT SYSTOLIC BLOOD PRESSURE >= 140 MM HG: ICD-10-PCS | Mod: CPTII,S$GLB,, | Performed by: INTERNAL MEDICINE

## 2023-02-09 PROCEDURE — 1159F PR MEDICATION LIST DOCUMENTED IN MEDICAL RECORD: ICD-10-PCS | Mod: CPTII,S$GLB,, | Performed by: INTERNAL MEDICINE

## 2023-02-09 PROCEDURE — 1159F MED LIST DOCD IN RCRD: CPT | Mod: CPTII,S$GLB,, | Performed by: INTERNAL MEDICINE

## 2023-02-09 PROCEDURE — 1101F PR PT FALLS ASSESS DOC 0-1 FALLS W/OUT INJ PAST YR: ICD-10-PCS | Mod: CPTII,S$GLB,, | Performed by: INTERNAL MEDICINE

## 2023-02-09 PROCEDURE — 3008F PR BODY MASS INDEX (BMI) DOCUMENTED: ICD-10-PCS | Mod: CPTII,S$GLB,, | Performed by: INTERNAL MEDICINE

## 2023-02-09 PROCEDURE — 3288F PR FALLS RISK ASSESSMENT DOCUMENTED: ICD-10-PCS | Mod: CPTII,S$GLB,, | Performed by: INTERNAL MEDICINE

## 2023-02-09 PROCEDURE — 3078F PR MOST RECENT DIASTOLIC BLOOD PRESSURE < 80 MM HG: ICD-10-PCS | Mod: CPTII,S$GLB,, | Performed by: INTERNAL MEDICINE

## 2023-02-09 PROCEDURE — 99999 PR PBB SHADOW E&M-EST. PATIENT-LVL III: CPT | Mod: PBBFAC,,, | Performed by: INTERNAL MEDICINE

## 2023-02-09 PROCEDURE — 1160F RVW MEDS BY RX/DR IN RCRD: CPT | Mod: CPTII,S$GLB,, | Performed by: INTERNAL MEDICINE

## 2023-02-09 PROCEDURE — 99214 PR OFFICE/OUTPT VISIT, EST, LEVL IV, 30-39 MIN: ICD-10-PCS | Mod: S$GLB,,, | Performed by: INTERNAL MEDICINE

## 2023-02-09 PROCEDURE — 99999 PR PBB SHADOW E&M-EST. PATIENT-LVL III: ICD-10-PCS | Mod: PBBFAC,,, | Performed by: INTERNAL MEDICINE

## 2023-02-09 PROCEDURE — 1125F PR PAIN SEVERITY QUANTIFIED, PAIN PRESENT: ICD-10-PCS | Mod: CPTII,S$GLB,, | Performed by: INTERNAL MEDICINE

## 2023-02-09 PROCEDURE — 3008F BODY MASS INDEX DOCD: CPT | Mod: CPTII,S$GLB,, | Performed by: INTERNAL MEDICINE

## 2023-02-09 PROCEDURE — 3077F SYST BP >= 140 MM HG: CPT | Mod: CPTII,S$GLB,, | Performed by: INTERNAL MEDICINE

## 2023-02-09 PROCEDURE — 99214 OFFICE O/P EST MOD 30 MIN: CPT | Mod: S$GLB,,, | Performed by: INTERNAL MEDICINE

## 2023-02-09 PROCEDURE — 1101F PT FALLS ASSESS-DOCD LE1/YR: CPT | Mod: CPTII,S$GLB,, | Performed by: INTERNAL MEDICINE

## 2023-02-09 RX ORDER — TRAMADOL HYDROCHLORIDE 50 MG/1
50 TABLET ORAL EVERY 8 HOURS PRN
Qty: 12 TABLET | Refills: 0 | Status: SHIPPED | OUTPATIENT
Start: 2023-02-09 | End: 2023-09-05 | Stop reason: SDUPTHER

## 2023-02-09 NOTE — PROGRESS NOTES
67-year-old female       For the past 4 days she has been experiencing a pain off and on over the anterior aspect of the left leg.  It is worse with standing up and walking on it.  It comes and goes.  It does not appear to affect the during the night.  It has remained the same.  At 1 point two days ago when she felt the pain she also felt the concurrent pain involving the left epitrochlear region.  It is noted that she presented emergency room on January 29th for chest discomfort.  This been off and on for while.  Routine labs including troponin.  Was normal as well as electrocardiogram.  She was given Toradol.    She is being seen in physical therapy for the various musculoskeletal pains.    Examination   Weight 184 lb   Pulse 72   Blood pressure 158/62   Chest clear breath sounds  Heart regular rate rhythm   2+ carotid pulses.  Trace knee and trace ankle jerk reflexes.  There is tenderness while palpate over the anterior aspect of the left leg lateral to the tibia but not directly over the tibial bone.    Presently no pain when I palpate over the epicondyles.  But she does have upper arm pain with supination pronation    1+ left dorsalis pedis pulse    Impression  Acute left distal lower extremity pain, prior muscular pain but pulse appears to be slightly diminished  Episode the left elbow pain which I think is probably related to tendinopathy around the elbow    Plan  NINI with exercise   Consider referral to functional restoration program   Discuss treatment options in regard to use of certain anti-inflammatory muscle relaxant.  She agreed to tramadol 50 mg q.8 hours as needed for the next 4 days      Addendum at the end appointment she mention having ringing in the ears.  No other symptoms.  Ear canals was occluded with cerumen.  Attempts were made to flush it out but unsuccessful.  It was also uncomfortable.  Referred to ENT

## 2023-02-10 ENCOUNTER — PATIENT MESSAGE (OUTPATIENT)
Dept: OTOLARYNGOLOGY | Facility: CLINIC | Age: 68
End: 2023-02-10
Payer: COMMERCIAL

## 2023-02-10 ENCOUNTER — TELEPHONE (OUTPATIENT)
Dept: OTOLARYNGOLOGY | Facility: CLINIC | Age: 68
End: 2023-02-10
Payer: COMMERCIAL

## 2023-02-10 NOTE — TELEPHONE ENCOUNTER
Message left for patient to contact the ENT clinic to schedule an appointment from referral.    3rd Attempt- Unable to contact (request canceled)

## 2023-02-17 ENCOUNTER — TELEPHONE (OUTPATIENT)
Dept: INTERNAL MEDICINE | Facility: CLINIC | Age: 68
End: 2023-02-17
Payer: COMMERCIAL

## 2023-02-17 DIAGNOSIS — R07.2 PRECORDIAL PAIN: Primary | ICD-10-CM

## 2023-02-17 NOTE — TELEPHONE ENCOUNTER
----- Message from Malia Smith sent at 2/17/2023  9:40 AM CST -----  Contact: NATALIA LUGO [[id number]]@ [phone]  Medicaid patient want to be seen today or speak with nurse about a test result she has taken. She want you order a test  called Nohemy RAMIREZ

## 2023-02-17 NOTE — TELEPHONE ENCOUNTER
I put in order for cardiac enzymes,CPK and troponin    I also put in order for PET cardiac stress which is a different type of stress test but recommend it as a nuclear medicine stress test

## 2023-02-17 NOTE — TELEPHONE ENCOUNTER
Spoke to pt and she states she have a discomfort in her chest and she would like to re take the cardiac stress test and also requesting a lab

## 2023-02-18 ENCOUNTER — LAB VISIT (OUTPATIENT)
Dept: LAB | Facility: HOSPITAL | Age: 68
End: 2023-02-18
Attending: INTERNAL MEDICINE
Payer: COMMERCIAL

## 2023-02-18 ENCOUNTER — NURSE TRIAGE (OUTPATIENT)
Dept: ADMINISTRATIVE | Facility: CLINIC | Age: 68
End: 2023-02-18
Payer: COMMERCIAL

## 2023-02-18 DIAGNOSIS — R07.2 PRECORDIAL PAIN: ICD-10-CM

## 2023-02-18 LAB
CK SERPL-CCNC: 109 U/L (ref 20–180)
TROPONIN I SERPL DL<=0.01 NG/ML-MCNC: 0.01 NG/ML (ref 0–0.03)

## 2023-02-18 PROCEDURE — 84484 ASSAY OF TROPONIN QUANT: CPT | Performed by: INTERNAL MEDICINE

## 2023-02-18 PROCEDURE — 82550 ASSAY OF CK (CPK): CPT | Performed by: INTERNAL MEDICINE

## 2023-02-18 PROCEDURE — 36415 COLL VENOUS BLD VENIPUNCTURE: CPT | Performed by: INTERNAL MEDICINE

## 2023-02-18 NOTE — TELEPHONE ENCOUNTER
Josi calling requesting rx for  mg be sent to her pharmacy. Triage offered and accepted. Pt currently c/o neck, lower back and knee pain & bilateral hand tingling and numbness. Pt states she has chronic neck and back pain. +mild headache. Advised per triage protocol to go to nearest ED now for physician north. Instructed not to drive self. Pt states she is close to [facility] now because she just had blood work done at hospital. Pt refusing care advice recommendation. Requesting on call provider call a rx in. Triager reiterated care advice to go to ED now for physician north. Caller hangs up prior to triager ending call.     Reason for Disposition   Headache  (and neurologic deficit)    Additional Information   Negative: [1] SEVERE weakness (i.e., unable to walk or barely able to walk, requires support) AND [2] new-onset or worsening   Negative: [1] Weakness (i.e., paralysis, loss of muscle strength) of the face, arm / hand, or leg / foot on one side of the body AND [2] sudden onset AND [3] present now (Exception: Bell's palsy suspected [i.e., weakness only on one side of the face, developing over hours to days, no other symptoms])   Negative: [1] Numbness (i.e., loss of sensation) of the face, arm / hand, or leg / foot on one side of the body AND [2] sudden onset AND [3] present now   Negative: [1] Loss of speech or garbled speech AND [2] sudden onset AND [3] present now   Negative: Difficult to awaken or acting confused (e.g., disoriented, slurred speech)   Negative: Sounds like a life-threatening emergency to the triager    Protocols used: Neurologic Deficit-A-AH

## 2023-02-20 ENCOUNTER — HOSPITAL ENCOUNTER (OUTPATIENT)
Dept: VASCULAR SURGERY | Facility: CLINIC | Age: 68
Discharge: HOME | End: 2023-02-20
Attending: INTERNAL MEDICINE
Payer: COMMERCIAL

## 2023-02-20 DIAGNOSIS — M79.605 ACUTE PAIN OF LEFT LOWER EXTREMITY: ICD-10-CM

## 2023-02-20 DIAGNOSIS — I73.9 PVD (PERIPHERAL VASCULAR DISEASE): Primary | ICD-10-CM

## 2023-02-20 PROCEDURE — 93923 UPR/LXTR ART STDY 3+ LVLS: CPT | Mod: S$GLB,,, | Performed by: SURGERY

## 2023-02-20 PROCEDURE — 93923 PR NON-INVASIVE PHYSIOLOGIC STUDY EXTREMITY 3 LEVELS: ICD-10-PCS | Mod: S$GLB,,, | Performed by: SURGERY

## 2023-02-23 ENCOUNTER — DOCUMENTATION ONLY (OUTPATIENT)
Dept: INTERNAL MEDICINE | Facility: CLINIC | Age: 68
End: 2023-02-23
Payer: COMMERCIAL

## 2023-02-23 ENCOUNTER — PATIENT MESSAGE (OUTPATIENT)
Dept: INTERNAL MEDICINE | Facility: CLINIC | Age: 68
End: 2023-02-23
Payer: COMMERCIAL

## 2023-02-23 DIAGNOSIS — M79.605 PAIN IN BOTH LOWER EXTREMITIES: ICD-10-CM

## 2023-02-23 DIAGNOSIS — R68.89 ABNORMAL ANKLE BRACHIAL INDEX (ABI): Primary | ICD-10-CM

## 2023-02-23 DIAGNOSIS — M79.604 PAIN IN BOTH LOWER EXTREMITIES: ICD-10-CM

## 2023-02-23 NOTE — PROGRESS NOTES
Internal medicine note    The NINI with exercise result was noted in reviewed    The test was done because of leg pain particularly the left and a diminished pulse was seen although was palpable    There was no occlusive arterial disease but medial calcinosis was noted.    She does not have a history of diabetes or chronic kidney disease.    When she was last seen in clinic was because of having an acute pain attack involving the legs although the pain may be related more being muscular in related to lumbar degenerative disc disease.  Nevertheless medial calcinosis was noted in referral to vascular Cardiology for further assessment and if any other testing is needed

## 2023-03-06 ENCOUNTER — TELEPHONE (OUTPATIENT)
Dept: INTERNAL MEDICINE | Facility: CLINIC | Age: 68
End: 2023-03-06
Payer: COMMERCIAL

## 2023-03-06 RX ORDER — IBUPROFEN 800 MG/1
800 TABLET ORAL 3 TIMES DAILY
Qty: 30 TABLET | Refills: 0 | Status: SHIPPED | OUTPATIENT
Start: 2023-03-06 | End: 2023-08-28

## 2023-03-06 NOTE — TELEPHONE ENCOUNTER
----- Message from Nella Fields sent at 3/6/2023  4:17 PM CST -----  Contact: Josi ayala [phone]  Requesting an RX refill or new RX.  Is this a refill or new RX: refill  RX name and strength:  ibuprofen (ADVIL,MOTRIN) 800 MG tablet   Is this a 30 day or 90 day RX:   Ph[facility] and phone #   [facility]   Phone: [phone]  The doctors have asked that we provide their patients with the following 2 reminders -- prescription refills can take up to 72 hours, and a friendly reminder that in the future you can use your [facility] account to request refills        Comments: Pt is requesting a rx for pain, and would like to get it today

## 2023-03-14 ENCOUNTER — CLINICAL SUPPORT (OUTPATIENT)
Dept: REHABILITATION | Facility: HOSPITAL | Age: 68
End: 2023-03-14
Attending: INTERNAL MEDICINE
Payer: COMMERCIAL

## 2023-03-14 DIAGNOSIS — M54.50 CHRONIC LUMBOSACRAL PAIN: ICD-10-CM

## 2023-03-14 DIAGNOSIS — G89.29 CHRONIC LUMBOSACRAL PAIN: ICD-10-CM

## 2023-03-14 DIAGNOSIS — R26.89 DECREASED FUNCTIONAL MOBILITY: ICD-10-CM

## 2023-03-14 DIAGNOSIS — R68.89 DECREASED ACTIVITY TOLERANCE: ICD-10-CM

## 2023-03-14 DIAGNOSIS — M79.2 NEUROPATHIC PAIN: ICD-10-CM

## 2023-03-14 DIAGNOSIS — R26.89 IMPAIRED GAIT AND MOBILITY: ICD-10-CM

## 2023-03-14 DIAGNOSIS — M47.816 SPONDYLOSIS OF LUMBAR REGION WITHOUT MYELOPATHY OR RADICULOPATHY: Primary | ICD-10-CM

## 2023-03-14 PROCEDURE — 97110 THERAPEUTIC EXERCISES: CPT | Mod: PO

## 2023-03-14 NOTE — PROGRESS NOTES
"[facility]   Physical Therapy Treatment Note     Name: Josi Gil  Clinic Number: [id number]    Therapy Diagnosis: No diagnosis found.  Physician: Jonny Willis MD    Visit Date: 3/14/2023    Physician Orders: PT Eval and Treat   Medical Diagnosis from Referral:   M54.50,G89.29 (ICD-10-CM) - Chronic lumbosacral pain   M54.6,G89.29 (ICD-10-CM) - Chronic thoracic back pain, unspecified back pain laterality    Evaluation Date: 1/12/2023  Authorization Period Expiration: 12/29/2023  Plan of Care Expiration: 4/12/2023  Progress Note Due: 2/11/23  Visit # / Visits authorized: 3 / 20  FOTO: 1/3  Precautions: Standard, HTN  PTA Visit #: 2/5   Time In: 4:35 pm (patient is 35 minutes delayed in arriving)  Time Out: 5:05 pm  Total Billable Time: 30 minutes  SUBJECTIVE   Pt reports: "I am tired of living like this." Patient does not feel that she has gotten the care that she deserves, and does not feel that therapy is helping, even though she is doing her routine daily, which include clamshells and walking. She states that she feels that the pain has been taking all of their faisal." Upon further inquiry, she denies feeling the impulse for self-harm.  She was compliant with home exercise program.   Response to previous treatment: pain  Functional change: Ongoing  Pain: 9/10  Location:   OBJECTIVE   Sitting to Standing: Slow, cautious with bilateral upper extremity assistance to press off of the chair to avoid utilizing hips and knees, with patient verbalizing sounds of pain and discomfort.  Lumbar AROM Pain/Dysfunction with movement   Flexion 10 degrees "Excruciating pain"; Josi is able to achieve sitting position in chair   Extension 5 degrees Excruciating pain   Right Side Bending 5 degrees "Excruciating pain"   Left Side Bending Unable to perform "Excruciating pain"               Joint Accessory Mobility:  Unable to assess lumbar and hip joint accessory mobility due to patient's " refusal to lie down on the mat secondary to pain.    Treatment     Josi received the treatments listed below:    (pt completes all exercises in seated position)   therapeutic exercises to develop strength, ROM, and posture for 30 minutes including:  NuStep, 6 minutes, Level 1  Objective measures taken (see above)  Seated Clamshells, demonstrated from exercise  **Education to patient as to the benefit of further assessment and referral back to referring provider due to lack of progress, continued high levels of pain, and the benefit of other approaches to therapy such as aquatic therapy. Patient also educated of prognosis of full recovery being difficult to achieve, but that improvement is possible.    Patient Education and Home Exercises     Home Exercises Provided and Patient Education Provided     Education provided:   - HEP update  - nature of symptoms    Written Home Exercises Provided: yes. Exercises were reviewed and Josi was able to demonstrate them prior to the end of the session.  Josi demonstrated fair  understanding of the education provided. See EMR under Patient Instructions for exercises provided during therapy sessions    ASSESSMENT   Josi arrives to clinic 35 minutes late to treatment this day, and is notified that remaining time can be utilized but make-up of lost time is not possible. She continues to demonstrate slow, and cautious shuffling gait with flexed posture, cautiousness in all movement and reports of irritability with minimal amounts of motion and inability to assume certain postures. Patient has not made improvements significant to justify continuing with PT at this time, and may be more appropriate for further assessment from medical provider prior to attempting further PT.    Josi is progressing well towards her goals.   Pt prognosis is Guarded.     Pt will continue to benefit from skilled outpatient physical therapy to address the deficits listed in the problem list box  on initial evaluation, provide pt/family education and to maximize pt's level of independence in the home and community environment.     Pt's spiritual, cultural and educational needs considered and pt agreeable to plan of care and goals.     Anticipated barriers to physical therapy: Compliance, Attitudes, Chronicity, Psychosocial Factors    Goals:   Short Term Goals (4 Weeks):  1. Pt will be compliant with HEP to supplement PT in decreasing pain with functional mobility. Not met  2. Pt will be able to ambulate 500ft with pain rating of 8/10 to demonstrated improved pain tolerance.  Not met  3. Pt will improve lumbar ROM by 20% in all planes to improve functional mobility. Not met  4. Pt will improve impaired LE MMTs by 1/2 score to improve strength for functional tasks. Not met    Long Term Goals (8 Weeks):   1. Pt will be able to ambulate 500ft with 5/10 pain rating.  Not met  2. Pt will perform paloff press with good control to demonstrate improved core strength. Not met  3. Pt will improve impaired LE MMTs by 1 score to improve strength for functional tasks. Not met  4. Pt will improve lumbar ROM by 40% in all planes to improve functional mobility.   Not met    PLAN   Return to referring provider for further assessment.  Annmarie Kelsey PT, DPT  Board Certified in Orthopedic Physical Therapy      .

## 2023-03-16 ENCOUNTER — OFFICE VISIT (OUTPATIENT)
Dept: CARDIOLOGY | Facility: CLINIC | Age: 68
End: 2023-03-16
Payer: COMMERCIAL

## 2023-03-16 VITALS
WEIGHT: 186.31 LBS | DIASTOLIC BLOOD PRESSURE: 78 MMHG | SYSTOLIC BLOOD PRESSURE: 145 MMHG | BODY MASS INDEX: 27.6 KG/M2 | HEART RATE: 72 BPM | HEIGHT: 69 IN

## 2023-03-16 DIAGNOSIS — M54.41 CHRONIC BILATERAL LOW BACK PAIN WITH BILATERAL SCIATICA: ICD-10-CM

## 2023-03-16 DIAGNOSIS — R68.89 ABNORMAL ANKLE BRACHIAL INDEX (ABI): ICD-10-CM

## 2023-03-16 DIAGNOSIS — M47.16 OSTEOARTHRITIS OF LUMBAR SPINE WITH MYELOPATHY: ICD-10-CM

## 2023-03-16 DIAGNOSIS — M54.42 CHRONIC BILATERAL LOW BACK PAIN WITH BILATERAL SCIATICA: ICD-10-CM

## 2023-03-16 DIAGNOSIS — M79.604 PAIN IN BOTH LOWER EXTREMITIES: ICD-10-CM

## 2023-03-16 DIAGNOSIS — I10 PRIMARY HYPERTENSION: ICD-10-CM

## 2023-03-16 DIAGNOSIS — G89.29 CHRONIC BILATERAL LOW BACK PAIN WITH BILATERAL SCIATICA: ICD-10-CM

## 2023-03-16 DIAGNOSIS — M79.605 PAIN IN BOTH LOWER EXTREMITIES: ICD-10-CM

## 2023-03-16 DIAGNOSIS — M79.661 PAIN IN BOTH LOWER LEGS: Primary | ICD-10-CM

## 2023-03-16 DIAGNOSIS — M51.9 LUMBAR DISC DISEASE: ICD-10-CM

## 2023-03-16 DIAGNOSIS — M79.662 PAIN IN BOTH LOWER LEGS: Primary | ICD-10-CM

## 2023-03-16 PROCEDURE — 1125F AMNT PAIN NOTED PAIN PRSNT: CPT | Mod: CPTII,S$GLB,, | Performed by: INTERNAL MEDICINE

## 2023-03-16 PROCEDURE — 3008F BODY MASS INDEX DOCD: CPT | Mod: CPTII,S$GLB,, | Performed by: INTERNAL MEDICINE

## 2023-03-16 PROCEDURE — 1101F PR PT FALLS ASSESS DOC 0-1 FALLS W/OUT INJ PAST YR: ICD-10-PCS | Mod: CPTII,S$GLB,, | Performed by: INTERNAL MEDICINE

## 2023-03-16 PROCEDURE — 99214 PR OFFICE/OUTPT VISIT, EST, LEVL IV, 30-39 MIN: ICD-10-PCS | Mod: S$GLB,,, | Performed by: INTERNAL MEDICINE

## 2023-03-16 PROCEDURE — 3078F DIAST BP <80 MM HG: CPT | Mod: CPTII,S$GLB,, | Performed by: INTERNAL MEDICINE

## 2023-03-16 PROCEDURE — 3288F PR FALLS RISK ASSESSMENT DOCUMENTED: ICD-10-PCS | Mod: CPTII,S$GLB,, | Performed by: INTERNAL MEDICINE

## 2023-03-16 PROCEDURE — 1101F PT FALLS ASSESS-DOCD LE1/YR: CPT | Mod: CPTII,S$GLB,, | Performed by: INTERNAL MEDICINE

## 2023-03-16 PROCEDURE — 99214 OFFICE O/P EST MOD 30 MIN: CPT | Mod: S$GLB,,, | Performed by: INTERNAL MEDICINE

## 2023-03-16 PROCEDURE — 1159F PR MEDICATION LIST DOCUMENTED IN MEDICAL RECORD: ICD-10-PCS | Mod: CPTII,S$GLB,, | Performed by: INTERNAL MEDICINE

## 2023-03-16 PROCEDURE — 1159F MED LIST DOCD IN RCRD: CPT | Mod: CPTII,S$GLB,, | Performed by: INTERNAL MEDICINE

## 2023-03-16 PROCEDURE — 99999 PR PBB SHADOW E&M-EST. PATIENT-LVL IV: CPT | Mod: PBBFAC,,, | Performed by: INTERNAL MEDICINE

## 2023-03-16 PROCEDURE — 99999 PR PBB SHADOW E&M-EST. PATIENT-LVL IV: ICD-10-PCS | Mod: PBBFAC,,, | Performed by: INTERNAL MEDICINE

## 2023-03-16 PROCEDURE — 3078F PR MOST RECENT DIASTOLIC BLOOD PRESSURE < 80 MM HG: ICD-10-PCS | Mod: CPTII,S$GLB,, | Performed by: INTERNAL MEDICINE

## 2023-03-16 PROCEDURE — 1125F PR PAIN SEVERITY QUANTIFIED, PAIN PRESENT: ICD-10-PCS | Mod: CPTII,S$GLB,, | Performed by: INTERNAL MEDICINE

## 2023-03-16 PROCEDURE — 3077F PR MOST RECENT SYSTOLIC BLOOD PRESSURE >= 140 MM HG: ICD-10-PCS | Mod: CPTII,S$GLB,, | Performed by: INTERNAL MEDICINE

## 2023-03-16 PROCEDURE — 3008F PR BODY MASS INDEX (BMI) DOCUMENTED: ICD-10-PCS | Mod: CPTII,S$GLB,, | Performed by: INTERNAL MEDICINE

## 2023-03-16 PROCEDURE — 3077F SYST BP >= 140 MM HG: CPT | Mod: CPTII,S$GLB,, | Performed by: INTERNAL MEDICINE

## 2023-03-16 PROCEDURE — 3288F FALL RISK ASSESSMENT DOCD: CPT | Mod: CPTII,S$GLB,, | Performed by: INTERNAL MEDICINE

## 2023-03-16 NOTE — PROGRESS NOTES
[facility]      Chief Complaint   Patient presents with    Peripheral Vascular Disease       Patient ID: Josi Gil is a 68 y.o. female who presents for an initial appointment.  Pertinent history/events are as follows:     -Pt kindly referred by Dr. Willis for evaluation of abnormal NINI/pain in both lower extremities (per his note on 2/9/2023):  For the past 4 days she has been experiencing a pain off and on over the anterior aspect of the left leg.  It is worse with standing up and walking on it.  It comes and goes.  It does not appear to affect the during the night.  It has remained the same. At 1 point two days ago when she felt the pain she also felt the concurrent pain involving the left epitrochlear region.  It is noted that she presented emergency room on January 29th for chest discomfort.  This been off and on for while.  Routine labs including troponin.  Was normal as well as electrocardiogram.  She was given Toradol.    HPI:  Mrs. Gil reports constant leg pain since having an accident in 2020.  She has no classic claudication symptoms or tissue loss.  NINI Study on 2/20/2022 revealed right Leg: Segmental pressures suggest medial calcinosis. PVR waveforms suggest no evidence of peripheral arterial occlusive disease. Right TBI of 0.84 with a Great toe pressure of 149 mmHg.  LLE segmental pressures suggest medial calcinosis. PVR waveforms suggest no evidence of peripheral arterial occlusive disease.  Left TBI of 0.87 with a Great toe pressure of 154 mmHg is noted.  MRI Lumbar Spine on 5/12/2022 revealed degenerative changes of the lower lumbar spine.  Moderate left neural foraminal narrowing noted at L4-L5.    Past Medical History:   Diagnosis Date    Acute costochondritis 9/18/2012    Back pain     Benign essential hypertension     Gastroesophageal reflux disease without esophagitis     Pain in joint involving multiple sites 7/9/2013     Past Surgical History:   Procedure  Laterality Date    BREAST BIOPSY Right      SECTION      KNEE ARTHROSCOPY W/ ACL RECONSTRUCTION      REFRACTIVE SURGERY Bilateral  or     Dr. Bharath hammer     Social History     Socioeconomic History    Marital status: Single    Number of children: 1   Tobacco Use    Smoking status: Never    Smokeless tobacco: Never   Substance and Sexual Activity    Alcohol use: No    Drug use: No     Family History   Problem Relation Age of Onset    Hypertension Mother     Heart disease Maternal Grandmother     Celiac disease Neg Hx     Colon cancer Neg Hx     Colon polyps Neg Hx     Crohn's disease Neg Hx     Cystic fibrosis Neg Hx     Esophageal cancer Neg Hx     Inflammatory bowel disease Neg Hx     Irritable bowel syndrome Neg Hx     Liver cancer Neg Hx     Liver disease Neg Hx     Rectal cancer Neg Hx     Stomach cancer Neg Hx        Review of patient's allergies indicates:  No Known Allergies    Medication List with Changes/Refills   Current Medications    ASPIRIN 325 MG TABLET    Take 325 mg by mouth once daily.    IBUPROFEN (ADVIL,MOTRIN) 800 MG TABLET    Take 1 tablet (800 mg total) by mouth 3 (three) times daily. for 10 days    MAGNESIUM 30 MG TAB    Take by mouth once.    OMEGA-3 FATTY ACIDS/FISH OIL (FISH OIL-OMEGA-3 FATTY ACIDS) 300-1,000 MG CAPSULE    Take by mouth once daily.    TRAMADOL (ULTRAM) 50 MG TABLET    Take 1 tablet (50 mg total) by mouth every 8 (eight) hours as needed for Pain.       Review of Systems  Constitution: Denies chills, fever, and sweats.  HENT: Denies headaches or blurry vision.  Cardiovascular: Denies chest pain or irregular heart beat.  Respiratory: Denies cough or shortness of breath.  Gastrointestinal: Denies abdominal pain, nausea, or vomiting.  Musculoskeletal: Denies muscle cramps.  Neurological: Denies dizziness or focal weakness.  Psychiatric/Behavioral: Normal mental status.  Hematologic/Lymphatic: Denies bleeding problem or easy bruising/bleeding.  Skin: Denies  "rash or suspicious lesions    Physical Examination  BP (!) 145/78   Pulse 72   Ht 5' 9" (1.753 m)   Wt 84.5 kg (186 lb 4.6 oz)   BMI 27.51 kg/m²     Constitutional: No acute distress, conversant  HEENT: Sclera anicteric, Pupils equal, round and reactive to light, extraocular motions intact, Oropharynx clear  Neck: No JVD, no carotid bruits  Cardiovascular: regular rate and rhythm, no murmur, rubs or gallops, normal S1/S2  Pulmonary: Clear to auscultation bilaterally  Abdominal: Abdomen soft, nontender, nondistended, positive bowel sounds  Extremities: No lower extremity edema,   Pulses:  Carotid pulses are 2+ on the right side, and 2+ on the left side.  Radial pulses are 2+ on the right side, and 2+ on the left side.   Femoral pulses are 2+ on the right side, and 2+ on the left side.  Popliteal pulses are 2+ on the right side, and 2+ on the left side.   Dorsalis pedis pulses are 2+ on the right side, and 2+ on the left side.   Posterior tibial pulses are 2+ on the right side, and 2+ on the left side.    Skin: No ecchymosis, erythema, or ulcers  Psych: Alert and oriented x 3, appropriate affect  Neuro: CNII-XII intact, no focal deficits    Labs:  Most Recent Data  CBC:   Lab Results   Component Value Date    WBC 4.55 01/29/2023    HGB 13.6 01/29/2023    HCT 42 01/29/2023     01/29/2023    MCV 94 01/29/2023    RDW 13.2 01/29/2023     BMP:   Lab Results   Component Value Date     01/29/2023    K 4.1 01/29/2023     01/29/2023    CO2 24 01/29/2023    BUN 10 01/29/2023    CREATININE 0.8 01/29/2023     01/29/2023    CALCIUM 9.5 01/29/2023    MG 1.9 09/19/2022    PHOS 2.8 05/01/2019     LFTS;   Lab Results   Component Value Date    PROT 6.9 01/29/2023    ALBUMIN 3.6 01/29/2023    BILITOT 0.9 01/29/2023    AST 21 01/29/2023    ALKPHOS 69 01/29/2023    ALT 17 01/29/2023     COAGS:   Lab Results   Component Value Date    INR 1.0 01/29/2019     FLP:   Lab Results   Component Value Date    CHOL 232 " (H) 12/07/2022    HDL 84 (H) 12/07/2022    LDLCALC 135.2 12/07/2022    TRIG 64 12/07/2022    CHOLHDL 36.2 12/07/2022     CARDIAC:   Lab Results   Component Value Date    TROPONINI 0.013 02/18/2023    BNP 37 01/29/2023       Imaging:    EKG 1/29/2023:  Normal sinus rhythm with sinus arrhythmia    NINI Study 2/20/2022:  Right Leg: Segmental pressures suggest medial calcinosis. PVR waveforms suggest no evidence of peripheral arterial occlusive   disease.   Rt lower digits: Toe PPG waveforms as described above. - TBI of 0.84 with a Great toe pressure of 149 mmHg is noted.     Left Leg: Segmental pressures suggest medial calcinosis. PVR waveforms suggest no evidence of peripheral arterial occlusive   disease.   Lt lower digits: Toe PPG waveforms as described above. - TBI of 0.87 with a Great toe pressure of 154 mmHg is noted.     MRI Lumbar Spine 5/12/2022:  Degenerative changes of the lower lumbar spine  Moderate left neural foraminal narrowing noted at L4-L5.    Assessment/Plan:  Josi Gil is a 68 y.o. female who presents for an initial appointment.     Pain in both lower extremities- Etiology unclear.  Likely related to lumbar degenerative disc disease.  Pt does not have classic claudication symptoms.  NINI Study on 2/20/2022 revealed right Leg: Segmental pressures suggest medial calcinosis. PVR waveforms suggest no evidence of peripheral arterial occlusive disease. Right TBI of 0.84 with a Great toe pressure of 149 mmHg.  LLE segmental pressures suggest medial calcinosis. PVR waveforms suggest no evidence of peripheral arterial occlusive disease.  Left TBI of 0.87 with a Great toe pressure of 154 mmHg is noted.  MRI Lumbar Spine on 5/12/2022 revealed degenerative changes of the lower lumbar spine.  Moderate left neural foraminal narrowing noted at L4-L5.  Check BLE arterial ultrasound to rule out flow limiting PAD.     2. Overweight- Encourage diet, exercise, and weight loss.    Follow up in 3  months    Total duration of face to face visit time 30 minutes.  Total time spent counseling greater than fifty percent of total visit time.  Counseling included discussion regarding imaging findings, diagnosis, possibilities, treatment options, risks and benefits.  The patient had many questions regarding the options and long-term effects.    Parker Menjivar MD, PhD  Interventional Cardiology

## 2023-03-17 ENCOUNTER — TELEPHONE (OUTPATIENT)
Dept: NEUROSURGERY | Facility: CLINIC | Age: 68
End: 2023-03-17
Payer: COMMERCIAL

## 2023-03-17 DIAGNOSIS — M48.061 NEUROFORAMINAL STENOSIS OF LUMBAR SPINE: ICD-10-CM

## 2023-03-17 DIAGNOSIS — M47.816 LUMBAR SPONDYLOSIS: Primary | ICD-10-CM

## 2023-03-17 NOTE — TELEPHONE ENCOUNTER
Received message pt needed appt with Dr. Rivero. Attempted to call pt. Not able to leave message. Appt letter printed and mailed.

## 2023-03-19 ENCOUNTER — NURSE TRIAGE (OUTPATIENT)
Dept: ADMINISTRATIVE | Facility: CLINIC | Age: 68
End: 2023-03-19
Payer: COMMERCIAL

## 2023-03-19 NOTE — TELEPHONE ENCOUNTER
Pt declines to verify info. Informed in order to assist, need to document encounter for chart, still declines, states she is not calling for self. She is calling for someone else & adamant on only wanting locations for covid rapid testing. Advised generally speaking, UC for further assistance.    Reason for Disposition   General information question, no triage required and triager able to answer question    Protocols used: Information Only Call - No Triage-A-

## 2023-03-21 ENCOUNTER — TELEPHONE (OUTPATIENT)
Dept: CARDIOLOGY | Facility: HOSPITAL | Age: 68
End: 2023-03-21
Payer: COMMERCIAL

## 2023-03-22 ENCOUNTER — HOSPITAL ENCOUNTER (OUTPATIENT)
Dept: CARDIOLOGY | Facility: HOSPITAL | Age: 68
Discharge: HOME | End: 2023-03-22
Attending: INTERNAL MEDICINE
Payer: COMMERCIAL

## 2023-03-22 DIAGNOSIS — M79.662 PAIN IN BOTH LOWER LEGS: ICD-10-CM

## 2023-03-22 DIAGNOSIS — M79.661 PAIN IN BOTH LOWER LEGS: ICD-10-CM

## 2023-03-22 LAB
LEFT ANT TIBIAL SYS PSV: 50 CM/S
LEFT CFA PSV: 252 CM/S
LEFT EXTERNAL ILIAC PSV: 280 CM/S
LEFT PERONEAL SYS PSV: 64 CM/S
LEFT POPLITEAL PSV: 62 CM/S
LEFT POST TIBIAL SYS PSV: 82 CM/S
LEFT PROFUNDA SYS PSV: 143 CM/S
LEFT SUPER FEMORAL DIST SYS PSV: 108 CM/S
LEFT SUPER FEMORAL MID SYS PSV: 156 CM/S
LEFT SUPER FEMORAL OSTIAL SYS PSV: 197 CM/S
LEFT SUPER FEMORAL PROX SYS PSV: 190 CM/S
LEFT TIB/PER TRUNK SYS PSV: 94 CM/S
OHS CV LEFT COMMON ILIAC ARTERY PSV: 213 CM/S
OHS CV LEFT LOWER EXTREMITY ABI (NO CALC): 1.11
OHS CV RIGHT ABI LOWER EXTREMITY (NO CALC): 1.12
OHS CV US RIGHT COMMON ILIAC PSV: 158 CM/S
RIGHT ANT TIBIAL SYS PSV: 48 CM/S
RIGHT CFA PSV: 227 CM/S
RIGHT EXTERNAL ILLIAC PSV: 234 CM/S
RIGHT PERONEAL SYS PSV: 83 CM/S
RIGHT POPLITEAL PSV: 87 CM/S
RIGHT POST TIBIAL SYS PSV: 92 CM/S
RIGHT PROFUNDA SYS PSV: 126 CM/S
RIGHT SUPER FEMORAL DIST SYS PSV: 112 CM/S
RIGHT SUPER FEMORAL MID SYS PSV: 146 CM/S
RIGHT SUPER FEMORAL OSTIAL SYS PSV: 177 CM/S
RIGHT SUPER FEMORAL PROX SYS PSV: 150 CM/S
RIGHT TIB/PER TRUNK SYS PSV: 100 CM/S

## 2023-03-22 PROCEDURE — 93925 LOWER EXTREMITY STUDY: CPT | Mod: 26,,, | Performed by: INTERNAL MEDICINE

## 2023-03-22 PROCEDURE — 93925 LOWER EXTREMITY STUDY: CPT

## 2023-03-22 PROCEDURE — 93925 CV US DOPPLER ARTERIAL LEGS BILATERAL (CUPID ONLY): ICD-10-PCS | Mod: 26,,, | Performed by: INTERNAL MEDICINE

## 2023-03-24 ENCOUNTER — TELEPHONE (OUTPATIENT)
Dept: CARDIOLOGY | Facility: CLINIC | Age: 68
End: 2023-03-24
Payer: COMMERCIAL

## 2023-03-24 ENCOUNTER — NURSE TRIAGE (OUTPATIENT)
Dept: ADMINISTRATIVE | Facility: CLINIC | Age: 68
End: 2023-03-24
Payer: COMMERCIAL

## 2023-03-24 NOTE — TELEPHONE ENCOUNTER
Pt would like to speak to  about Her latest Test results, informed pt that I would forward message to .Verbalized understanding

## 2023-03-24 NOTE — TELEPHONE ENCOUNTER
----- Message from Nemo Cook sent at 3/24/2023  2:15 PM CDT -----  Regarding: results  The pt is calling to get results. Please call her back @ [phone]. Thanks, Nemo

## 2023-03-25 NOTE — TELEPHONE ENCOUNTER
Patient called for test results from her Cardiologist. Patient voiced her aggravation with the [facility] regarding review of lab/test results or Provider follow-up to discuss labs/tests. Patient states the [facility] staff is incompetent and she does not understand why an On Call Provider cannot review her lab results with her or why the [facility] Triage RN returned her call if lab/test results can not be reviewed at this time. Patient was very rude during conversation with Triage RN. Caller declined to state the name of the labs she is requesting to be reviewed at this time.     Patient advised to contact her Cardiologist for test review.       Reason for Disposition   Caller requesting routine or non-urgent lab result    Additional Information   Negative: [1] Caller is not with the adult (patient) AND [2] reporting urgent symptoms   Lab result questions   Negative: Lab calling with strep throat test results and triager can call in prescription   Negative: Lab calling with urinalysis test results and triager can call in prescription   Negative: Medication questions   Negative: Medication renewal and refill questions   Negative: Pre-operative or pre-procedural questions   Negative: ED call to PCP (i.e., primary care provider; doctor, NP, or PA)   Negative: Doctor (or NP/PA) call to PCP   Negative: Call about patient who is currently hospitalized   Negative: Lab or radiology calling with CRITICAL test results   Negative: [1] Follow-up call from patient regarding patient's clinical status AND [2] information urgent   Negative: [1] Caller requests to speak ONLY to PCP AND [2] URGENT question   Negative: [1] Caller requests to speak to PCP now AND [2] won't tell us reason for call  (Exception: If 10 pm to 6 am, caller must first discuss reason for the call.)   Negative: Notification of hospital admission   Negative: Notification of death   Negative: Caller requesting lab results  (Exception: Routine or non-urgent lab  result.)   Negative: Lab or radiology calling with test results   Negative: [1] Follow-up call from patient regarding patient's clinical status AND [2] information NON-URGENT   Negative: [1] Caller requests to speak ONLY to PCP AND [2] NON-URGENT question   Negative: Caller requesting an appointment, triage offered and declined    Protocols used: Information Only Call - No Triage-A-AH, PCP Call - No Triage-A-AH

## 2023-03-29 ENCOUNTER — TELEPHONE (OUTPATIENT)
Dept: CARDIOLOGY | Facility: CLINIC | Age: 68
End: 2023-03-29
Payer: COMMERCIAL

## 2023-03-29 NOTE — TELEPHONE ENCOUNTER
Left a message for Mrs. Gil about her appointment request. I informed her that I would place her on the wait list for the July schedule.

## 2023-03-29 NOTE — TELEPHONE ENCOUNTER
----- Message from Denise Zamora sent at 3/29/2023  4:08 PM CDT -----  Contact: pt/[phone]  Type:  Patient  Call    Who Called:pt    Would the patient rather a call back or a response via [facility]? Call   Best Call Back Number:[phone]  Additional Information: per pt  call to request that  she  need to  schedule an  appointment

## 2023-04-06 ENCOUNTER — OFFICE VISIT (OUTPATIENT)
Dept: CARDIOLOGY | Facility: CLINIC | Age: 68
End: 2023-04-06
Payer: COMMERCIAL

## 2023-04-06 VITALS
BODY MASS INDEX: 26.22 KG/M2 | SYSTOLIC BLOOD PRESSURE: 146 MMHG | HEIGHT: 69 IN | HEART RATE: 68 BPM | WEIGHT: 177 LBS | DIASTOLIC BLOOD PRESSURE: 70 MMHG

## 2023-04-06 DIAGNOSIS — M51.9 LUMBAR DISC DISEASE: ICD-10-CM

## 2023-04-06 DIAGNOSIS — G89.29 CHRONIC BILATERAL LOW BACK PAIN WITH BILATERAL SCIATICA: ICD-10-CM

## 2023-04-06 DIAGNOSIS — M54.41 CHRONIC BILATERAL LOW BACK PAIN WITH BILATERAL SCIATICA: ICD-10-CM

## 2023-04-06 DIAGNOSIS — E78.2 MIXED HYPERLIPIDEMIA: ICD-10-CM

## 2023-04-06 DIAGNOSIS — I73.9 PVD (PERIPHERAL VASCULAR DISEASE): ICD-10-CM

## 2023-04-06 DIAGNOSIS — M54.42 CHRONIC BILATERAL LOW BACK PAIN WITH BILATERAL SCIATICA: ICD-10-CM

## 2023-04-06 DIAGNOSIS — M47.16 OSTEOARTHRITIS OF LUMBAR SPINE WITH MYELOPATHY: ICD-10-CM

## 2023-04-06 DIAGNOSIS — M79.661 PAIN IN BOTH LOWER LEGS: Primary | ICD-10-CM

## 2023-04-06 DIAGNOSIS — M53.86 DECREASED RANGE OF MOTION OF LUMBAR SPINE: ICD-10-CM

## 2023-04-06 DIAGNOSIS — M79.672 PAIN IN BOTH FEET: ICD-10-CM

## 2023-04-06 DIAGNOSIS — M79.662 PAIN IN BOTH LOWER LEGS: Primary | ICD-10-CM

## 2023-04-06 DIAGNOSIS — E66.3 OVERWEIGHT (BMI 25.0-29.9): ICD-10-CM

## 2023-04-06 DIAGNOSIS — M79.671 PAIN IN BOTH FEET: ICD-10-CM

## 2023-04-06 PROCEDURE — 1125F PR PAIN SEVERITY QUANTIFIED, PAIN PRESENT: ICD-10-PCS | Mod: CPTII,S$GLB,, | Performed by: INTERNAL MEDICINE

## 2023-04-06 PROCEDURE — 1159F PR MEDICATION LIST DOCUMENTED IN MEDICAL RECORD: ICD-10-PCS | Mod: CPTII,S$GLB,, | Performed by: INTERNAL MEDICINE

## 2023-04-06 PROCEDURE — 3288F FALL RISK ASSESSMENT DOCD: CPT | Mod: CPTII,S$GLB,, | Performed by: INTERNAL MEDICINE

## 2023-04-06 PROCEDURE — 3078F DIAST BP <80 MM HG: CPT | Mod: CPTII,S$GLB,, | Performed by: INTERNAL MEDICINE

## 2023-04-06 PROCEDURE — 3288F PR FALLS RISK ASSESSMENT DOCUMENTED: ICD-10-PCS | Mod: CPTII,S$GLB,, | Performed by: INTERNAL MEDICINE

## 2023-04-06 PROCEDURE — 99999 PR PBB SHADOW E&M-EST. PATIENT-LVL III: CPT | Mod: PBBFAC,,, | Performed by: INTERNAL MEDICINE

## 2023-04-06 PROCEDURE — 3008F BODY MASS INDEX DOCD: CPT | Mod: CPTII,S$GLB,, | Performed by: INTERNAL MEDICINE

## 2023-04-06 PROCEDURE — 3008F PR BODY MASS INDEX (BMI) DOCUMENTED: ICD-10-PCS | Mod: CPTII,S$GLB,, | Performed by: INTERNAL MEDICINE

## 2023-04-06 PROCEDURE — 1101F PT FALLS ASSESS-DOCD LE1/YR: CPT | Mod: CPTII,S$GLB,, | Performed by: INTERNAL MEDICINE

## 2023-04-06 PROCEDURE — 1101F PR PT FALLS ASSESS DOC 0-1 FALLS W/OUT INJ PAST YR: ICD-10-PCS | Mod: CPTII,S$GLB,, | Performed by: INTERNAL MEDICINE

## 2023-04-06 PROCEDURE — 1159F MED LIST DOCD IN RCRD: CPT | Mod: CPTII,S$GLB,, | Performed by: INTERNAL MEDICINE

## 2023-04-06 PROCEDURE — 1125F AMNT PAIN NOTED PAIN PRSNT: CPT | Mod: CPTII,S$GLB,, | Performed by: INTERNAL MEDICINE

## 2023-04-06 PROCEDURE — 3077F PR MOST RECENT SYSTOLIC BLOOD PRESSURE >= 140 MM HG: ICD-10-PCS | Mod: CPTII,S$GLB,, | Performed by: INTERNAL MEDICINE

## 2023-04-06 PROCEDURE — 3078F PR MOST RECENT DIASTOLIC BLOOD PRESSURE < 80 MM HG: ICD-10-PCS | Mod: CPTII,S$GLB,, | Performed by: INTERNAL MEDICINE

## 2023-04-06 PROCEDURE — 99215 PR OFFICE/OUTPT VISIT, EST, LEVL V, 40-54 MIN: ICD-10-PCS | Mod: S$GLB,,, | Performed by: INTERNAL MEDICINE

## 2023-04-06 PROCEDURE — 99999 PR PBB SHADOW E&M-EST. PATIENT-LVL III: ICD-10-PCS | Mod: PBBFAC,,, | Performed by: INTERNAL MEDICINE

## 2023-04-06 PROCEDURE — 99215 OFFICE O/P EST HI 40 MIN: CPT | Mod: S$GLB,,, | Performed by: INTERNAL MEDICINE

## 2023-04-06 PROCEDURE — 3077F SYST BP >= 140 MM HG: CPT | Mod: CPTII,S$GLB,, | Performed by: INTERNAL MEDICINE

## 2023-04-06 RX ORDER — ATORVASTATIN CALCIUM 40 MG/1
40 TABLET, FILM COATED ORAL DAILY
Qty: 90 TABLET | Refills: 3 | Status: SHIPPED | OUTPATIENT
Start: 2023-04-06 | End: 2023-09-06

## 2023-04-06 NOTE — PROGRESS NOTES
[facility]      Chief Complaint   Patient presents with    Peripheral Vascular Disease       Patient ID: Josi Gil is a 68 y.o. female who presents for an follow up appointment.  Pertinent history/events are as follows:     -Pt kindly referred by Dr. Willis for evaluation of abnormal NINI/pain in both lower extremities (per his note on 2/9/2023):  For the past 4 days she has been experiencing a pain off and on over the anterior aspect of the left leg.  It is worse with standing up and walking on it.  It comes and goes.  It does not appear to affect the during the night.  It has remained the same. At 1 point two days ago when she felt the pain she also felt the concurrent pain involving the left epitrochlear region.  It is noted that she presented emergency room on January 29th for chest discomfort.  This been off and on for while.  Routine labs including troponin.  Was normal as well as electrocardiogram.  She was given Toradol.    -At our initial clinic visit on 3/16/2023, Mrs. Gil reported constant leg pain since having an accident in 2020.  She has no classic claudication symptoms or tissue loss.  NINI Study on 2/20/2022 revealed right Leg: Segmental pressures suggest medial calcinosis. PVR waveforms suggest no evidence of peripheral arterial occlusive disease. Right TBI of 0.84 with a Great toe pressure of 149 mmHg.  LLE segmental pressures suggest medial calcinosis. PVR waveforms suggest no evidence of peripheral arterial occlusive disease.  Left TBI of 0.87 with a Great toe pressure of 154 mmHg is noted.  MRI Lumbar Spine on 5/12/2022 revealed degenerative changes of the lower lumbar spine.  Moderate left neural foraminal narrowing noted at L4-L5.  Plan:   Pain in both lower extremities- Etiology unclear.  Likely related to lumbar degenerative disc disease.  Pt does not have classic claudication symptoms.  NINI Study on 2/20/2022 revealed right Leg: Segmental pressures suggest  medial calcinosis. PVR waveforms suggest no evidence of peripheral arterial occlusive disease. Right TBI of 0.84 with a Great toe pressure of 149 mmHg.  LLE segmental pressures suggest medial calcinosis. PVR waveforms suggest no evidence of peripheral arterial occlusive disease.  Left TBI of 0.87 with a Great toe pressure of 154 mmHg is noted.  MRI Lumbar Spine on 2022 revealed degenerative changes of the lower lumbar spine.  Moderate left neural foraminal narrowing noted at L4-L5.  Check BLE arterial ultrasound to rule out flow limiting PAD.   Overweight- Encourage diet, exercise, and weight loss.    HPI:  Mrs. Gil reports back pain.  She has no claudication or tissue loss.  BLE Arterial Ultrasound on 3/22/2023 revealed bilateral TUSHAR's, EIA's and CFA's demonstrate elevated velocities consistent with mild to moderate PAD.    Past Medical History:   Diagnosis Date    Acute costochondritis 2012    Back pain     Benign essential hypertension     Gastroesophageal reflux disease without esophagitis     Pain in joint involving multiple sites 2013     Past Surgical History:   Procedure Laterality Date    BREAST BIOPSY Right      SECTION      KNEE ARTHROSCOPY W/ ACL RECONSTRUCTION      REFRACTIVE SURGERY Bilateral  or     Dr. Sinha OU distance     Social History     Socioeconomic History    Marital status: Single    Number of children: 1   Tobacco Use    Smoking status: Never    Smokeless tobacco: Never   Substance and Sexual Activity    Alcohol use: No    Drug use: No     Family History   Problem Relation Age of Onset    Hypertension Mother     Heart disease Maternal Grandmother     Celiac disease Neg Hx     Colon cancer Neg Hx     Colon polyps Neg Hx     Crohn's disease Neg Hx     Cystic fibrosis Neg Hx     Esophageal cancer Neg Hx     Inflammatory bowel disease Neg Hx     Irritable bowel syndrome Neg Hx     Liver cancer Neg Hx     Liver disease Neg Hx     Rectal cancer Neg Hx     Stomach  "cancer Neg Hx        Review of patient's allergies indicates:  No Known Allergies    Medication List with Changes/Refills   Current Medications    ASPIRIN 325 MG TABLET    Take 325 mg by mouth once daily.    MAGNESIUM 30 MG TAB    Take by mouth once.    OMEGA-3 FATTY ACIDS/FISH OIL (FISH OIL-OMEGA-3 FATTY ACIDS) 300-1,000 MG CAPSULE    Take by mouth once daily.    TRAMADOL (ULTRAM) 50 MG TABLET    Take 1 tablet (50 mg total) by mouth every 8 (eight) hours as needed for Pain.       Review of Systems  Constitution: Denies chills, fever, and sweats.  HENT: Denies headaches or blurry vision.  Cardiovascular: Denies chest pain or irregular heart beat.  Respiratory: Denies cough or shortness of breath.  Gastrointestinal: Denies abdominal pain, nausea, or vomiting.  Musculoskeletal: Denies muscle cramps.  Neurological: Denies dizziness or focal weakness.  Psychiatric/Behavioral: Normal mental status.  Hematologic/Lymphatic: Denies bleeding problem or easy bruising/bleeding.  Skin: Denies rash or suspicious lesions    Physical Examination  BP (!) 146/70   Pulse 68   Ht 5' 9" (1.753 m)   Wt 80.3 kg (177 lb 0.5 oz)   BMI 26.14 kg/m²     Constitutional: No acute distress, conversant  HEENT: Sclera anicteric, Pupils equal, round and reactive to light, extraocular motions intact, Oropharynx clear  Neck: No JVD, no carotid bruits  Cardiovascular: regular rate and rhythm, no murmur, rubs or gallops, normal S1/S2  Pulmonary: Clear to auscultation bilaterally  Abdominal: Abdomen soft, nontender, nondistended, positive bowel sounds  Extremities: No lower extremity edema,   Pulses:  Carotid pulses are 2+ on the right side, and 2+ on the left side.  Radial pulses are 2+ on the right side, and 2+ on the left side.   Femoral pulses are 2+ on the right side, and 2+ on the left side.  Popliteal pulses are 2+ on the right side, and 2+ on the left side.   Dorsalis pedis pulses are 2+ on the right side, and 2+ on the left side. "   Posterior tibial pulses are 2+ on the right side, and 2+ on the left side.    Skin: No ecchymosis, erythema, or ulcers  Psych: Alert and oriented x 3, appropriate affect  Neuro: CNII-XII intact, no focal deficits    Labs:  Most Recent Data  CBC:   Lab Results   Component Value Date    WBC 4.55 01/29/2023    HGB 13.6 01/29/2023    HCT 42 01/29/2023     01/29/2023    MCV 94 01/29/2023    RDW 13.2 01/29/2023     BMP:   Lab Results   Component Value Date     01/29/2023    K 4.1 01/29/2023     01/29/2023    CO2 24 01/29/2023    BUN 10 01/29/2023    CREATININE 0.8 01/29/2023     01/29/2023    CALCIUM 9.5 01/29/2023    MG 1.9 09/19/2022    PHOS 2.8 05/01/2019     LFTS;   Lab Results   Component Value Date    PROT 6.9 01/29/2023    ALBUMIN 3.6 01/29/2023    BILITOT 0.9 01/29/2023    AST 21 01/29/2023    ALKPHOS 69 01/29/2023    ALT 17 01/29/2023     COAGS:   Lab Results   Component Value Date    INR 1.0 01/29/2019     FLP:   Lab Results   Component Value Date    CHOL 232 (H) 12/07/2022    HDL 84 (H) 12/07/2022    LDLCALC 135.2 12/07/2022    TRIG 64 12/07/2022    CHOLHDL 36.2 12/07/2022     CARDIAC:   Lab Results   Component Value Date    TROPONINI 0.013 02/18/2023    BNP 37 01/29/2023       Imaging:    EKG 1/29/2023:  Normal sinus rhythm with sinus arrhythmia    BLE Arterial Ultrasound 3/22/2023:  The resting NINI's are normal.  Bilateral TUSHAR's, EIA's and CFA's demonstrate elevated velocities consistent with mild to moderate PAD.    NINI Study 2/20/2022:  Right Leg: Segmental pressures suggest medial calcinosis. PVR waveforms suggest no evidence of peripheral arterial occlusive   disease.   Rt lower digits: Toe PPG waveforms as described above. - TBI of 0.84 with a Great toe pressure of 149 mmHg is noted.     Left Leg: Segmental pressures suggest medial calcinosis. PVR waveforms suggest no evidence of peripheral arterial occlusive   disease.   Lt lower digits: Toe PPG waveforms as described above.  - TBI of 0.87 with a Great toe pressure of 154 mmHg is noted.     MRI Lumbar Spine 5/12/2022:  Degenerative changes of the lower lumbar spine  Moderate left neural foraminal narrowing noted at L4-L5.    Assessment/Plan:  Josi Gil is a 68 y.o. female with PAD, lumbar disc disease, who presents for a follow up appointment.     Pain in both lower extremities- Likely related to lumbar degenerative disc disease.  MRI Lumbar Spine on 5/12/2022 revealed degenerative changes of the lower lumbar spine.  Moderate left neural foraminal narrowing noted at L4-L5.  Refer to Neurosurgery for evaluation.     2. PAD- Pt  with no claudication or tissue loss.  BLE Arterial Ultrasound on 3/22/2023 revealed bilateral TUSHAR's, EIA's and CFA's demonstrate elevated velocities consistent with mild to moderate PAD.  Continue ASA.  Start atorvasatin 40 mg daily with LDL goal of <70.      2. Overweight- Encourage diet, exercise, and weight loss.    Follow up in 4 months with lipids and cmp prior    Total duration of face to face visit time 30 minutes.  Total time spent counseling greater than fifty percent of total visit time.  Counseling included discussion regarding imaging findings, diagnosis, possibilities, treatment options, risks and benefits.  The patient had many questions regarding the options and long-term effects.    Parker Menjivar MD, PhD  Interventional Cardiology

## 2023-04-06 NOTE — PATIENT INSTRUCTIONS
Assessment/Plan:  Josi Gil is a 68 y.o. female with PAD, lumbar disc disease, who presents for a follow up appointment.     Pain in both lower extremities- Likely related to lumbar degenerative disc disease.  MRI Lumbar Spine on 5/12/2022 revealed degenerative changes of the lower lumbar spine.  Moderate left neural foraminal narrowing noted at L4-L5.  Refer to Neurosurgery for evaluation.     2. PAD- Pt  with no claudication or tissue loss.  BLE Arterial Ultrasound on 3/22/2023 revealed bilateral TUSHAR's, EIA's and CFA's demonstrate elevated velocities consistent with mild to moderate PAD.  Continue ASA.  Start atorvasatin 40 mg daily with LDL goal of <70.      2. Overweight- Encourage diet, exercise, and weight loss.    Follow up in 4 months with lipids and cmp prior

## 2023-04-11 ENCOUNTER — LAB VISIT (OUTPATIENT)
Dept: LAB | Facility: HOSPITAL | Age: 68
End: 2023-04-11
Attending: INTERNAL MEDICINE
Payer: COMMERCIAL

## 2023-04-11 DIAGNOSIS — E78.2 MIXED HYPERLIPIDEMIA: ICD-10-CM

## 2023-04-11 LAB
ALBUMIN SERPL BCP-MCNC: 3.6 G/DL (ref 3.5–5.2)
ALP SERPL-CCNC: 75 U/L (ref 55–135)
ALT SERPL W/O P-5'-P-CCNC: 16 U/L (ref 10–44)
ANION GAP SERPL CALC-SCNC: 10 MMOL/L (ref 8–16)
AST SERPL-CCNC: 19 U/L (ref 10–40)
BILIRUB SERPL-MCNC: 0.6 MG/DL (ref 0.1–1)
BUN SERPL-MCNC: 10 MG/DL (ref 8–23)
CALCIUM SERPL-MCNC: 9.9 MG/DL (ref 8.7–10.5)
CHLORIDE SERPL-SCNC: 103 MMOL/L (ref 95–110)
CHOLEST SERPL-MCNC: 229 MG/DL (ref 120–199)
CHOLEST/HDLC SERPL: 3 {RATIO} (ref 2–5)
CO2 SERPL-SCNC: 25 MMOL/L (ref 23–29)
CREAT SERPL-MCNC: 0.9 MG/DL (ref 0.5–1.4)
EST. GFR  (NO RACE VARIABLE): >60 ML/MIN/1.73 M^2
GLUCOSE SERPL-MCNC: 93 MG/DL (ref 70–110)
HDLC SERPL-MCNC: 77 MG/DL (ref 40–75)
HDLC SERPL: 33.6 % (ref 20–50)
LDLC SERPL CALC-MCNC: 139.8 MG/DL (ref 63–159)
NONHDLC SERPL-MCNC: 152 MG/DL
POTASSIUM SERPL-SCNC: 3.6 MMOL/L (ref 3.5–5.1)
PROT SERPL-MCNC: 6.9 G/DL (ref 6–8.4)
SODIUM SERPL-SCNC: 138 MMOL/L (ref 136–145)
TRIGL SERPL-MCNC: 61 MG/DL (ref 30–150)

## 2023-04-11 PROCEDURE — 80061 LIPID PANEL: CPT | Performed by: INTERNAL MEDICINE

## 2023-04-11 PROCEDURE — 36415 COLL VENOUS BLD VENIPUNCTURE: CPT | Performed by: INTERNAL MEDICINE

## 2023-04-11 PROCEDURE — 80053 COMPREHEN METABOLIC PANEL: CPT | Performed by: INTERNAL MEDICINE

## 2023-04-13 ENCOUNTER — TELEPHONE (OUTPATIENT)
Dept: CARDIOLOGY | Facility: CLINIC | Age: 68
End: 2023-04-13
Payer: COMMERCIAL

## 2023-04-13 NOTE — TELEPHONE ENCOUNTER
----- Message from Yenni Joyner sent at 4/13/2023  9:57 AM CDT -----  Type:  Test Results    Who Called:  pt  Name of Test (Lab/Mammo/Etc):  labs  Date of Test:  4/11/2023  Ordering Provider:  Dr. Menjivar  Where the test was performed:  [facility]  Would the patient rather a call back or a response via [facility]?  call  Best Call Back Number:  [phone]  Additional Information:   pt said this is an Urgent request and she would like to get results this morning

## 2023-04-13 NOTE — TELEPHONE ENCOUNTER
Pt would like to speak to  about Lipid results done on 4/11 ,I informed pt that message will be forwarded to . Pt verbalized understanding

## 2023-04-15 ENCOUNTER — NURSE TRIAGE (OUTPATIENT)
Dept: ADMINISTRATIVE | Facility: CLINIC | Age: 68
End: 2023-04-15
Payer: COMMERCIAL

## 2023-04-15 NOTE — TELEPHONE ENCOUNTER
Spoke with patient who is requesting to speak with on call provider in regards to previous lab work.  Called Dr. Sen who states patient's labs are stable.  She states patient will need to speak with Dr. Willis for exact number's.  Explained to patient per on call provider this is not an urgent matter.  Patient was not satisfied with the response given and requested again to speak with the on call provider. Patient was connected with the Dr. Sen. No further assistance needed from nurse on call line.    Reason for Disposition   [1] Caller requests to speak ONLY to PCP AND [2] URGENT question    Protocols used: PCP Call - No Triage-A-

## 2023-04-17 ENCOUNTER — TELEPHONE (OUTPATIENT)
Dept: INTERNAL MEDICINE | Facility: CLINIC | Age: 68
End: 2023-04-17

## 2023-04-17 NOTE — TELEPHONE ENCOUNTER
----- Message from Dana Kimble sent at 4/14/2023  4:16 PM CDT -----  Contact: Self [phone]  Would like to receive medical advice.    Would they like a call back or a response via [facility]:  call back    Additional information: Calling to review  lab results.

## 2023-04-18 NOTE — TELEPHONE ENCOUNTER
----- Message from Tiana Cottrell sent at 4/18/2023 10:20 AM CDT -----  Contact: self/[phone]  3rd attempt    Pt called in regards to getting her test results from 2-18-23. Call back ASAP    Please advice

## 2023-04-18 NOTE — TELEPHONE ENCOUNTER
Spoke to pt and I informed her that her lab results from 2/18 was negative. She started to get real aggressive and rude then she hung up in my face.

## 2023-05-01 ENCOUNTER — TELEPHONE (OUTPATIENT)
Dept: ENDOCRINOLOGY | Facility: CLINIC | Age: 68
End: 2023-05-01
Payer: COMMERCIAL

## 2023-05-01 ENCOUNTER — PATIENT MESSAGE (OUTPATIENT)
Dept: NEUROLOGY | Facility: CLINIC | Age: 68
End: 2023-05-01
Payer: COMMERCIAL

## 2023-05-01 ENCOUNTER — TELEPHONE (OUTPATIENT)
Dept: NEUROLOGY | Facility: CLINIC | Age: 68
End: 2023-05-01
Payer: COMMERCIAL

## 2023-05-03 ENCOUNTER — TELEPHONE (OUTPATIENT)
Dept: NEUROSURGERY | Facility: CLINIC | Age: 68
End: 2023-05-03
Payer: COMMERCIAL

## 2023-05-03 NOTE — TELEPHONE ENCOUNTER
----- Message from Noemy Abreu sent at 5/2/2023  4:15 PM CDT -----  Regarding: PT ADVICE  Contact: PT  Pt called regarding appt scheduled for 5.8.23. Pt would like to change the time of her appt.    Please advise. Pt can be reached at [phone]

## 2023-05-05 ENCOUNTER — TELEPHONE (OUTPATIENT)
Dept: NEUROSURGERY | Facility: CLINIC | Age: 68
End: 2023-05-05
Payer: COMMERCIAL

## 2023-05-05 DIAGNOSIS — M47.816 LUMBAR SPONDYLOSIS: Primary | ICD-10-CM

## 2023-05-05 NOTE — TELEPHONE ENCOUNTER
"Called and spoke with pt about missing EMG appt. Pt stated she was not aware of it being scheduled. I offered to reschedule the appt. Pt refused and stated she is not having it done. She did not have a good experience at another facility with in [facility]. She stated she felt like the the provider was not clean, she was concerned about the cleanliness of the equipment. I explained that the needles are sterile and once used are discarded. She said "well you never know anyone can make mistakes". I explained I will ask  if there is anything else that can be done and get back with her. Pt verbalized understanding.     "

## 2023-06-07 NOTE — TELEPHONE ENCOUNTER
----- Message from Carmelo Bravo sent at 3/24/2017  4:19 PM CDT -----  Contact: self [phone] cell  Pt is calling to inform the office that the medication tramadol worked this morning, but the pain is coming back and she is now experiencing chills.  She hasn't been able to check her temp, but she thinks that she may have a low grade fever.  She would like to speak with someone in the office today, if possible.  Please call and advise.    Thank you   Last visit: 10/19/21  Last refill: 3/28/23  Last labs: BMP 9/23/21    Refill set up below

## 2023-06-25 ENCOUNTER — HOSPITAL ENCOUNTER (EMERGENCY)
Facility: HOSPITAL | Age: 68
Discharge: HOME | End: 2023-06-26
Attending: EMERGENCY MEDICINE
Payer: COMMERCIAL

## 2023-06-25 DIAGNOSIS — R07.89 CHEST TIGHTNESS: ICD-10-CM

## 2023-06-25 DIAGNOSIS — M79.602 LEFT ARM PAIN: ICD-10-CM

## 2023-06-25 DIAGNOSIS — M54.2 NECK PAIN: Primary | ICD-10-CM

## 2023-06-25 LAB
ALBUMIN SERPL BCP-MCNC: 3.8 G/DL (ref 3.5–5.2)
ALP SERPL-CCNC: 82 U/L (ref 55–135)
ALT SERPL W/O P-5'-P-CCNC: 15 U/L (ref 10–44)
ANION GAP SERPL CALC-SCNC: 14 MMOL/L (ref 8–16)
AST SERPL-CCNC: 19 U/L (ref 10–40)
BILIRUB SERPL-MCNC: 0.7 MG/DL (ref 0.1–1)
BUN SERPL-MCNC: 8 MG/DL (ref 8–23)
CALCIUM SERPL-MCNC: 9.5 MG/DL (ref 8.7–10.5)
CHLORIDE SERPL-SCNC: 108 MMOL/L (ref 95–110)
CO2 SERPL-SCNC: 17 MMOL/L (ref 23–29)
CREAT SERPL-MCNC: 0.8 MG/DL (ref 0.5–1.4)
EST. GFR  (NO RACE VARIABLE): >60 ML/MIN/1.73 M^2
GLUCOSE SERPL-MCNC: 85 MG/DL (ref 70–110)
POTASSIUM SERPL-SCNC: 3.9 MMOL/L (ref 3.5–5.1)
PROT SERPL-MCNC: 7.1 G/DL (ref 6–8.4)
SODIUM SERPL-SCNC: 139 MMOL/L (ref 136–145)
TROPONIN I SERPL DL<=0.01 NG/ML-MCNC: 0.01 NG/ML (ref 0–0.03)

## 2023-06-25 PROCEDURE — 93010 EKG 12-LEAD: ICD-10-PCS | Mod: ,,, | Performed by: INTERNAL MEDICINE

## 2023-06-25 PROCEDURE — 80053 COMPREHEN METABOLIC PANEL: CPT | Performed by: EMERGENCY MEDICINE

## 2023-06-25 PROCEDURE — 84484 ASSAY OF TROPONIN QUANT: CPT | Performed by: EMERGENCY MEDICINE

## 2023-06-25 PROCEDURE — 99285 EMERGENCY DEPT VISIT HI MDM: CPT

## 2023-06-25 PROCEDURE — 93010 ELECTROCARDIOGRAM REPORT: CPT | Mod: ,,, | Performed by: INTERNAL MEDICINE

## 2023-06-25 PROCEDURE — 93005 ELECTROCARDIOGRAM TRACING: CPT

## 2023-06-26 ENCOUNTER — PATIENT MESSAGE (OUTPATIENT)
Dept: INTERNAL MEDICINE | Facility: CLINIC | Age: 68
End: 2023-06-26
Payer: COMMERCIAL

## 2023-06-26 VITALS
OXYGEN SATURATION: 100 % | DIASTOLIC BLOOD PRESSURE: 88 MMHG | HEART RATE: 70 BPM | TEMPERATURE: 98 F | RESPIRATION RATE: 16 BRPM | SYSTOLIC BLOOD PRESSURE: 190 MMHG | WEIGHT: 180 LBS | BODY MASS INDEX: 26.58 KG/M2

## 2023-06-26 LAB
BASOPHILS # BLD AUTO: 0.07 K/UL (ref 0–0.2)
BASOPHILS NFR BLD: 1.2 % (ref 0–1.9)
DIFFERENTIAL METHOD: NORMAL
EOSINOPHIL # BLD AUTO: 0.5 K/UL (ref 0–0.5)
EOSINOPHIL NFR BLD: 7.5 % (ref 0–8)
ERYTHROCYTE [DISTWIDTH] IN BLOOD BY AUTOMATED COUNT: 13.2 % (ref 11.5–14.5)
HCT VFR BLD AUTO: 40.5 % (ref 37–48.5)
HGB BLD-MCNC: 13.6 G/DL (ref 12–16)
IMM GRANULOCYTES # BLD AUTO: 0.01 K/UL (ref 0–0.04)
IMM GRANULOCYTES NFR BLD AUTO: 0.2 % (ref 0–0.5)
LYMPHOCYTES # BLD AUTO: 2.5 K/UL (ref 1–4.8)
LYMPHOCYTES NFR BLD: 42.5 % (ref 18–48)
MCH RBC QN AUTO: 30.8 PG (ref 27–31)
MCHC RBC AUTO-ENTMCNC: 33.6 G/DL (ref 32–36)
MCV RBC AUTO: 92 FL (ref 82–98)
MONOCYTES # BLD AUTO: 0.4 K/UL (ref 0.3–1)
MONOCYTES NFR BLD: 7.4 % (ref 4–15)
NEUTROPHILS # BLD AUTO: 2.5 K/UL (ref 1.8–7.7)
NEUTROPHILS NFR BLD: 41.2 % (ref 38–73)
NRBC BLD-RTO: 0 /100 WBC
PLATELET # BLD AUTO: 217 K/UL (ref 150–450)
PMV BLD AUTO: 10.9 FL (ref 9.2–12.9)
RBC # BLD AUTO: 4.42 M/UL (ref 4–5.4)
WBC # BLD AUTO: 5.98 K/UL (ref 3.9–12.7)

## 2023-06-26 PROCEDURE — 85025 COMPLETE CBC W/AUTO DIFF WBC: CPT | Performed by: EMERGENCY MEDICINE

## 2023-06-26 PROCEDURE — 25500020 PHARM REV CODE 255: Performed by: EMERGENCY MEDICINE

## 2023-06-26 RX ORDER — METHOCARBAMOL 500 MG/1
500 TABLET, FILM COATED ORAL 3 TIMES DAILY
Qty: 15 TABLET | Refills: 0 | Status: SHIPPED | OUTPATIENT
Start: 2023-06-26 | End: 2023-07-01

## 2023-06-26 RX ADMIN — IOHEXOL 100 ML: 350 INJECTION, SOLUTION INTRAVENOUS at 02:06

## 2023-06-26 NOTE — PROVIDER PROGRESS NOTES - EMERGENCY DEPT.
This is an assumption of care note.     Upon shift change, the patient was transferred to me from Eleazar Mai Jr., MD at 1:21 AM in stable condition.     Josi Gil is a 68 y.o. female who  has a past medical history of Acute costochondritis (9/18/2012), Back pain, Benign essential hypertension, Gastroesophageal reflux disease without esophagitis, and Pain in joint involving multiple sites (7/9/2013).    Patient presented to ED with chief complaint of neck pain.    Workup currently in progress.   CTA neck pending at this time.    Update:   No acute events during shift.    CTA negative for LVO.   Symptoms possibly due to chronic neck pain.  Will DC with supportive care and PCP f/u.       VITALS:  Vitals:    06/25/23 2041 06/26/23 0042   BP: (!) 205/98 (!) 198/90   BP Location:  Left arm   Patient Position:  Sitting   Pulse: 67 66   Resp: 15 16   Temp: 98.2 °F (36.8 °C) 98 °F (36.7 °C)   TempSrc: Oral    SpO2: 100%    Weight: 81.6 kg (180 lb)          IMAGING:  Imaging Results              CTA Head and Neck (xpd) (Final result)  Result time 06/26/23 03:31:15      Final result by Darien Frias MD (06/26/23 03:31:15)                   Impression:      No acute intracranial hemorrhage or large territorial infarct.  No large vessel occlusion or dissection.    Chronic microvascular ischemic changes with asymmetric patchy hypoattenuation in the left periventricular white matter.  Rapid AI demonstrates decreased vascularity in the left MCA distribution though no large vessel occlusion is identified on the CTA images.  If the patient has an acute, focal neurological deficit, MRI of the brain may be indicated.    Slight prominence of the ventricles greater than expected for degree of sulcal enlargement.  Ventricular size appears similar when compared with prior maxillofacial CT allowing for differences in technique.    Additional findings discussed in the body of the report.      Electronically signed  by: Darien Frias MD  Date:    06/26/2023  Time:    03:31               Narrative:    EXAMINATION:  CTA HEAD AND NECK (XPD)    CLINICAL HISTORY:  Right neck pain w/ LUE sensation changes, r/o carotid dissection;    TECHNIQUE:  Non contrast low dose axial images were obtained thought the head. CT angiogram was performed from the level of the santiago to the top of the head following the IV administration of 100 mL of Omnipaque 350.   Sagittal and coronal reconstructions and maximum intensity projection reconstructions were performed. Arterial stenosis percentages are based on NASCET measurement criteria.    COMPARISON:  CT head, 12/16/2014.  CT maxillofacial, 11/08/2021.    FINDINGS:  CT HEAD:    No evidence of acute territorial infarct, hemorrhage, mass effect, or midline shift.    Mild prominence of the lateral and 3rd ventricles with slight flaring of the temporal horns of the lateral ventricles.  Findings are similar to prior maxillofacial CT when allowing for differences in technique.  Mild patchy and confluent periventricular white matter hypoattenuation suggestive of chronic microvascular ischemic change.  Asymmetric patchy hypoattenuation in the left frontal lobe (axial series 2, image 23).    No displaced calvarial fracture.    Visualized paranasal sinuses and mastoid air cells are clear.    CTA HEAD AND NECK:    Soft tissues: No acute abnormality.  Degenerative changes in the cervical spine.    Aorta: 2 vessel aortic arch with common origin of the brachiocephalic and left common carotid arteries.    Extracranial carotid circulation: Allowing for motion, no hemodynamically significant stenosis, aneurysmal dilatation, or dissection.    Extracranial vertebral circulation: No hemodynamically significant stenosis, aneurysmal dilatation, or dissection.    Intracranial Arteries: Rapid AI demonstrates decreased vascularity in the left MCA distribution though no large vessel occlusion is identified on the CTA  images.  No focal high-grade stenosis, occlusion, or aneurysm.    Venous structures (limited evaluation): Normal.                                        LABS:  Labs Reviewed   COMPREHENSIVE METABOLIC PANEL - Abnormal; Notable for the following components:       Result Value    CO2 17 (*)     All other components within normal limits   TROPONIN I   CBC W/ AUTO DIFFERENTIAL         MEDICATIONS:  Medications   iohexoL (OMNIPAQUE 350) injection 100 mL (100 mLs Intravenous Given 6/26/23 0228)         IMPRESSION:  1. Neck pain    2. Left arm pain    3. Chest tightness         Current Discharge Medication List        START taking these medications    Details   methocarbamoL (ROBAXIN) 500 MG Tab Take 1 tablet (500 mg total) by mouth 3 (three) times daily. for 5 days  Qty: 15 tablet, Refills: 0                 DISPOSITION:  D/C in stable condition.

## 2023-06-26 NOTE — DISCHARGE INSTRUCTIONS
Thank you for choosing [facility]!     Our goal in the Emergency Department is to always provide outstanding medical care. You may receive a survey by mail or e-mail in the next week regarding your experience today. We would greatly appreciate you completing and returning the survey. Your feedback provides us with a way to recognize our staff who provide very good care, and it helps us learn how to improve when your experience was below our aspiration of excellence.      It is important to remember that some problems are difficult to diagnose and may not be found during your first visit. Be sure to follow up with your primary care doctor and review any labs/imaging that was performed during your visit with them. If you do not have a primary care doctor, you may contact the one listed on your discharge paperwork, or you may also call the [facility] Appointment Desk at [phone] to schedule an appointment.     All medications may potentially have side effects and it is impossible to predict which medications may give you side effects. If you feel that you are having a negative effect of any medication you should immediately stop taking them and seek medical attention.  Do not drive or make any important decisions for 24 hours if you have received any pain medications, sedatives or mood altering drugs during your ER visit.    We appreciate you trusting us with your medical care. We will be happy to take care of you for all of your future medical needs. You may return to the ER at any time for any new/concerning symptoms, worsening condition, or failure to improve. We hope you feel better soon.     Sincerely,    Evan Isabel Jr., MD  Board-Certified Emergency Medicine Physician  [facility]

## 2023-06-26 NOTE — ED TRIAGE NOTES
Josi Gil, a 68 y.o. female presents to the ED w/ complaint of left sided neck pain over carotid artery with pain to left arm.       Triage note:  Chief Complaint   Patient presents with    Neck Pain     C/o neck pain started today. Hx chronic disc and back problems     Review of patient's allergies indicates:  No Known Allergies  Past Medical History:   Diagnosis Date    Acute costochondritis 9/18/2012    Back pain     Benign essential hypertension     Gastroesophageal reflux disease without esophagitis     Pain in joint involving multiple sites 7/9/2013

## 2023-06-26 NOTE — ED PROVIDER NOTES
Encounter Date: 2023       History     Chief Complaint   Patient presents with    Neck Pain     C/o neck pain started today. Hx chronic disc and back problems     68 y.o. Past medical history of chronic neck pain, GERD, hypertension, costochondritis presenting with right anterior neck pain.  Patient mentions having 3 episodes of sharp pain right anterior neck at times radiating up to head.  Patient denies any numbness, tingling, weakness, headaches, change in vision.  Patient denies having similar pains in past deals with chronic neck pain however this pain is different.  Denies any recent trauma.  Patient additionally mentions notice a bulge to right side of neck that is pulsating has not had before.  Patient additionally mentions having this a.m. when episode of chest tightness associated with pain and neck.  No additional pain noted.    Review of patient's allergies indicates:  No Known Allergies  Past Medical History:   Diagnosis Date    Acute costochondritis 2012    Back pain     Benign essential hypertension     Gastroesophageal reflux disease without esophagitis     Pain in joint involving multiple sites 2013     Past Surgical History:   Procedure Laterality Date    BREAST BIOPSY Right      SECTION      KNEE ARTHROSCOPY W/ ACL RECONSTRUCTION      REFRACTIVE SURGERY Bilateral  or     Dr. Sinha OU distance     Family History   Problem Relation Age of Onset    Hypertension Mother     Heart disease Maternal Grandmother     Celiac disease Neg Hx     Colon cancer Neg Hx     Colon polyps Neg Hx     Crohn's disease Neg Hx     Cystic fibrosis Neg Hx     Esophageal cancer Neg Hx     Inflammatory bowel disease Neg Hx     Irritable bowel syndrome Neg Hx     Liver cancer Neg Hx     Liver disease Neg Hx     Rectal cancer Neg Hx     Stomach cancer Neg Hx      Social History     Tobacco Use    Smoking status: Never    Smokeless tobacco: Never   Substance Use Topics    Alcohol use: No    Drug  use: No     Review of Systems    Physical Exam     Initial Vitals [06/25/23 2041]   BP Pulse Resp Temp SpO2   (!) 205/98 67 15 98.2 °F (36.8 °C) 100 %      MAP       --         Physical Exam    Constitutional: She appears well-developed and well-nourished. She is not diaphoretic. No distress.   HENT:   Head: Normocephalic and atraumatic.   Eyes: Conjunctivae and EOM are normal. Pupils are equal, round, and reactive to light. Right eye exhibits no discharge. Left eye exhibits no discharge. No scleral icterus.   Neck: Neck supple. No tracheal deviation present. No JVD present.   No noted bruit  Palpable thrill  No overlying skin changes swelling erythema   Normal range of motion.  Cardiovascular:  Normal rate and regular rhythm.     Exam reveals no friction rub.       No murmur heard.  Pulmonary/Chest: Breath sounds normal. No stridor. No respiratory distress. She has no wheezes. She has no rales.   Abdominal: Abdomen is soft. Bowel sounds are normal. She exhibits no distension. There is no abdominal tenderness. There is no rebound and no guarding.   Musculoskeletal:         General: Normal range of motion.      Cervical back: Normal range of motion and neck supple.     Lymphadenopathy:     She has no cervical adenopathy.   Neurological: She is alert and oriented to person, place, and time. No cranial nerve deficit or sensory deficit. GCS score is 15. GCS eye subscore is 4. GCS verbal subscore is 5. GCS motor subscore is 6.   Skin: Skin is warm and dry. No erythema. No pallor.   Psychiatric: She has a normal mood and affect. Her behavior is normal. Judgment and thought content normal.       ED Course   Procedures  Labs Reviewed   COMPREHENSIVE METABOLIC PANEL - Abnormal; Notable for the following components:       Result Value    CO2 17 (*)     All other components within normal limits   TROPONIN I   CBC W/ AUTO DIFFERENTIAL        ECG Results              EKG 12-lead (Final result)  Result time 06/26/23 09:04:41       Final result by Interface, Lab In [facility] (06/26/23 09:04:41)                   Narrative:    Test Reason : R07.89,    Vent. Rate : 067 BPM     Atrial Rate : 067 BPM     P-R Int : 164 ms          QRS Dur : 074 ms      QT Int : 404 ms       P-R-T Axes : 034 033 024 degrees     QTc Int : 426 ms    Normal sinus rhythm  Normal ECG  When compared with ECG of 29-JAN-2023 14:33,  No significant change was found  Confirmed by Dedra Montalvo MD ([id number]) on 6/26/2023 9:04:33 AM    Referred By: [email or web address]   SELF           Confirmed By:Dedra Montalvo MD                                  Imaging Results              CTA Head and Neck (xpd) (Final result)  Result time 06/26/23 03:31:15      Final result by Darien Frias MD (06/26/23 03:31:15)                   Impression:      No acute intracranial hemorrhage or large territorial infarct.  No large vessel occlusion or dissection.    Chronic microvascular ischemic changes with asymmetric patchy hypoattenuation in the left periventricular white matter.  Rapid AI demonstrates decreased vascularity in the left MCA distribution though no large vessel occlusion is identified on the CTA images.  If the patient has an acute, focal neurological deficit, MRI of the brain may be indicated.    Slight prominence of the ventricles greater than expected for degree of sulcal enlargement.  Ventricular size appears similar when compared with prior maxillofacial CT allowing for differences in technique.    Additional findings discussed in the body of the report.      Electronically signed by: Darien Frias MD  Date:    06/26/2023  Time:    03:31               Narrative:    EXAMINATION:  CTA HEAD AND NECK (XPD)    CLINICAL HISTORY:  Right neck pain w/ LUE sensation changes, r/o carotid dissection;    TECHNIQUE:  Non contrast low dose axial images were obtained thought the head. CT angiogram was performed from the level of the santiago to the top of the head following the IV administration of 100 mL of  Omnipaque 350.   Sagittal and coronal reconstructions and maximum intensity projection reconstructions were performed. Arterial stenosis percentages are based on NASCET measurement criteria.    COMPARISON:  CT head, 12/16/2014.  CT maxillofacial, 11/08/2021.    FINDINGS:  CT HEAD:    No evidence of acute territorial infarct, hemorrhage, mass effect, or midline shift.    Mild prominence of the lateral and 3rd ventricles with slight flaring of the temporal horns of the lateral ventricles.  Findings are similar to prior maxillofacial CT when allowing for differences in technique.  Mild patchy and confluent periventricular white matter hypoattenuation suggestive of chronic microvascular ischemic change.  Asymmetric patchy hypoattenuation in the left frontal lobe (axial series 2, image 23).    No displaced calvarial fracture.    Visualized paranasal sinuses and mastoid air cells are clear.    CTA HEAD AND NECK:    Soft tissues: No acute abnormality.  Degenerative changes in the cervical spine.    Aorta: 2 vessel aortic arch with common origin of the brachiocephalic and left common carotid arteries.    Extracranial carotid circulation: Allowing for motion, no hemodynamically significant stenosis, aneurysmal dilatation, or dissection.    Extracranial vertebral circulation: No hemodynamically significant stenosis, aneurysmal dilatation, or dissection.    Intracranial Arteries: Rapid AI demonstrates decreased vascularity in the left MCA distribution though no large vessel occlusion is identified on the CTA images.  No focal high-grade stenosis, occlusion, or aneurysm.    Venous structures (limited evaluation): Normal.                                       Medications   iohexoL (OMNIPAQUE 350) injection 100 mL (100 mLs Intravenous Given 6/26/23 0228)     Medical Decision Making:   History:   Old Medical Records: I decided to obtain old medical records.  Initial Assessment:   68-year-old presenting with right-sided neck  pain.  Differential Diagnosis:   DX includes carotid dissection,  fistula although lower likelihood, neck mass, aneurysm,  Clinical Tests:   Lab Tests: Ordered and Reviewed  Radiological Study: Ordered and Reviewed  ED Management:  Plan:  Obtain CBC, CMP, troponin, CTA at and neck and reassess.    Pt signed out to Dr. Isabel pending imaging.                             Clinical Impression:   Final diagnoses:  [M79.602] Left arm pain  [R07.89] Chest tightness  [M54.2] Neck pain (Primary)        ED Disposition Condition    Discharge Stable          ED Prescriptions       Medication Sig Dispense Start Date End Date Auth. Provider    methocarbamoL (ROBAXIN) 500 MG Tab Take 1 tablet (500 mg total) by mouth 3 (three) times daily. for 5 days 15 tablet 6/26/2023 7/1/2023 Evan Isabel MD          Follow-up Information       Follow up With Specialties Details Why Contact Info    [facility] - Emergency Dept Emergency Medicine In 1 week  [address] [id number] [phone]    Jonny Willis MD Internal Medicine In 1 week  [address], [address]  [place] [id number]  [phone]               Eleazar Mai Jr., MD  06/26/23 [id number]       Eleazar Mai Jr., MD  06/26/23 [id number]

## 2023-08-08 ENCOUNTER — TELEPHONE (OUTPATIENT)
Dept: EMERGENCY MEDICINE | Facility: HOSPITAL | Age: 68
End: 2023-08-08
Payer: COMMERCIAL

## 2023-08-08 ENCOUNTER — NURSE TRIAGE (OUTPATIENT)
Dept: ADMINISTRATIVE | Facility: CLINIC | Age: 68
End: 2023-08-08
Payer: COMMERCIAL

## 2023-08-08 NOTE — TELEPHONE ENCOUNTER
[place]    PCP:  Dr. Jonny Willis    She reports calling about her most recent test that was done in the ED.  Pt wants the results.  Advised pt that we cannot provide any test results or interpretation.  Advised that she can get on her [facility] pt portal to review the results.  Pt states that she is unable to do that and she needs the results tonight.  NOC attempted to contact the [facility] ED to assist the pt.  [facility] ED will have a Provider call her back at the # provided.  Pt notified that Provider will reach back out to her.  Pt VU.  Advised to call for worsening/questions/concerns.  VU.     Reason for Disposition   [1] Caller requests to speak ONLY to PCP AND [2] URGENT question    Additional Information   Negative: ED call to PCP (i.e., primary care provider; doctor, NP, or PA)   Negative: Doctor (or NP/PA) call to PCP   Negative: Call about patient who is currently hospitalized   Negative: Lab or radiology calling with CRITICAL test results   Negative: [1] Follow-up call from patient regarding patient's clinical status AND [2] information urgent    Protocols used: PCP Call - No Triage-A-

## 2023-08-08 NOTE — TELEPHONE ENCOUNTER
6:42 PM  ED  left message that patient wanted her results from ED visit in June 2023. Patient called the ED requesting information of her lab results from the ED visit from over 1 month ago. I attempted to give patient her patient portal username and e-mail address which she accessed a few months ago so she can easily view this at any time, but she started to raise her voice at me. She threatened to report me and hung up the phone.

## 2023-08-20 RX ORDER — IBUPROFEN 800 MG/1
800 TABLET ORAL 3 TIMES DAILY
Qty: 30 TABLET | Refills: 0 | Status: CANCELLED | OUTPATIENT
Start: 2023-08-20 | End: 2023-08-30

## 2023-08-22 RX ORDER — IBUPROFEN 800 MG/1
800 TABLET ORAL 3 TIMES DAILY
Qty: 30 TABLET | Refills: 0 | Status: CANCELLED | OUTPATIENT
Start: 2023-08-20 | End: 2023-08-30

## 2023-08-24 RX ORDER — IBUPROFEN 800 MG/1
800 TABLET ORAL 3 TIMES DAILY
Qty: 30 TABLET | Refills: 0 | Status: CANCELLED | OUTPATIENT
Start: 2023-08-20 | End: 2023-08-30

## 2023-09-05 ENCOUNTER — TELEPHONE (OUTPATIENT)
Dept: OPHTHALMOLOGY | Facility: CLINIC | Age: 68
End: 2023-09-05
Payer: COMMERCIAL

## 2023-09-05 ENCOUNTER — OFFICE VISIT (OUTPATIENT)
Dept: CARDIOLOGY | Facility: CLINIC | Age: 68
End: 2023-09-05
Payer: COMMERCIAL

## 2023-09-05 ENCOUNTER — PATIENT OUTREACH (OUTPATIENT)
Dept: ADMINISTRATIVE | Facility: HOSPITAL | Age: 68
End: 2023-09-05
Payer: COMMERCIAL

## 2023-09-05 ENCOUNTER — OFFICE VISIT (OUTPATIENT)
Dept: INTERNAL MEDICINE | Facility: CLINIC | Age: 68
End: 2023-09-05
Payer: COMMERCIAL

## 2023-09-05 ENCOUNTER — TELEPHONE (OUTPATIENT)
Dept: OPTOMETRY | Facility: CLINIC | Age: 68
End: 2023-09-05
Payer: COMMERCIAL

## 2023-09-05 VITALS
HEIGHT: 69 IN | BODY MASS INDEX: 25.67 KG/M2 | OXYGEN SATURATION: 98 % | SYSTOLIC BLOOD PRESSURE: 132 MMHG | WEIGHT: 173.31 LBS | HEART RATE: 77 BPM | DIASTOLIC BLOOD PRESSURE: 58 MMHG

## 2023-09-05 VITALS
BODY MASS INDEX: 25.8 KG/M2 | WEIGHT: 174.19 LBS | DIASTOLIC BLOOD PRESSURE: 77 MMHG | SYSTOLIC BLOOD PRESSURE: 150 MMHG | HEIGHT: 69 IN | HEART RATE: 62 BPM

## 2023-09-05 DIAGNOSIS — E78.5 HYPERLIPIDEMIA, UNSPECIFIED HYPERLIPIDEMIA TYPE: ICD-10-CM

## 2023-09-05 DIAGNOSIS — Z78.9 OTHER SPECIFIED HEALTH STATUS: ICD-10-CM

## 2023-09-05 DIAGNOSIS — I10 PRIMARY HYPERTENSION: ICD-10-CM

## 2023-09-05 DIAGNOSIS — R07.9 CHEST PAIN, UNSPECIFIED TYPE: ICD-10-CM

## 2023-09-05 DIAGNOSIS — R51.9 NONINTRACTABLE HEADACHE, UNSPECIFIED CHRONICITY PATTERN, UNSPECIFIED HEADACHE TYPE: ICD-10-CM

## 2023-09-05 DIAGNOSIS — I10 PRIMARY HYPERTENSION: Primary | ICD-10-CM

## 2023-09-05 DIAGNOSIS — E78.2 MIXED HYPERLIPIDEMIA: Primary | ICD-10-CM

## 2023-09-05 DIAGNOSIS — E66.3 OVERWEIGHT (BMI 25.0-29.9): ICD-10-CM

## 2023-09-05 DIAGNOSIS — Z12.31 ENCOUNTER FOR SCREENING MAMMOGRAM FOR MALIGNANT NEOPLASM OF BREAST: Primary | ICD-10-CM

## 2023-09-05 PROCEDURE — 99214 OFFICE O/P EST MOD 30 MIN: CPT | Mod: S$GLB,,, | Performed by: INTERNAL MEDICINE

## 2023-09-05 PROCEDURE — 3078F DIAST BP <80 MM HG: CPT | Mod: CPTII,S$GLB,, | Performed by: INTERNAL MEDICINE

## 2023-09-05 PROCEDURE — 1125F PR PAIN SEVERITY QUANTIFIED, PAIN PRESENT: ICD-10-PCS | Mod: CPTII,S$GLB,, | Performed by: INTERNAL MEDICINE

## 2023-09-05 PROCEDURE — 99999 PR PBB SHADOW E&M-EST. PATIENT-LVL III: ICD-10-PCS | Mod: PBBFAC,,, | Performed by: INTERNAL MEDICINE

## 2023-09-05 PROCEDURE — 1160F PR REVIEW ALL MEDS BY PRESCRIBER/CLIN PHARMACIST DOCUMENTED: ICD-10-PCS | Mod: CPTII,S$GLB,, | Performed by: INTERNAL MEDICINE

## 2023-09-05 PROCEDURE — 3078F PR MOST RECENT DIASTOLIC BLOOD PRESSURE < 80 MM HG: ICD-10-PCS | Mod: CPTII,S$GLB,, | Performed by: INTERNAL MEDICINE

## 2023-09-05 PROCEDURE — 1159F PR MEDICATION LIST DOCUMENTED IN MEDICAL RECORD: ICD-10-PCS | Mod: CPTII,S$GLB,, | Performed by: INTERNAL MEDICINE

## 2023-09-05 PROCEDURE — 1125F AMNT PAIN NOTED PAIN PRSNT: CPT | Mod: CPTII,S$GLB,, | Performed by: INTERNAL MEDICINE

## 2023-09-05 PROCEDURE — 93000 ELECTROCARDIOGRAM COMPLETE: CPT | Mod: S$GLB,,, | Performed by: INTERNAL MEDICINE

## 2023-09-05 PROCEDURE — 3075F SYST BP GE 130 - 139MM HG: CPT | Mod: CPTII,S$GLB,, | Performed by: INTERNAL MEDICINE

## 2023-09-05 PROCEDURE — 1159F MED LIST DOCD IN RCRD: CPT | Mod: CPTII,S$GLB,, | Performed by: INTERNAL MEDICINE

## 2023-09-05 PROCEDURE — 1160F RVW MEDS BY RX/DR IN RCRD: CPT | Mod: CPTII,S$GLB,, | Performed by: INTERNAL MEDICINE

## 2023-09-05 PROCEDURE — 3008F BODY MASS INDEX DOCD: CPT | Mod: CPTII,S$GLB,, | Performed by: INTERNAL MEDICINE

## 2023-09-05 PROCEDURE — 1101F PT FALLS ASSESS-DOCD LE1/YR: CPT | Mod: CPTII,S$GLB,, | Performed by: INTERNAL MEDICINE

## 2023-09-05 PROCEDURE — 3288F PR FALLS RISK ASSESSMENT DOCUMENTED: ICD-10-PCS | Mod: CPTII,S$GLB,, | Performed by: INTERNAL MEDICINE

## 2023-09-05 PROCEDURE — 99214 PR OFFICE/OUTPT VISIT, EST, LEVL IV, 30-39 MIN: ICD-10-PCS | Mod: S$GLB,,, | Performed by: INTERNAL MEDICINE

## 2023-09-05 PROCEDURE — 3008F PR BODY MASS INDEX (BMI) DOCUMENTED: ICD-10-PCS | Mod: CPTII,S$GLB,, | Performed by: INTERNAL MEDICINE

## 2023-09-05 PROCEDURE — 1101F PR PT FALLS ASSESS DOC 0-1 FALLS W/OUT INJ PAST YR: ICD-10-PCS | Mod: CPTII,S$GLB,, | Performed by: INTERNAL MEDICINE

## 2023-09-05 PROCEDURE — 3075F PR MOST RECENT SYSTOLIC BLOOD PRESS GE 130-139MM HG: ICD-10-PCS | Mod: CPTII,S$GLB,, | Performed by: INTERNAL MEDICINE

## 2023-09-05 PROCEDURE — 99999 PR PBB SHADOW E&M-EST. PATIENT-LVL III: CPT | Mod: PBBFAC,,, | Performed by: INTERNAL MEDICINE

## 2023-09-05 PROCEDURE — 3288F FALL RISK ASSESSMENT DOCD: CPT | Mod: CPTII,S$GLB,, | Performed by: INTERNAL MEDICINE

## 2023-09-05 PROCEDURE — 3077F PR MOST RECENT SYSTOLIC BLOOD PRESSURE >= 140 MM HG: ICD-10-PCS | Mod: CPTII,S$GLB,, | Performed by: INTERNAL MEDICINE

## 2023-09-05 PROCEDURE — 93000 EKG 12-LEAD: ICD-10-PCS | Mod: S$GLB,,, | Performed by: INTERNAL MEDICINE

## 2023-09-05 PROCEDURE — 3077F SYST BP >= 140 MM HG: CPT | Mod: CPTII,S$GLB,, | Performed by: INTERNAL MEDICINE

## 2023-09-05 RX ORDER — TRAMADOL HYDROCHLORIDE 50 MG/1
50 TABLET ORAL DAILY
Qty: 7 TABLET | Refills: 0 | Status: SHIPPED | OUTPATIENT
Start: 2023-09-05 | End: 2024-02-05

## 2023-09-05 NOTE — TELEPHONE ENCOUNTER
----- Message from Agnes Reinoso sent at 9/5/2023  1:15 PM CDT -----  Pt states that she is requesting a urgent appt due to consistent ocular migraines, eye pain OU, and visual disturbances.

## 2023-09-05 NOTE — PROGRESS NOTES
Health Maintenance Due   Topic Date Due    Pneumococcal Vaccines (Age 65+) (1 - PCV) Never done    Shingles Vaccine (1 of 2) Never done    Colorectal Cancer Screening  08/20/2018    COVID-19 Vaccine (4 - Moderna series) 02/16/2022    Influenza Vaccine (1) 09/01/2023    Mammogram  10/24/2023     Chart reviewed.   Immunizations: Reconciled  Orders placed: Mammogram  Upcoming appts to satisfy ORACIO topics: N/A

## 2023-09-05 NOTE — PROGRESS NOTES
"  HISTORY:    68-year-old female with a history of hyperlipidemia and degenerative disc disease with pseudoclaudication presenting for initial evaluation by me.  Patient has been seen by multiple colleagues previously.    Comes in to discuss her "heart."    Has a chronic h/o intermittent non-cardiac chest pain. Continues to have intermittent non-exertional issues. More plagued by chronic back pain.    Activity levels moderate. No dedicated exercise, but walks a lot. Somewhat limited by chronic pain.     The patient denies any previous history of myocardial infarction, coronary artery disease, peripheral arterial disease, stroke, congestive heart failure, or cardiomyopathy.    Tolerates aspirin 81 x 1. Has been prescribed a variety of cardiac meds including betablockers, calcium channel blockers, and statin therapy. Pt not interested in meds.     PHYSICAL EXAM:    Vitals:    09/05/23 1158   BP: (!) 150/77   Pulse: 62       NAD, A+Ox3.  No jvd, no bruit.  RRR nml s1,s2. No murmurs.  CTA B no wheezes or crackles.  No edema.    LABS/STUDIES (imaging reviewed during clinic visit):    June 2023 CBC and CMP normal.   HDL 77.  Triglycerides 61.  BNP 37.  Troponin negative.    EKG today and June 2023 sinus rhythm no Q-waves or ST changes.    Holter 2019 sinus rhythm with average heart rate 73 beats per minute.  0.7% PVC burden.  Rare PACs.  LUIS ALBERTO 2022 normal TTE at baseline.  Very low exercise capacity.  Heart rates achieved 82% of age predicted maximum with no evidence of ischemia on EKG or echo.  Coronary CTA 2015 coronary artery calcium score of 0 with normal coronary arteries.  Arterial duplex 2023 normal ABIs bilaterally with no evidence of focal stenosis.      ASSESSMENT & PLAN:    1. Mixed hyperlipidemia    2. Primary hypertension    3. Overweight (BMI 25.0-29.9)              Pt with chronic non-cardiac discomfort for years. Normal coronary CTA '15 w a calcium score of 0. LUIS ALBERTO w no e/o ischemia at submaximal " rates. No need for repeat testing and pt not interested in radiation exposure.    Bps above goal on home logs. Pt interested in natural methods. Diet, exercise and weight loss.     Same for hyperlipidemia. Consider a vegan diet.    Follow up if symptoms worsen or fail to improve.    Shireen Fish MD

## 2023-09-05 NOTE — PROGRESS NOTES
68-year-old female      Presents for follow-up and request general lab testing.    Earlier today she was evaluated by Cardiology.  Recurring episodes of chest pain which is usually in the mid lower chest, midline sometimes toward the right sometimes toward the left.  There is no shortness a breath with this at times she might have bilateral arm pain.  She is had multiple cardiac studies which have been fine.  It was the opinion that was not cardiac in nature.    In the past she is been prescribed medication for blood pressure and hyperlipidemia.  Has expressed reluctance due to potential side effects with the medication or sometimes concern with affecting memory.  She is trying to manage things are listed coulee in with a proper diet.  No formal exercise routine but just walks throughout the day.  She has been recently given prescription for ibuprofen for pain that she has in her back and legs.    In June she was seen emergency room for right-sided neck pain.  Also complaining of chest pain and headaches.  CTA of the head and neck was unrevealing but it was can felt with her that there was evidence for chronic microvascular ischemic change and thus expressed the importance of risk factor management.  On occasion she will still get frontal headaches    Social history   Tobacco use none   Alcohol use none    Medical history   Hypertension  Hyperlipidemia    Examination   Weight 173 lb   Pulse 76   Blood pressure right arm 144/62   Blood pressure left arm 148/62    Impression  Hypertension  Hyperlipidemia  Chest pain syndrome  Myalgia      Plan  She may be interested in the medicine lisinopril,  She will come tomorrow for routine labs of chemistry lipid CBC TSH magnesium vitamin-D hemoglobin A1c    Tramadol 50 mg once a day for 7 days but can use as needed she does remember was very helpful in the past but she is also that it is an opioid medication

## 2023-09-06 ENCOUNTER — LAB VISIT (OUTPATIENT)
Dept: LAB | Facility: HOSPITAL | Age: 68
End: 2023-09-06
Attending: INTERNAL MEDICINE
Payer: COMMERCIAL

## 2023-09-06 ENCOUNTER — OFFICE VISIT (OUTPATIENT)
Dept: URGENT CARE | Facility: CLINIC | Age: 68
End: 2023-09-06
Payer: COMMERCIAL

## 2023-09-06 VITALS
TEMPERATURE: 99 F | RESPIRATION RATE: 16 BRPM | WEIGHT: 173 LBS | HEART RATE: 80 BPM | HEIGHT: 69 IN | SYSTOLIC BLOOD PRESSURE: 154 MMHG | DIASTOLIC BLOOD PRESSURE: 81 MMHG | OXYGEN SATURATION: 98 % | BODY MASS INDEX: 25.62 KG/M2

## 2023-09-06 DIAGNOSIS — M54.32 BILATERAL SCIATICA: ICD-10-CM

## 2023-09-06 DIAGNOSIS — I10 HYPERTENSION, UNSPECIFIED TYPE: ICD-10-CM

## 2023-09-06 DIAGNOSIS — E78.5 HYPERLIPIDEMIA, UNSPECIFIED HYPERLIPIDEMIA TYPE: ICD-10-CM

## 2023-09-06 DIAGNOSIS — Z11.59 ENCOUNTER FOR SCREENING FOR OTHER VIRAL DISEASES: Primary | ICD-10-CM

## 2023-09-06 DIAGNOSIS — I10 PRIMARY HYPERTENSION: ICD-10-CM

## 2023-09-06 DIAGNOSIS — R07.9 CHEST PAIN, UNSPECIFIED TYPE: ICD-10-CM

## 2023-09-06 DIAGNOSIS — M54.9 CHRONIC BILATERAL BACK PAIN, UNSPECIFIED BACK LOCATION: ICD-10-CM

## 2023-09-06 DIAGNOSIS — M54.31 BILATERAL SCIATICA: ICD-10-CM

## 2023-09-06 DIAGNOSIS — Z78.9 OTHER SPECIFIED HEALTH STATUS: ICD-10-CM

## 2023-09-06 DIAGNOSIS — R51.9 NONINTRACTABLE HEADACHE, UNSPECIFIED CHRONICITY PATTERN, UNSPECIFIED HEADACHE TYPE: ICD-10-CM

## 2023-09-06 DIAGNOSIS — G89.29 CHRONIC BILATERAL BACK PAIN, UNSPECIFIED BACK LOCATION: ICD-10-CM

## 2023-09-06 LAB
25(OH)D3+25(OH)D2 SERPL-MCNC: 28 NG/ML (ref 30–96)
ALBUMIN SERPL BCP-MCNC: 3.8 G/DL (ref 3.5–5.2)
ALP SERPL-CCNC: 70 U/L (ref 55–135)
ALT SERPL W/O P-5'-P-CCNC: 12 U/L (ref 10–44)
ANION GAP SERPL CALC-SCNC: 12 MMOL/L (ref 8–16)
AST SERPL-CCNC: 16 U/L (ref 10–40)
BASOPHILS # BLD AUTO: 0.04 K/UL (ref 0–0.2)
BASOPHILS NFR BLD: 0.8 % (ref 0–1.9)
BILIRUB SERPL-MCNC: 0.8 MG/DL (ref 0.1–1)
BNP SERPL-MCNC: 14 PG/ML (ref 0–99)
BUN SERPL-MCNC: 8 MG/DL (ref 8–23)
CALCIUM SERPL-MCNC: 9.7 MG/DL (ref 8.7–10.5)
CHLORIDE SERPL-SCNC: 103 MMOL/L (ref 95–110)
CHOLEST SERPL-MCNC: 240 MG/DL (ref 120–199)
CHOLEST/HDLC SERPL: 2.8 {RATIO} (ref 2–5)
CK SERPL-CCNC: 61 U/L (ref 20–180)
CO2 SERPL-SCNC: 26 MMOL/L (ref 23–29)
CREAT SERPL-MCNC: 0.8 MG/DL (ref 0.5–1.4)
CRP SERPL-MCNC: 2.04 MG/L (ref 0–3.19)
CTP QC/QA: YES
DIFFERENTIAL METHOD: NORMAL
EOSINOPHIL # BLD AUTO: 0.3 K/UL (ref 0–0.5)
EOSINOPHIL NFR BLD: 6.3 % (ref 0–8)
ERYTHROCYTE [DISTWIDTH] IN BLOOD BY AUTOMATED COUNT: 12.9 % (ref 11.5–14.5)
EST. GFR  (NO RACE VARIABLE): >60 ML/MIN/1.73 M^2
ESTIMATED AVG GLUCOSE: 100 MG/DL (ref 68–131)
GLUCOSE SERPL-MCNC: 93 MG/DL (ref 70–110)
HBA1C MFR BLD: 5.1 % (ref 4–5.6)
HCT VFR BLD AUTO: 41.7 % (ref 37–48.5)
HDLC SERPL-MCNC: 86 MG/DL (ref 40–75)
HDLC SERPL: 35.8 % (ref 20–50)
HGB BLD-MCNC: 13.9 G/DL (ref 12–16)
IMM GRANULOCYTES # BLD AUTO: 0 K/UL (ref 0–0.04)
IMM GRANULOCYTES NFR BLD AUTO: 0 % (ref 0–0.5)
LDLC SERPL CALC-MCNC: 140 MG/DL (ref 63–159)
LYMPHOCYTES # BLD AUTO: 1.5 K/UL (ref 1–4.8)
LYMPHOCYTES NFR BLD: 32.1 % (ref 18–48)
MAGNESIUM SERPL-MCNC: 1.9 MG/DL (ref 1.6–2.6)
MCH RBC QN AUTO: 30.8 PG (ref 27–31)
MCHC RBC AUTO-ENTMCNC: 33.3 G/DL (ref 32–36)
MCV RBC AUTO: 92 FL (ref 82–98)
MONOCYTES # BLD AUTO: 0.4 K/UL (ref 0.3–1)
MONOCYTES NFR BLD: 7.4 % (ref 4–15)
NEUTROPHILS # BLD AUTO: 2.5 K/UL (ref 1.8–7.7)
NEUTROPHILS NFR BLD: 53.4 % (ref 38–73)
NONHDLC SERPL-MCNC: 154 MG/DL
NRBC BLD-RTO: 0 /100 WBC
PLATELET # BLD AUTO: 235 K/UL (ref 150–450)
PMV BLD AUTO: 11.7 FL (ref 9.2–12.9)
POTASSIUM SERPL-SCNC: 3.7 MMOL/L (ref 3.5–5.1)
PROT SERPL-MCNC: 7.1 G/DL (ref 6–8.4)
RBC # BLD AUTO: 4.52 M/UL (ref 4–5.4)
SARS-COV-2 AG RESP QL IA.RAPID: NEGATIVE
SODIUM SERPL-SCNC: 141 MMOL/L (ref 136–145)
T4 FREE SERPL-MCNC: 1.16 NG/DL (ref 0.71–1.51)
TRIGL SERPL-MCNC: 70 MG/DL (ref 30–150)
TSH SERPL DL<=0.005 MIU/L-ACNC: 0.82 UIU/ML (ref 0.4–4)
WBC # BLD AUTO: 4.73 K/UL (ref 3.9–12.7)

## 2023-09-06 PROCEDURE — 83036 HEMOGLOBIN GLYCOSYLATED A1C: CPT | Mod: GA | Performed by: INTERNAL MEDICINE

## 2023-09-06 PROCEDURE — 84443 ASSAY THYROID STIM HORMONE: CPT | Performed by: INTERNAL MEDICINE

## 2023-09-06 PROCEDURE — 83880 ASSAY OF NATRIURETIC PEPTIDE: CPT | Performed by: INTERNAL MEDICINE

## 2023-09-06 PROCEDURE — 86141 C-REACTIVE PROTEIN HS: CPT | Performed by: INTERNAL MEDICINE

## 2023-09-06 PROCEDURE — 87811 SARS CORONAVIRUS 2 ANTIGEN POCT, MANUAL READ: ICD-10-PCS | Mod: QW,S$GLB,, | Performed by: FAMILY MEDICINE

## 2023-09-06 PROCEDURE — 84439 ASSAY OF FREE THYROXINE: CPT | Performed by: INTERNAL MEDICINE

## 2023-09-06 PROCEDURE — 83735 ASSAY OF MAGNESIUM: CPT | Performed by: INTERNAL MEDICINE

## 2023-09-06 PROCEDURE — 82306 VITAMIN D 25 HYDROXY: CPT | Mod: GA | Performed by: INTERNAL MEDICINE

## 2023-09-06 PROCEDURE — 80061 LIPID PANEL: CPT | Performed by: INTERNAL MEDICINE

## 2023-09-06 PROCEDURE — 85025 COMPLETE CBC W/AUTO DIFF WBC: CPT | Performed by: INTERNAL MEDICINE

## 2023-09-06 PROCEDURE — 80053 COMPREHEN METABOLIC PANEL: CPT | Performed by: INTERNAL MEDICINE

## 2023-09-06 PROCEDURE — 82550 ASSAY OF CK (CPK): CPT | Performed by: INTERNAL MEDICINE

## 2023-09-06 PROCEDURE — 99214 PR OFFICE/OUTPT VISIT, EST, LEVL IV, 30-39 MIN: ICD-10-PCS | Mod: S$GLB,,, | Performed by: FAMILY MEDICINE

## 2023-09-06 PROCEDURE — 36415 COLL VENOUS BLD VENIPUNCTURE: CPT | Performed by: INTERNAL MEDICINE

## 2023-09-06 PROCEDURE — 87811 SARS-COV-2 COVID19 W/OPTIC: CPT | Mod: QW,S$GLB,, | Performed by: FAMILY MEDICINE

## 2023-09-06 PROCEDURE — 99214 OFFICE O/P EST MOD 30 MIN: CPT | Mod: S$GLB,,, | Performed by: FAMILY MEDICINE
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
## 2023-09-13 ENCOUNTER — OFFICE VISIT (OUTPATIENT)
Dept: OPTOMETRY | Facility: CLINIC | Age: 68
End: 2023-09-13
Payer: COMMERCIAL

## 2023-09-13 DIAGNOSIS — H25.13 NUCLEAR SCLEROSIS, BILATERAL: ICD-10-CM

## 2023-09-13 DIAGNOSIS — H53.71 GLARE SENSITIVITY: Primary | ICD-10-CM

## 2023-09-13 PROCEDURE — 3288F PR FALLS RISK ASSESSMENT DOCUMENTED: ICD-10-PCS | Mod: CPTII,S$GLB,, | Performed by: OPTOMETRIST

## 2023-09-13 PROCEDURE — 3044F PR MOST RECENT HEMOGLOBIN A1C LEVEL <7.0%: ICD-10-PCS | Mod: CPTII,S$GLB,, | Performed by: OPTOMETRIST

## 2023-09-13 PROCEDURE — 1159F PR MEDICATION LIST DOCUMENTED IN MEDICAL RECORD: ICD-10-PCS | Mod: CPTII,S$GLB,, | Performed by: OPTOMETRIST

## 2023-09-13 PROCEDURE — 1160F PR REVIEW ALL MEDS BY PRESCRIBER/CLIN PHARMACIST DOCUMENTED: ICD-10-PCS | Mod: CPTII,S$GLB,, | Performed by: OPTOMETRIST

## 2023-09-13 PROCEDURE — 1159F MED LIST DOCD IN RCRD: CPT | Mod: CPTII,S$GLB,, | Performed by: OPTOMETRIST

## 2023-09-13 PROCEDURE — 99999 PR PBB SHADOW E&M-EST. PATIENT-LVL II: CPT | Mod: PBBFAC,,, | Performed by: OPTOMETRIST

## 2023-09-13 PROCEDURE — 3288F FALL RISK ASSESSMENT DOCD: CPT | Mod: CPTII,S$GLB,, | Performed by: OPTOMETRIST

## 2023-09-13 PROCEDURE — 1160F RVW MEDS BY RX/DR IN RCRD: CPT | Mod: CPTII,S$GLB,, | Performed by: OPTOMETRIST

## 2023-09-13 PROCEDURE — 1101F PR PT FALLS ASSESS DOC 0-1 FALLS W/OUT INJ PAST YR: ICD-10-PCS | Mod: CPTII,S$GLB,, | Performed by: OPTOMETRIST

## 2023-09-13 PROCEDURE — 99213 OFFICE O/P EST LOW 20 MIN: CPT | Mod: S$GLB,,, | Performed by: OPTOMETRIST

## 2023-09-13 PROCEDURE — 99999 PR PBB SHADOW E&M-EST. PATIENT-LVL II: ICD-10-PCS | Mod: PBBFAC,,, | Performed by: OPTOMETRIST

## 2023-09-13 PROCEDURE — 4010F PR ACE/ARB THEARPY RXD/TAKEN: ICD-10-PCS | Mod: CPTII,S$GLB,, | Performed by: OPTOMETRIST

## 2023-09-13 PROCEDURE — 99213 PR OFFICE/OUTPT VISIT, EST, LEVL III, 20-29 MIN: ICD-10-PCS | Mod: S$GLB,,, | Performed by: OPTOMETRIST

## 2023-09-13 PROCEDURE — 1125F PR PAIN SEVERITY QUANTIFIED, PAIN PRESENT: ICD-10-PCS | Mod: CPTII,S$GLB,, | Performed by: OPTOMETRIST

## 2023-09-13 PROCEDURE — 1125F AMNT PAIN NOTED PAIN PRSNT: CPT | Mod: CPTII,S$GLB,, | Performed by: OPTOMETRIST

## 2023-09-13 PROCEDURE — 4010F ACE/ARB THERAPY RXD/TAKEN: CPT | Mod: CPTII,S$GLB,, | Performed by: OPTOMETRIST

## 2023-09-13 PROCEDURE — 1101F PT FALLS ASSESS-DOCD LE1/YR: CPT | Mod: CPTII,S$GLB,, | Performed by: OPTOMETRIST

## 2023-09-13 PROCEDURE — 3044F HG A1C LEVEL LT 7.0%: CPT | Mod: CPTII,S$GLB,, | Performed by: OPTOMETRIST

## 2023-09-13 NOTE — PROGRESS NOTES
HPI    Patient is here today for an annual eye exam. Patient's last eye exam was   01/12/2022. Patient states the prescription she was given at her last   visit did not work well for her so she stopped wearing her glasses.   Patient has noticed a change in her vision for distance and near. Patient   is feeling pain in her eyes today and she rates it a 6/10. Patient   experiences headaches occasionally. Patient is experiencing dry eyes and   tearing. Patient is using Systane and Lumify 1x a day. Patient is not   experiencing any flashes or floaters in her vision.   Last edited by Dana Tovar on 9/13/2023  3:22 PM.            Assessment /Plan     For exam results, see Encounter Report.    Glare sensitivity    Nuclear sclerosis, bilateral      ++++++++See ALL prev notes from myself/Dr Sharp/Dr Chowdary/Dr Bañuelos: pt had a bad reaction top one of the eyedrops given to her in the past, which she felt made her glare issues worse.  So today deferred all drops+++++++        Pt has cats OS>OD, with glare issues.  BCVA 20/30 OD; 20/40 OS with VA OS not as crisp as OD.  Discussed cat a little worse OS, and even with best correction the vision OS will NOT be as good as OD!  But also discussed that, even with cataracts her vision is good enough to legally drive and pass the drivers test.    Pt will get new spex and see how she does.  If she is happy with vision she can leave the cats alone, but if she feels her vision is NOT sufficient with her new spex she can message me, and I will set up cat eval (w Dr Chowdary?)    Discussed glare issues/blur due to cats, and spex will not make things perfect    PLAN:    Pt will message me if she wishes cat eval

## 2023-09-23 ENCOUNTER — HOSPITAL ENCOUNTER (OUTPATIENT)
Dept: RADIOLOGY | Facility: OTHER | Age: 68
Discharge: HOME | End: 2023-09-23
Attending: INTERNAL MEDICINE
Payer: COMMERCIAL

## 2023-09-23 DIAGNOSIS — R51.9 NONINTRACTABLE HEADACHE, UNSPECIFIED CHRONICITY PATTERN, UNSPECIFIED HEADACHE TYPE: ICD-10-CM

## 2023-09-23 DIAGNOSIS — I67.9 CVD (CEREBROVASCULAR DISEASE): ICD-10-CM

## 2023-09-23 PROCEDURE — 70551 MRI BRAIN WITHOUT CONTRAST: ICD-10-PCS | Mod: 26,,, | Performed by: RADIOLOGY

## 2023-09-23 PROCEDURE — 70551 MRI BRAIN STEM W/O DYE: CPT | Mod: TC

## 2023-09-23 PROCEDURE — 70551 MRI BRAIN STEM W/O DYE: CPT | Mod: 26,,, | Performed by: RADIOLOGY

## 2023-09-25 ENCOUNTER — PATIENT MESSAGE (OUTPATIENT)
Dept: INTERNAL MEDICINE | Facility: CLINIC | Age: 68
End: 2023-09-25
Payer: COMMERCIAL

## 2023-09-25 ENCOUNTER — DOCUMENTATION ONLY (OUTPATIENT)
Dept: INTERNAL MEDICINE | Facility: CLINIC | Age: 68
End: 2023-09-25
Payer: COMMERCIAL

## 2023-09-25 DIAGNOSIS — D32.9 MENINGIOMA: Primary | ICD-10-CM

## 2023-09-25 NOTE — PROGRESS NOTES
Internal medicine review note    Recent MRI the brain was reviewed with the patient    Primary reason was to follow-up on having known chronic microvascular ischemic change in having episodes of headaches.  She had a CT CTA of the brain in June 2023 when she presented emergency room.  No aneurysm was seen but chronic microvascular ischemic change was noted which concern her.  The MRI confirmed a mild degree of this but there appears to be nothing that was different than noted on CT in regard to chronic microvascular ischemic change    Incidental note was made of a 1.1 cm dural-based mass in the left parietal convexity.  There was no mass effect.  Appearance is consistent with meningioma.  The benign nature of the condition was discussed.  At present do not feel that is causing any symptoms.  Referral to neuro surgery was put in for further evaluation, doubt any further intervention as needed present other a follow-up MRI at a later date

## 2023-10-09 ENCOUNTER — TELEPHONE (OUTPATIENT)
Dept: OPTOMETRY | Facility: CLINIC | Age: 68
End: 2023-10-09
Payer: COMMERCIAL

## 2023-11-03 ENCOUNTER — TELEPHONE (OUTPATIENT)
Dept: NEUROSURGERY | Facility: CLINIC | Age: 68
End: 2023-11-03
Payer: COMMERCIAL

## 2023-11-03 ENCOUNTER — TELEPHONE (OUTPATIENT)
Dept: INTERNAL MEDICINE | Facility: CLINIC | Age: 68
End: 2023-11-03
Payer: COMMERCIAL

## 2023-11-03 DIAGNOSIS — Z12.31 ENCOUNTER FOR SCREENING MAMMOGRAM FOR BREAST CANCER: Primary | ICD-10-CM

## 2023-11-03 NOTE — TELEPHONE ENCOUNTER
I called to let her know I received the message.    She no showed her original appointment for the mammogram. Once they sofia it no show, the whole order cancels. Can you put in another order for her?

## 2023-11-03 NOTE — TELEPHONE ENCOUNTER
----- Message from Alecia Zambrano sent at 11/3/2023 11:44 AM CDT -----  Type:  Sooner Apoointment Request    Caller is requesting a sooner appointment.  Caller declined first available appointment listed below.  Caller will not accept being placed on the waitlist and is requesting a message be sent to doctor.  Name of Caller: Jeanniejúnior Gil  When is the first available appointment? 12/20/23  Symptoms: Meninigioma  Would the patient rather a call back or a response via [facility]?  call  Best Call Back Number: [phone]  Additional Information:  Pt would like a call back as soon as possible.

## 2023-11-03 NOTE — TELEPHONE ENCOUNTER
----- Message from Alecia Zambrano sent at 11/3/2023 11:47 AM CDT -----  Type:  Mammogram    Caller is requesting to schedule their annual mammogram appointment.  Order is not listed in EPIC.  Please enter order and contact patient to schedule.  Name of Caller: Jeannie Gil  Where would they like the mammogram performed? [facility]  Would the patient rather a call back or a response via [facility]?  call  Best Call Back Number: [phone]  Additional Information:

## 2023-11-08 ENCOUNTER — OFFICE VISIT (OUTPATIENT)
Dept: NEUROSURGERY | Facility: CLINIC | Age: 68
End: 2023-11-08
Payer: COMMERCIAL

## 2023-11-08 VITALS — SYSTOLIC BLOOD PRESSURE: 170 MMHG | HEART RATE: 72 BPM | DIASTOLIC BLOOD PRESSURE: 80 MMHG

## 2023-11-08 DIAGNOSIS — D32.9 MENINGIOMA: ICD-10-CM

## 2023-11-08 PROCEDURE — 99999 PR PBB SHADOW E&M-EST. PATIENT-LVL II: CPT | Mod: PBBFAC,,, | Performed by: PHYSICIAN ASSISTANT

## 2023-11-08 PROCEDURE — 3079F PR MOST RECENT DIASTOLIC BLOOD PRESSURE 80-89 MM HG: ICD-10-PCS | Mod: CPTII,S$GLB,, | Performed by: PHYSICIAN ASSISTANT

## 2023-11-08 PROCEDURE — 3077F PR MOST RECENT SYSTOLIC BLOOD PRESSURE >= 140 MM HG: ICD-10-PCS | Mod: CPTII,S$GLB,, | Performed by: PHYSICIAN ASSISTANT

## 2023-11-08 PROCEDURE — 3288F PR FALLS RISK ASSESSMENT DOCUMENTED: ICD-10-PCS | Mod: CPTII,S$GLB,, | Performed by: PHYSICIAN ASSISTANT

## 2023-11-08 PROCEDURE — 99215 PR OFFICE/OUTPT VISIT, EST, LEVL V, 40-54 MIN: ICD-10-PCS | Mod: S$GLB,,, | Performed by: PHYSICIAN ASSISTANT

## 2023-11-08 PROCEDURE — 1125F PR PAIN SEVERITY QUANTIFIED, PAIN PRESENT: ICD-10-PCS | Mod: CPTII,S$GLB,, | Performed by: PHYSICIAN ASSISTANT

## 2023-11-08 PROCEDURE — 3077F SYST BP >= 140 MM HG: CPT | Mod: CPTII,S$GLB,, | Performed by: PHYSICIAN ASSISTANT

## 2023-11-08 PROCEDURE — 1159F PR MEDICATION LIST DOCUMENTED IN MEDICAL RECORD: ICD-10-PCS | Mod: CPTII,S$GLB,, | Performed by: PHYSICIAN ASSISTANT

## 2023-11-08 PROCEDURE — 1101F PR PT FALLS ASSESS DOC 0-1 FALLS W/OUT INJ PAST YR: ICD-10-PCS | Mod: CPTII,S$GLB,, | Performed by: PHYSICIAN ASSISTANT

## 2023-11-08 PROCEDURE — 99999 PR PBB SHADOW E&M-EST. PATIENT-LVL II: ICD-10-PCS | Mod: PBBFAC,,, | Performed by: PHYSICIAN ASSISTANT

## 2023-11-08 PROCEDURE — 1159F MED LIST DOCD IN RCRD: CPT | Mod: CPTII,S$GLB,, | Performed by: PHYSICIAN ASSISTANT

## 2023-11-08 PROCEDURE — 3044F HG A1C LEVEL LT 7.0%: CPT | Mod: CPTII,S$GLB,, | Performed by: PHYSICIAN ASSISTANT

## 2023-11-08 PROCEDURE — 3079F DIAST BP 80-89 MM HG: CPT | Mod: CPTII,S$GLB,, | Performed by: PHYSICIAN ASSISTANT

## 2023-11-08 PROCEDURE — 1101F PT FALLS ASSESS-DOCD LE1/YR: CPT | Mod: CPTII,S$GLB,, | Performed by: PHYSICIAN ASSISTANT

## 2023-11-08 PROCEDURE — 1125F AMNT PAIN NOTED PAIN PRSNT: CPT | Mod: CPTII,S$GLB,, | Performed by: PHYSICIAN ASSISTANT

## 2023-11-08 PROCEDURE — 4010F ACE/ARB THERAPY RXD/TAKEN: CPT | Mod: CPTII,S$GLB,, | Performed by: PHYSICIAN ASSISTANT

## 2023-11-08 PROCEDURE — 99215 OFFICE O/P EST HI 40 MIN: CPT | Mod: S$GLB,,, | Performed by: PHYSICIAN ASSISTANT

## 2023-11-08 PROCEDURE — 4010F PR ACE/ARB THEARPY RXD/TAKEN: ICD-10-PCS | Mod: CPTII,S$GLB,, | Performed by: PHYSICIAN ASSISTANT

## 2023-11-08 PROCEDURE — 3044F PR MOST RECENT HEMOGLOBIN A1C LEVEL <7.0%: ICD-10-PCS | Mod: CPTII,S$GLB,, | Performed by: PHYSICIAN ASSISTANT

## 2023-11-08 PROCEDURE — 3288F FALL RISK ASSESSMENT DOCD: CPT | Mod: CPTII,S$GLB,, | Performed by: PHYSICIAN ASSISTANT

## 2023-11-21 NOTE — PROGRESS NOTES
Neurosurgery  History & Physical    SUBJECTIVE:     Chief Complaint: meningioma     History of Present Illness:  Josi Gil is a 68 y.o. female with history of hypertension and arthritis who was referred to neurosurgery clinic by her PCP Jonny Willis MD. Patient had a CTA of brain in 2023 after presenting to ED. This demonstrated chronic microvascular ischemic change and a left parietal convexity 1.1 cm dural based mass. MRI brain without contrast confirmed the dural based mass that is most consistent with a meningioma. Patient denies any visual complaints, intractable headaches, dizziness, seizures activity, confusion, changes in personality.       Review of patient's allergies indicates:  No Known Allergies    Current Outpatient Medications   Medication Sig Dispense Refill    aspirin 325 MG tablet Take 325 mg by mouth once daily.      lisinopriL 10 MG tablet Take 1 tablet (10 mg total) by mouth once daily. 30 tablet 5    magnesium 30 mg Tab Take by mouth once.      omega-3 fatty acids/fish oil (FISH OIL-OMEGA-3 FATTY ACIDS) 300-1,000 mg capsule Take by mouth once daily.      traMADoL (ULTRAM) 50 mg tablet Take 1 tablet (50 mg total) by mouth once daily. 7 tablet 0     No current facility-administered medications for this visit.       Past Medical History:   Diagnosis Date    Acute costochondritis 2012    Back pain     Benign essential hypertension     Gastroesophageal reflux disease without esophagitis     Pain in joint involving multiple sites 2013     Past Surgical History:   Procedure Laterality Date    BREAST BIOPSY Right      SECTION      KNEE ARTHROSCOPY W/ ACL RECONSTRUCTION      REFRACTIVE SURGERY Bilateral  or     Dr. Sinha OU distance     Family History       Problem Relation (Age of Onset)    Heart disease Maternal Grandmother    Hypertension Mother          Social History     Socioeconomic History    Marital status: Single    Number of children: 1   Tobacco  Use    Smoking status: Never    Smokeless tobacco: Never   Substance and Sexual Activity    Alcohol use: No    Drug use: No       Review of Systems   Constitutional:  Negative for chills, fatigue and fever.   Eyes:  Negative for photophobia and visual disturbance.   Respiratory:  Negative for cough and shortness of breath.    Cardiovascular:  Negative for chest pain, palpitations and leg swelling.   Gastrointestinal:  Negative for constipation, diarrhea, nausea and vomiting.   Genitourinary:  Negative for difficulty urinating, dysuria, frequency and urgency.   Musculoskeletal:  Negative for back pain, gait problem and neck pain.   Neurological:  Negative for dizziness, seizures, speech difficulty, weakness, numbness and headaches.   Psychiatric/Behavioral:  Negative for confusion. The patient is not nervous/anxious.        OBJECTIVE:     Vital Signs  Pulse: 72  BP: (!) 170/80  Pain Score: 10-Worst pain ever  There is no height or weight on file to calculate BMI.      Neurosurgery Physical Exam  General: well developed, well nourished, no distress.   Head: normocephalic, atraumatic  Neurologic: Alert and oriented. Thought content appropriate.  GCS: Motor: 6/Verbal: 5/Eyes: 4 GCS Total: 15  Mental Status: Awake, Alert, Oriented x 4  Language: No aphasia  Speech: No dysarthria  Cranial nerves: face symmetric, tongue midline, CN II-XII grossly intact.   Eyes: pupils equal, round, reactive to light with accommodation, EOMI.   Pulmonary: normal respirations, no signs of respiratory distress  Skin: Skin is warm, dry and intact.  Sensory: intact to light touch throughout  Motor Strength:Moves all extremities spontaneously with good tone.  Full strength upper and lower extremities. No abnormal movements seen.   Pronator drift: absent bilaterally  Gait stable, fluid.         Diagnostic Results:  I have personally reviewed imaging and agree with the findings    CTA head and neck (6/26/23): No acute intracranial hemorrhage or  large territorial infarct.  No large vessel occlusion or dissection.Chronic microvascular ischemic changes with asymmetric patchy hypoattenuation in the left periventricular white matter.  Rapid AI demonstrates decreased vascularity in the left MCA distribution though no large vessel occlusion is identified on the CTA images.  If the patient has an acute, focal neurological deficit, MRI of the brain may be indicated.  Slight prominence of the ventricles greater than expected for degree of sulcal enlargement.  Ventricular size appears similar when compared with prior maxillofacial CT allowing for differences in technique.    MRI brain wo contrast (9/23/23): Mild cerebral volume loss with scattered foci of T2 FLAIR signal abnormality supratentorial white matter while nonspecific concerning for sequela of chronic ischemic change.  No evidence for acute infarction. Lobular small extra-axial lesion overlies the left parietal convexity with underlying thickening inner table of the parietal calvarium most compatible with meningioma. Clinical correlation and further evaluation with post-contrast imaging advised.    ASSESSMENT/PLAN:     68 y.o. female with history of hypertension and arthritis with incidental 1.1 cm left parietal convexity meningioma    -Education provided regarding meningiomas. I reviewed a CT head in 2014 in which lesion appears to be similar in size   -Recommend MRI brain w/wo contrast to further evaluate the lesion. Patient declines any further imaging specifically with contrast. She would like to do her own research and will contact our office if she changes her mind regarding imaging surveillance     Mona Figueroa PA-C  Neurosurgery   [facility]    Time spent on this encounter: 40 minutes. This includes face-to-face time and non-face to face time preparing to see the patient (eg, review of tests), obtaining and/or reviewing separately obtained history, documenting clinical  information in the electronic or other health record, independently interpreting results and communicating results to the patient/family/caregiver, or care coordinator         Note dictated with voice recognition software, please excuse any grammatical errors.

## 2023-12-11 ENCOUNTER — HOSPITAL ENCOUNTER (OUTPATIENT)
Dept: RADIOLOGY | Facility: HOSPITAL | Age: 68
Discharge: HOME | End: 2023-12-11
Attending: INTERNAL MEDICINE
Payer: COMMERCIAL

## 2023-12-11 DIAGNOSIS — Z12.31 ENCOUNTER FOR SCREENING MAMMOGRAM FOR BREAST CANCER: ICD-10-CM

## 2023-12-11 PROCEDURE — 77067 SCR MAMMO BI INCL CAD: CPT | Mod: TC

## 2023-12-11 PROCEDURE — 77063 BREAST TOMOSYNTHESIS BI: CPT | Mod: 26,,, | Performed by: RADIOLOGY

## 2023-12-11 PROCEDURE — 77063 MAMMO DIGITAL SCREENING BILAT WITH TOMO: ICD-10-PCS | Mod: 26,,, | Performed by: RADIOLOGY

## 2023-12-11 PROCEDURE — 77067 SCR MAMMO BI INCL CAD: CPT | Mod: 26,,, | Performed by: RADIOLOGY

## 2023-12-11 PROCEDURE — 77067 MAMMO DIGITAL SCREENING BILAT WITH TOMO: ICD-10-PCS | Mod: 26,,, | Performed by: RADIOLOGY

## 2023-12-13 ENCOUNTER — TELEPHONE (OUTPATIENT)
Dept: INTERNAL MEDICINE | Facility: CLINIC | Age: 68
End: 2023-12-13
Payer: COMMERCIAL

## 2023-12-13 NOTE — TELEPHONE ENCOUNTER
Spoke with patient. She called in regards to her mammogram results. I stated that they had not been released yet.

## 2023-12-13 NOTE — TELEPHONE ENCOUNTER
----- Message from Randal Mosquera MA sent at 12/12/2023  4:48 PM CST -----  Contact: kim@ [phone]  Pt called              In  regards to speak with staff or provider to discuss mammogram results.              Call back  [phone]

## 2023-12-16 ENCOUNTER — TELEPHONE (OUTPATIENT)
Dept: INTERNAL MEDICINE | Facility: CLINIC | Age: 68
End: 2023-12-16
Payer: COMMERCIAL

## 2023-12-16 NOTE — TELEPHONE ENCOUNTER
----- Message from Luba Goode sent at 12/15/2023  4:11 PM CST -----  Regarding: results  Contact: @  [phone]  Pt is calling to see if someone can call  in regards to recent lab/scans results mammograms  ..Please call and adv @  [phone]

## 2023-12-28 ENCOUNTER — TELEPHONE (OUTPATIENT)
Dept: CARDIOLOGY | Facility: CLINIC | Age: 68
End: 2023-12-28
Payer: COMMERCIAL

## 2023-12-28 NOTE — TELEPHONE ENCOUNTER
----- Message from [place] sent at 12/28/2023  8:24 AM CST -----  Type:  Same Day Appointment Request    Caller is requesting a same day appointment.  Caller declined first available appointment listed below.    Name of Caller: Josi  When is the first available appointment?   Symptoms: heart discomfort   Best Call Back Number: [phone]  Additional Information:   no

## 2023-12-28 NOTE — TELEPHONE ENCOUNTER
"Call returned;   pt requesting an appointment, c/o CP x 2 weeks with pain to arm and shoulders;  Has hx of back pain;   Pt requested an appointment today, advised pt no appointments today but can override schedule for an appointment;  Pt then stated "Oh I didn't know a call was placed to this office, thank you" and hung up the phone.  Call back to pt with no answer    "

## 2024-01-16 ENCOUNTER — TELEPHONE (OUTPATIENT)
Dept: INTERNAL MEDICINE | Facility: CLINIC | Age: 69
End: 2024-01-16
Payer: COMMERCIAL

## 2024-01-16 DIAGNOSIS — M79.10 MYALGIA: ICD-10-CM

## 2024-01-16 DIAGNOSIS — I10 PRIMARY HYPERTENSION: Primary | ICD-10-CM

## 2024-01-16 DIAGNOSIS — E78.5 HYPERLIPIDEMIA, UNSPECIFIED HYPERLIPIDEMIA TYPE: ICD-10-CM

## 2024-01-16 NOTE — TELEPHONE ENCOUNTER
Called and spoke to pt. Offered to schedule pt labs, pt states she doesn't need an appointment. Pt states she has no further needs at this time.

## 2024-01-16 NOTE — TELEPHONE ENCOUNTER
Returned pt call. Pt states she is still experiencing generalized muscle pain and would like to have all her blood test redone. Pt states the pain has increased since her last visit and states that nothing makes the pain better or worse. Pt requesting that you order all these tests today.

## 2024-01-16 NOTE — TELEPHONE ENCOUNTER
----- Message from Danyelle Kimble sent at 1/16/2024 12:48 PM CST -----  Contact: [phone]  Per pt, she is requesting to have a CBC and what ever other test is needed to check everything. Please let her know when it has been put in.                   Thank you

## 2024-01-18 ENCOUNTER — LAB VISIT (OUTPATIENT)
Dept: LAB | Facility: HOSPITAL | Age: 69
End: 2024-01-18
Payer: COMMERCIAL

## 2024-01-18 DIAGNOSIS — E78.5 HYPERLIPIDEMIA, UNSPECIFIED HYPERLIPIDEMIA TYPE: ICD-10-CM

## 2024-01-18 DIAGNOSIS — I10 PRIMARY HYPERTENSION: ICD-10-CM

## 2024-01-18 DIAGNOSIS — M79.10 MYALGIA: ICD-10-CM

## 2024-01-18 LAB
ALBUMIN SERPL BCP-MCNC: 3.8 G/DL (ref 3.5–5.2)
ALP SERPL-CCNC: 87 U/L (ref 55–135)
ALT SERPL W/O P-5'-P-CCNC: 14 U/L (ref 10–44)
ANION GAP SERPL CALC-SCNC: 9 MMOL/L (ref 8–16)
AST SERPL-CCNC: 17 U/L (ref 10–40)
BASOPHILS # BLD AUTO: 0.04 K/UL (ref 0–0.2)
BASOPHILS NFR BLD: 0.8 % (ref 0–1.9)
BILIRUB SERPL-MCNC: 0.8 MG/DL (ref 0.1–1)
BUN SERPL-MCNC: 10 MG/DL (ref 8–23)
CALCIUM SERPL-MCNC: 9.8 MG/DL (ref 8.7–10.5)
CHLORIDE SERPL-SCNC: 102 MMOL/L (ref 95–110)
CHOLEST SERPL-MCNC: 244 MG/DL (ref 120–199)
CHOLEST/HDLC SERPL: 2.6 {RATIO} (ref 2–5)
CK SERPL-CCNC: 101 U/L (ref 20–180)
CO2 SERPL-SCNC: 27 MMOL/L (ref 23–29)
CREAT SERPL-MCNC: 0.8 MG/DL (ref 0.5–1.4)
DIFFERENTIAL METHOD BLD: NORMAL
EOSINOPHIL # BLD AUTO: 0.3 K/UL (ref 0–0.5)
EOSINOPHIL NFR BLD: 5.9 % (ref 0–8)
ERYTHROCYTE [DISTWIDTH] IN BLOOD BY AUTOMATED COUNT: 13 % (ref 11.5–14.5)
EST. GFR  (NO RACE VARIABLE): >60 ML/MIN/1.73 M^2
ESTIMATED AVG GLUCOSE: 103 MG/DL (ref 68–131)
GLUCOSE SERPL-MCNC: 81 MG/DL (ref 70–110)
HBA1C MFR BLD: 5.2 % (ref 4–5.6)
HCT VFR BLD AUTO: 42.5 % (ref 37–48.5)
HDLC SERPL-MCNC: 93 MG/DL (ref 40–75)
HDLC SERPL: 38.1 % (ref 20–50)
HGB BLD-MCNC: 14.1 G/DL (ref 12–16)
IMM GRANULOCYTES # BLD AUTO: 0.01 K/UL (ref 0–0.04)
IMM GRANULOCYTES NFR BLD AUTO: 0.2 % (ref 0–0.5)
LDLC SERPL CALC-MCNC: 135 MG/DL (ref 63–159)
LYMPHOCYTES # BLD AUTO: 2.1 K/UL (ref 1–4.8)
LYMPHOCYTES NFR BLD: 42 % (ref 18–48)
MCH RBC QN AUTO: 30.9 PG (ref 27–31)
MCHC RBC AUTO-ENTMCNC: 33.2 G/DL (ref 32–36)
MCV RBC AUTO: 93 FL (ref 82–98)
MONOCYTES # BLD AUTO: 0.4 K/UL (ref 0.3–1)
MONOCYTES NFR BLD: 8.9 % (ref 4–15)
NEUTROPHILS # BLD AUTO: 2.1 K/UL (ref 1.8–7.7)
NEUTROPHILS NFR BLD: 42.2 % (ref 38–73)
NONHDLC SERPL-MCNC: 151 MG/DL
NRBC BLD-RTO: 0 /100 WBC
PLATELET # BLD AUTO: 174 K/UL (ref 150–450)
PMV BLD AUTO: 12.9 FL (ref 9.2–12.9)
POTASSIUM SERPL-SCNC: 3.9 MMOL/L (ref 3.5–5.1)
PROT SERPL-MCNC: 7.1 G/DL (ref 6–8.4)
RBC # BLD AUTO: 4.56 M/UL (ref 4–5.4)
SODIUM SERPL-SCNC: 138 MMOL/L (ref 136–145)
TRIGL SERPL-MCNC: 80 MG/DL (ref 30–150)
TSH SERPL DL<=0.005 MIU/L-ACNC: 1.1 UIU/ML (ref 0.4–4)
WBC # BLD AUTO: 4.93 K/UL (ref 3.9–12.7)

## 2024-01-18 PROCEDURE — 80061 LIPID PANEL: CPT | Performed by: INTERNAL MEDICINE

## 2024-01-18 PROCEDURE — 82550 ASSAY OF CK (CPK): CPT | Performed by: INTERNAL MEDICINE

## 2024-01-18 PROCEDURE — 80053 COMPREHEN METABOLIC PANEL: CPT | Performed by: INTERNAL MEDICINE

## 2024-01-18 PROCEDURE — 36415 COLL VENOUS BLD VENIPUNCTURE: CPT | Performed by: INTERNAL MEDICINE

## 2024-01-18 PROCEDURE — 83036 HEMOGLOBIN GLYCOSYLATED A1C: CPT | Mod: GA | Performed by: INTERNAL MEDICINE

## 2024-01-18 PROCEDURE — 84443 ASSAY THYROID STIM HORMONE: CPT | Performed by: INTERNAL MEDICINE

## 2024-01-18 PROCEDURE — 85025 COMPLETE CBC W/AUTO DIFF WBC: CPT | Performed by: INTERNAL MEDICINE

## 2024-01-30 ENCOUNTER — NURSE TRIAGE (OUTPATIENT)
Dept: ADMINISTRATIVE | Facility: CLINIC | Age: 69
End: 2024-01-30
Payer: COMMERCIAL

## 2024-01-30 NOTE — TELEPHONE ENCOUNTER
Patient declined Triage Services and stated that she felt uncomfortable answering questions from the Triage RN due to call-back possibly being a fraudulent scam. Patient opted to end call at this time.     Reason for Disposition   Caller hangs up    Additional Information   Caller is angry or rude (e.g., hangs up, verbally abusive, yelling)   Negative: Violent behavior, or threatening to physically hurt or kill someone   Negative: [1] Caller is very confused AND [2] no other adult (e.g., friend or family member) available   Negative: Sounds like a life-threatening emergency to the triager   Negative: Child abuse suspected   Negative: Suicide thoughts, threats, attempts, or questions   Negative: Patient sounds very sick or weak to the triager   Negative: [1] [place] worried caller AND [2] second call within 4 hours about the same medical problem   Negative: [1] [place] worried caller AND [2] third call within 48 hours about the same medical problem   Negative: [1] [place] worried caller AND [2] can't be reassured by triager   Negative: [1] Caller demands to speak with the PCP AND [2] about sick adult (or sick caller)   Negative: [1] Angry or rude caller AND [2] doesn't respond to 5 minutes of triager counseling AND [3] sick adult (or caller)   Negative: [1] Caller mainly has complaints about past medical care, billing, etc. AND [2] has mild symptoms or is well   Negative: Difficult caller responded to triager counseling   Negative: Caller is requesting health information that triager feels is unethical or illegal   Negative: [1] Caller continues to be verbally abusive AND [2] after triager sets BOUNDARIES AND [3] Triager ends call    Protocols used: No Contact or Duplicate Contact Call-A-AH, Difficult Call-A-AH

## 2024-02-05 ENCOUNTER — CLINICAL SUPPORT (OUTPATIENT)
Dept: FAMILY MEDICINE | Facility: CLINIC | Age: 69
End: 2024-02-05
Payer: COMMERCIAL

## 2024-02-05 ENCOUNTER — OFFICE VISIT (OUTPATIENT)
Dept: OPTOMETRY | Facility: CLINIC | Age: 69
End: 2024-02-05
Payer: COMMERCIAL

## 2024-02-05 ENCOUNTER — DOCUMENTATION ONLY (OUTPATIENT)
Dept: INTERNAL MEDICINE | Facility: CLINIC | Age: 69
End: 2024-02-05
Payer: COMMERCIAL

## 2024-02-05 ENCOUNTER — TELEPHONE (OUTPATIENT)
Dept: OPHTHALMOLOGY | Facility: CLINIC | Age: 69
End: 2024-02-05
Payer: COMMERCIAL

## 2024-02-05 ENCOUNTER — TELEPHONE (OUTPATIENT)
Dept: INTERNAL MEDICINE | Facility: CLINIC | Age: 69
End: 2024-02-05
Payer: COMMERCIAL

## 2024-02-05 VITALS — DIASTOLIC BLOOD PRESSURE: 68 MMHG | HEART RATE: 72 BPM | SYSTOLIC BLOOD PRESSURE: 132 MMHG

## 2024-02-05 DIAGNOSIS — I10 PRIMARY HYPERTENSION: Primary | ICD-10-CM

## 2024-02-05 DIAGNOSIS — Z98.890 HX OF LASIK: ICD-10-CM

## 2024-02-05 DIAGNOSIS — H04.123 DRY EYE SYNDROME, BILATERAL: Primary | ICD-10-CM

## 2024-02-05 PROCEDURE — 3288F FALL RISK ASSESSMENT DOCD: CPT | Mod: CPTII,S$GLB,, | Performed by: OPTOMETRIST

## 2024-02-05 PROCEDURE — 1126F AMNT PAIN NOTED NONE PRSNT: CPT | Mod: CPTII,S$GLB,, | Performed by: OPTOMETRIST

## 2024-02-05 PROCEDURE — 99213 OFFICE O/P EST LOW 20 MIN: CPT | Mod: S$GLB,,, | Performed by: OPTOMETRIST

## 2024-02-05 PROCEDURE — 99999 PR PBB SHADOW E&M-EST. PATIENT-LVL II: CPT | Mod: PBBFAC,,, | Performed by: OPTOMETRIST

## 2024-02-05 PROCEDURE — 1101F PT FALLS ASSESS-DOCD LE1/YR: CPT | Mod: CPTII,S$GLB,, | Performed by: OPTOMETRIST

## 2024-02-05 PROCEDURE — 3044F HG A1C LEVEL LT 7.0%: CPT | Mod: CPTII,S$GLB,, | Performed by: OPTOMETRIST

## 2024-02-05 PROCEDURE — 1159F MED LIST DOCD IN RCRD: CPT | Mod: CPTII,S$GLB,, | Performed by: OPTOMETRIST

## 2024-02-05 PROCEDURE — 1160F RVW MEDS BY RX/DR IN RCRD: CPT | Mod: CPTII,S$GLB,, | Performed by: OPTOMETRIST

## 2024-02-05 PROCEDURE — 99999 PR PBB SHADOW E&M-EST. PATIENT-LVL I: CPT | Mod: PBBFAC,,,

## 2024-02-05 NOTE — PROGRESS NOTES
Josi Gil 68 y.o. female is here today for Blood Pressure check.   History of HTN yes.    Review of patient's allergies indicates:  No Known Allergies  Creatinine   Date Value Ref Range Status   01/18/2024 0.8 0.5 - 1.4 mg/dL Final     Sodium   Date Value Ref Range Status   01/18/2024 138 136 - 145 mmol/L Final     Potassium   Date Value Ref Range Status   01/18/2024 3.9 3.5 - 5.1 mmol/L Final   ]  Patient denies taking blood pressure medications on a regular basis at the same time of the day.     Current Outpatient Medications:     aspirin 325 MG tablet, Take 325 mg by mouth once daily., Disp: , Rfl:     lisinopriL 10 MG tablet, Take 1 tablet (10 mg total) by mouth once daily. (Patient not taking: Reported on 2/5/2024), Disp: 30 tablet, Rfl: 5    magnesium 30 mg Tab, Take by mouth once., Disp: , Rfl:     omega-3 fatty acids/fish oil (FISH OIL-OMEGA-3 FATTY ACIDS) 300-1,000 mg capsule, Take by mouth once daily., Disp: , Rfl:     traMADoL (ULTRAM) 50 mg tablet, Take 1 tablet (50 mg total) by mouth once daily. (Patient not taking: Reported on 2/5/2024), Disp: 7 tablet, Rfl: 0  Does patient have record of home blood pressure readings yes. Readings have been averaging 140/80.   Patient is asymptomatic.     BP: 132/68 , Pulse: 72 .    Dr. Willis notified.

## 2024-02-05 NOTE — PROGRESS NOTES
Subjective:       Patient ID: Josi Gil is a 68 y.o. female      Chief Complaint   Patient presents with    Eye Problem     History of Present Illness  Dls: 9/13/23 Dr. Gonzalez     69 y/o female presents today with c/o x for 2 yrs red ou pain 5-6 off/on ou blurry vision ou off/on ou.     Eye meds  Systane OU QD AM  Lumify OU QD     Assessment/Plan:     1. Dry eye syndrome, bilateral  2. Hx of LASIK  Pt deferred eye drops at previous visits. Discussed use of NaFL to stain cornea to evaluate for dry eyes. Pt agreed to drops, had no adverse reactions while in clinic.     Pt with h/o of face/eye pain, has been seen by Dr. Sharp and Dr. Bañuelos. Pt presents today with c/o red and painful eyes x 2 year. Pt with h/o dry eyes. Inferior PEE with h/o lasik ou. Discussed with pt signs/symptoms of dry eyes and to increase use of AT. Pt currently using systane QD, advised can be used QID. Discussed that drier weather and use of heaters can increase dry eye symptoms. Pt also with h/o LASIK OU in the past - advised may contribute to increased [place].     Pt c/o blurry vision. PH VA 20/25. Was given Rx glasses at last visit with Dr. Gonzalez in September 2023, pt states had glasses made but still blurry. Did not bring glasses today. Per Dr. Gonzalez's last note, if VA unsatisfactory, pt to consider CEIOL.     Pt advised today was a urgent care appt to address complaint of red/painful eyes OU and not a comprehensive eye exam. Pt can return for Dr. Gonzalez for yearly in Sept or schedule for cataract consult if needed.     Follow up for Dr. Gonzalez for yearly .

## 2024-02-05 NOTE — TELEPHONE ENCOUNTER
----- Message from Julio Reardon MA sent at 2/5/2024  8:41 AM CST -----  Contact: Pt @  [phone]  Patient is requesting lab test results  ----- Message -----  From: Nithin Powell  Sent: 2/5/2024   8:38 AM CST  To: Lazaro MAHER Staff    2TESTRESULTS    Type: Test Results    What test was performed? 1/18/2024    Who ordered the test?    When and where were the test performed? [facility]    Would you like response via [facility]:     Comments: Pt states she has not received the results of her labs and she's unable to view anything in the portal and would like a call to discuss results

## 2024-02-05 NOTE — TELEPHONE ENCOUNTER
----- Message from Julisa Garcia MA sent at 2/5/2024 11:50 AM CST -----  Regarding: appt urgent  Contact: pt at [phone]  Pt is calling to speak with someone in provider office is asking for a return call regarding an urgent appt stated that she is having trouble  seeing and  is scared would like to  be seen today  please call pt at [phone]

## 2024-02-05 NOTE — PROGRESS NOTES
Internal medicine review note    Labs from January 18th was reviewed with the patient.  All look fine other than cholesterol being little bit elevated but with a good HDL and LDL at 135.  Profile has not changed    She has not on medication in is attempting to control things or listed coulee.  It was noted that she had a optometry appointment today with blood pressure readings being 132/68    Recommend follow-up in the few months

## 2024-02-14 ENCOUNTER — TELEPHONE (OUTPATIENT)
Dept: INTERNAL MEDICINE | Facility: CLINIC | Age: 69
End: 2024-02-14

## 2024-02-14 NOTE — TELEPHONE ENCOUNTER
Hi can you reach out to this patient she is working on trying to get her cholesterol down. She is having all over body pain with toes, legs and back. She is interested Physical therapy to see if it would help with her all over body aches and pains. She is also interested in getting blood test done to see if she has any inflammation.

## 2024-02-24 ENCOUNTER — HOSPITAL ENCOUNTER (EMERGENCY)
Facility: HOSPITAL | Age: 69
Discharge: HOME | End: 2024-02-24
Attending: EMERGENCY MEDICINE
Payer: COMMERCIAL

## 2024-02-24 VITALS
OXYGEN SATURATION: 100 % | BODY MASS INDEX: 23.63 KG/M2 | HEART RATE: 77 BPM | DIASTOLIC BLOOD PRESSURE: 82 MMHG | TEMPERATURE: 99 F | WEIGHT: 160 LBS | SYSTOLIC BLOOD PRESSURE: 182 MMHG | RESPIRATION RATE: 18 BRPM

## 2024-02-24 DIAGNOSIS — T78.1XXA GASTROINTESTINAL FOOD SENSITIVITY: Primary | ICD-10-CM

## 2024-02-24 DIAGNOSIS — R07.9 CHEST PAIN: ICD-10-CM

## 2024-02-24 LAB
ALBUMIN SERPL BCP-MCNC: 3.4 G/DL (ref 3.5–5.2)
ALP SERPL-CCNC: 84 U/L (ref 55–135)
ALT SERPL W/O P-5'-P-CCNC: 16 U/L (ref 10–44)
ANION GAP SERPL CALC-SCNC: 6 MMOL/L (ref 8–16)
AST SERPL-CCNC: 18 U/L (ref 10–40)
BASOPHILS # BLD AUTO: 0.07 K/UL (ref 0–0.2)
BASOPHILS NFR BLD: 1.4 % (ref 0–1.9)
BILIRUB SERPL-MCNC: 0.5 MG/DL (ref 0.1–1)
BUN SERPL-MCNC: 8 MG/DL (ref 8–23)
CALCIUM SERPL-MCNC: 9.3 MG/DL (ref 8.7–10.5)
CHLORIDE SERPL-SCNC: 106 MMOL/L (ref 95–110)
CO2 SERPL-SCNC: 27 MMOL/L (ref 23–29)
CREAT SERPL-MCNC: 0.9 MG/DL (ref 0.5–1.4)
DIFFERENTIAL METHOD BLD: NORMAL
EOSINOPHIL # BLD AUTO: 0.3 K/UL (ref 0–0.5)
EOSINOPHIL NFR BLD: 6.5 % (ref 0–8)
ERYTHROCYTE [DISTWIDTH] IN BLOOD BY AUTOMATED COUNT: 13.1 % (ref 11.5–14.5)
EST. GFR  (NO RACE VARIABLE): >60 ML/MIN/1.73 M^2
GLUCOSE SERPL-MCNC: 100 MG/DL (ref 70–110)
HCT VFR BLD AUTO: 41.6 % (ref 37–48.5)
HCV AB SERPL QL IA: NORMAL
HGB BLD-MCNC: 13.7 G/DL (ref 12–16)
HIV 1+2 AB+HIV1 P24 AG SERPL QL IA: NORMAL
IMM GRANULOCYTES # BLD AUTO: 0.01 K/UL (ref 0–0.04)
IMM GRANULOCYTES NFR BLD AUTO: 0.2 % (ref 0–0.5)
LIPASE SERPL-CCNC: 27 U/L (ref 4–60)
LYMPHOCYTES # BLD AUTO: 1.5 K/UL (ref 1–4.8)
LYMPHOCYTES NFR BLD: 30.2 % (ref 18–48)
MCH RBC QN AUTO: 30.9 PG (ref 27–31)
MCHC RBC AUTO-ENTMCNC: 32.9 G/DL (ref 32–36)
MCV RBC AUTO: 94 FL (ref 82–98)
MONOCYTES # BLD AUTO: 0.5 K/UL (ref 0.3–1)
MONOCYTES NFR BLD: 9 % (ref 4–15)
NEUTROPHILS # BLD AUTO: 2.7 K/UL (ref 1.8–7.7)
NEUTROPHILS NFR BLD: 52.7 % (ref 38–73)
NRBC BLD-RTO: 0 /100 WBC
PLATELET # BLD AUTO: 237 K/UL (ref 150–450)
PMV BLD AUTO: 10.9 FL (ref 9.2–12.9)
POTASSIUM SERPL-SCNC: 3.7 MMOL/L (ref 3.5–5.1)
PROT SERPL-MCNC: 6.6 G/DL (ref 6–8.4)
RBC # BLD AUTO: 4.43 M/UL (ref 4–5.4)
SODIUM SERPL-SCNC: 139 MMOL/L (ref 136–145)
TROPONIN I SERPL DL<=0.01 NG/ML-MCNC: <0.006 NG/ML (ref 0–0.03)
WBC # BLD AUTO: 5.1 K/UL (ref 3.9–12.7)

## 2024-02-24 PROCEDURE — 93010 ELECTROCARDIOGRAM REPORT: CPT | Mod: ,,, | Performed by: INTERNAL MEDICINE

## 2024-02-24 PROCEDURE — 93005 ELECTROCARDIOGRAM TRACING: CPT

## 2024-02-24 PROCEDURE — 84484 ASSAY OF TROPONIN QUANT: CPT

## 2024-02-24 PROCEDURE — 99284 EMERGENCY DEPT VISIT MOD MDM: CPT | Mod: 25

## 2024-02-24 PROCEDURE — 85025 COMPLETE CBC W/AUTO DIFF WBC: CPT

## 2024-02-24 PROCEDURE — 80053 COMPREHEN METABOLIC PANEL: CPT

## 2024-02-24 PROCEDURE — 83690 ASSAY OF LIPASE: CPT

## 2024-02-24 PROCEDURE — 87389 HIV-1 AG W/HIV-1&-2 AB AG IA: CPT | Performed by: PHYSICIAN ASSISTANT

## 2024-02-24 PROCEDURE — 86803 HEPATITIS C AB TEST: CPT | Performed by: PHYSICIAN ASSISTANT

## 2024-02-24 NOTE — ED NOTES
"Pt c/o "multiple issues" and is "concerned about if she has eaten anything that could cause her physical harm". "Wants blood tests". Pt states after eating macaroni and cheese fish and bread and peas yesterday at noon--immediately got a headache and started to feel horrible with stomach upset. No vomiting. No diarrhea. + nausea. Pt drove herself to er. Pt lives her daughter.  "

## 2024-02-24 NOTE — ED PROVIDER NOTES
"Encounter Date: 2024       History     Chief Complaint   Patient presents with    Headache     Pt endorsing mild headache that started yesterday. Pt also endorsing abdominal "discomfort" that started yesterday around ~noon. States she "would like a blood test to see if anything is in her system" related to the food she ate yesterday. States she is not "taking blood pressure medicine due the to fear of what's in it" -- taking garlic supplements for BP.     70 y/o female history of HTN (not on medications) and GERD presents to emergency department due to epigastric discomfort  and nausea onset yesterday after eating a catfish plate. She reports it happened 20 minutes after eating her plate and has continued. She also endorsed a short lasting substernal burning discomfort. Also has a frontal headache. Pt reports she prefers not taking medications. She is being evaluated for her HTN but not currently on medications. She was able to tolerate an acai bowel today. She is nauseated but no episodes of emesis. No profuse water bowel movements. No fever.       Review of patient's allergies indicates:  No Known Allergies  Past Medical History:   Diagnosis Date    Acute costochondritis 2012    Back pain     Benign essential hypertension     Gastroesophageal reflux disease without esophagitis     Pain in joint involving multiple sites 2013     Past Surgical History:   Procedure Laterality Date    BREAST BIOPSY Right      SECTION      KNEE ARTHROSCOPY W/ ACL RECONSTRUCTION      REFRACTIVE SURGERY Bilateral  or     Dr. Sinha OU distance     Family History   Problem Relation Age of Onset    Hypertension Mother     Heart disease Maternal Grandmother     Celiac disease Neg Hx     Colon cancer Neg Hx     Colon polyps Neg Hx     Crohn's disease Neg Hx     Cystic fibrosis Neg Hx     Esophageal cancer Neg Hx     Inflammatory bowel disease Neg Hx     Irritable bowel syndrome Neg Hx     Liver cancer Neg Hx     " Liver disease Neg Hx     Rectal cancer Neg Hx     Stomach cancer Neg Hx      Social History     Tobacco Use    Smoking status: Never    Smokeless tobacco: Never   Substance Use Topics    Alcohol use: No    Drug use: No     Review of Systems  See HPI   Physical Exam     Initial Vitals   BP Pulse Resp Temp SpO2   02/24/24 1213 02/24/24 1213 02/24/24 1213 02/24/24 1213 02/24/24 1214   (!) 182/82 77 18 98.6 °F (37 °C) 100 %      MAP       --                Physical Exam    Vitals reviewed.  Constitutional: She appears well-developed and well-nourished.   HENT:   Head: Normocephalic and atraumatic.   Eyes: Conjunctivae and EOM are normal.   Neck:   Normal range of motion.  Cardiovascular:  Normal rate.           Pulmonary/Chest: No respiratory distress.   Abdominal: Abdomen is soft. She exhibits no distension. There is no abdominal tenderness.   No garcia sign  There is no rebound.   Musculoskeletal:         General: Normal range of motion.      Cervical back: Normal range of motion.     Neurological: She is alert and oriented to person, place, and time.   Skin: Skin is warm and dry.   Psychiatric: She has a normal mood and affect. Thought content normal.         ED Course   Procedures  Labs Reviewed   COMPREHENSIVE METABOLIC PANEL - Abnormal; Notable for the following components:       Result Value    Albumin 3.4 (*)     Anion Gap 6 (*)     All other components within normal limits    Narrative:     Release to patient->Immediate   HIV 1 / 2 ANTIBODY    Narrative:     Release to patient->Immediate   HEPATITIS C ANTIBODY    Narrative:     Release to patient->Immediate   CBC W/ AUTO DIFFERENTIAL    Narrative:     Release to patient->Immediate   LIPASE    Narrative:     Release to patient->Immediate   TROPONIN I    Narrative:     Release to patient->Immediate          Imaging Results    None          Medications - No data to display  Medical Decision Making  69-year-old female presents emergency department with epigastric  nauseated discomfort when eating fish playing yesterday.  Still tolerating p.o..  She was afebrile nontoxic appearing.  Considered GERD, food sensitivity, pancreatitis concern of cholecystitis negative Troncoso's sign, T bili WNL.  Patient hypertensive on arrival being evaluated by PCP for possible med management as patient prefers holistic lifestyle.  Labwork without leukocytosis concerning for infection, no electrolyte derangement.  Abdomen was benign on physical exam.  Patient presentation remains consistent with food sensitivity recommended a BRAT diet.  Patient discharged home.    Amount and/or Complexity of Data Reviewed  Labs: ordered.  ECG/medicine tests: ordered.     Details: NS 76 bpm , EKG unchanged isolated t wave change.                                     Clinical Impression:  Final diagnoses:  [R07.9] Chest pain  [T78.1XXA] Gastrointestinal food sensitivity (Primary)          ED Disposition Condition    Discharge Stable          ED Prescriptions    None       Follow-up Information       Follow up With Specialties Details Why Contact Info    Jonny Willis MD Internal Medicine   [address]  [address], [address]  [facility] [address]  [phone]               Blanca Pitt PA-C  02/24/24 [id number]

## 2024-02-24 NOTE — DISCHARGE INSTRUCTIONS
As disucssed your lab work does not show infection, kidney injury or pancreatic inflammation  They symptoms you are experiencing are due to food sensitivity.   I recommend a BRAT diet until symptoms improved   -Banana, Rice, Apple and Toast     Your vitals are also good except your blood pressure. I would like you to call you primary care physician to schedule an appointment to discuss medication options.

## 2024-02-25 LAB
OHS QRS DURATION: 72 MS
OHS QTC CALCULATION: 427 MS

## 2024-04-30 ENCOUNTER — TELEPHONE (OUTPATIENT)
Dept: INTERNAL MEDICINE | Facility: CLINIC | Age: 69
End: 2024-04-30
Payer: COMMERCIAL

## 2024-04-30 ENCOUNTER — PATIENT MESSAGE (OUTPATIENT)
Dept: INTERNAL MEDICINE | Facility: CLINIC | Age: 69
End: 2024-04-30
Payer: COMMERCIAL

## 2024-04-30 NOTE — TELEPHONE ENCOUNTER
Called patient left voice message and sent epic msg, intro self, referred by Judi Valladares RN.   Request call back at direct number [phone].  Lyndsey Wolf RN HC     From NATY Valladares RN  Hi can you reach out to this patient she is working on trying to get her cholesterol down. She is having all over body pain with toes, legs and back. She is interested Physical therapy to see if it would help with her all over body aches and pains. She is also interested in getting blood test done to see if she has any inflammation.

## 2024-05-01 ENCOUNTER — TELEPHONE (OUTPATIENT)
Dept: INTERNAL MEDICINE | Facility: CLINIC | Age: 69
End: 2024-05-01
Payer: COMMERCIAL

## 2024-05-01 NOTE — TELEPHONE ENCOUNTER
Patient returned call, Patient referred by NATY Valladares RN for Health coaching (cholesterol, blood pressure, lifestyle changes).  Gave an explanation about Health Coaching program and invited participation.   Patient states she would like to participate.  She will reach back out in about 2 weeks.   Gave direct contact number to call if she has any questions [phone].   Lyndsey Wolf RN  Health

## 2024-05-20 ENCOUNTER — TELEPHONE (OUTPATIENT)
Dept: OPHTHALMOLOGY | Facility: CLINIC | Age: 69
End: 2024-05-20
Payer: COMMERCIAL

## 2024-05-20 NOTE — TELEPHONE ENCOUNTER
----- Message from Arleth Degroot sent at 5/20/2024  8:46 AM CDT -----  Regarding: Scheduling Request  Contact: :Josi Gil  Scheduling Request           Appt Type:  Ep     Date/Time Preference:asap     Treating Provider:Rolly     Caller Name:Josi Jeanniejúnior Gil      Contact Preference:[phone] (home)       Comments/notes:Patient is calling needing to see a doctor asap she states that she can't see this morning and she's very afraid. Requesting a call back

## 2024-05-21 ENCOUNTER — OFFICE VISIT (OUTPATIENT)
Dept: OPTOMETRY | Facility: CLINIC | Age: 69
End: 2024-05-21
Payer: COMMERCIAL

## 2024-05-21 ENCOUNTER — HOSPITAL ENCOUNTER (EMERGENCY)
Facility: HOSPITAL | Age: 69
Discharge: HOME | End: 2024-05-21
Attending: EMERGENCY MEDICINE
Payer: COMMERCIAL

## 2024-05-21 VITALS
TEMPERATURE: 98 F | SYSTOLIC BLOOD PRESSURE: 186 MMHG | DIASTOLIC BLOOD PRESSURE: 81 MMHG | HEIGHT: 69 IN | BODY MASS INDEX: 21.48 KG/M2 | OXYGEN SATURATION: 100 % | WEIGHT: 145 LBS | HEART RATE: 66 BPM | RESPIRATION RATE: 18 BRPM

## 2024-05-21 DIAGNOSIS — I10 HYPERTENSION, UNSPECIFIED TYPE: Primary | ICD-10-CM

## 2024-05-21 DIAGNOSIS — H53.15 VISUAL DISTORTIONS OF SHAPE AND SIZE: ICD-10-CM

## 2024-05-21 DIAGNOSIS — H25.13 NUCLEAR SCLEROSIS, BILATERAL: Primary | ICD-10-CM

## 2024-05-21 DIAGNOSIS — R07.9 CHEST PAIN: ICD-10-CM

## 2024-05-21 LAB
ALBUMIN SERPL BCP-MCNC: 3.8 G/DL (ref 3.5–5.2)
ALP SERPL-CCNC: 74 U/L (ref 55–135)
ALT SERPL W/O P-5'-P-CCNC: 16 U/L (ref 10–44)
ANION GAP SERPL CALC-SCNC: 12 MMOL/L (ref 8–16)
AST SERPL-CCNC: 20 U/L (ref 10–40)
BASOPHILS # BLD AUTO: 0.06 K/UL (ref 0–0.2)
BASOPHILS NFR BLD: 1 % (ref 0–1.9)
BILIRUB SERPL-MCNC: 1 MG/DL (ref 0.1–1)
BNP SERPL-MCNC: 68 PG/ML (ref 0–99)
BUN SERPL-MCNC: 7 MG/DL (ref 8–23)
CALCIUM SERPL-MCNC: 10 MG/DL (ref 8.7–10.5)
CHLORIDE SERPL-SCNC: 103 MMOL/L (ref 95–110)
CO2 SERPL-SCNC: 25 MMOL/L (ref 23–29)
CREAT SERPL-MCNC: 0.9 MG/DL (ref 0.5–1.4)
DIFFERENTIAL METHOD BLD: ABNORMAL
EOSINOPHIL # BLD AUTO: 0.3 K/UL (ref 0–0.5)
EOSINOPHIL NFR BLD: 4.2 % (ref 0–8)
ERYTHROCYTE [DISTWIDTH] IN BLOOD BY AUTOMATED COUNT: 13.2 % (ref 11.5–14.5)
EST. GFR  (NO RACE VARIABLE): >60 ML/MIN/1.73 M^2
GLUCOSE SERPL-MCNC: 120 MG/DL (ref 70–110)
HCT VFR BLD AUTO: 41.2 % (ref 37–48.5)
HGB BLD-MCNC: 13.9 G/DL (ref 12–16)
IMM GRANULOCYTES # BLD AUTO: 0.01 K/UL (ref 0–0.04)
IMM GRANULOCYTES NFR BLD AUTO: 0.2 % (ref 0–0.5)
LYMPHOCYTES # BLD AUTO: 2.2 K/UL (ref 1–4.8)
LYMPHOCYTES NFR BLD: 36.7 % (ref 18–48)
MCH RBC QN AUTO: 31.3 PG (ref 27–31)
MCHC RBC AUTO-ENTMCNC: 33.7 G/DL (ref 32–36)
MCV RBC AUTO: 93 FL (ref 82–98)
MONOCYTES # BLD AUTO: 0.4 K/UL (ref 0.3–1)
MONOCYTES NFR BLD: 7.2 % (ref 4–15)
NEUTROPHILS # BLD AUTO: 3 K/UL (ref 1.8–7.7)
NEUTROPHILS NFR BLD: 50.7 % (ref 38–73)
NRBC BLD-RTO: 0 /100 WBC
PLATELET # BLD AUTO: 222 K/UL (ref 150–450)
PMV BLD AUTO: 11 FL (ref 9.2–12.9)
POTASSIUM SERPL-SCNC: 3.6 MMOL/L (ref 3.5–5.1)
PROT SERPL-MCNC: 7.2 G/DL (ref 6–8.4)
RBC # BLD AUTO: 4.44 M/UL (ref 4–5.4)
SODIUM SERPL-SCNC: 140 MMOL/L (ref 136–145)
TROPONIN I SERPL DL<=0.01 NG/ML-MCNC: 0.01 NG/ML (ref 0–0.03)
WBC # BLD AUTO: 5.96 K/UL (ref 3.9–12.7)

## 2024-05-21 PROCEDURE — 92134 CPTRZ OPH DX IMG PST SGM RTA: CPT | Mod: S$GLB,,, | Performed by: OPTOMETRIST

## 2024-05-21 PROCEDURE — 3044F HG A1C LEVEL LT 7.0%: CPT | Mod: CPTII,S$GLB,, | Performed by: OPTOMETRIST

## 2024-05-21 PROCEDURE — 1159F MED LIST DOCD IN RCRD: CPT | Mod: CPTII,S$GLB,, | Performed by: OPTOMETRIST

## 2024-05-21 PROCEDURE — 1160F RVW MEDS BY RX/DR IN RCRD: CPT | Mod: CPTII,S$GLB,, | Performed by: OPTOMETRIST

## 2024-05-21 PROCEDURE — 93005 ELECTROCARDIOGRAM TRACING: CPT

## 2024-05-21 PROCEDURE — 84484 ASSAY OF TROPONIN QUANT: CPT | Performed by: PHYSICIAN ASSISTANT

## 2024-05-21 PROCEDURE — 92015 DETERMINE REFRACTIVE STATE: CPT | Mod: S$GLB,,, | Performed by: OPTOMETRIST

## 2024-05-21 PROCEDURE — 85025 COMPLETE CBC W/AUTO DIFF WBC: CPT | Performed by: PHYSICIAN ASSISTANT

## 2024-05-21 PROCEDURE — 3288F FALL RISK ASSESSMENT DOCD: CPT | Mod: CPTII,S$GLB,, | Performed by: OPTOMETRIST

## 2024-05-21 PROCEDURE — 83880 ASSAY OF NATRIURETIC PEPTIDE: CPT | Performed by: PHYSICIAN ASSISTANT

## 2024-05-21 PROCEDURE — 99284 EMERGENCY DEPT VISIT MOD MDM: CPT | Mod: 25

## 2024-05-21 PROCEDURE — 1101F PT FALLS ASSESS-DOCD LE1/YR: CPT | Mod: CPTII,S$GLB,, | Performed by: OPTOMETRIST

## 2024-05-21 PROCEDURE — 25000003 PHARM REV CODE 250: Performed by: PHYSICIAN ASSISTANT

## 2024-05-21 PROCEDURE — 1126F AMNT PAIN NOTED NONE PRSNT: CPT | Mod: CPTII,S$GLB,, | Performed by: OPTOMETRIST

## 2024-05-21 PROCEDURE — 99213 OFFICE O/P EST LOW 20 MIN: CPT | Mod: S$GLB,,, | Performed by: OPTOMETRIST

## 2024-05-21 PROCEDURE — 93010 ELECTROCARDIOGRAM REPORT: CPT | Mod: ,,, | Performed by: INTERNAL MEDICINE

## 2024-05-21 PROCEDURE — 80053 COMPREHEN METABOLIC PANEL: CPT | Performed by: PHYSICIAN ASSISTANT

## 2024-05-21 PROCEDURE — 99999 PR PBB SHADOW E&M-EST. PATIENT-LVL II: CPT | Mod: PBBFAC,,, | Performed by: OPTOMETRIST

## 2024-05-21 RX ORDER — ASPIRIN 325 MG
325 TABLET ORAL
Status: DISCONTINUED | OUTPATIENT
Start: 2024-05-21 | End: 2024-05-22 | Stop reason: HOSPADM

## 2024-05-21 RX ORDER — AMLODIPINE BESYLATE 5 MG/1
5 TABLET ORAL DAILY
Qty: 90 TABLET | Refills: 3 | Status: SHIPPED | OUTPATIENT
Start: 2024-05-21 | End: 2024-05-30

## 2024-05-21 NOTE — PROGRESS NOTES
HPI    DLS: 02/05/2024 Dr. Gonzalez    Patient states her vision is blurry. She says she just wants to see   clearly. Patient says she thinks it is form the drops she received about 2   years ago. Patient feel like it has changed the chemistry of her vision.   (+)floaters but is not consistent. (+)pain but not used to having   (difficult to grade.     H/o LASIK OU  Lumify QD OU  Systane PRN OU (about BID)  Last edited by Heidi Melgar on 5/21/2024  1:54 PM.            Assessment /Plan     For exam results, see Encounter Report.    Nuclear sclerosis, bilateral      ++++++++See ALL prev notes from myself/Dr Sharp/Dr Chowdary/Dr Bañuelos: pt had a bad reaction top one of the eyedrops given to her in the past, which she felt made her glare issues worse.  So today deferred all drops+++++++        Pt has cats OS>OD, with glare issues.  BCVA 20/30 OD; 20/40 OS.  Mac OCT wnl OU today.   But also discussed that, even with cataracts her vision is good enough to legally drive and pass the drivers test.    Also has dry eye sx--will cont w ATs, and Lumify OK once daily as needed for redness    PLAN:    Pt will go home and think about it, and message me if she wishes cat eval w Dr Chowdary

## 2024-05-22 LAB
OHS QRS DURATION: 76 MS
OHS QTC CALCULATION: 425 MS

## 2024-05-22 NOTE — DISCHARGE INSTRUCTIONS
Please follow-up with your cardiologist for further workup of chest pain    You were given a prescription for amlodipine for blood pressure control.  Take as directed.  Side effects may include lower extremity swelling.  Follow up with your primary care provider cardiologist for further management of blood pressure    Strict ED precautions given to return immediately for new, worsening, or concerning symptoms

## 2024-05-22 NOTE — ED PROVIDER NOTES
Encounter Date: 2024       History     Chief Complaint   Patient presents with    Chest Pain     C/o chest tightness x 4 days with left sided arm pain. Denies cardiac hx     69-year-old female with a PMHx of HTN and HLD presents to ED with elevated blood pressure and chest pain x4 days.  She checks her blood pressure daily.  For the past few days her pressure has been more elevated than it normal. Today her pressure was greater than 200s systolic.  She does not take medication for blood pressure though is trying to lower it holistically.  She does not currently have chest pain. She last felt chest pain earlier today around 3:00 p.m. she describes pain as tightness on both the right and left side.  She has chronic back and shoulder pain.  She is unsure if this is her typical shoulder pain or if it was related to her chest pain.  She denies personal or family history of cardiac disease. she denies headache, dizziness, visual changes, shortness of breath, palpitations, lower extremity swelling.    The history is provided by the patient.     Review of patient's allergies indicates:  No Known Allergies  Past Medical History:   Diagnosis Date    Acute costochondritis 2012    Back pain     Benign essential hypertension     Gastroesophageal reflux disease without esophagitis     Pain in joint involving multiple sites 2013     Past Surgical History:   Procedure Laterality Date    BREAST BIOPSY Right      SECTION      KNEE ARTHROSCOPY W/ ACL RECONSTRUCTION      REFRACTIVE SURGERY Bilateral  or     Dr. Bharath DAWSON distance     Family History   Problem Relation Name Age of Onset    Hypertension Mother      Heart disease Maternal Grandmother      Celiac disease Neg Hx      Colon cancer Neg Hx      Colon polyps Neg Hx      Crohn's disease Neg Hx      Cystic fibrosis Neg Hx      Esophageal cancer Neg Hx      Inflammatory bowel disease Neg Hx      Irritable bowel syndrome Neg Hx      Liver cancer Neg Hx       Liver disease Neg Hx      Rectal cancer Neg Hx      Stomach cancer Neg Hx       Social History     Tobacco Use    Smoking status: Never    Smokeless tobacco: Never   Substance Use Topics    Alcohol use: No    Drug use: No     Review of Systems   Constitutional:  Negative for fever.   Respiratory:  Negative for shortness of breath.    Cardiovascular:  Positive for chest pain. Negative for palpitations and leg swelling.   Gastrointestinal:  Negative for abdominal pain, nausea and vomiting.   Neurological:  Negative for dizziness, light-headedness and headaches.       Physical Exam     Initial Vitals   BP Pulse Resp Temp SpO2   05/21/24 1941 05/21/24 1939 05/21/24 1939 05/21/24 1939 05/21/24 1939   (!) 200/93 72 16 98.5 °F (36.9 °C) 99 %      MAP       --                Physical Exam    Nursing note and vitals reviewed.  Constitutional: She appears well-developed and well-nourished. She is not diaphoretic. No distress.   HENT:   Head: Normocephalic and atraumatic.   Nose: Nose normal.   Eyes: Conjunctivae and EOM are normal.   Neck: Neck supple.   Cardiovascular:  Normal rate, regular rhythm, normal heart sounds and intact distal pulses.           Pulses:       Radial pulses are 2+ on the right side and 2+ on the left side.   Pulmonary/Chest: Breath sounds normal. No respiratory distress.   Musculoskeletal:      Cervical back: Neck supple.      Right lower leg: No edema.      Left lower leg: No edema.     Neurological: She is alert and oriented to person, place, and time. Gait normal.   Skin: No rash noted.   Psychiatric: She has a normal mood and affect. Thought content normal.         ED Course   Procedures  Labs Reviewed   CBC W/ AUTO DIFFERENTIAL - Abnormal; Notable for the following components:       Result Value    MCH 31.3 (*)     All other components within normal limits   COMPREHENSIVE METABOLIC PANEL - Abnormal; Notable for the following components:    Glucose 120 (*)     BUN 7 (*)     All other components  within normal limits   TROPONIN I   B-TYPE NATRIURETIC PEPTIDE     EKG Readings: (Independently Interpreted)   Initial Reading: No STEMI. Previous EKG Date: 2/24/24. Rhythm: Normal Sinus Rhythm. Heart Rate: 68. T Waves Flipped: III. Axis: Normal. Clinical Impression: Normal Sinus Rhythm     ECG Results              EKG 12-lead (Final result)        Collection Time Result Time QRS Duration OHS QTC Calculation    05/21/24 19:52:47 05/22/24 12:12:18 76 425                     Final result by Interface, Lab In [facility] (05/22/24 12:12:24)                   Narrative:    Test Reason : R07.9,    Vent. Rate : 068 BPM     Atrial Rate : 068 BPM     P-R Int : 164 ms          QRS Dur : 076 ms      QT Int : 400 ms       P-R-T Axes : 055 060 009 degrees     QTc Int : 425 ms    Normal sinus rhythm  Nonspecific T wave abnormality  Abnormal ECG  When compared with ECG of 24-FEB-2024 12:58,  No significant change was found  Confirmed by Jostin DUEÑAS MD ([id number]) on 5/22/2024 12:12:16 PM    Referred By: [email or web address]   SELF           Confirmed By:Jostin DUEÑAS MD                                  Imaging Results    None          Medications - No data to display    Medical Decision Making  69-year-old female with a PMHx of HTN and HLD presents to ED with elevated blood pressure and chest pain x4 days. Nontoxic appearing.  Vitals with hypertension. Afebrile. Exam as above. I will initiate workup and reassess.    Ddx:  ACS, hypertensive urgency, arrhythmia, anxiety, GERD, pneumonia, I considered PE though felt less likely as pain comes and goes.  She was not tachypneic or tachycardic.  She does not currently have pain.    Labs without leukocytosis. H&H stable. No significant electrolyte derangements.    Pressure initially 200/93, though is asymptomatic. I doubt hypertensive urgency. Downtrending without medication     Initial troponin normal. EKG with NSR. No ST segmental elevation or depression. Similar in appearance to prior studies. She has a  heart score of 5-6 (moderately suspicious story, age, risk factors (HTN, HLD, obestiy), normal troponin). Offered admission though patient would like to be discharged home. Given heart score, I advised trending/ second troponin. She would like to be discharge at this time. This was a difficult encounter with patient and she is unhappy with encounter. She does not believe that second troponin is necessary. I explain heart score and risk for adverse cardiac events. Efforts were made to use shared decision making and discharge home after second troponin though she does not want to stay. Declined ASA given additives and chest xray. AMA form was filled out and witnessed by nursing staff though patient refused to sign form. Prescription given for amlodipine for elevated blood pressure. She was encouraged to follow up with her cardiology and return to the ED immediately for new, worsening, or concerning symptoms    Amount and/or Complexity of Data Reviewed  Labs: ordered.  Radiology: ordered.    Risk  OTC drugs.  Prescription drug management.      Additional MDM:   Heart Score:    History:          Moderately suspicious.  ECG:             Normal  Age:               >65 years  Risk factors: >= 3 risk factors or history of atherosclerotic disease  Troponin:       Less than or equal to normal limit  Heart Score = 5                                       Clinical Impression:  Final diagnoses:  [R07.9] Chest pain  [I10] Hypertension, unspecified type (Primary)          ED Disposition Condition    AMA Ayala Jacobo PA-C  05/22/24 0984

## 2024-05-23 ENCOUNTER — TELEPHONE (OUTPATIENT)
Dept: INTERNAL MEDICINE | Facility: CLINIC | Age: 69
End: 2024-05-23
Payer: COMMERCIAL

## 2024-05-23 DIAGNOSIS — E78.5 HYPERLIPIDEMIA, UNSPECIFIED HYPERLIPIDEMIA TYPE: ICD-10-CM

## 2024-05-23 DIAGNOSIS — R73.9 HYPERGLYCEMIA: Primary | ICD-10-CM

## 2024-05-23 NOTE — TELEPHONE ENCOUNTER
----- Message from Meaghan Almazan sent at 5/23/2024  1:29 PM CDT -----  Contact: [phone]  1MEDICALADVICE     Patient is calling for Medical Advice regarding:test results     How long has patient had these symptoms:    Pharmacy name and phone#:    Would like response via [employer]:  no     Comments:  Pt is calling she states she had labs done in the ED and she is asking for the results please advise

## 2024-05-29 ENCOUNTER — TELEPHONE (OUTPATIENT)
Dept: INTERNAL MEDICINE | Facility: CLINIC | Age: 69
End: 2024-05-29
Payer: COMMERCIAL

## 2024-05-29 ENCOUNTER — LAB VISIT (OUTPATIENT)
Dept: LAB | Facility: HOSPITAL | Age: 69
End: 2024-05-29
Attending: INTERNAL MEDICINE
Payer: COMMERCIAL

## 2024-05-29 ENCOUNTER — OFFICE VISIT (OUTPATIENT)
Dept: INTERNAL MEDICINE | Facility: CLINIC | Age: 69
End: 2024-05-29
Payer: COMMERCIAL

## 2024-05-29 VITALS
HEIGHT: 69 IN | WEIGHT: 177.94 LBS | HEART RATE: 76 BPM | SYSTOLIC BLOOD PRESSURE: 140 MMHG | OXYGEN SATURATION: 99 % | DIASTOLIC BLOOD PRESSURE: 80 MMHG | BODY MASS INDEX: 26.35 KG/M2

## 2024-05-29 DIAGNOSIS — R07.9 CHEST PAIN, UNSPECIFIED TYPE: ICD-10-CM

## 2024-05-29 DIAGNOSIS — R73.9 HYPERGLYCEMIA: ICD-10-CM

## 2024-05-29 DIAGNOSIS — I10 PRIMARY HYPERTENSION: ICD-10-CM

## 2024-05-29 DIAGNOSIS — Z00.00 ANNUAL PHYSICAL EXAM: Primary | ICD-10-CM

## 2024-05-29 DIAGNOSIS — Z00.00 ANNUAL PHYSICAL EXAM: ICD-10-CM

## 2024-05-29 LAB
CHOLEST SERPL-MCNC: 230 MG/DL (ref 120–199)
CHOLEST/HDLC SERPL: 2.7 {RATIO} (ref 2–5)
CK SERPL-CCNC: 72 U/L (ref 20–180)
ESTIMATED AVG GLUCOSE: 100 MG/DL (ref 68–131)
GLUCOSE SERPL-MCNC: 82 MG/DL (ref 70–110)
HBA1C MFR BLD: 5.1 % (ref 4–5.6)
HDLC SERPL-MCNC: 84 MG/DL (ref 40–75)
HDLC SERPL: 36.5 % (ref 20–50)
LDLC SERPL CALC-MCNC: 125.4 MG/DL (ref 63–159)
MAGNESIUM SERPL-MCNC: 2.1 MG/DL (ref 1.6–2.6)
NONHDLC SERPL-MCNC: 146 MG/DL
T4 FREE SERPL-MCNC: 0.97 NG/DL (ref 0.71–1.51)
TRIGL SERPL-MCNC: 103 MG/DL (ref 30–150)
TSH SERPL DL<=0.005 MIU/L-ACNC: 0.83 UIU/ML (ref 0.4–4)

## 2024-05-29 PROCEDURE — 84439 ASSAY OF FREE THYROXINE: CPT | Performed by: INTERNAL MEDICINE

## 2024-05-29 PROCEDURE — 83735 ASSAY OF MAGNESIUM: CPT | Performed by: INTERNAL MEDICINE

## 2024-05-29 PROCEDURE — 99999 PR PBB SHADOW E&M-EST. PATIENT-LVL IV: CPT | Mod: PBBFAC,,, | Performed by: INTERNAL MEDICINE

## 2024-05-29 PROCEDURE — 83036 HEMOGLOBIN GLYCOSYLATED A1C: CPT | Performed by: INTERNAL MEDICINE

## 2024-05-29 PROCEDURE — 82550 ASSAY OF CK (CPK): CPT | Performed by: INTERNAL MEDICINE

## 2024-05-29 PROCEDURE — 84443 ASSAY THYROID STIM HORMONE: CPT | Performed by: INTERNAL MEDICINE

## 2024-05-29 PROCEDURE — 1160F RVW MEDS BY RX/DR IN RCRD: CPT | Mod: CPTII,S$GLB,, | Performed by: INTERNAL MEDICINE

## 2024-05-29 PROCEDURE — 3079F DIAST BP 80-89 MM HG: CPT | Mod: CPTII,S$GLB,, | Performed by: INTERNAL MEDICINE

## 2024-05-29 PROCEDURE — 80061 LIPID PANEL: CPT | Performed by: INTERNAL MEDICINE

## 2024-05-29 PROCEDURE — 3008F BODY MASS INDEX DOCD: CPT | Mod: CPTII,S$GLB,, | Performed by: INTERNAL MEDICINE

## 2024-05-29 PROCEDURE — 1101F PT FALLS ASSESS-DOCD LE1/YR: CPT | Mod: CPTII,S$GLB,, | Performed by: INTERNAL MEDICINE

## 2024-05-29 PROCEDURE — 82947 ASSAY GLUCOSE BLOOD QUANT: CPT | Performed by: INTERNAL MEDICINE

## 2024-05-29 PROCEDURE — 99214 OFFICE O/P EST MOD 30 MIN: CPT | Mod: S$GLB,,, | Performed by: INTERNAL MEDICINE

## 2024-05-29 PROCEDURE — 3044F HG A1C LEVEL LT 7.0%: CPT | Mod: CPTII,S$GLB,, | Performed by: INTERNAL MEDICINE

## 2024-05-29 PROCEDURE — 3288F FALL RISK ASSESSMENT DOCD: CPT | Mod: CPTII,S$GLB,, | Performed by: INTERNAL MEDICINE

## 2024-05-29 PROCEDURE — 1126F AMNT PAIN NOTED NONE PRSNT: CPT | Mod: CPTII,S$GLB,, | Performed by: INTERNAL MEDICINE

## 2024-05-29 PROCEDURE — 1159F MED LIST DOCD IN RCRD: CPT | Mod: CPTII,S$GLB,, | Performed by: INTERNAL MEDICINE

## 2024-05-29 PROCEDURE — 36415 COLL VENOUS BLD VENIPUNCTURE: CPT | Performed by: INTERNAL MEDICINE

## 2024-05-29 PROCEDURE — 3077F SYST BP >= 140 MM HG: CPT | Mod: CPTII,S$GLB,, | Performed by: INTERNAL MEDICINE

## 2024-05-29 NOTE — PROGRESS NOTES
Medical history  Hypertension  Elevated cholesterol with elevated HDL  Meningioma on imaging study  Cervical and lumbar degenerative disc on imaging          Social history  Tobacco use and alcohol use none    Family history per epic      Screening   Mammogram December 2023 no abnormal findings   Colonoscopy 2013      69 year female   Presents for routine visit but also follow-up     Presented to the emergency room on December 21st for for reoccurring chest discomfort that has been off and on for the past few weeks.  That has no associated shortness a breath.  At times she would left arm pain but maybe not associated with the chest pain.  It occurred off and on.  Discomfort would occur randomly, not under any circumstances  .  Did not occur while sleeping.  Was not associated shortness for breath , nausea, vomiting.    In the ER the lab testing noted chemistry CBC troponin BNP all normal except glucose 120.  ECG no showed no acute changes.  Blood pressure was elevated    She reports that she was been down as well as anxious due to personal circumstances in his life.      She has a history of hypertension, previously on medication but she has  elected to treat that is still proper diet physical activity.     Review of symptoms   No abdominal pain.  No indigestion heartburn.  Regular bowel function.  No difficulty urinating.  Will have nocturia x1.  Has reoccurring pain involving the shoulders and n her cervical, thoracic, lumbar back    Examination   Weight 177 lb   Pulse 72   Blood pressure ranged anywhere from 148-158 systolic over 80 diastolic   Neck no thyromegaly no masses  Chest clear breath sounds   Heart regular rate rhythm   Abdominal exam nontender soft no hepatosplenomegaly abdominal masses   Pulses 2+ carotid pulses no bruits 2+ dorsalis pedal pulses   Extremities no edema   Lymph gland, no palpable adenopathy   Skin, no gross abnormal findings  There is discomfort when I abduct both arms at the shoulder  joint.    Impression   General exam   Hypertension  Elevated cholesterol elevated HDL  Chest pain  Hyperglycemia    Plan   Recommend hemoglobin A1c repeat glucose repeat lipid profile.  TSH   Consider trial over-the-counter Pepcid once a day Prilosec once a day for 2 weeks to see if this will help.  Still engage in regular physical activity as tolerated as well as being mindful to diet

## 2024-05-29 NOTE — TELEPHONE ENCOUNTER
----- Message from Meaghan Almazan sent at 5/29/2024  9:42 AM CDT -----  Contact: [phone]  1MEDICALADVICE     Patient is calling for Medical Advice regarding:speak to the nurse     How long has patient had these symptoms:    Pharmacy name and phone#:    Would like response via [employer]:  ##    Comments:  Pt states she is needing to see the dr today she states she has a lot of concerns please give return call

## 2024-05-30 NOTE — PROGRESS NOTES
Internal medicine note   Test results from visit reviewed in general all look fine.  That has no diabetes.  Thyroid function, magnesium, CPK was normal.  The lipid profile is still the same with total cholesterol 230 but good HDL of 84 and     I discussed about the use of nifedipine in regard to blood pressure management.  She was wants to review the medication in his she will let me know.    In regard to the chest pain discuss the option of pursuing another coronary CT but also discussed was the potential of this being esophageal chest pain in recommended trial of either Pepcid or Prilosec.  She indicated to me that she thinks she was drinking too much coffee and will try to reduce this use abstain maybe substituted with green tea in determine if this has a positive benefit

## 2024-06-04 ENCOUNTER — TELEPHONE (OUTPATIENT)
Dept: INTERNAL MEDICINE | Facility: CLINIC | Age: 69
End: 2024-06-04
Payer: COMMERCIAL

## 2024-06-04 NOTE — TELEPHONE ENCOUNTER
----- Message from Higinio Rae sent at 6/4/2024  4:50 PM CDT -----  Regarding: Pt Callback  Contact: [phone]  Type: Returning a call    Who left a message? N/a    When did the practice call? Today     Comments:

## 2024-06-04 NOTE — TELEPHONE ENCOUNTER
----- Message from Annemarie Bertrand sent at 6/3/2024  6:49 PM CDT -----  Type:  Appointment Request     Name of Caller:pt   When is the first available appointment?No access  Would the patient rather a call back or a response via [facility]? Call back  Best Call Back Number:[phone]   Additional Information: Pt would like to be seen on Wed June 5th. Patient was advised there wasn't any appointments on that day but wants to know if the doctor can get her in

## 2024-06-05 ENCOUNTER — TELEPHONE (OUTPATIENT)
Dept: INTERNAL MEDICINE | Facility: CLINIC | Age: 69
End: 2024-06-05
Payer: COMMERCIAL

## 2024-06-05 ENCOUNTER — OFFICE VISIT (OUTPATIENT)
Dept: INTERNAL MEDICINE | Facility: CLINIC | Age: 69
End: 2024-06-05
Payer: COMMERCIAL

## 2024-06-05 ENCOUNTER — PATIENT OUTREACH (OUTPATIENT)
Dept: ADMINISTRATIVE | Facility: HOSPITAL | Age: 69
End: 2024-06-05
Payer: COMMERCIAL

## 2024-06-05 VITALS
WEIGHT: 177.25 LBS | SYSTOLIC BLOOD PRESSURE: 170 MMHG | OXYGEN SATURATION: 99 % | HEIGHT: 69 IN | BODY MASS INDEX: 26.25 KG/M2 | DIASTOLIC BLOOD PRESSURE: 78 MMHG | HEART RATE: 81 BPM

## 2024-06-05 DIAGNOSIS — R07.2 PRECORDIAL CHEST PAIN: ICD-10-CM

## 2024-06-05 DIAGNOSIS — I10 PRIMARY HYPERTENSION: Primary | ICD-10-CM

## 2024-06-05 PROCEDURE — 99999 PR PBB SHADOW E&M-EST. PATIENT-LVL III: CPT | Mod: PBBFAC,,, | Performed by: INTERNAL MEDICINE

## 2024-06-05 PROCEDURE — 3008F BODY MASS INDEX DOCD: CPT | Mod: CPTII,S$GLB,, | Performed by: INTERNAL MEDICINE

## 2024-06-05 PROCEDURE — 3044F HG A1C LEVEL LT 7.0%: CPT | Mod: CPTII,S$GLB,, | Performed by: INTERNAL MEDICINE

## 2024-06-05 PROCEDURE — 1126F AMNT PAIN NOTED NONE PRSNT: CPT | Mod: CPTII,S$GLB,, | Performed by: INTERNAL MEDICINE

## 2024-06-05 PROCEDURE — 3078F DIAST BP <80 MM HG: CPT | Mod: CPTII,S$GLB,, | Performed by: INTERNAL MEDICINE

## 2024-06-05 PROCEDURE — 3288F FALL RISK ASSESSMENT DOCD: CPT | Mod: CPTII,S$GLB,, | Performed by: INTERNAL MEDICINE

## 2024-06-05 PROCEDURE — 1160F RVW MEDS BY RX/DR IN RCRD: CPT | Mod: CPTII,S$GLB,, | Performed by: INTERNAL MEDICINE

## 2024-06-05 PROCEDURE — 99214 OFFICE O/P EST MOD 30 MIN: CPT | Mod: S$GLB,,, | Performed by: INTERNAL MEDICINE

## 2024-06-05 PROCEDURE — 1159F MED LIST DOCD IN RCRD: CPT | Mod: CPTII,S$GLB,, | Performed by: INTERNAL MEDICINE

## 2024-06-05 PROCEDURE — 3077F SYST BP >= 140 MM HG: CPT | Mod: CPTII,S$GLB,, | Performed by: INTERNAL MEDICINE

## 2024-06-05 PROCEDURE — 1101F PT FALLS ASSESS-DOCD LE1/YR: CPT | Mod: CPTII,S$GLB,, | Performed by: INTERNAL MEDICINE

## 2024-06-05 NOTE — TELEPHONE ENCOUNTER
----- Message from Gavin Reardon sent at 6/5/2024  7:38 AM CDT -----  Type:  Patient Returning Call    Who Called:Patient  Who Left Message for Patient:Unknown  Does the patient know what this is regarding?:No  Would the patient rather a call back or a response via [employer]? Callback  Best Call Back Number:[phone]  Additional Information:

## 2024-06-05 NOTE — TELEPHONE ENCOUNTER
----- Message from Gavin Reardon sent at 6/5/2024  7:38 AM CDT -----  Type:  Patient Returning Call    Who Called:Patient  Who Left Message for Patient:Unknown  Does the patient know what this is regarding?:No  Would the patient rather a call back or a response via [employer] [employer]? Callback  Best Call Back Number:[phone]  Additional Information:

## 2024-06-05 NOTE — TELEPHONE ENCOUNTER
Spoke to pt . Made her appointment for 6/6/24 at 2:30 for blood pressure problems. Will send portal message with appointment info by her request.    -james

## 2024-06-05 NOTE — PROGRESS NOTES
Medical history  Hypertension  Elevated cholesterol with elevated HDL  Meningioma on imaging study  Cervical and lumbar degenerative disc on imaging           Social history  Tobacco use and alcohol use none    [age] female     She was seen last week on May 30th the follow-up on episodes of chest pain elevated blood pressure.  It was noted that there was some stressful circumstances going on.  Which still persist.  She had lab studies the follow-up on glucose thyroid CPK all being normal.  Since that time she was eliminated coffee use in his she does not experience this burning sensation that she noted in upper chest throat.    Randomly throughout the day she will still get episodes as discomfort involving the chest right or left but at times appear more so on the right as well as discomfort in the left arm.  It was the sharp pain in nature.  Just comes and goes throughout the day.  She does have history of tendinitis involving the left shoulder this still at times painful.  She reports no shortness a breath.  No palpitations.  No nausea.  At times it been issues in difficulty going to sleep.    She was checked her blood pressure periodically throughout the day.  This morning actually got systolic reading of 210 diastolic 105 but other readings have been elevated but not as high    Examination   Weight 177 lb   BMI 26.18   Pulse 76   Blood pressure left arm 148 over 62   Right arm 152/60  Chest clear breath sounds   Heart regular rate rhythm  2+ carotid pulses   Presently not tender when I palpate over the chest wall.  That is discomfort while palpate over the left bicipital tendon region    Impression   Hypertension with labile control  Precordial chest pain, suspect muscular strain    Plan from last visit discuss the use of nifedipine.  Another option can be that of clonidine.  0.1 mg to take at night.  Even in the use of the diuretic    Mentioned that the use of an anti-inflammatory medicine would be  appropriate.  Recent lab shows renal function to be fine    Relaxation as needed discussed

## 2024-06-05 NOTE — PROGRESS NOTES
Population Health Chart Review & Patient Outreach Details      Additional [facility] Health Notes:               Updates Requested / Reviewed:      Care Everywhere, , and Immunizations Reconciliation Completed or Queried: [facility] Topics Overdue:      [facility] Score: 2     Colon Cancer Screening  Uncontrolled BP    Pneumonia Vaccine  Shingles/Zoster Vaccine  RSV Vaccine                  Health Maintenance Topic(s) Outreach Outcomes & Actions Taken:    Primary Care Appt - Outreach Outcomes & Actions Taken  : Primary Care Appt Scheduled

## 2024-06-10 ENCOUNTER — PATIENT MESSAGE (OUTPATIENT)
Dept: INTERNAL MEDICINE | Facility: CLINIC | Age: 69
End: 2024-06-10
Payer: COMMERCIAL

## 2024-06-13 ENCOUNTER — OFFICE VISIT (OUTPATIENT)
Dept: INTERNAL MEDICINE | Facility: CLINIC | Age: 69
End: 2024-06-13
Payer: COMMERCIAL

## 2024-06-13 VITALS
BODY MASS INDEX: 26.22 KG/M2 | DIASTOLIC BLOOD PRESSURE: 80 MMHG | WEIGHT: 177 LBS | RESPIRATION RATE: 18 BRPM | SYSTOLIC BLOOD PRESSURE: 150 MMHG | HEART RATE: 80 BPM | HEIGHT: 69 IN | OXYGEN SATURATION: 99 %

## 2024-06-13 DIAGNOSIS — M25.511 CHRONIC PAIN OF BOTH SHOULDERS: Primary | ICD-10-CM

## 2024-06-13 DIAGNOSIS — G89.29 CHRONIC PAIN OF BOTH SHOULDERS: Primary | ICD-10-CM

## 2024-06-13 DIAGNOSIS — M89.8X1 CLAVICLE PAIN: ICD-10-CM

## 2024-06-13 DIAGNOSIS — S39.012A STRAIN OF LUMBAR REGION, INITIAL ENCOUNTER: ICD-10-CM

## 2024-06-13 DIAGNOSIS — M25.512 CHRONIC PAIN OF BOTH SHOULDERS: Primary | ICD-10-CM

## 2024-06-13 PROCEDURE — 99214 OFFICE O/P EST MOD 30 MIN: CPT | Mod: 25,S$GLB,, | Performed by: INTERNAL MEDICINE

## 2024-06-13 PROCEDURE — 1101F PT FALLS ASSESS-DOCD LE1/YR: CPT | Mod: CPTII,S$GLB,, | Performed by: INTERNAL MEDICINE

## 2024-06-13 PROCEDURE — 99999 PR PBB SHADOW E&M-EST. PATIENT-LVL III: CPT | Mod: PBBFAC,,, | Performed by: INTERNAL MEDICINE

## 2024-06-13 PROCEDURE — 1159F MED LIST DOCD IN RCRD: CPT | Mod: CPTII,S$GLB,, | Performed by: INTERNAL MEDICINE

## 2024-06-13 PROCEDURE — 3077F SYST BP >= 140 MM HG: CPT | Mod: CPTII,S$GLB,, | Performed by: INTERNAL MEDICINE

## 2024-06-13 PROCEDURE — 1160F RVW MEDS BY RX/DR IN RCRD: CPT | Mod: CPTII,S$GLB,, | Performed by: INTERNAL MEDICINE

## 2024-06-13 PROCEDURE — 96372 THER/PROPH/DIAG INJ SC/IM: CPT | Mod: S$GLB,,, | Performed by: INTERNAL MEDICINE

## 2024-06-13 PROCEDURE — 3008F BODY MASS INDEX DOCD: CPT | Mod: CPTII,S$GLB,, | Performed by: INTERNAL MEDICINE

## 2024-06-13 PROCEDURE — 3079F DIAST BP 80-89 MM HG: CPT | Mod: CPTII,S$GLB,, | Performed by: INTERNAL MEDICINE

## 2024-06-13 PROCEDURE — 3044F HG A1C LEVEL LT 7.0%: CPT | Mod: CPTII,S$GLB,, | Performed by: INTERNAL MEDICINE

## 2024-06-13 PROCEDURE — 1125F AMNT PAIN NOTED PAIN PRSNT: CPT | Mod: CPTII,S$GLB,, | Performed by: INTERNAL MEDICINE

## 2024-06-13 PROCEDURE — 3288F FALL RISK ASSESSMENT DOCD: CPT | Mod: CPTII,S$GLB,, | Performed by: INTERNAL MEDICINE

## 2024-06-13 RX ORDER — KETOROLAC TROMETHAMINE 30 MG/ML
60 INJECTION, SOLUTION INTRAMUSCULAR; INTRAVENOUS
Status: SHIPPED | OUTPATIENT
Start: 2024-06-13

## 2024-06-13 RX ORDER — KETOROLAC TROMETHAMINE 30 MG/ML
60 INJECTION, SOLUTION INTRAMUSCULAR; INTRAVENOUS
Status: COMPLETED | OUTPATIENT
Start: 2024-06-13 | End: 2024-06-13

## 2024-06-13 RX ADMIN — KETOROLAC TROMETHAMINE 60 MG: 30 INJECTION, SOLUTION INTRAMUSCULAR; INTRAVENOUS at 02:06

## 2024-06-13 NOTE — PROGRESS NOTES
69 year female   Reason for visit is that for the past few days she has been having pain along the clavicle near the sternum.  That has been constant persistent.  That is come on randomly she does not recall anyone particular incident.  But in addition she was also having ongoing pain that involves both shoulders, the anterior lateral posterior particularly when g lifting up a arms..  This is been going on for awhile.  She has had an MRI that did show rotator cuff tendinopathy in the past.    Also the other day she was been feeling a vicelike gripping pain in his lower back it was localize.  Previous MRI she was had was known to have lumbar degenerative discs      Medical history is hypertension   Elevated cholesterol with elevated HDL      Examination   Weight 177   Pulse 80   Blood pressure 160/72  Chest clear breath sounds   Heart regular rate rhythm  Neck no thyromegaly  Patient is tender when I palpate over the proximal aspect of both clavicles but certain worse on the right where she was definitely tender in his sternoclavicular margin.    She was multiple tender points along the mid that is well as right and left aspect of the upper mid and lower back.    It was difficult for the hold the arms up against resistance and it was very painful when that is any type of rotation abduction motion of the arms at the shoulder joint      Impression   Chronic bilateral shoulder pain probably due to rotator cuff arthropathy  Sternoclavicular pain probably due to acute inflammation  Lumbar pain    Plan   Recommend sternoclavicular joint by bilateral MRI in the meantime Toradol 60 mg IM can consider Toradol 10 mg 3 times a day starting tomorrow

## 2024-07-05 ENCOUNTER — TELEPHONE (OUTPATIENT)
Dept: INTERNAL MEDICINE | Facility: CLINIC | Age: 69
End: 2024-07-05
Payer: COMMERCIAL

## 2024-07-05 NOTE — TELEPHONE ENCOUNTER
----- Message from Jocelin Vallecillopeace sent at 7/5/2024  1:17 PM CDT -----  Contact: patient @ [phone]  1MEDICALADVICE     Patient is calling for Medical Advice regarding: Symptom: Breast Symptoms  Outcome: Schedule an appointment to be seen within 24 hours.  Reason: Caller denied all higher acuity questions    The caller rejected this outcome    How long has patient had these symptoms:    Pharmacy name and phone#:  [facility] [address] [facility] [address]  Phone: [phone] Fax: [phone]          Patient wants a call back or thru [facility]:call     Comments:    Please advise patient replies from provider may take up to 48 hours.

## 2024-07-09 ENCOUNTER — TELEPHONE (OUTPATIENT)
Dept: INTERNAL MEDICINE | Facility: CLINIC | Age: 69
End: 2024-07-09
Payer: COMMERCIAL

## 2024-07-09 NOTE — TELEPHONE ENCOUNTER
Pt wanted to know if the Omega 3 was causing her chest pain which she is no longer having because she was using 3 tablets a day?  Dr Rivera;I will call her today. Pt informed. Appointment today at 1 pm will be cancelled.

## 2024-07-09 NOTE — TELEPHONE ENCOUNTER
----- Message from Radha Kimble sent at 7/9/2024 10:07 AM CDT -----  Contact: [phone]  1MEDICALADVICE     Patient is calling for Medical Advice regarding: PATIENT STATES THAT SHE IS NOT HAVING THE BURNING SENSATION ANY LONGER IN HER CHEST  and would like a call back before cancelling her appointment today for 1 pm .         Comments: Please call and advise today before appointment.

## 2024-07-12 ENCOUNTER — TELEPHONE (OUTPATIENT)
Dept: INTERNAL MEDICINE | Facility: CLINIC | Age: 69
End: 2024-07-12
Payer: COMMERCIAL

## 2024-07-12 NOTE — TELEPHONE ENCOUNTER
----- Message from Christin Salmon sent at 7/12/2024  1:51 PM CDT -----  Contact: [phone] Patient  Patient would like to get medical advice.  Symptoms (please be specific):   Pt is requesting labs orders for inflammation. Pt is also requesting to speak directly to Dr Willis.   How long have you had these symptoms: N/A  Would you like a call back, or a response through your [facility] portal?:   both  Pharmacy name and phone # (copy from chart):   N/A  Comments:

## 2024-07-12 NOTE — TELEPHONE ENCOUNTER
Pt was seen on June 13, 2024 but asking for lab test to check for inflammation. Pt is also requesting to ONLY speak DIRECTLY to Dr Willis.     Please review and respond,  Thank You.

## 2024-07-24 ENCOUNTER — TELEPHONE (OUTPATIENT)
Dept: OPHTHALMOLOGY | Facility: CLINIC | Age: 69
End: 2024-07-24
Payer: COMMERCIAL

## 2024-08-21 ENCOUNTER — LAB VISIT (OUTPATIENT)
Dept: LAB | Facility: HOSPITAL | Age: 69
End: 2024-08-21
Attending: INTERNAL MEDICINE
Payer: COMMERCIAL

## 2024-08-21 ENCOUNTER — OFFICE VISIT (OUTPATIENT)
Dept: INTERNAL MEDICINE | Facility: CLINIC | Age: 69
End: 2024-08-21
Payer: COMMERCIAL

## 2024-08-21 ENCOUNTER — NURSE TRIAGE (OUTPATIENT)
Dept: ADMINISTRATIVE | Facility: CLINIC | Age: 69
End: 2024-08-21
Payer: COMMERCIAL

## 2024-08-21 VITALS
OXYGEN SATURATION: 98 % | HEART RATE: 66 BPM | HEIGHT: 69 IN | WEIGHT: 180.31 LBS | BODY MASS INDEX: 26.71 KG/M2 | SYSTOLIC BLOOD PRESSURE: 152 MMHG | DIASTOLIC BLOOD PRESSURE: 60 MMHG

## 2024-08-21 DIAGNOSIS — I10 PRIMARY HYPERTENSION: ICD-10-CM

## 2024-08-21 DIAGNOSIS — R07.9 CHEST PAIN, UNSPECIFIED TYPE: ICD-10-CM

## 2024-08-21 DIAGNOSIS — Z78.9 OTHER SPECIFIED HEALTH STATUS: ICD-10-CM

## 2024-08-21 DIAGNOSIS — R07.89 COSTOCHONDRAL CHEST PAIN: ICD-10-CM

## 2024-08-21 DIAGNOSIS — M54.6 ACUTE BILATERAL THORACIC BACK PAIN: ICD-10-CM

## 2024-08-21 DIAGNOSIS — R07.9 CHEST PAIN, UNSPECIFIED TYPE: Primary | ICD-10-CM

## 2024-08-21 LAB
ALBUMIN SERPL BCP-MCNC: 3.7 G/DL (ref 3.5–5.2)
ALP SERPL-CCNC: 64 U/L (ref 55–135)
ALT SERPL W/O P-5'-P-CCNC: 14 U/L (ref 10–44)
ANION GAP SERPL CALC-SCNC: 9 MMOL/L (ref 8–16)
AST SERPL-CCNC: 19 U/L (ref 10–40)
BASOPHILS # BLD AUTO: 0.07 K/UL (ref 0–0.2)
BASOPHILS NFR BLD: 1.4 % (ref 0–1.9)
BILIRUB SERPL-MCNC: 1 MG/DL (ref 0.1–1)
BUN SERPL-MCNC: 11 MG/DL (ref 8–23)
CALCIUM SERPL-MCNC: 9.6 MG/DL (ref 8.7–10.5)
CHLORIDE SERPL-SCNC: 105 MMOL/L (ref 95–110)
CHOLEST SERPL-MCNC: 239 MG/DL (ref 120–199)
CHOLEST/HDLC SERPL: 2.6 {RATIO} (ref 2–5)
CK SERPL-CCNC: 99 U/L (ref 20–180)
CO2 SERPL-SCNC: 26 MMOL/L (ref 23–29)
CREAT SERPL-MCNC: 0.9 MG/DL (ref 0.5–1.4)
CRP SERPL-MCNC: 1.7 MG/L (ref 0–3.19)
DIFFERENTIAL METHOD BLD: ABNORMAL
EOSINOPHIL # BLD AUTO: 0.3 K/UL (ref 0–0.5)
EOSINOPHIL NFR BLD: 5.9 % (ref 0–8)
ERYTHROCYTE [DISTWIDTH] IN BLOOD BY AUTOMATED COUNT: 12.9 % (ref 11.5–14.5)
EST. GFR  (NO RACE VARIABLE): >60 ML/MIN/1.73 M^2
ESTIMATED AVG GLUCOSE: 100 MG/DL (ref 68–131)
GLUCOSE SERPL-MCNC: 72 MG/DL (ref 70–110)
HBA1C MFR BLD: 5.1 % (ref 4–5.6)
HCT VFR BLD AUTO: 42.3 % (ref 37–48.5)
HDLC SERPL-MCNC: 91 MG/DL (ref 40–75)
HDLC SERPL: 38.1 % (ref 20–50)
HGB BLD-MCNC: 14.1 G/DL (ref 12–16)
IMM GRANULOCYTES # BLD AUTO: 0.01 K/UL (ref 0–0.04)
IMM GRANULOCYTES NFR BLD AUTO: 0.2 % (ref 0–0.5)
LDLC SERPL CALC-MCNC: 131 MG/DL (ref 63–159)
LYMPHOCYTES # BLD AUTO: 1.9 K/UL (ref 1–4.8)
LYMPHOCYTES NFR BLD: 39.4 % (ref 18–48)
MAGNESIUM SERPL-MCNC: 2.1 MG/DL (ref 1.6–2.6)
MCH RBC QN AUTO: 31.1 PG (ref 27–31)
MCHC RBC AUTO-ENTMCNC: 33.3 G/DL (ref 32–36)
MCV RBC AUTO: 93 FL (ref 82–98)
MONOCYTES # BLD AUTO: 0.4 K/UL (ref 0.3–1)
MONOCYTES NFR BLD: 8.3 % (ref 4–15)
NEUTROPHILS # BLD AUTO: 2.2 K/UL (ref 1.8–7.7)
NEUTROPHILS NFR BLD: 44.8 % (ref 38–73)
NONHDLC SERPL-MCNC: 148 MG/DL
NRBC BLD-RTO: 0 /100 WBC
PLATELET # BLD AUTO: 195 K/UL (ref 150–450)
PMV BLD AUTO: 12.6 FL (ref 9.2–12.9)
POTASSIUM SERPL-SCNC: 3.3 MMOL/L (ref 3.5–5.1)
PROT SERPL-MCNC: 7.1 G/DL (ref 6–8.4)
RBC # BLD AUTO: 4.54 M/UL (ref 4–5.4)
SODIUM SERPL-SCNC: 140 MMOL/L (ref 136–145)
TRIGL SERPL-MCNC: 85 MG/DL (ref 30–150)
TROPONIN I SERPL DL<=0.01 NG/ML-MCNC: 0.01 NG/ML (ref 0–0.03)
WBC # BLD AUTO: 4.92 K/UL (ref 3.9–12.7)

## 2024-08-21 PROCEDURE — 82746 ASSAY OF FOLIC ACID SERUM: CPT | Mod: GA | Performed by: INTERNAL MEDICINE

## 2024-08-21 PROCEDURE — 83090 ASSAY OF HOMOCYSTEINE: CPT | Mod: GA | Performed by: INTERNAL MEDICINE

## 2024-08-21 PROCEDURE — 1160F RVW MEDS BY RX/DR IN RCRD: CPT | Mod: CPTII,S$GLB,, | Performed by: INTERNAL MEDICINE

## 2024-08-21 PROCEDURE — 99214 OFFICE O/P EST MOD 30 MIN: CPT | Mod: S$GLB,,, | Performed by: INTERNAL MEDICINE

## 2024-08-21 PROCEDURE — 83735 ASSAY OF MAGNESIUM: CPT | Performed by: INTERNAL MEDICINE

## 2024-08-21 PROCEDURE — 84484 ASSAY OF TROPONIN QUANT: CPT | Performed by: INTERNAL MEDICINE

## 2024-08-21 PROCEDURE — 1159F MED LIST DOCD IN RCRD: CPT | Mod: CPTII,S$GLB,, | Performed by: INTERNAL MEDICINE

## 2024-08-21 PROCEDURE — 3077F SYST BP >= 140 MM HG: CPT | Mod: CPTII,S$GLB,, | Performed by: INTERNAL MEDICINE

## 2024-08-21 PROCEDURE — 85025 COMPLETE CBC W/AUTO DIFF WBC: CPT | Performed by: INTERNAL MEDICINE

## 2024-08-21 PROCEDURE — 80053 COMPREHEN METABOLIC PANEL: CPT | Performed by: INTERNAL MEDICINE

## 2024-08-21 PROCEDURE — 99999 PR PBB SHADOW E&M-EST. PATIENT-LVL IV: CPT | Mod: PBBFAC,,, | Performed by: INTERNAL MEDICINE

## 2024-08-21 PROCEDURE — 3044F HG A1C LEVEL LT 7.0%: CPT | Mod: CPTII,S$GLB,, | Performed by: INTERNAL MEDICINE

## 2024-08-21 PROCEDURE — 86141 C-REACTIVE PROTEIN HS: CPT | Performed by: INTERNAL MEDICINE

## 2024-08-21 PROCEDURE — 83036 HEMOGLOBIN GLYCOSYLATED A1C: CPT | Mod: GA | Performed by: INTERNAL MEDICINE

## 2024-08-21 PROCEDURE — 1125F AMNT PAIN NOTED PAIN PRSNT: CPT | Mod: CPTII,S$GLB,, | Performed by: INTERNAL MEDICINE

## 2024-08-21 PROCEDURE — 82550 ASSAY OF CK (CPK): CPT | Performed by: INTERNAL MEDICINE

## 2024-08-21 PROCEDURE — 36415 COLL VENOUS BLD VENIPUNCTURE: CPT | Performed by: INTERNAL MEDICINE

## 2024-08-21 PROCEDURE — 86038 ANTINUCLEAR ANTIBODIES: CPT | Performed by: INTERNAL MEDICINE

## 2024-08-21 PROCEDURE — 80061 LIPID PANEL: CPT | Performed by: INTERNAL MEDICINE

## 2024-08-21 PROCEDURE — 3008F BODY MASS INDEX DOCD: CPT | Mod: CPTII,S$GLB,, | Performed by: INTERNAL MEDICINE

## 2024-08-21 PROCEDURE — 3078F DIAST BP <80 MM HG: CPT | Mod: CPTII,S$GLB,, | Performed by: INTERNAL MEDICINE

## 2024-08-21 NOTE — TELEPHONE ENCOUNTER
Spoke with pt who reports that she is having left arm pain, that is sometimes noted to be in her right arm, Also reports burning to her back, and reports having chest pain. Nurse attempted to complete triage for pt. But pt did decline to continue. She is asking for appointment with Dr. Willis. Asking if provider, or nurse from office would reach out to her regarding an appointment.Advised will send message to office. Verbalized understanding.    Reason for Disposition   Nursing judgment    Additional Information   Negative: SEVERE difficulty breathing (e.g., struggling for each breath, speaks in single words)   Negative: Difficult to awaken or acting confused (e.g., disoriented, slurred speech)   Negative: Shock suspected (e.g., cold/pale/clammy skin, too weak to stand, low BP, rapid pulse)    Protocols used: Chest Pain-A-[facility], Information Only Call - No Triage-A-[facility]

## 2024-08-21 NOTE — PROGRESS NOTES
[age] female     For the past 2 weeks been noticing a shocking like pain in his mid chest.  Lower part of the chest.  It comes and goes but seems to be consistent.  She was not aware of circumstances that make it worse.  Does not necessarily happen lying at night.  Along with this she has felt a burning discomfort around her scapular region both sides and aching discomfort in his upper arms.  She was known to have rotator cuff tendinopathy involving both shoulders.  But she feels that this is different.  She can not account for any circumstances couple weeks go to cause this.  That has been no excessive physical labral activity.  She states that she has worries regarding circumstances in her life.  And just trying to deal with it.  She reports no abdominal pain.  Has regular bowel function no difficulty urinating.  No indigestion heartburn    Medical history   Hypertension previously on medication  Hyperlipidemia with normal HDL   Lumbar spondylosis on imaging  Rotator cuff tendinopathy    Social history   Tobacco use none   Alcohol use none    Examination   Weight 180 lb   BMI 26.63   Pulse 68   Blood pressure 152/62 later systolic palpable 142  Chest clear breath sounds   Heart regular rate rhythm no murmurs   Abdominal exam nontender soft no hepatosplenomegaly abdominal masses  2+ carotid pulses no bruits 2+ dorsalis pedal pulses   Extremities no edema  Tender area when I palpate over the left lower costochondral area.  Tender points when I palpate over the upper midthoracic area    Impression   Chest pain with symptoms consistent with costochondritis  Thoracic muscular pain   Hypertension      Plan   Labs of chemistry CBC hemoglobin A1c magnesium, CPK troponin TRISTAN profile high sensitivity CRP, homocystine level folate level  Discussed about taking Advil 2 tablets twice a day.  At times she took the 325 aspirin sometimes 2 or 3 times a day but only take it once a day.  In the past she received a  Toradol injection

## 2024-08-22 ENCOUNTER — PATIENT MESSAGE (OUTPATIENT)
Dept: INTERNAL MEDICINE | Facility: CLINIC | Age: 69
End: 2024-08-22
Payer: COMMERCIAL

## 2024-08-22 ENCOUNTER — NURSE TRIAGE (OUTPATIENT)
Dept: ADMINISTRATIVE | Facility: CLINIC | Age: 69
End: 2024-08-22
Payer: COMMERCIAL

## 2024-08-22 LAB
ANA SER QL IF: NORMAL
FOLATE SERPL-MCNC: 13.9 NG/ML (ref 4–24)
HCYS SERPL-SCNC: 10.3 UMOL/L (ref 4–15.5)

## 2024-08-22 NOTE — TELEPHONE ENCOUNTER
Patient requesting to get her lab results from yesterday. Advised patient to call pcp office when open. Will also send a message to ordering provider. Instructed to call back with additional questions or worsening of symptoms. Patient verbalized understanding.       Reason for Disposition   Caller requesting lab results  (Exception: Routine or non-urgent lab result.)    Additional Information   Lab result questions    Protocols used: Information Only Call - No Triage-A-[facility], PCP Call - No Triage-A-AH

## 2024-08-23 ENCOUNTER — TELEPHONE (OUTPATIENT)
Dept: INTERNAL MEDICINE | Facility: CLINIC | Age: 69
End: 2024-08-23
Payer: COMMERCIAL

## 2024-08-23 NOTE — TELEPHONE ENCOUNTER
----- Message from Amirah Patel sent at 8/23/2024  8:38 AM CDT -----  Contact: [phone]  2TESTRESULTS    Type: Test Results    What test was performed? No fast lab     Who ordered the test? Dr. Willis     When and where were the test performed? 08/21/2024    Would you like a call back and or thru [facility]: call back     Comments: Pt states it is urgent. Pt need to get the result this morning.

## 2024-08-23 NOTE — TELEPHONE ENCOUNTER
Pt stated that she needs the results from blood work. She wants me to contact him to get him to call her or get his nurse to call her with the results.

## 2024-08-23 NOTE — TELEPHONE ENCOUNTER
Patient requesting review of lab results ordered by Dr. Willis on 8/21.     CBC, CMP, A1c, Mg, folate, hsCRP, homocysteine, TRISTAN, CK, troponin all in normal limits.     Lipid panel with cholesterol 239, HDL 91, . ASCVD risk 17.8%. Patient not on statin.     Dr. Willis is out of office today so I called patient to discuss results. No answer but left voicemail explaining lab results are normal for the most part with exception of elevated cholesterol and ASCVD risk 17.8%, advised to discuss statin medication with Dr. Willis. No urgent interventions needed.       Bruna Guerrero MD  [facility]

## 2024-08-23 NOTE — TELEPHONE ENCOUNTER
PCP is out of the office but has not reviewed or interpreted the results, sent msg requesting to allow time for the Physician to get to the results.

## 2024-08-23 NOTE — TELEPHONE ENCOUNTER
----- Message from Vy Madrid sent at 8/23/2024  1:31 PM CDT -----  Contact: Patient @ [phone]  1MEDICALADVICE     Patient is calling for Medical Advice regarding: Patient urgently needs test result today and says it has been 3 days    How long has patient had these symptoms:    Pharmacy name and phone#:    Patient wants a call back or thru [facility]: Call    Comments:    Please advise patient replies from provider may take up to 48 hours.

## 2024-08-23 NOTE — TELEPHONE ENCOUNTER
----- Message from Amirah Patel sent at 8/23/2024  8:38 AM CDT -----  Contact: [phone]  2TESTRESULTS    Type: Test Results    What test was performed? No fast lab     Who ordered the test? Dr. Willis     When and where were the test performed? 08/21/2024    Would you like a call back and or thru [facility]: call back     Comments: Pt states it is urgent. Pt need to get the result this morning.

## 2024-08-23 NOTE — TELEPHONE ENCOUNTER
Can you give pt a call to go over results of her lab work. Pt stated she called yesterday and didn't receive any help and that she shouldn't have to wait that long for results.

## 2024-08-26 ENCOUNTER — PATIENT MESSAGE (OUTPATIENT)
Dept: INTERNAL MEDICINE | Facility: CLINIC | Age: 69
End: 2024-08-26
Payer: COMMERCIAL

## 2024-08-26 ENCOUNTER — TELEPHONE (OUTPATIENT)
Dept: INTERNAL MEDICINE | Facility: CLINIC | Age: 69
End: 2024-08-26
Payer: COMMERCIAL

## 2024-08-26 DIAGNOSIS — E87.6 LOW BLOOD POTASSIUM: Primary | ICD-10-CM

## 2024-08-26 NOTE — TELEPHONE ENCOUNTER
Set up renal function panel that has Thursday around 5:00 p.m..  She was aware of this but need to confirmed with her

## 2024-08-26 NOTE — TELEPHONE ENCOUNTER
----- Message from Christine Pantoja MA sent at 8/26/2024  1:51 PM CDT -----    ----- Message -----  From: Melita Cohen  Sent: 8/23/2024   1:55 PM CDT  To: Yolanda Mcfarland Staff    Type:  Test Results     Who Called: NATALIA LUGO [[id number]]  Name of Test (Lab/Mammo/Etc): lab  Date of Test: 8/21  Ordering Provider: alina  Where the test was performed: [facility]  Would the patient rather a call back or a response via [facility]? call  Best Call Back Number: [phone]   Additional Information:  The pat is calling to go over her test results. The patient states that they a important question for the provider.

## 2024-08-26 NOTE — TELEPHONE ENCOUNTER
----- Message from Christine Pantoja MA sent at 8/26/2024  1:51 PM CDT -----    ----- Message -----  From: Melita Cohen  Sent: 8/23/2024   1:55 PM CDT  To: Yolanda Mcfarland Staff    Type:  Test Results     Who Called: NATALIA LUGO [[id number]]  Name of Test (Lab/Mammo/Etc): lab  Date of Test: 8/21  Ordering Provider: alina  Where the test was performed: [facility]  Would the patient rather a call back or a response via [facility]? call  Best Call Back Number: [phone]   Additional Information:  The pat is calling to go over her test results. The patient states that they a important question for the provider.

## 2024-09-03 ENCOUNTER — TELEPHONE (OUTPATIENT)
Dept: INTERNAL MEDICINE | Facility: CLINIC | Age: 69
End: 2024-09-03
Payer: COMMERCIAL

## 2024-09-03 ENCOUNTER — LAB VISIT (OUTPATIENT)
Dept: LAB | Facility: HOSPITAL | Age: 69
End: 2024-09-03
Attending: INTERNAL MEDICINE
Payer: COMMERCIAL

## 2024-09-03 DIAGNOSIS — E87.6 LOW BLOOD POTASSIUM: ICD-10-CM

## 2024-09-03 LAB
ALBUMIN SERPL BCP-MCNC: 3.8 G/DL (ref 3.5–5.2)
ANION GAP SERPL CALC-SCNC: 9 MMOL/L (ref 8–16)
BUN SERPL-MCNC: 10 MG/DL (ref 8–23)
CALCIUM SERPL-MCNC: 9.8 MG/DL (ref 8.7–10.5)
CHLORIDE SERPL-SCNC: 104 MMOL/L (ref 95–110)
CO2 SERPL-SCNC: 25 MMOL/L (ref 23–29)
CREAT SERPL-MCNC: 0.9 MG/DL (ref 0.5–1.4)
EST. GFR  (NO RACE VARIABLE): >60 ML/MIN/1.73 M^2
GLUCOSE SERPL-MCNC: 84 MG/DL (ref 70–110)
PHOSPHATE SERPL-MCNC: 2.8 MG/DL (ref 2.7–4.5)
POTASSIUM SERPL-SCNC: 3.6 MMOL/L (ref 3.5–5.1)
SODIUM SERPL-SCNC: 138 MMOL/L (ref 136–145)

## 2024-09-03 PROCEDURE — 36415 COLL VENOUS BLD VENIPUNCTURE: CPT | Performed by: INTERNAL MEDICINE

## 2024-09-03 PROCEDURE — 80069 RENAL FUNCTION PANEL: CPT | Performed by: INTERNAL MEDICINE

## 2024-09-03 NOTE — TELEPHONE ENCOUNTER
----- Message from Higinio Rae sent at 9/3/2024 10:10 AM CDT -----  Regarding: Patient Report  Contact: Pt [phone]  1MEDICALADVICE     Patient is calling for Medical Advice regarding: Patient called to report that she is having symptoms of covid; headaches, sore throat, and body aches. Please call back to discuss.    How long has patient had these symptoms:    Pharmacy name and phone#:    Patient wants a call back or thru [facility]: Call    Comments:    Please advise patient replies from provider may take up to 48 hours.

## 2024-09-05 ENCOUNTER — DOCUMENTATION ONLY (OUTPATIENT)
Dept: INTERNAL MEDICINE | Facility: CLINIC | Age: 69
End: 2024-09-05
Payer: COMMERCIAL

## 2024-09-05 NOTE — PROGRESS NOTES
INTERNAL MEDICINE NOTE   RECENT RENAL FUNCTION PANEL REVIEWED.  THIS WAS THE FOLLOW-UP ON PRIOR CHEMISTRY TEST IN WHICH POTASSIUM WAS LOW AT 3.3.  RENAL FUNCTION PANEL WAS NORMAL POTASSIUM 3.6 CONTINUE TO BE MINDFUL WITH DIET PARTICULARLY PLANT BASED DIET WITH FRUITS VEGETABLES.  ALSO THE MONITOR BLOOD PRESSURE AT HOME

## 2024-09-06 ENCOUNTER — TELEPHONE (OUTPATIENT)
Dept: INTERNAL MEDICINE | Facility: CLINIC | Age: 69
End: 2024-09-06
Payer: COMMERCIAL

## 2024-09-06 NOTE — TELEPHONE ENCOUNTER
Spoke with pt, she stated that she want to speak with Dr. Willis or his Nurse only.  Pt stated that she spoke with Dr. Willis a few days ago and she has questions about paperwork.  Pt requested that I send msg to Dr. Willis and Nurse only.    Please review and respond,  Thank You.

## 2024-09-06 NOTE — TELEPHONE ENCOUNTER
----- Message from Meaghan Almazan sent at 9/6/2024  1:35 PM CDT -----  Contact: [phone]  1MEDICALADVICE     Patient is calling for Medical Advice regarding:speak to the office     How long has patient had these symptoms:    Pharmacy name and phone#:    Patient wants a call back or thru [facility]:call back     Comments:  Pt is needing to speak to someone in regards to sore throat fever and headaches she is having and also to  a document from you all   Please advise patient replies from provider may take up to 48 hours.

## 2024-09-09 ENCOUNTER — TELEPHONE (OUTPATIENT)
Dept: INTERNAL MEDICINE | Facility: CLINIC | Age: 69
End: 2024-09-09
Payer: COMMERCIAL

## 2024-09-09 NOTE — TELEPHONE ENCOUNTER
----- Message from Becca Juares sent at 9/6/2024  4:15 PM CDT -----  Contact: [phone]  Would like to receive medical advice.    Pt called and stated that she on her way to office to  paper work.    Would they like a call back or a response via [facility]:  call    Additional information:  Please call to advise

## 2024-10-01 ENCOUNTER — NURSE TRIAGE (OUTPATIENT)
Dept: ADMINISTRATIVE | Facility: CLINIC | Age: 69
End: 2024-10-01
Payer: COMMERCIAL

## 2024-10-01 NOTE — TELEPHONE ENCOUNTER
Phone rung 5 times then went to voicemail, left msg informing pt that an in office appt is required with possible imaging to determine what is going on in the pt's leg. Left office number to call back and also sending portal msg as well.  See On Call Nurse Note below.

## 2024-10-01 NOTE — TELEPHONE ENCOUNTER
Severe pain L knee     Pain has been going on for months, worse today    Injured knee     Concerned she may have a blood clot.      Triager attempted to explain to pt multiple times that we go through a series of questions to rule out anything emergent and determine the level of care that pt needs. Pt kept asking triager if she could have a blood clot. Triager told pt multiple times that I cannot advise on this but that I can go through series of questions to rule out anything emergent.Pt hung up on triager, refused triage questions.    Sent high priority to MD office to reach out to patient for follow up.       Reason for Disposition   Triager unable to complete call (e.g., caller continues to be abusive or caller hangs up)    Protocols used: Difficult Call-A-[place]

## 2024-10-02 ENCOUNTER — TELEPHONE (OUTPATIENT)
Dept: INTERNAL MEDICINE | Facility: CLINIC | Age: 69
End: 2024-10-02
Payer: COMMERCIAL

## 2024-10-02 NOTE — TELEPHONE ENCOUNTER
----- Message from Cathie sent at 10/2/2024  3:08 PM CDT -----  Regarding: Call Back  Contact: [phone]  Type:  Needs Medical Advice    Who Called: PT   Symptoms (please be specific): Severe pain on her left knee   Pharmacy name and phone #:  [facility] Phone: [phone] Fax: [phone]  Would the patient rather a call back or a response via [facility]? Call back   Best Call Back Number: [phone]   Additional Information: Patient would pain medication to be called in to the pharmacy

## 2024-10-05 ENCOUNTER — HOSPITAL ENCOUNTER (EMERGENCY)
Facility: HOSPITAL | Age: 69
Discharge: HOME | End: 2024-10-05
Attending: STUDENT IN AN ORGANIZED HEALTH CARE EDUCATION/TRAINING PROGRAM
Payer: COMMERCIAL

## 2024-10-05 VITALS
HEART RATE: 72 BPM | TEMPERATURE: 99 F | RESPIRATION RATE: 20 BRPM | BODY MASS INDEX: 26.66 KG/M2 | SYSTOLIC BLOOD PRESSURE: 197 MMHG | DIASTOLIC BLOOD PRESSURE: 93 MMHG | HEIGHT: 69 IN | WEIGHT: 180 LBS | OXYGEN SATURATION: 100 %

## 2024-10-05 DIAGNOSIS — M79.602 LEFT ARM PAIN: ICD-10-CM

## 2024-10-05 DIAGNOSIS — M25.512 ACUTE PAIN OF LEFT SHOULDER: Primary | ICD-10-CM

## 2024-10-05 PROCEDURE — 96372 THER/PROPH/DIAG INJ SC/IM: CPT | Performed by: PHYSICIAN ASSISTANT

## 2024-10-05 PROCEDURE — 93005 ELECTROCARDIOGRAM TRACING: CPT

## 2024-10-05 PROCEDURE — 93010 ELECTROCARDIOGRAM REPORT: CPT | Mod: ,,, | Performed by: INTERNAL MEDICINE

## 2024-10-05 PROCEDURE — 25000003 PHARM REV CODE 250: Performed by: PHYSICIAN ASSISTANT

## 2024-10-05 PROCEDURE — 99284 EMERGENCY DEPT VISIT MOD MDM: CPT | Mod: 25

## 2024-10-05 PROCEDURE — 63600175 PHARM REV CODE 636 W HCPCS: Performed by: PHYSICIAN ASSISTANT

## 2024-10-05 RX ORDER — METHOCARBAMOL 500 MG/1
500 TABLET, FILM COATED ORAL 3 TIMES DAILY PRN
Qty: 15 TABLET | Refills: 0 | Status: SHIPPED | OUTPATIENT
Start: 2024-10-05

## 2024-10-05 RX ORDER — LIDOCAINE 50 MG/G
1 PATCH TOPICAL
Status: DISCONTINUED | OUTPATIENT
Start: 2024-10-05 | End: 2024-10-05 | Stop reason: HOSPADM

## 2024-10-05 RX ORDER — NAPROXEN 500 MG/1
500 TABLET ORAL 2 TIMES DAILY PRN
Qty: 10 TABLET | Refills: 0 | Status: SHIPPED | OUTPATIENT
Start: 2024-10-05

## 2024-10-05 RX ORDER — METHOCARBAMOL 750 MG/1
750 TABLET, FILM COATED ORAL
Status: COMPLETED | OUTPATIENT
Start: 2024-10-05 | End: 2024-10-05

## 2024-10-05 RX ORDER — KETOROLAC TROMETHAMINE 30 MG/ML
10 INJECTION, SOLUTION INTRAMUSCULAR; INTRAVENOUS
Status: COMPLETED | OUTPATIENT
Start: 2024-10-05 | End: 2024-10-05

## 2024-10-05 RX ORDER — ACETAMINOPHEN 500 MG
1000 TABLET ORAL
Status: COMPLETED | OUTPATIENT
Start: 2024-10-05 | End: 2024-10-05

## 2024-10-05 RX ORDER — LIDOCAINE 50 MG/G
1 PATCH TOPICAL DAILY
Qty: 10 PATCH | Refills: 0 | Status: SHIPPED | OUTPATIENT
Start: 2024-10-05

## 2024-10-05 RX ADMIN — METHOCARBAMOL 750 MG: 750 TABLET ORAL at 02:10

## 2024-10-05 RX ADMIN — LIDOCAINE 1 PATCH: 50 PATCH CUTANEOUS at 02:10

## 2024-10-05 RX ADMIN — LIDOCAINE 1 PATCH: 50 PATCH CUTANEOUS at 12:10

## 2024-10-05 RX ADMIN — ACETAMINOPHEN 1000 MG: 500 TABLET ORAL at 12:10

## 2024-10-05 RX ADMIN — KETOROLAC TROMETHAMINE 10 MG: 30 INJECTION, SOLUTION INTRAMUSCULAR; INTRAVENOUS at 12:10
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
## 2024-10-05 NOTE — DISCHARGE INSTRUCTIONS
Your x-ray showed degenerative joint disease (arthritis), which has progressed since 2016.  There were no other serious conditions shown on your x-ray.  Please note that the x-ray does not show the soft tissues such as muscle, cartilage, ligaments.  These may also be causing your pain.    Take the naproxen 500 mg every 12 hours with food as needed for pain.  DO NOT take any additional Aleve, naproxen, ibuprofen (Advil, Motrin) while taking this medication.  You can also take acetaminophen/tylenol 650 mg every 6 hours or 1000 mg every 8 hours for added relief.  You can apply ice or heat  to the area for 10 minutes 3 to 4 times a day to reduce pain.    Follow up with orthopedic surgery or Sports Medicine if your symptoms do not improve.   Return to the ER for new or worsening symptoms such as chest pain, numbness in the arm, swelling, increased warmth or redness to the shoulder or any other new or worsening symptoms or other urgent concerns.

## 2024-10-05 NOTE — ED NOTES
Pt agitated yelling at staff in hallway. Pt demands to see her x-rays immediately. Staff attempts to calm pt and explain the provider will be notified but pt remains agitated and uncooperative/disruptive.

## 2024-10-05 NOTE — ED TRIAGE NOTES
To the ED with c/o pain to left shoulder.  Pain is chronic but unable to tolerate today. Unable to demonstrate ROM.

## 2024-10-05 NOTE — ED NOTES
LOC: The patient is awake, alert, and oriented to self, place, time, and situation. Pt is calm and cooperative. Affect is appropriate.  Speech is appropriate and clear.     APPEARANCE: Patient resting uncomfortably, reporting left shoulder pain,  in no acute distress.  Patient is clean and well groomed.    SKIN: The skin is warm and dry; color consistent with ethnicity.  Patient has normal skin turgor and moist mucus membranes.  Skin intact; no breakdown or bruising noted.     MUSCULOSKELETAL: reporting uncontrollable pain to left shoulder. Requesting an injection.  Denies weakness.     RESPIRATORY: Airway is open and patent. Respirations spontaneous, even, easy, and non-labored.  Patient has a normal effort and rate.  No accessory muscle use noted. Denies cough.     CARDIAC:  No peripheral edema noted. No complaints of chest pain.     ABDOMEN: Soft and non tender to palpation.  No distention noted. Pt denies abdominal pain; denies nausea, vomiting, diarrhea, or constipation.    NEUROLOGIC: Eyes open spontaneously.  Behavior appropriate to situation.  Follows commands; facial expression symmetrical.  Purposeful motor response noted; normal sensation in all extremities. Pt denies headache; denies lightheadedness or dizziness; denies visual disturbances; denies loss of balance; denies unilateral weakness.

## 2024-10-06 LAB
OHS QRS DURATION: 72 MS
OHS QTC CALCULATION: 395 MS
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
## 2024-10-07 ENCOUNTER — OFFICE VISIT (OUTPATIENT)
Dept: INTERNAL MEDICINE | Facility: CLINIC | Age: 69
End: 2024-10-07
Payer: COMMERCIAL

## 2024-10-07 VITALS
HEART RATE: 89 BPM | WEIGHT: 183 LBS | HEIGHT: 69 IN | SYSTOLIC BLOOD PRESSURE: 160 MMHG | DIASTOLIC BLOOD PRESSURE: 62 MMHG | BODY MASS INDEX: 27.11 KG/M2 | OXYGEN SATURATION: 98 %

## 2024-10-07 DIAGNOSIS — M75.102 ROTATOR CUFF SYNDROME OF LEFT SHOULDER: Primary | ICD-10-CM

## 2024-10-07 DIAGNOSIS — M25.562 ARTHRALGIA OF KNEE, LEFT: ICD-10-CM

## 2024-10-07 PROCEDURE — 3077F SYST BP >= 140 MM HG: CPT | Mod: CPTII,S$GLB,, | Performed by: INTERNAL MEDICINE

## 2024-10-07 PROCEDURE — 1160F RVW MEDS BY RX/DR IN RCRD: CPT | Mod: CPTII,S$GLB,, | Performed by: INTERNAL MEDICINE

## 2024-10-07 PROCEDURE — 1159F MED LIST DOCD IN RCRD: CPT | Mod: CPTII,S$GLB,, | Performed by: INTERNAL MEDICINE

## 2024-10-07 PROCEDURE — 1125F AMNT PAIN NOTED PAIN PRSNT: CPT | Mod: CPTII,S$GLB,, | Performed by: INTERNAL MEDICINE

## 2024-10-07 PROCEDURE — 3044F HG A1C LEVEL LT 7.0%: CPT | Mod: CPTII,S$GLB,, | Performed by: INTERNAL MEDICINE

## 2024-10-07 PROCEDURE — 3288F FALL RISK ASSESSMENT DOCD: CPT | Mod: CPTII,S$GLB,, | Performed by: INTERNAL MEDICINE

## 2024-10-07 PROCEDURE — 1101F PT FALLS ASSESS-DOCD LE1/YR: CPT | Mod: CPTII,S$GLB,, | Performed by: INTERNAL MEDICINE

## 2024-10-07 PROCEDURE — 99214 OFFICE O/P EST MOD 30 MIN: CPT | Mod: 25,S$GLB,, | Performed by: INTERNAL MEDICINE

## 2024-10-07 PROCEDURE — 3078F DIAST BP <80 MM HG: CPT | Mod: CPTII,S$GLB,, | Performed by: INTERNAL MEDICINE

## 2024-10-07 PROCEDURE — 99999 PR PBB SHADOW E&M-EST. PATIENT-LVL III: CPT | Mod: PBBFAC,,, | Performed by: INTERNAL MEDICINE

## 2024-10-07 PROCEDURE — 96372 THER/PROPH/DIAG INJ SC/IM: CPT | Mod: S$GLB,,, | Performed by: INTERNAL MEDICINE

## 2024-10-07 PROCEDURE — 3008F BODY MASS INDEX DOCD: CPT | Mod: CPTII,S$GLB,, | Performed by: INTERNAL MEDICINE

## 2024-10-07 RX ORDER — CELECOXIB 100 MG/1
100 CAPSULE ORAL 2 TIMES DAILY
Qty: 14 CAPSULE | Refills: 0 | Status: SHIPPED | OUTPATIENT
Start: 2024-10-07 | End: 2024-10-14

## 2024-10-07 RX ORDER — KETOROLAC TROMETHAMINE 30 MG/ML
60 INJECTION, SOLUTION INTRAMUSCULAR; INTRAVENOUS
Status: COMPLETED | OUTPATIENT
Start: 2024-10-07 | End: 2024-10-07

## 2024-10-07 RX ADMIN — KETOROLAC TROMETHAMINE 60 MG: 30 INJECTION, SOLUTION INTRAMUSCULAR; INTRAVENOUS at 04:10

## 2024-10-08 ENCOUNTER — HOSPITAL ENCOUNTER (OUTPATIENT)
Dept: RADIOLOGY | Facility: HOSPITAL | Age: 69
Discharge: HOME | End: 2024-10-08
Attending: INTERNAL MEDICINE
Payer: COMMERCIAL

## 2024-10-08 DIAGNOSIS — M25.562 ARTHRALGIA OF KNEE, LEFT: ICD-10-CM

## 2024-10-08 DIAGNOSIS — M75.102 ROTATOR CUFF SYNDROME OF LEFT SHOULDER: ICD-10-CM

## 2024-10-08 PROCEDURE — 73221 MRI JOINT UPR EXTREM W/O DYE: CPT | Mod: TC,LT

## 2024-10-08 PROCEDURE — 73221 MRI JOINT UPR EXTREM W/O DYE: CPT | Mod: 26,LT,, | Performed by: RADIOLOGY

## 2024-10-09 ENCOUNTER — DOCUMENTATION ONLY (OUTPATIENT)
Dept: INTERNAL MEDICINE | Facility: CLINIC | Age: 69
End: 2024-10-09

## 2024-10-09 ENCOUNTER — TELEPHONE (OUTPATIENT)
Dept: INTERNAL MEDICINE | Facility: CLINIC | Age: 69
End: 2024-10-09
Payer: COMMERCIAL

## 2024-10-09 NOTE — PROGRESS NOTES
Internal medicine note    MRI of the shoulder reviewed    Based on report no abnormal findings were seen regarding rotator cuff muscles or tendons    Mentioned was having significant cartilage lost involving the glenohumeral joint with osteophytes in his final diagnosis of glenohumeral osteoarthritis, also moderate joint effusion with synovitis    When it results were presented to she expressed a concern about the validity of the report or the accuracy of the report.  She feels it was related to rotator cuff pathology, not that of osteoarthritis.    She expressed a concern regarding the procedure itself, if it was done accurately.  Reportedly she was told that she was moving during the exam but miss ayon states otherwise, therefore does not think it was a proper study.  And thus the MRI itself can be difficult to interpret    Nevertheless she was taking Celebrex twice a day.  That has some degree of pain.  I explained to her that if she was concerns about the report, would recommend to orthopedic shoulder specialists for that person to look at the film itself.  Or if the pain still persistent will need to make an appointment with orthopedics shoulder specialists for further assessment, consideration of a steroid injection and maybe aspiration

## 2024-10-09 NOTE — TELEPHONE ENCOUNTER
----- Message from Makayla sent at 10/9/2024  1:34 PM CDT -----  Contact: [phone]  1MEDICALADVICE     Patient is calling for Medical Advice regarding:Results     Patient wants a call back or thru [facility]:Call back     Comments:Pt would like a call back due to lab results     Please advise patient replies from provider may take up to 48 hours.

## 2024-10-25 ENCOUNTER — OFFICE VISIT (OUTPATIENT)
Dept: URGENT CARE | Facility: CLINIC | Age: 69
End: 2024-10-25
Payer: COMMERCIAL

## 2024-10-25 VITALS
SYSTOLIC BLOOD PRESSURE: 180 MMHG | OXYGEN SATURATION: 98 % | HEART RATE: 67 BPM | TEMPERATURE: 98 F | DIASTOLIC BLOOD PRESSURE: 99 MMHG | WEIGHT: 182 LBS | HEIGHT: 69 IN | BODY MASS INDEX: 26.96 KG/M2 | RESPIRATION RATE: 20 BRPM

## 2024-10-25 DIAGNOSIS — R05.9 COUGH, UNSPECIFIED TYPE: Primary | ICD-10-CM

## 2024-10-25 DIAGNOSIS — M25.512 CHRONIC LEFT SHOULDER PAIN: ICD-10-CM

## 2024-10-25 DIAGNOSIS — G89.29 CHRONIC LEFT SHOULDER PAIN: ICD-10-CM

## 2024-10-25 DIAGNOSIS — R52 BODY ACHES: ICD-10-CM

## 2024-10-25 DIAGNOSIS — M51.9 LUMBAR DISC DISEASE: ICD-10-CM

## 2024-10-25 LAB
CTP QC/QA: YES
CTP QC/QA: YES
POC MOLECULAR INFLUENZA A AGN: NEGATIVE
POC MOLECULAR INFLUENZA B AGN: NEGATIVE
SARS-COV-2 AG RESP QL IA.RAPID: NEGATIVE

## 2024-10-25 PROCEDURE — 87811 SARS-COV-2 COVID19 W/OPTIC: CPT | Mod: QW,S$GLB,, | Performed by: NURSE PRACTITIONER

## 2024-10-25 PROCEDURE — 99214 OFFICE O/P EST MOD 30 MIN: CPT | Mod: S$GLB,,, | Performed by: NURSE PRACTITIONER

## 2024-10-25 PROCEDURE — 87502 INFLUENZA DNA AMP PROBE: CPT | Mod: QW,S$GLB,, | Performed by: NURSE PRACTITIONER

## 2024-10-25 RX ORDER — CYCLOBENZAPRINE HCL 5 MG
5 TABLET ORAL 3 TIMES DAILY PRN
Qty: 30 TABLET | Refills: 0 | Status: SHIPPED | OUTPATIENT
Start: 2024-10-25 | End: 2024-11-04

## 2024-10-26 NOTE — PROGRESS NOTES
"Subjective:      Patient ID: Josi Gil is a 69 y.o. female.    Vitals:  height is 5' 9" (1.753 m) and weight is 82.6 kg (182 lb). Her temperature is 98.2 °F (36.8 °C). Her blood pressure is 180/99 (abnormal) and her pulse is 67. Her respiration is 20 and oxygen saturation is 98%.     Chief Complaint: Cough     69 y.o. female who presents today with a chief complaint of a cough that began a week ago. She's also complaining of muscle aches. She hasn't taken any medication to help relieve her symptoms.  Presents with an elevated blood pressure.  Reports she does not take HTN medications, states she is taking the "holistic" route to decrease her BP.  Denies chest pain, dizziness, SOB, palpitations, visual disturbances, or extremity numbness or weakness.    As it relates to her compliant of "muscle aches".  She reports left shoulder pain.  Discussed history of severe degenerative disease.  She disputes all history of osteoarthritis.  States, "I do not have osteoarthritis, and they was suppose take that diagnosis out of my record".  Strongly advised her to follow up with her PCP regarding previous diagnosis.  She questions her history of HTN as well.  Discussed risk and complications of untreated HTN; such as MI, CVA, renal disease, or even death.  States she will continue the holistic approach to decrease her BP.        Cough  This is a new problem. The current episode started in the past 7 days. The problem has been gradually worsening. The problem occurs every few minutes. Associated symptoms include myalgias. Pertinent negatives include no chest pain, chills, ear congestion, ear pain, fever, headaches, heartburn, hemoptysis, nasal congestion, postnasal drip, rash, rhinorrhea, sore throat, shortness of breath, sweats, weight loss or wheezing. Nothing aggravates the symptoms. She has tried nothing for the symptoms. There is no history of asthma, bronchiectasis, bronchitis, COPD, emphysema, environmental " allergies or pneumonia.     Constitution: Negative for chills, fatigue, fever and generalized weakness.   HENT:  Positive for congestion. Negative for ear pain, postnasal drip, sinus pain, sinus pressure and sore throat.    Cardiovascular:  Negative for chest pain, palpitations and sob on exertion.   Respiratory:  Positive for cough. Negative for bloody sputum, shortness of breath and wheezing.    Gastrointestinal:  Negative for nausea, vomiting and heartburn.   Musculoskeletal:  Positive for pain, joint pain, abnormal ROM of joint, arthritis and muscle ache. Negative for trauma, joint swelling and muscle cramps.        Left shoulder, disputes history of osteoarthritis   Skin:  Negative for rash, erythema and bruising.   Allergic/Immunologic: Negative for environmental allergies and chronic cough.   Neurological:  Negative for dizziness, speech difficulty, coordination disturbances, headaches, numbness, tingling and tremors.      Objective:     Physical Exam   Constitutional: She is oriented to person, place, and time. She appears well-developed. She is cooperative.  Non-toxic appearance. She does not appear ill. No distress.   HENT:   Head: Normocephalic and atraumatic.   Ears:   Right Ear: Hearing, tympanic membrane, external ear and ear canal normal. no impacted cerumen  Left Ear: Hearing, tympanic membrane, external ear and ear canal normal. no impacted cerumen  Nose: Nose normal. No mucosal edema, rhinorrhea, nasal deformity or congestion. No epistaxis. Right sinus exhibits no maxillary sinus tenderness and no frontal sinus tenderness. Left sinus exhibits no maxillary sinus tenderness and no frontal sinus tenderness.   Mouth/Throat: Uvula is midline, oropharynx is clear and moist and mucous membranes are normal. No trismus in the jaw. Normal dentition. No uvula swelling. No oropharyngeal exudate, posterior oropharyngeal edema or posterior oropharyngeal erythema.   Eyes: Conjunctivae and lids are normal. No  scleral icterus.   Neck: Trachea normal and phonation normal. Neck supple. No edema present. No erythema present. No neck rigidity present.   Cardiovascular: Normal rate, regular rhythm, normal heart sounds and normal pulses.   Pulmonary/Chest: Effort normal and breath sounds normal. No respiratory distress. She has no decreased breath sounds. She has no rhonchi.   Abdominal: Normal appearance.   Musculoskeletal:         General: Tenderness present. No swelling, deformity or signs of injury.      Left shoulder: She exhibits tenderness.        Arms:    Neurological: She is alert and oriented to person, place, and time. She exhibits normal muscle tone. Coordination normal.   Skin: Skin is warm, dry, intact, not diaphoretic and not pale. No erythema   Psychiatric: Her speech is normal and behavior is normal. Judgment and thought content normal.   Nursing note and vitals reviewed.      Assessment:     1. Cough, unspecified type    2. Body aches    3. Chronic left shoulder pain    4. Lumbar disc disease      Results for orders placed or performed in visit on 10/25/24   SARS Coronavirus 2 Antigen, POCT Manual Read    Collection Time: 10/25/24  7:23 PM   Result Value Ref Range    SARS Coronavirus 2 Antigen Negative Negative     Acceptable Yes    POCT Influenza A/B MOLECULAR    Collection Time: 10/25/24  7:44 PM   Result Value Ref Range    POC Molecular Influenza A Ag Negative Negative    POC Molecular Influenza B Ag Negative Negative     Acceptable Yes      *Note: Due to a large number of results and/or encounters for the requested time period, some results have not been displayed. A complete set of results can be found in Results Review.      Plan:     Cough, unspecified type  -     SARS Coronavirus 2 Antigen, POCT Manual Read    Body aches  -     POCT Influenza A/B MOLECULAR    Chronic left shoulder pain  -     cyclobenzaprine (FLEXERIL) 5 MG tablet; Take 1 tablet (5 mg total) by mouth 3  "(three) times daily as needed for Muscle spasms.  Dispense: 30 tablet; Refill: 0    Lumbar disc disease  -     cyclobenzaprine (FLEXERIL) 5 MG tablet; Take 1 tablet (5 mg total) by mouth 3 (three) times daily as needed for Muscle spasms.  Dispense: 30 tablet; Refill: 0    Presents with an elevated BP.  Does not take HTN medications.    Please be aware your blood pressure was slightly elevated today -  Eat a low salt diet and recheck your blood pressure to make sure it is not getting too elevated ( greater than 160/100). Also make sure to let your doctor know about the elevated reading.       Patient disputes history of osteoarthritis.    Patient Instructions   PLEASE READ YOUR DISCHARGE INSTRUCTIONS ENTIRELY AS IT CONTAINS IMPORTANT INFORMATION.     URI:    Please drink plenty of fluids. You body needs increased water but other beverages may aid in comfort.  You will know that you have had enough water to be hydrated when your urine is clear or at least a very pale yellow. Nasal saline may help with removal of mucus as well.  Ibuprofen is preferred for aches and pains as well as fever reduction. If you do not have high blood pressure, then you may use a decongestant such as pseudoephedrine or one of the above medications that have the letter, "-D" following it.  Hot tea with honey can help with sore throats as the heat with reduce the inflammation and the honey will coat your throat to help it feel better. Prescription pain medicine should be used for the significant throat sxs you are experiencing. Do not drive after taking this medication.     Lastly, good hand washing and cough hygiene (cough into your elbow) will help prevent the spread of the illness.  A general rule is that you are no longer contagious once you have been without a fever for over 24 hours without requiring fever reducing medications.   Please get plenty of rest.     Please return here or go to the Emergency Department for any concerns or " worsening of condition.     Please take an over the counter antihistamine medication (allegra/Claritin/Zyrtec) of your choice as directed, they may help with some of the runny nose symptoms if you are having them.       Can continue mucinex. Use mucinex (guaifenisin) to break up mucous up to 2400mg/day to loosen any mucous. The mucinex DM pill has a cough suppressant that can be used at night to stop the tickle at the back of your throat. You may use Mucinex to help thin thick secretions to allow you to expel them but it only works if you drink more water.     If not allergic, please take over the counter Tylenol (Acetaminophen) and/or Motrin (Ibuprofen) as directed for control of pain and/or fever.  Please follow up with your primary care doctor or specialist as needed.     Sore throat recommendations: Warm fluids, warm salt water gargles, throat lozenges, tea, honey, soup, rest, hydration.     Use over the counter flonase: one spray each nostril twice daily OR two sprays each nostril once daily.      Sinus rinses DO NOT USE TAP WATER, if you must, water must be a rolling boil for 1 minute, let it cool, then use.  May use distilled water, or over the counter nasal saline rinses.  Vics vapor rub in shower to help open nasal passages.  May use nasal gel to keep passages moisturized.  May use Nasal saline sprays during the day for added relief of congestion.   For those who go to the gym, please do not use the sauna or steam room now to clear sinuses.     If you  smoke, please stop smoking.        Please return or see your primary care doctor if you develop new or worsening symptoms.      Please arrange follow up with your primary medical clinic as soon as possible. You must understand that you've received an Urgent Care treatment only and that you may be released before all of your medical problems are known or treated. You, the patient, will arrange for follow up as instructed. If your symptoms worsen or fail to  improve you should go to the Emergency Room.

## 2024-10-26 NOTE — PATIENT INSTRUCTIONS
Presents with an elevated BP.  Does not take HTN medications.    Please be aware your blood pressure was slightly elevated today -  Eat a low salt diet and recheck your blood pressure to make sure it is not getting too elevated ( greater than 160/100). Also make sure to let your doctor know about the elevated reading.       You must understand that you've received an Urgent Care treatment only and that you may be released before all your medical problems are known or treated. You, the patient, will arrange for follow up care as instructed.  Follow up with your PCP or specialty clinic as directed in the next 1-2 weeks if not improved or as needed.  You can call [phone] to schedule an appointment with the appropriate provider.  If your condition worsens we recommend that you receive another evaluation at the emergency room immediately or contact your primary medical clinics after hours call service to discuss your concerns.  Please return here or go to the Emergency Department for any concerns or worsening of condition.    If you were prescribed a narcotic or controlled medication, do not drive or operate heavy equipment or machinery while taking these medications.    Thank you for choosing [facility]!    Our goal in the Urgent Care is to always provide outstanding medical care. You may receive a survey by mail or e-mail in the next week regarding your experience today. We would greatly appreciate you completing and returning the survey. Your feedback provides us with a way to recognize our staff who provide very good care, and it helps us learn how to improve when your experience was below our aspiration of excellence.      We appreciate you trusting us with your medical care. We hope you feel better soon. We will be happy to take care of you for all of your future medical needs.   This note was prepared using voice-recognition software.  Although efforts are made to proofread the note, some errors  may persist in the final document.     Sincerely,    Valentin Steward DNP, FNP-C

## 2024-11-05 ENCOUNTER — TELEPHONE (OUTPATIENT)
Dept: INTERNAL MEDICINE | Facility: CLINIC | Age: 69
End: 2024-11-05
Payer: COMMERCIAL

## 2024-11-05 NOTE — TELEPHONE ENCOUNTER
----- Message from Scarlet sent at 11/5/2024 10:36 AM CST -----  Contact: self   Patient is calling to have Lab orders placed for a complete blood work. Please call to advise

## 2024-11-05 NOTE — TELEPHONE ENCOUNTER
Pt recently in August and May for annual had lab test done. Does this pt require complete blood work every 3 months? If so, please place lab orders.    Please review and respond,  Thank You.

## 2024-11-21 DIAGNOSIS — E78.5 HYPERLIPIDEMIA, UNSPECIFIED HYPERLIPIDEMIA TYPE: ICD-10-CM

## 2024-11-21 DIAGNOSIS — Z00.00 WELL ADULT ON ROUTINE HEALTH CHECK: ICD-10-CM

## 2024-11-21 DIAGNOSIS — M79.10 MYALGIA: Primary | ICD-10-CM

## 2024-11-23 ENCOUNTER — LAB VISIT (OUTPATIENT)
Dept: LAB | Facility: HOSPITAL | Age: 69
End: 2024-11-23
Attending: INTERNAL MEDICINE

## 2024-11-23 DIAGNOSIS — E78.5 HYPERLIPIDEMIA, UNSPECIFIED HYPERLIPIDEMIA TYPE: ICD-10-CM

## 2024-11-23 DIAGNOSIS — Z00.00 WELL ADULT ON ROUTINE HEALTH CHECK: ICD-10-CM

## 2024-11-23 DIAGNOSIS — M79.10 MYALGIA: ICD-10-CM

## 2024-11-23 LAB
ALBUMIN SERPL BCP-MCNC: 3.7 G/DL (ref 3.5–5.2)
ALP SERPL-CCNC: 75 U/L (ref 40–150)
ALT SERPL W/O P-5'-P-CCNC: 16 U/L (ref 10–44)
ANION GAP SERPL CALC-SCNC: 10 MMOL/L (ref 8–16)
AST SERPL-CCNC: 26 U/L (ref 10–40)
BASOPHILS # BLD AUTO: 0.06 K/UL (ref 0–0.2)
BASOPHILS NFR BLD: 1.3 % (ref 0–1.9)
BILIRUB SERPL-MCNC: 1 MG/DL (ref 0.1–1)
BUN SERPL-MCNC: 9 MG/DL (ref 8–23)
CALCIUM SERPL-MCNC: 9.7 MG/DL (ref 8.7–10.5)
CHLORIDE SERPL-SCNC: 104 MMOL/L (ref 95–110)
CHOLEST SERPL-MCNC: 257 MG/DL (ref 120–199)
CHOLEST/HDLC SERPL: 2.6 {RATIO} (ref 2–5)
CK SERPL-CCNC: 452 U/L (ref 20–180)
CO2 SERPL-SCNC: 26 MMOL/L (ref 23–29)
CREAT SERPL-MCNC: 0.8 MG/DL (ref 0.5–1.4)
DIFFERENTIAL METHOD BLD: ABNORMAL
EOSINOPHIL # BLD AUTO: 0.2 K/UL (ref 0–0.5)
EOSINOPHIL NFR BLD: 4.9 % (ref 0–8)
ERYTHROCYTE [DISTWIDTH] IN BLOOD BY AUTOMATED COUNT: 13.1 % (ref 11.5–14.5)
EST. GFR  (NO RACE VARIABLE): >60 ML/MIN/1.73 M^2
ESTIMATED AVG GLUCOSE: 100 MG/DL (ref 68–131)
GLUCOSE SERPL-MCNC: 87 MG/DL (ref 70–110)
HBA1C MFR BLD: 5.1 % (ref 4–5.6)
HCT VFR BLD AUTO: 45.4 % (ref 37–48.5)
HDLC SERPL-MCNC: 99 MG/DL (ref 40–75)
HDLC SERPL: 38.5 % (ref 20–50)
HGB BLD-MCNC: 14.9 G/DL (ref 12–16)
IMM GRANULOCYTES # BLD AUTO: 0 K/UL (ref 0–0.04)
IMM GRANULOCYTES NFR BLD AUTO: 0 % (ref 0–0.5)
LDLC SERPL CALC-MCNC: 143.8 MG/DL (ref 63–159)
LYMPHOCYTES # BLD AUTO: 1.8 K/UL (ref 1–4.8)
LYMPHOCYTES NFR BLD: 39.4 % (ref 18–48)
MAGNESIUM SERPL-MCNC: 2.3 MG/DL (ref 1.6–2.6)
MCH RBC QN AUTO: 31.2 PG (ref 27–31)
MCHC RBC AUTO-ENTMCNC: 32.8 G/DL (ref 32–36)
MCV RBC AUTO: 95 FL (ref 82–98)
MONOCYTES # BLD AUTO: 0.5 K/UL (ref 0.3–1)
MONOCYTES NFR BLD: 10.1 % (ref 4–15)
NEUTROPHILS # BLD AUTO: 2 K/UL (ref 1.8–7.7)
NEUTROPHILS NFR BLD: 44.3 % (ref 38–73)
NONHDLC SERPL-MCNC: 158 MG/DL
NRBC BLD-RTO: 0 /100 WBC
PLATELET # BLD AUTO: 185 K/UL (ref 150–450)
PMV BLD AUTO: 12.5 FL (ref 9.2–12.9)
POTASSIUM SERPL-SCNC: 4.4 MMOL/L (ref 3.5–5.1)
PROT SERPL-MCNC: 7.4 G/DL (ref 6–8.4)
RBC # BLD AUTO: 4.77 M/UL (ref 4–5.4)
SODIUM SERPL-SCNC: 140 MMOL/L (ref 136–145)
T4 FREE SERPL-MCNC: 1.08 NG/DL (ref 0.71–1.51)
TRIGL SERPL-MCNC: 71 MG/DL (ref 30–150)
TSH SERPL DL<=0.005 MIU/L-ACNC: 0.33 UIU/ML (ref 0.4–4)
WBC # BLD AUTO: 4.47 K/UL (ref 3.9–12.7)

## 2024-11-23 PROCEDURE — 84439 ASSAY OF FREE THYROXINE: CPT | Performed by: INTERNAL MEDICINE

## 2024-11-23 PROCEDURE — 80061 LIPID PANEL: CPT | Performed by: INTERNAL MEDICINE

## 2024-11-23 PROCEDURE — 82550 ASSAY OF CK (CPK): CPT | Performed by: INTERNAL MEDICINE

## 2024-11-23 PROCEDURE — 80053 COMPREHEN METABOLIC PANEL: CPT | Performed by: INTERNAL MEDICINE

## 2024-11-23 PROCEDURE — 83735 ASSAY OF MAGNESIUM: CPT | Performed by: INTERNAL MEDICINE

## 2024-11-23 PROCEDURE — 83036 HEMOGLOBIN GLYCOSYLATED A1C: CPT | Mod: GA | Performed by: INTERNAL MEDICINE

## 2024-11-23 PROCEDURE — 85025 COMPLETE CBC W/AUTO DIFF WBC: CPT | Performed by: INTERNAL MEDICINE

## 2024-11-23 PROCEDURE — 84443 ASSAY THYROID STIM HORMONE: CPT | Performed by: INTERNAL MEDICINE

## 2024-11-25 ENCOUNTER — TELEPHONE (OUTPATIENT)
Dept: INTERNAL MEDICINE | Facility: CLINIC | Age: 69
End: 2024-11-25
Payer: MEDICARE

## 2024-11-25 NOTE — TELEPHONE ENCOUNTER
----- Message from Iris sent at 11/25/2024  8:03 AM CST -----  Contact: patient  [phone]  2TESTRESULTS    Type: Test Results    What test was performed? Lab Results on Sat 11/23    Who ordered the test? Dr Gonzalez    When and where were the test performed? [facility]    Would you like a call back and or thru [facility]:Patient Urgently needs results per PHONE    Comments:patient  [phone]    _______________________________________    .1MEDICALADVICE     Patient is calling for Medical Advice regarding: TOE FUNGUS BABY on EACH FOOT    How long has patient had these symptoms: 2 MONTHS           Patient wants a call back or thru [facility]:patient  [phone]   Patient needs something prescribed nothing over counter is working    Comments:    Please advise patient replies from provider may take up to 48 hours.

## 2024-11-25 NOTE — TELEPHONE ENCOUNTER
I spoke with patient regarding lab results , I advised patient that  will reach out once he reviews the results.

## 2024-11-26 ENCOUNTER — DOCUMENTATION ONLY (OUTPATIENT)
Dept: INTERNAL MEDICINE | Facility: CLINIC | Age: 69
End: 2024-11-26
Payer: MEDICARE

## 2024-11-26 ENCOUNTER — TELEPHONE (OUTPATIENT)
Dept: INTERNAL MEDICINE | Facility: CLINIC | Age: 69
End: 2024-11-26
Payer: MEDICARE

## 2024-11-26 DIAGNOSIS — R07.9 CHEST PAIN, UNSPECIFIED TYPE: ICD-10-CM

## 2024-11-26 DIAGNOSIS — M79.10 MYALGIA: ICD-10-CM

## 2024-11-26 DIAGNOSIS — M79.605 ACUTE LEG PAIN, LEFT: Primary | ICD-10-CM

## 2024-11-26 NOTE — TELEPHONE ENCOUNTER
----- Message from Radha sent at 11/26/2024  2:09 PM CST -----  Contact: [phone]  2TESTRESULTS    Type: Test Results    What test was performed?labs    Who ordered the test?Lazaro    When and where were the test performed?  11/23/24/ [facility]    Would you like a call back and or thru [facility]: CALL    Comments:

## 2024-11-26 NOTE — TELEPHONE ENCOUNTER
Duplicate msg, called 11/23/24 and was informed that PCP has not reviewed and once we get any information we will contact them. Sent portal msg.

## 2024-11-27 ENCOUNTER — LAB VISIT (OUTPATIENT)
Dept: LAB | Facility: HOSPITAL | Age: 69
End: 2024-11-27
Attending: INTERNAL MEDICINE
Payer: MEDICARE

## 2024-11-27 DIAGNOSIS — M79.605 ACUTE LEG PAIN, LEFT: ICD-10-CM

## 2024-11-27 DIAGNOSIS — M79.10 MYALGIA: ICD-10-CM

## 2024-11-27 DIAGNOSIS — R07.9 CHEST PAIN, UNSPECIFIED TYPE: ICD-10-CM

## 2024-11-27 LAB
CK SERPL-CCNC: 581 U/L (ref 20–180)
CRP SERPL-MCNC: 1.8 MG/L (ref 0–8.2)
D DIMER PPP IA.FEU-MCNC: 0.8 MG/L FEU
TROPONIN I SERPL DL<=0.01 NG/ML-MCNC: 0.01 NG/ML (ref 0–0.03)
TSH SERPL DL<=0.005 MIU/L-ACNC: 0.78 UIU/ML (ref 0.4–4)

## 2024-11-27 PROCEDURE — 82550 ASSAY OF CK (CPK): CPT | Performed by: INTERNAL MEDICINE

## 2024-11-27 PROCEDURE — 85379 FIBRIN DEGRADATION QUANT: CPT | Performed by: INTERNAL MEDICINE

## 2024-11-27 PROCEDURE — 84484 ASSAY OF TROPONIN QUANT: CPT | Performed by: INTERNAL MEDICINE

## 2024-11-27 PROCEDURE — 86140 C-REACTIVE PROTEIN: CPT | Performed by: INTERNAL MEDICINE

## 2024-11-27 PROCEDURE — 36415 COLL VENOUS BLD VENIPUNCTURE: CPT | Performed by: INTERNAL MEDICINE

## 2024-11-27 PROCEDURE — 84443 ASSAY THYROID STIM HORMONE: CPT | Performed by: INTERNAL MEDICINE

## 2024-11-27 NOTE — PROGRESS NOTES
Internal medicine note    Patient recently has set of labs    She was states that she has not been feeling well just hurts all over but last week had significant pain involving her left calf particularly when walking she did not note any swelling.  It was still that has she does express a concern for blood clot.    The main thing noted on lab was not elevated CPK.  This was not the case before and will need to be follow-up by suspect that has she had some acute strain in his spasm involving her muscles.  She was not on medications to cause this.    Also TSH was minimally low.  But free T4 was normal.  Do not feel that she was a thyroid disorder overactive thyroid that has later can be repeated    The liver profile is elevated.  In which total and LDL cholesterol is elevated.  The HDL is always been high.  Despite attempts at diet this is the same profile throughout the number of years.  I would recommend that she give some consideration to being on medication even if it was low intensity    In his meantime will arrange vascular ultrasound of the left leg

## 2024-11-29 ENCOUNTER — TELEPHONE (OUTPATIENT)
Dept: INTERNAL MEDICINE | Facility: CLINIC | Age: 69
End: 2024-11-29
Payer: MEDICARE

## 2024-11-29 NOTE — TELEPHONE ENCOUNTER
----- Message from Kyle sent at 11/29/2024  8:21 AM CST -----  Type:  Same Day Appointment Request    Caller is requesting a same day appointment.  Caller declined first available appointment listed below.    Name of Caller: Josi  When is the first available appointment?  12-  Symptoms: discuss bloodwork  Best Call Back Number:  [phone]  Additional Information:

## 2024-11-29 NOTE — TELEPHONE ENCOUNTER
Spoke with pt informed PCP is out of clinic today. Pt requesting that PCP return her call on Monday Dec 2, 2024 when he return to clinic.

## 2024-12-03 ENCOUNTER — TELEPHONE (OUTPATIENT)
Dept: INTERNAL MEDICINE | Facility: CLINIC | Age: 69
End: 2024-12-03
Payer: MEDICARE

## 2024-12-03 NOTE — TELEPHONE ENCOUNTER
----- Message from Jocelin sent at 12/2/2024  4:18 PM CST -----  Contact: [phone]  .1MEDICALADVICE     Patient is calling for Medical Advice regarding:Patient is calling to speak to nurse about appointment for Dr     How long has patient had these symptoms:    Pharmacy name and phone#:    Patient wants a call back or [facility]:    Comments:    Please advise patient replies from provider may take up to 48 hours.

## 2024-12-03 NOTE — TELEPHONE ENCOUNTER
Called pt to discuss what's going on and get her scheduled, received no answer, mailbox full could not leave message

## 2024-12-17 ENCOUNTER — TELEPHONE (OUTPATIENT)
Dept: INTERNAL MEDICINE | Facility: CLINIC | Age: 69
End: 2024-12-17
Payer: MEDICARE

## 2024-12-17 DIAGNOSIS — M79.10 MYALGIA: Primary | ICD-10-CM

## 2024-12-17 NOTE — TELEPHONE ENCOUNTER
Pt states that she needs Dr Willis to contact her today its very very very important that he calls her today.    pc   no concern

## 2024-12-17 NOTE — TELEPHONE ENCOUNTER
Spoke with pt and she wanted to know if she can change her vascular appointment to a later date and time. I was able to give her the number to that department to see what they were able to do.    -pc

## 2024-12-17 NOTE — TELEPHONE ENCOUNTER
----- Message from Fatoumata sent at 12/17/2024  1:18 PM CST -----  .1MEDICALADVICE     Patient is calling for Medical Advice regarding: pt states that it is very important that she speaks with someone today.     How long has patient had these symptoms:    Pharmacy name and phone#:    Patient wants a call back or [facility]:    Comments:    Please advise patient replies from provider may take up to 48 hours.

## 2024-12-18 ENCOUNTER — LAB VISIT (OUTPATIENT)
Dept: LAB | Facility: HOSPITAL | Age: 69
End: 2024-12-18
Attending: INTERNAL MEDICINE
Payer: MEDICARE

## 2024-12-18 DIAGNOSIS — M79.10 MYALGIA: ICD-10-CM

## 2024-12-18 LAB
CK SERPL-CCNC: 112 U/L (ref 20–180)
T4 FREE SERPL-MCNC: 0.95 NG/DL (ref 0.71–1.51)
TSH SERPL DL<=0.005 MIU/L-ACNC: 0.63 UIU/ML (ref 0.4–4)

## 2024-12-18 PROCEDURE — 82550 ASSAY OF CK (CPK): CPT | Performed by: INTERNAL MEDICINE

## 2024-12-18 PROCEDURE — 84443 ASSAY THYROID STIM HORMONE: CPT | Performed by: INTERNAL MEDICINE

## 2024-12-18 PROCEDURE — 82085 ASSAY OF ALDOLASE: CPT | Performed by: INTERNAL MEDICINE

## 2024-12-18 PROCEDURE — 36415 COLL VENOUS BLD VENIPUNCTURE: CPT | Performed by: INTERNAL MEDICINE

## 2024-12-18 PROCEDURE — 84439 ASSAY OF FREE THYROXINE: CPT | Performed by: INTERNAL MEDICINE

## 2024-12-20 ENCOUNTER — DOCUMENTATION ONLY (OUTPATIENT)
Dept: INTERNAL MEDICINE | Facility: CLINIC | Age: 69
End: 2024-12-20
Payer: MEDICARE

## 2024-12-20 LAB — ALDOLASE SERPL-CCNC: 4 U/L (ref 1.2–7.6)

## 2024-12-21 ENCOUNTER — NURSE TRIAGE (OUTPATIENT)
Dept: ADMINISTRATIVE | Facility: CLINIC | Age: 69
End: 2024-12-21
Payer: MEDICARE

## 2024-12-21 NOTE — TELEPHONE ENCOUNTER
Patient spoke with another triage nurse. No contact with patient by this nurse.     Reason for Disposition   Caller has already spoken with another triager and has no further questions.    Protocols used: No Contact or Duplicate Contact Call-A-AH

## 2024-12-21 NOTE — TELEPHONE ENCOUNTER
Reason for Disposition   Caller requesting routine or non-urgent lab result    Additional Information   Lab result questions    Protocols used: Information Only Call - No Triage-A-AH, PCP Call - No Triage-A-AH  Pt access rep attempted to transfer pt. She stated pt hung up. Called pt back and she is requesting lab results. Advised pt per policy [facility] nurses do not discuss lab results. Pt advised the lab will notify the MD of any critical/life threatening results and the MD or someone would have contacted her. Advised of Virtual visit option to discuss labs. Also advised pt she can review her labs in the portal. Walked pt through steps on how to view the labs in her portal. Pt was able to get in her portal. Pt has no further questions or concerns.

## 2024-12-23 NOTE — PROGRESS NOTES
Internal medicine note    Test results from April 18th reviewed.  It was the follow-up on abnormal CPK which she had elevated back in November.  At the time she call us in his she just was not feeling right she was having diffuse pain particularly in his legs.  The P CPK that has normalize.  Thyroid function remains normal this was discussed with the patient.  She was to pursue a vascular Doppler of the leg.  That has noted that the D-dimer has been chronically elevated but the last 1 was was minimally elevated.  Each time it seems like the level that has gone down

## 2024-12-26 ENCOUNTER — TELEPHONE (OUTPATIENT)
Dept: INTERNAL MEDICINE | Facility: CLINIC | Age: 69
End: 2024-12-26
Payer: MEDICARE

## 2024-12-26 NOTE — TELEPHONE ENCOUNTER
----- Message from Gilma sent at 12/26/2024  9:18 AM CST -----   Name of Who is Calling:     What is the request in detail: patient request call back in reference to want to be  seen today by dr fuller Please contact to further discuss and advise      Can the clinic reply by [facility]:     [facility]   [phone]

## 2024-12-26 NOTE — TELEPHONE ENCOUNTER
----- Message from Gilma sent at 12/26/2024  9:18 AM CST -----   Name of Who is Calling:     What is the request in detail: patient request call back in reference to want to be  seen today by dr fuller Please contact to further discuss and advise      Can the clinic reply by [facility]:     [facility]   [phone]

## 2024-12-27 ENCOUNTER — TELEPHONE (OUTPATIENT)
Dept: INTERNAL MEDICINE | Facility: CLINIC | Age: 69
End: 2024-12-27
Payer: MEDICARE

## 2024-12-27 NOTE — TELEPHONE ENCOUNTER
----- Message from Trini sent at 12/27/2024  8:52 AM CST -----  Type: Patient call    Who called: Patient    Does the patient know what this is regarding? Requesting a urgent call back in regards to needing to schedule an appt today; patient states she is feeling horrible; please advise     Would the patient rather a call back or response via [facility]? Call    Best call back number: [phone]    Additional information:

## 2024-12-30 ENCOUNTER — TELEPHONE (OUTPATIENT)
Dept: INTERNAL MEDICINE | Facility: CLINIC | Age: 69
End: 2024-12-30
Payer: MEDICARE

## 2024-12-30 NOTE — TELEPHONE ENCOUNTER
----- Message from Windy sent at 12/30/2024 12:02 PM CST -----  Contact: Pt  [phone]  .1MEDICALADVICE     Patient is calling for Medical Advice regarding: Chest pains     How long has patient had these symptoms: Over a week getting worst    Pharmacy name and phone#:[facility]   Phone: [phone]  Fax: [phone]          Patient wants a call back or thru [facility]:Callback    Comments:Pt is requesting an liza today with Dr Willis if she can be fitted in please call and advise    Please advise patient replies from provider may take up to 48 hours.

## 2024-12-31 ENCOUNTER — OFFICE VISIT (OUTPATIENT)
Dept: INTERNAL MEDICINE | Facility: CLINIC | Age: 69
End: 2024-12-31
Payer: MEDICARE

## 2024-12-31 ENCOUNTER — HOSPITAL ENCOUNTER (OUTPATIENT)
Dept: RADIOLOGY | Facility: HOSPITAL | Age: 69
Discharge: HOME | End: 2024-12-31
Attending: INTERNAL MEDICINE
Payer: MEDICARE

## 2024-12-31 DIAGNOSIS — R07.9 CHEST PAIN, UNSPECIFIED TYPE: Primary | ICD-10-CM

## 2024-12-31 DIAGNOSIS — R07.9 CHEST PAIN, UNSPECIFIED TYPE: ICD-10-CM

## 2024-12-31 DIAGNOSIS — M25.519 SHOULDER PAIN, UNSPECIFIED CHRONICITY, UNSPECIFIED LATERALITY: ICD-10-CM

## 2024-12-31 DIAGNOSIS — I10 HYPERTENSION, UNSPECIFIED TYPE: ICD-10-CM

## 2024-12-31 PROCEDURE — 99214 OFFICE O/P EST MOD 30 MIN: CPT | Mod: S$PBB,,, | Performed by: INTERNAL MEDICINE

## 2024-12-31 PROCEDURE — 99214 OFFICE O/P EST MOD 30 MIN: CPT | Mod: PBBFAC,25 | Performed by: INTERNAL MEDICINE

## 2024-12-31 PROCEDURE — 71046 X-RAY EXAM CHEST 2 VIEWS: CPT | Mod: TC

## 2024-12-31 PROCEDURE — 71046 X-RAY EXAM CHEST 2 VIEWS: CPT | Mod: 26,,, | Performed by: RADIOLOGY

## 2024-12-31 PROCEDURE — 99999 PR PBB SHADOW E&M-EST. PATIENT-LVL IV: CPT | Mod: PBBFAC,,, | Performed by: INTERNAL MEDICINE

## 2024-12-31 PROCEDURE — G2211 COMPLEX E/M VISIT ADD ON: HCPCS | Mod: S$PBB,,, | Performed by: INTERNAL MEDICINE

## 2024-12-31 RX ORDER — METOPROLOL TARTRATE 25 MG/1
25 TABLET, FILM COATED ORAL 2 TIMES DAILY
Qty: 60 TABLET | Refills: 1 | Status: SHIPPED | OUTPATIENT
Start: 2024-12-31 | End: 2024-12-31

## 2024-12-31 RX ORDER — ESOMEPRAZOLE MAGNESIUM 40 MG/1
40 CAPSULE, DELAYED RELEASE ORAL DAILY PRN
Qty: 30 CAPSULE | Refills: 2 | Status: SHIPPED | OUTPATIENT
Start: 2024-12-31 | End: 2025-12-31

## 2024-12-31 RX ORDER — LISINOPRIL 10 MG/1
10 TABLET ORAL DAILY
Qty: 30 TABLET | Refills: 1 | Status: SHIPPED | OUTPATIENT
Start: 2024-12-31 | End: 2025-12-31

## 2024-12-31 NOTE — TELEPHONE ENCOUNTER
Outgoing Annmarie Gil () [phone] (Home) Remove    Daughter Frankie stated she would prefer we keep trying her mom to give the result message.   Unable to get pt on the phone- reviewed the phone number to make sure we have correct info.    ----- Message from Alec Aguillon MA sent at 2/14/2024 11:07 AM CST -----  Contact: [phone]    ----- Message -----  From: Amparo Toledo  Sent: 2/14/2024  11:02 AM CST  To: [facility] Staff    Pt states she has not received a call back re test results from a long time ago and she feels the not responding is not acceptable. Original message is attached.Can you please call pt? Thanks      February 5, 2024           2/5/24  9:02 AM  Venice Caraballo, CRISTOFER routed this conversation to Jonny Willis MD Brassette, Suzanne C, LPN         2/5/24  9:02 AM  Note  ----- Message from Julio Reardon MA sent at 2/5/2024  8:41 AM CST -----  Contact: Pt @  [phone]  Patient is requesting lab test results  ----- Message -----  From: Nithin Powell  Sent: 2/5/2024   8:38 AM CST  To: [facility] Staff     2TESTRESULTS     Type: Test Results     What test was performed? 1/18/2024     Who ordered the test?     When and where were the test performed? [facility]     Would you like response via [facility]:      Comments: Pt states she has not received the results of her labs and she's unable to view anything in the portal and would like a call to discuss results             [id number]

## 2025-01-02 ENCOUNTER — PATIENT MESSAGE (OUTPATIENT)
Dept: INTERNAL MEDICINE | Facility: CLINIC | Age: 70
End: 2025-01-02
Payer: MEDICARE

## 2025-01-02 ENCOUNTER — TELEPHONE (OUTPATIENT)
Dept: INTERNAL MEDICINE | Facility: CLINIC | Age: 70
End: 2025-01-02
Payer: MEDICARE

## 2025-01-02 ENCOUNTER — HOSPITAL ENCOUNTER (OUTPATIENT)
Dept: CARDIOLOGY | Facility: HOSPITAL | Age: 70
Discharge: HOME | End: 2025-01-02
Attending: INTERNAL MEDICINE
Payer: MEDICARE

## 2025-01-02 VITALS — WEIGHT: 180 LBS | BODY MASS INDEX: 28.25 KG/M2 | HEIGHT: 67 IN

## 2025-01-02 DIAGNOSIS — R07.9 CHEST PAIN, UNSPECIFIED TYPE: ICD-10-CM

## 2025-01-02 LAB
CV STRESS BASE HR: 64 BPM
DIASTOLIC BLOOD PRESSURE: 80 MMHG
OHS CV CPX 1 MINUTE RECOVERY HEART RATE: 101 BPM
OHS CV CPX 85 PERCENT MAX PREDICTED HEART RATE MALE: 128
OHS CV CPX ESTIMATED METS: 8
OHS CV CPX MAX PREDICTED HEART RATE: 151
OHS CV CPX PATIENT IS FEMALE: 1
OHS CV CPX PATIENT IS MALE: 0
OHS CV CPX PEAK DIASTOLIC BLOOD PRESSURE: 78 MMHG
OHS CV CPX PEAK HEAR RATE: 123 BPM
OHS CV CPX PEAK RATE PRESSURE PRODUCT: NORMAL
OHS CV CPX PEAK SYSTOLIC BLOOD PRESSURE: 236 MMHG
OHS CV CPX PERCENT MAX PREDICTED HEART RATE ACHIEVED: 85
OHS CV CPX RATE PRESSURE PRODUCT PRESENTING: 9408
STRESS ECHO POST EXERCISE DUR MIN: 5 MINUTES
STRESS ECHO POST EXERCISE DUR SEC: 16 SECONDS
SYSTOLIC BLOOD PRESSURE: 147 MMHG

## 2025-01-02 PROCEDURE — 93016 CV STRESS TEST SUPVJ ONLY: CPT | Mod: ,,, | Performed by: STUDENT IN AN ORGANIZED HEALTH CARE EDUCATION/TRAINING PROGRAM

## 2025-01-02 PROCEDURE — 93017 CV STRESS TEST TRACING ONLY: CPT

## 2025-01-02 PROCEDURE — 93018 CV STRESS TEST I&R ONLY: CPT | Mod: ,,, | Performed by: STUDENT IN AN ORGANIZED HEALTH CARE EDUCATION/TRAINING PROGRAM

## 2025-01-02 NOTE — TELEPHONE ENCOUNTER
----- Message from Vy sent at 1/2/2025 10:11 AM CST -----  Contact: [phone]  2TESTRESULTS    Type: Test Results    What test was performed? Xray & labs    Who ordered the test? Dr. Sutton    When and where were the test performed? 12/31/24 [facility]    Would you like a call back and or thru [facility]: CALL    Comments:

## 2025-01-03 ENCOUNTER — NURSE TRIAGE (OUTPATIENT)
Dept: ADMINISTRATIVE | Facility: CLINIC | Age: 70
End: 2025-01-03
Payer: MEDICARE

## 2025-01-04 VITALS
TEMPERATURE: 99 F | OXYGEN SATURATION: 99 % | HEART RATE: 70 BPM | HEIGHT: 69 IN | WEIGHT: 186.31 LBS | BODY MASS INDEX: 27.6 KG/M2 | SYSTOLIC BLOOD PRESSURE: 138 MMHG | DIASTOLIC BLOOD PRESSURE: 88 MMHG

## 2025-01-04 NOTE — TELEPHONE ENCOUNTER
Pt called stating she has been waiting for Dr. Sutton to call her to review her lab results. Per chart clinics nurse sent pt a message through [employer] stating Dr. Sutton stated lab results were normal. Pt stated she does not use the liza and requesting for Dr. Sutton to call her to discuss her labs.   Reason for Disposition   Caller requesting lab results  (Exception: Routine or non-urgent lab result.)    Protocols used: PCP Call - No Triage-A-

## 2025-01-05 NOTE — PROGRESS NOTES
Subjective:       Patient ID: Josi Gil is a 69 y.o. female.    Chief Complaint: Hypertension    HPI  She returns for management of hypertension.  She has had hypertension for over a year.  Current treatment has included medications outlined in medication list.  She denies chest pain or shortness of breath.  No palpitations.  Denies left arm or neck pain.  She reports an episode of chest pain 3 days ago.  Currently asymptomatic     Medications: See medication list     Social history:  Does not smoke, does not drink alcohol       Review of Systems   Constitutional:  Negative for chills, fatigue, fever and unexpected weight change.   Respiratory:  Negative for chest tightness and shortness of breath.    Cardiovascular:  Negative for chest pain and palpitations.   Gastrointestinal:  Negative for abdominal pain and blood in stool.   Neurological:  Negative for dizziness, syncope, numbness and headaches.       Objective:      Physical Exam  HENT:      Right Ear: External ear normal.      Left Ear: External ear normal.      Nose: Nose normal.      Mouth/Throat:      Mouth: Mucous membranes are moist.      Pharynx: Oropharynx is clear.   Eyes:      Pupils: Pupils are equal, round, and reactive to light.   Cardiovascular:      Rate and Rhythm: Normal rate and regular rhythm.      Heart sounds: No murmur heard.  Pulmonary:      Breath sounds: Normal breath sounds.   Abdominal:      General: There is no distension.      Palpations: There is no hepatomegaly or splenomegaly.      Tenderness: There is no abdominal tenderness.   Musculoskeletal:      Cervical back: Normal range of motion.   Lymphadenopathy:      Cervical: No cervical adenopathy.      Upper Body:      Right upper body: No axillary adenopathy.      Left upper body: No axillary adenopathy.   Neurological:      Cranial Nerves: No cranial nerve deficit.      Sensory: No sensory deficit.      Motor: Motor function is intact.      Deep Tendon Reflexes:  Reflexes are normal and symmetric.         Assessment/Plan       Assessment and plan: 1.  Hypertension: Uncontrolled.  She has been intolerant to many previous blood pressure medications.  She requests lisinopril.  Start lisinopril.  She will follow-up with her primary care physician in 1 month  2.  Chest pain:  Check CMP, CBC, chest x-ray, stress test.  Advised her to call immediately if symptom recurs   3.  She was here for urgent care only appointment.  She will follow-up with her primary care physician for a physical    Visit today included increased complexity associated with the care of the episodic problem hypertension addressed and managing the longitudinal care of the patient due to the serious and/or complex managed problem(s) hypertension, chest pain.

## 2025-01-07 ENCOUNTER — OFFICE VISIT (OUTPATIENT)
Dept: URGENT CARE | Facility: CLINIC | Age: 70
End: 2025-01-07
Payer: MEDICARE

## 2025-01-07 ENCOUNTER — TELEPHONE (OUTPATIENT)
Dept: INTERNAL MEDICINE | Facility: CLINIC | Age: 70
End: 2025-01-07
Payer: MEDICARE

## 2025-01-07 VITALS
RESPIRATION RATE: 16 BRPM | HEART RATE: 76 BPM | SYSTOLIC BLOOD PRESSURE: 142 MMHG | WEIGHT: 180 LBS | DIASTOLIC BLOOD PRESSURE: 77 MMHG | TEMPERATURE: 99 F | BODY MASS INDEX: 28.19 KG/M2 | OXYGEN SATURATION: 97 %

## 2025-01-07 DIAGNOSIS — J06.9 VIRAL URI WITH COUGH: Primary | ICD-10-CM

## 2025-01-07 DIAGNOSIS — H61.23 BILATERAL IMPACTED CERUMEN: ICD-10-CM

## 2025-01-07 DIAGNOSIS — H60.502 ACUTE OTITIS EXTERNA OF LEFT EAR, UNSPECIFIED TYPE: ICD-10-CM

## 2025-01-07 DIAGNOSIS — R05.9 COUGH, UNSPECIFIED TYPE: ICD-10-CM

## 2025-01-07 LAB
CTP QC/QA: YES
POC MOLECULAR INFLUENZA A AGN: NEGATIVE
POC MOLECULAR INFLUENZA B AGN: NEGATIVE
POC RSV RAPID ANT MOLECULAR: NEGATIVE
SARS-COV-2 AG RESP QL IA.RAPID: NEGATIVE

## 2025-01-07 PROCEDURE — 87502 INFLUENZA DNA AMP PROBE: CPT | Mod: QW,S$GLB,,

## 2025-01-07 PROCEDURE — 69209 REMOVE IMPACTED EAR WAX UNI: CPT | Mod: 50,S$GLB,,

## 2025-01-07 PROCEDURE — 99214 OFFICE O/P EST MOD 30 MIN: CPT | Mod: 25,S$GLB,,

## 2025-01-07 PROCEDURE — 87811 SARS-COV-2 COVID19 W/OPTIC: CPT | Mod: QW,S$GLB,,

## 2025-01-07 PROCEDURE — 87634 RSV DNA/RNA AMP PROBE: CPT | Mod: QW,S$GLB,,

## 2025-01-07 RX ORDER — FLUTICASONE PROPIONATE 50 MCG
1 SPRAY, SUSPENSION (ML) NASAL DAILY
Qty: 9.9 ML | Refills: 0 | Status: SHIPPED | OUTPATIENT
Start: 2025-01-07 | End: 2025-01-12

## 2025-01-07 RX ORDER — CIPROFLOXACIN AND DEXAMETHASONE 3; 1 MG/ML; MG/ML
3 SUSPENSION/ DROPS AURICULAR (OTIC) 2 TIMES DAILY
Qty: 7.5 ML | Refills: 0 | Status: SHIPPED | OUTPATIENT
Start: 2025-01-07 | End: 2025-01-12

## 2025-01-07 RX ORDER — BENZONATATE 100 MG/1
100 CAPSULE ORAL 3 TIMES DAILY PRN
Qty: 21 CAPSULE | Refills: 0 | Status: SHIPPED | OUTPATIENT
Start: 2025-01-07 | End: 2025-01-14

## 2025-01-07 NOTE — TELEPHONE ENCOUNTER
Called and spoke to patient about recent labs. Patient ok with results. Verbalized understanding with read back

## 2025-01-08 NOTE — PATIENT INSTRUCTIONS
"                                                         URI   If your condition worsens or fails to improve we recommend that you receive another evaluation at the urgent care/ER immediately or contact your PCP to discuss your concerns. You must understand that you've received an urgent care treatment only and that you may be released before all your medical problems are known or treated. You the patient will arrange for follouwp care as instructed.     we discussed that I think your illness is viral, it will not respond to antibiotics and will last 10-14 days.     Zyrtec D, Claritin D or Allegra D can also help with symptoms of congestion and drainage.   If you have hypertension avoid using the "D" which is the decongestant   If you just have drainage you can take plain zyrtec, claritin or allegra   Use Flonase for postnasal drip and nasal congestion  Rest and fluids are also important.     Salt water gargles, warm tea with honey and chloraseptic spray as needed for sore throat.   Tylenol or ibuprofen can also be used as directed for pain unless you have an allergy to them or medical condition such as stomach ulcers, kidney or liver disease or blood thinners etc for which you should not be taking these type of medications.     Take the antibiotics drops as prescribed. Avoid using qtips. Use tylenol/ibuprofen as needed for pain relief/fevers. If your symptoms should worsen please return to the urgent care or ED as needed.   "

## 2025-01-08 NOTE — PROGRESS NOTES
Subjective:      Patient ID: Josi Gil is a 69 y.o. female.    Vitals:  weight is 81.6 kg (180 lb). Her oral temperature is 98.9 °F (37.2 °C). Her blood pressure is 142/77 (abnormal) and her pulse is 76. Her respiration is 16 and oxygen saturation is 97%.     Chief Complaint: Cough    This is a 69 y.o. female who presents today with a chief complaint of  cough, nasal congestion, runny nose,and headache x 4 days. Pt also presents with ear congestion and ringing in ears. No nausea, vomiting, diarrhea, abd pain, fever,  sore throat       Cough  This is a new problem. The current episode started in the past 7 days. The problem has been unchanged. The problem occurs constantly. The cough is Productive of sputum. Associated symptoms include ear congestion, headaches, myalgias, nasal congestion and rhinorrhea. Pertinent negatives include no chest pain, fever or sore throat. There is no history of asthma, bronchitis, environmental allergies or pneumonia.       Constitution: Negative for fever.   HENT:  Positive for congestion. Negative for sore throat.    Cardiovascular:  Negative for chest pain.   Respiratory:  Positive for cough.    Musculoskeletal:  Positive for muscle ache.   Allergic/Immunologic: Negative for environmental allergies.   Neurological:  Positive for headaches.      Objective:     Physical Exam   Constitutional: She is oriented to person, place, and time.   HENT:   Head: Normocephalic and atraumatic.   Ears:   Right Ear: impacted cerumen  Left Ear: impacted cerumen  Nose: Congestion present.   Mouth/Throat: Mucous membranes are moist. Posterior oropharyngeal erythema present. No oropharyngeal exudate.   Eyes: Conjunctivae are normal. Pupils are equal, round, and reactive to light.   Cardiovascular: Normal rate, regular rhythm and normal heart sounds.   Pulmonary/Chest: Breath sounds normal. No stridor. No respiratory distress. She has no wheezes. She has no rhonchi. She has no rales.  "  Abdominal: Soft.   Musculoskeletal: Normal range of motion.         General: Normal range of motion.   Neurological: She is alert and oriented to person, place, and time.   Skin: Skin is warm and dry.   Psychiatric: Her behavior is normal. Mood normal.     Results for orders placed or performed in visit on 01/07/25   SARS Coronavirus 2 Antigen, POCT Manual Read    Collection Time: 01/07/25  6:34 PM   Result Value Ref Range    SARS Coronavirus 2 Antigen Negative Negative     Acceptable Yes    POCT Influenza A/B MOLECULAR    Collection Time: 01/07/25  6:35 PM   Result Value Ref Range    POC Molecular Influenza A Ag Negative Negative    POC Molecular Influenza B Ag Negative Negative     Acceptable Yes    POCT RSV by Molecular    Collection Time: 01/07/25  7:02 PM   Result Value Ref Range    POC RSV Rapid Ant Molecular Negative Negative     Acceptable Yes      *Note: Due to a large number of results and/or encounters for the requested time period, some results have not been displayed. A complete set of results can be found in Results Review.     Ear Cerumen Removal    Date/Time: 1/7/2025 6:00 PM    Performed by: Jennifer Williamson FNP-C  Authorized by: Jennifer Williamson FNP-C    Time out: Immediately prior to procedure a "time out" was called to verify the correct patient, procedure, equipment, support staff and site/side marked as required.    Consent Done?:  Yes (Verbal)  Medication Used:  Cerumenex  Location details:  Both ears  Procedure type: curette and irrigation    Cerumen  Removal Results:  Cerumen partially removed (fully removed right ear, partially removed left ear)  Patient tolerance:  Patient tolerated the procedure well with no immediate complications     Left ear canal reddened and irritated post ear wax removal, ear wax partially removed. Right canal clear. TM visible bilaterally       Assessment:     1. Viral URI with cough    2. Cough, unspecified type    3. " "Bilateral impacted cerumen    4. Acute otitis externa of left ear, unspecified type        Plan:       Viral URI with cough  -     fluticasone propionate (FLONASE) 50 mcg/actuation nasal spray; 1 spray (50 mcg total) by Each Nostril route once daily. for 5 days  Dispense: 9.9 mL; Refill: 0  -     benzonatate (TESSALON) 100 MG capsule; Take 1 capsule (100 mg total) by mouth 3 (three) times daily as needed for Cough.  Dispense: 21 capsule; Refill: 0    Cough, unspecified type  -     SARS Coronavirus 2 Antigen, POCT Manual Read  -     POCT Influenza A/B MOLECULAR  -     POCT RSV by Molecular    Bilateral impacted cerumen  -     Ear wax removal  -     Ear Cerumen Removal    Acute otitis externa of left ear, unspecified type  -     ciprofloxacin-dexAMETHasone 0.3-0.1% (CIPRODEX) 0.3-0.1 % DrpS; Place 3 drops into the left ear 2 (two) times daily. for 5 days  Dispense: 7.5 mL; Refill: 0    Other orders  -     Cancel: Ear Cerumen Removal    Irritation present to left ear canal post removal. Discussed ear drops with patient and will treat left ear canal.  Patient Instructions                                                            URI   If your condition worsens or fails to improve we recommend that you receive another evaluation at the urgent care/ER immediately or contact your PCP to discuss your concerns. You must understand that you've received an urgent care treatment only and that you may be released before all your medical problems are known or treated. You the patient will arrange for follouwp care as instructed.     we discussed that I think your illness is viral, it will not respond to antibiotics and will last 10-14 days.     Zyrtec D, Claritin D or Allegra D can also help with symptoms of congestion and drainage.   If you have hypertension avoid using the "D" which is the decongestant   If you just have drainage you can take plain zyrtec, claritin or allegra   Use Flonase for postnasal drip and nasal " congestion  Rest and fluids are also important.     Salt water gargles, warm tea with honey and chloraseptic spray as needed for sore throat.   Tylenol or ibuprofen can also be used as directed for pain unless you have an allergy to them or medical condition such as stomach ulcers, kidney or liver disease or blood thinners etc for which you should not be taking these type of medications.     Take the antibiotics drops as prescribed. Avoid using qtips. Use tylenol/ibuprofen as needed for pain relief/fevers. If your symptoms should worsen please return to the urgent care or ED as needed.

## 2025-01-08 NOTE — PROCEDURES
"Ear Cerumen Removal    Date/Time: 1/7/2025 6:00 PM    Performed by: Jennifer Williamson FNP-C  Authorized by: Jennifer Williamson FNP-C    Time out: Immediately prior to procedure a "time out" was called to verify the correct patient, procedure, equipment, support staff and site/side marked as required.    Consent Done?:  Yes (Verbal)  Medication Used:  Cerumenex  Location details:  Both ears  Procedure type: curette and irrigation    Cerumen  Removal Results:  Cerumen partially removed (fully removed right ear, partially removed left ear)  Patient tolerance:  Patient tolerated the procedure well with no immediate complications     Left ear canal reddened and irritated post ear wax removal, ear wax partially removed. Right canal clear. TM visible bilaterally    "

## 2025-01-13 ENCOUNTER — OFFICE VISIT (OUTPATIENT)
Dept: SPORTS MEDICINE | Facility: CLINIC | Age: 70
End: 2025-01-13
Payer: MEDICARE

## 2025-01-13 DIAGNOSIS — M19.012 PRIMARY OSTEOARTHRITIS OF LEFT SHOULDER: Primary | Chronic | ICD-10-CM

## 2025-01-13 DIAGNOSIS — M25.519 SHOULDER PAIN, UNSPECIFIED CHRONICITY, UNSPECIFIED LATERALITY: ICD-10-CM

## 2025-01-13 DIAGNOSIS — M19.011 PRIMARY OSTEOARTHRITIS, RIGHT SHOULDER: ICD-10-CM

## 2025-01-13 PROCEDURE — 99999 PR PBB SHADOW E&M-EST. PATIENT-LVL II: CPT | Mod: PBBFAC,,, | Performed by: ORTHOPAEDIC SURGERY

## 2025-01-13 PROCEDURE — 99999PBSHW PR PBB SHADOW TECHNICAL ONLY FILED TO HB: Mod: PBBFAC,,,

## 2025-01-13 PROCEDURE — 20611 DRAIN/INJ JOINT/BURSA W/US: CPT | Mod: PBBFAC | Performed by: ORTHOPAEDIC SURGERY

## 2025-01-13 PROCEDURE — 99212 OFFICE O/P EST SF 10 MIN: CPT | Mod: PBBFAC | Performed by: ORTHOPAEDIC SURGERY

## 2025-01-13 PROCEDURE — 99214 OFFICE O/P EST MOD 30 MIN: CPT | Mod: 25,S$PBB,, | Performed by: ORTHOPAEDIC SURGERY

## 2025-01-13 RX ADMIN — TRIAMCINOLONE ACETONIDE 40 MG: 40 INJECTION, SUSPENSION INTRA-ARTICULAR; INTRAMUSCULAR at 03:01

## 2025-01-13 NOTE — PROGRESS NOTES
NEW PATIENT    HISTORY OF PRESENT ILLNESS   69 y.o. Female with a history of bilateral shoulder pain. She states that her left shoulder is worse then her right. She has been seen by Dr. Lovett for her right shoulder in . She states that her shoulders are in severe pain.  She states her left shoulder has gotten dramatically worse.  She was demanding pain medications and treatment for her left shoulder.  She can not perform ADLs at this time.    - mechanical symptoms, - instability    Is affecting ADLs.  Pain is 10/10 at it's worst.        PAST MEDICAL HISTORY    Past Medical History:   Diagnosis Date    Acute costochondritis 2012    Back pain     Benign essential hypertension     Gastroesophageal reflux disease without esophagitis     Pain in joint involving multiple sites 2013       PAST SURGICAL HISTORY     Past Surgical History:   Procedure Laterality Date    BREAST BIOPSY Right      SECTION      KNEE ARTHROSCOPY W/ ACL RECONSTRUCTION      REFRACTIVE SURGERY Bilateral  or     Dr. Sinha OU distance       FAMILY HISTORY    Family History   Problem Relation Name Age of Onset    Hypertension Mother      Heart disease Maternal Grandmother      Celiac disease Neg Hx      Colon cancer Neg Hx      Colon polyps Neg Hx      Crohn's disease Neg Hx      Cystic fibrosis Neg Hx      Esophageal cancer Neg Hx      Inflammatory bowel disease Neg Hx      Irritable bowel syndrome Neg Hx      Liver cancer Neg Hx      Liver disease Neg Hx      Rectal cancer Neg Hx      Stomach cancer Neg Hx         SOCIAL HISTORY    Social History     Socioeconomic History    Marital status: Single    Number of children: 1   Tobacco Use    Smoking status: Never     Passive exposure: Never    Smokeless tobacco: Never   Substance and Sexual Activity    Alcohol use: No    Drug use: No       MEDICATIONS      Current Outpatient Medications:     omega-3 fatty acids/fish oil (FISH OIL-OMEGA-3 FATTY ACIDS) 300-1,000 mg capsule,  Take by mouth once daily., Disp: , Rfl:     aspirin 325 MG tablet, Take 325 mg by mouth once daily., Disp: , Rfl:     benzonatate (TESSALON) 100 MG capsule, Take 1 capsule (100 mg total) by mouth 3 (three) times daily as needed for Cough., Disp: 21 capsule, Rfl: 0    esomeprazole (NEXIUM) 40 MG capsule, Take 1 capsule (40 mg total) by mouth daily as needed. (Patient not taking: Reported on 1/7/2025), Disp: 30 capsule, Rfl: 2    LIDOcaine (LIDODERM) 5 %, Place 1 patch onto the skin once daily. Remove & Discard patch within 12 hours or as directed by MD (Patient not taking: Reported on 1/7/2025), Disp: 10 patch, Rfl: 0    lisinopriL 10 MG tablet, Take 1 tablet (10 mg total) by mouth once daily. (Patient not taking: Reported on 1/13/2025), Disp: 30 tablet, Rfl: 1    magnesium 30 mg Tab, Take by mouth once. (Patient not taking: Reported on 1/7/2025), Disp: , Rfl:     naproxen (NAPROSYN) 500 MG tablet, Take 1 tablet (500 mg total) by mouth 2 (two) times daily as needed. Take with food (Patient not taking: Reported on 1/7/2025), Disp: 10 tablet, Rfl: 0    ALLERGIES     Review of patient's allergies indicates:  No Known Allergies      REVIEW OF SYSTEMS   Constitution: Negative. Negative for chills, fever and night sweats.   HENT: Negative for congestion and headaches.    Eyes: Negative for blurred vision, left vision loss and right vision loss.   Cardiovascular: Negative for chest pain and syncope.   Respiratory: Negative for cough and shortness of breath.    Endocrine: Negative for polydipsia, polyphagia and polyuria.   Hematologic/Lymphatic: Negative for bleeding problem. Does not bruise/bleed easily.   Skin: Negative for dry skin, itching and rash.   Musculoskeletal: Negative for falls. Positive for left shoulder pain   Gastrointestinal: Negative for abdominal pain and bowel incontinence.   Genitourinary: Negative for bladder incontinence and nocturia.   Neurological: Negative for disturbances in coordination, loss of  balance and seizures.   Psychiatric/Behavioral: Negative for depression. The patient does not have insomnia.    Allergic/Immunologic: Negative for hives and persistent infections.     PHYSICAL EXAMINATION    Vitals: There were no vitals taken for this visit.    General: The patient appears active and healthy with no apparent physical problems.  Clear disturbance of mood and affect was demonstrated. Alert and Oriented.    HEENT: Eyes normal, pupils equally round, nose normal.    Resp: Equal and symmetrical chest rises. No wheezing    CV: Regular rate    Neck: Supple; nonpainful range of motion.    Extremities: no cyanosis, clubbing, edema, or diffuse swelling.  Palpable pulses, good capillary refill of the digits.  No coolness, discoloration, edema or obvious varicosities.    Skin: no lesions noted.    Lymphatic: no detected adenopathy in the upper or lower extremities.    Neurologic: normal mental status, normal reflexes, normal gait and balance.  Patient is alert and oriented to person, place and time.  No flaccidity or spasticity is noted.  No motor or sensory deficits are noted.  Light touch is intact    Orthopaedic: SHOULDER EXAM - LEFT    Inspection:   Normal skin color and appearance with no scars.  No muscle atrophy noted.  No scapular winging.      Palpation: No tenderness of the acromioclavicular joint, lateral edge of the acromion, biceps tendon, trapezius muscle or scapulothoracic bursa.      ROM: Would not allow us to test the rest of her ROM       PROM:     FE - 60°        AROM:    FE - 60°        Tests:     Would not allow us to test her      Motor:  Rotator cuff strength is 3/5 supraspinatus, 3/5 infraspinatus, 3/5 subscapularis. Biceps, triceps and deltoid strength is 5/5.      Neuro     Distally there are no paresthesias, and sensation is intact to light touch in the median, ulnar, and radial distributions.  Reflexes are 2/2 biceps, triceps and brachioradialis.        IMAGING  EXAMINATION:  MRI  SHOULDER WITHOUT CONTRAST LEFT     CLINICAL HISTORY:  rotator arthropathy;  Unspecified rotator cuff tear or rupture of left shoulder, not specified as traumatic     TECHNIQUE:  MRI left shoulder performed without contrast per routine protocol.     Exam terminated prematurely due to patient request relating to pain.  Sagittal T1, sagittal T2 FS, axial PD FS oblique, and coronal PD sequences were obtained.     COMPARISON:  Left shoulder radiograph 10/05/2024 and left shoulder MRI 07/31/2015.     FINDINGS:  Examination terminated prematurely per patient request.     Rotator cuff: No evidence for rotator cuff tendon tear.  Rotator cuff muscle bulk is maintained.     Labrum: Circumferential fraying.     Biceps: Fluid within the tendon sheath.  The biceps tendon appears intact.     Bone: No fracture or marrow infiltrative process.     Acromioclavicular joint: Moderate hypertrophy of the acromioclavicular joint.     Cartilage: Extensive full-thickness cartilage loss throughout the humeral head and glenoid with bulky osteophyte production and mild subchondral edema.     Miscellaneous: Moderate joint effusion with synovitis.     Impression:     1. Severe glenohumeral osteoarthritis.  2. No significant rotator cuff tendon tear or muscle atrophy.     Electronically signed by resident: Esmer Ashley  Date:                                            10/09/2024  Time:                                           08:53     Electronically signed by:Lane Arellano MD  Date:                                            10/09/2024  Time:                                           10:57    EXAMINATION:  XR SHOULDER COMPLETE 2 OR MORE VIEWS LEFT     CLINICAL HISTORY:  shoulder pain;     TECHNIQUE:  Two or three views of the left shoulder were performed.     COMPARISON:  Left shoulder series 05/23/2016, chest radiograph 01/29/2023     FINDINGS:  Generalized osteopenia.  Overall alignment is within normal limits.  No displaced fracture,  dislocation or destructive osseous process.  Mild to moderate degenerative change at the AC joint, slightly progressed from prior.  Moderate to advanced degenerative change at the inferior aspect of with prominent marginal osteophytes also progressed since 2016.  Left lung apex is clear.  Calcification at the aortic knob.  No subcutaneous emphysema or radiopaque foreign body.     Impression:     No acute displaced fracture-dislocation identified.     Left shoulder DJD, progressed since 2016.        Electronically signed by:Jack Stokes MD  Date:                                            10/05/2024  Time:                                           13:34    IMPRESSION       ICD-10-CM ICD-9-CM   1. Primary osteoarthritis of left shoulder  M19.012 715.11   2. Shoulder pain, unspecified chronicity, unspecified laterality  M25.519 719.41   3. Primary osteoarthritis, right shoulder  M19.011 715.11       MEDICATIONS PRESCRIBED      none    RECOMMENDATIONS     Ultrasound guided glenohumeral CSI for left shoulder today   Based on patient history, physical exam, and imaging, I am advising non-operative management for the patients bilateral shoulder pain.  For that reason, we will refer to Dr. Liza Mariscal for further evaluation and treatment.  Of note, the patient was very difficult during the evaluation.  She accused my fellow of being racist and mysogionstic concerning her diagnosis.  She claimed that my fellow told her she had arthritis because she was an  and a female.  The fellow then called me in the room to continue managing the patient.  I performed a thorough history and attempted a physical exam on the patient.  She once again made the same claims and stated that the x-rays and the MRI were not hers and that somebody changed the name on the MRIs.  She denied acknowledging that the x-rays were her x-rays of the shoulder.  She believes somebody falsified the imaging.  I tried to make a referral to a  different physician however she wanted pain relief immediately and requested an injection.  During the injection she demanded that she watch and document everything that was put in her body.  We showed the patient the Kenalog as well as the lidocaine.  This was drawn up in front of the patient at her request.  We injected her left shoulder under ultrasound guidance without any issues.  I believe for both the patient and myself that she should find another physician.  This may be a mental health issue with the patient however I do not feel based on her behavior and accusations that a healthy doctor-patient relationship can be maintained in the future.    Large Joint Aspiration/Injection: L glenohumeral    Date/Time: 1/13/2025 3:45 PM    Performed by: Nicolette Eller MD  Authorized by: Nicolette Eller MD    Consent Done?:  Yes (Verbal)  Indications:  Pain  Site marked: the procedure site was marked    Timeout: prior to procedure the correct patient, procedure, and site was verified    Prep: patient was prepped and draped in usual sterile fashion      Local anesthesia used?: Yes    Local anesthetic:  Co-phenylcaine spray (0.2% Naropin)  Anesthetic total (ml):  4      Details:  Needle Size:  22 G  Ultrasonic Guidance for needle placement?: Yes (Ultrasound guidance was used for needle localization.  Images were saved and stored for documentation.  Dynamic visualization of the needle was continuous throughout the procedure and maintained accurate placement.)    Images are saved and documented.  Approach:  Posterior  Location:  Shoulder  Site:  L glenohumeral  Medications:  40 mg triamcinolone acetonide 40 mg/mL  Medications comment:  5 mL LIDOcaine HCL 10 mg/ml (1%) 10 mg/mL (1 %)  Patient tolerance:  Patient tolerated the procedure well with no immediate complications          All questions were answered, pt will contact us for questions or concerns in the interim.

## 2025-01-14 ENCOUNTER — TELEPHONE (OUTPATIENT)
Dept: SPORTS MEDICINE | Facility: CLINIC | Age: 70
End: 2025-01-14
Payer: MEDICARE

## 2025-01-14 RX ORDER — TRIAMCINOLONE ACETONIDE 40 MG/ML
40 INJECTION, SUSPENSION INTRA-ARTICULAR; INTRAMUSCULAR
Status: DISCONTINUED | OUTPATIENT
Start: 2025-01-13 | End: 2025-01-14 | Stop reason: HOSPADM
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
## 2025-01-31 ENCOUNTER — TELEPHONE (OUTPATIENT)
Dept: INTERNAL MEDICINE | Facility: CLINIC | Age: 70
End: 2025-01-31
Payer: MEDICARE

## 2025-01-31 ENCOUNTER — OFFICE VISIT (OUTPATIENT)
Dept: OPTOMETRY | Facility: CLINIC | Age: 70
End: 2025-01-31

## 2025-01-31 ENCOUNTER — TELEPHONE (OUTPATIENT)
Dept: OPHTHALMOLOGY | Facility: CLINIC | Age: 70
End: 2025-01-31
Payer: MEDICARE

## 2025-01-31 DIAGNOSIS — H25.13 NUCLEAR SCLEROSIS, BILATERAL: ICD-10-CM

## 2025-01-31 DIAGNOSIS — H04.123 DRY EYE SYNDROME, BILATERAL: Primary | ICD-10-CM

## 2025-01-31 PROCEDURE — 99999 PR PBB SHADOW E&M-EST. PATIENT-LVL II: CPT | Mod: PBBFAC,,, | Performed by: OPTOMETRIST

## 2025-01-31 PROCEDURE — 99213 OFFICE O/P EST LOW 20 MIN: CPT | Mod: S$PBB,,, | Performed by: OPTOMETRIST

## 2025-01-31 PROCEDURE — 99212 OFFICE O/P EST SF 10 MIN: CPT | Mod: PBBFAC,PO | Performed by: OPTOMETRIST

## 2025-01-31 NOTE — TELEPHONE ENCOUNTER
----- Message from Joann sent at 1/31/2025  8:49 AM CST -----  Contact: [phone] Patient  Needed asap!    Requesting an RX refill or new RX.    Is this a refill or new RX: new    RX name and strength (copy/paste from chart):  Journavx     Is this a 30 day or 90 day RX:     Pharmacy name and phone # (copy/paste from chart):    [facility] [address] [address]  Phone: [phone] Fax: [phone]    The doctors have asked that we provide their patients with the following 2 reminders -- prescription refills can take up to 72 hours, and a friendly reminder that in the future you can use your [facility] account to request refills: yes    Comment: Pt would like call back once completed.

## 2025-01-31 NOTE — TELEPHONE ENCOUNTER
----- Message from Luis sent at 1/31/2025  8:38 AM CST -----  Regarding: appointment access  Contact: [phone]  Pt is calling to get scheduled with an appointment . Pt vision is really blurry . Pt would like to be seen on today if possible.    Josi Gil   [phone]

## 2025-02-01 NOTE — PROGRESS NOTES
HPI    ROBERTH: 5/21/2024, Dr. Gonzalez  Chief complaint (CC): 69 you F present today for blurry vision with and   without vision corrections. Pt did not bring glasses with her today.  Glasses? +  Contacts? -  H/o eye surgery, injections or laser: LASIK OU  H/o eye injury: -  Known eye conditions? Nuclear sclerosis, bilateral  Family h/o eye conditions? -  Eye gtts? Systane gtts 1-2x/day, Lumify gtts      (-) Flashes (-)  Floaters (-) Mucous   (-)  Tearing (-) Itching (-) Burning   (-) Headaches (-) Eye Pain/discomfort (+) Irritation, mild  (-)  Redness (-) Double vision (+) Blurry vision    Diabetic? -  A1c? -     Last edited by Jo-Ann Mendez, OD on 1/31/2025  1:52 PM.            Assessment /Plan     For exam results, see Encounter Report.    Dry eye syndrome, bilateral    Nuclear sclerosis, bilateral      Pt shows fluctuation in VA w/blinks during refraction and SPK upon SLE. Recommend iVizia, Systane Ultra or Refresh Optive QID OU to aid with symptoms of dry eyes.  Pt educated on importance of daily lubrication multiple times a day vs once a day and wearing her SRx. Pt advised that she shouldn't be driving w/o her SRx since her vision is not of DMV requirements. Undilated view of cataracts do not appear visually significant. Pt showed reservation w/drop usage in office today stating that a drop used by Dr Bhatti in 2022 caused her to no longer be able to see well and not be able to see to put her makeup one in mirror. Pt advised that NaFl drop used then has been used at other apptmts w/Dr Gonzalez, Rolly, and Sarah. Proparacaine and NaFl strip used today due to backorder of Fluress. Pt became frustrated with being offered options during refraction and couldn't understand why she just couldn't be shown numerous lenses and she only picks which one she likes progressively vs 2 at a time. Explained to the pt the purpose of refraction being done this way to ensure clarity and that we reach the best stopping point.  RTC 1 mo routine.

## 2025-02-02 DIAGNOSIS — H04.123 DRY EYE SYNDROME OF BOTH EYES: Primary | ICD-10-CM

## 2025-02-05 ENCOUNTER — TELEPHONE (OUTPATIENT)
Dept: INTERNAL MEDICINE | Facility: CLINIC | Age: 70
End: 2025-02-05

## 2025-02-05 NOTE — TELEPHONE ENCOUNTER
----- Message from Scarlet sent at 2/5/2025 12:02 PM CST -----  Contact: self  [phone]  Patient is requesting a call back from Dr Willis  not a Nurse to discuss recent test results. Please call to advise

## 2025-02-05 NOTE — TELEPHONE ENCOUNTER
Patient would like to speak with you directly about her cholesterol, blood sugar level and BMI as well as LPA.     Cholesterol- good and bad  Blood sugar - level   BMI  LPA

## 2025-02-10 ENCOUNTER — TELEPHONE (OUTPATIENT)
Dept: INTERNAL MEDICINE | Facility: CLINIC | Age: 70
End: 2025-02-10

## 2025-02-10 DIAGNOSIS — Z13.6 ENCOUNTER FOR SCREENING FOR CARDIOVASCULAR DISORDERS: Primary | ICD-10-CM

## 2025-02-10 DIAGNOSIS — E78.5 HYPERLIPIDEMIA, UNSPECIFIED HYPERLIPIDEMIA TYPE: ICD-10-CM

## 2025-02-10 NOTE — TELEPHONE ENCOUNTER
----- Message from Lu sent at 2/10/2025 10:45 AM CST -----  Name of Who is Calling:NATALIA LUGO [[id number]]        What is the request in detail:Patient is requesting a call back from Dr Willis  not a Nurse to discuss recent test results. Please call to advise             Can the clinic reply by [facility]:call back         What Number to Call Back if not in [employer] [facility] [employer]: Telephone Information:  Mobile          [phone]

## 2025-02-12 ENCOUNTER — PATIENT MESSAGE (OUTPATIENT)
Dept: OPTOMETRY | Facility: CLINIC | Age: 70
End: 2025-02-12

## 2025-02-12 ENCOUNTER — HOSPITAL ENCOUNTER (OUTPATIENT)
Dept: RADIOLOGY | Facility: HOSPITAL | Age: 70
Discharge: HOME | End: 2025-02-12
Attending: INTERNAL MEDICINE

## 2025-02-12 DIAGNOSIS — Z13.6 ENCOUNTER FOR SCREENING FOR CARDIOVASCULAR DISORDERS: ICD-10-CM

## 2025-02-12 PROCEDURE — 75571 CT HRT W/O DYE W/CA TEST: CPT | Mod: 26,,, | Performed by: RADIOLOGY

## 2025-02-12 PROCEDURE — 75571 CT HRT W/O DYE W/CA TEST: CPT | Mod: TC

## 2025-02-27 ENCOUNTER — TELEPHONE (OUTPATIENT)
Dept: INTERNAL MEDICINE | Facility: CLINIC | Age: 70
End: 2025-02-27

## 2025-02-27 ENCOUNTER — OFFICE VISIT (OUTPATIENT)
Dept: INTERNAL MEDICINE | Facility: CLINIC | Age: 70
End: 2025-02-27

## 2025-02-27 VITALS
SYSTOLIC BLOOD PRESSURE: 144 MMHG | OXYGEN SATURATION: 99 % | BODY MASS INDEX: 28.93 KG/M2 | HEART RATE: 70 BPM | HEIGHT: 67 IN | DIASTOLIC BLOOD PRESSURE: 62 MMHG | WEIGHT: 184.31 LBS

## 2025-02-27 DIAGNOSIS — I10 PRIMARY HYPERTENSION: ICD-10-CM

## 2025-02-27 DIAGNOSIS — E78.5 HYPERLIPIDEMIA, UNSPECIFIED HYPERLIPIDEMIA TYPE: Primary | ICD-10-CM

## 2025-02-27 DIAGNOSIS — Z13.6 ENCOUNTER FOR SCREENING FOR CARDIOVASCULAR DISORDERS: ICD-10-CM

## 2025-02-27 PROCEDURE — 99213 OFFICE O/P EST LOW 20 MIN: CPT | Mod: PBBFAC | Performed by: INTERNAL MEDICINE

## 2025-02-27 PROCEDURE — 99214 OFFICE O/P EST MOD 30 MIN: CPT | Mod: S$PBB,,, | Performed by: INTERNAL MEDICINE

## 2025-02-27 PROCEDURE — 99999 PR PBB SHADOW E&M-EST. PATIENT-LVL III: CPT | Mod: PBBFAC,,, | Performed by: INTERNAL MEDICINE

## 2025-02-27 NOTE — PROGRESS NOTES
Medical history   Hypertension previously on medication  Hyperlipidemia with normal HDL   Lumbar spondylosis on imaging  Rotator cuff tendinopathy  Left parietal meningioma on MRI imaging     Social history   Tobacco use none   Alcohol use none    Component      Latest Ref Rng 2/12/2025   Cholesterol Total      120 - 199 mg/dL 246 (H)    Triglycerides      30 - 150 mg/dL 63    HDL      40 - 75 mg/dL 95 (H)    LDL Cholesterol      63.0 - 159.0 mg/dL 138.4    HDL/Cholesterol Ratio      20.0 - 50.0 % 38.6    Total Cholesterol/HDL Ratio      2.0 - 5.0  2.6    Non-HDL Cholesterol      mg/dL 151    Hemoglobin A1C External      4.0 - 5.6 % 5.2    Estimated Avg Glucose      68 - 131 mg/dL 103    Lipoprotein (a)      0 - 30 mg/dL 42 (H)       Legend:  (H) High      70 year female   Here with the daughter    Purpose of the visit discuss the recent lab testing, evaluating general health status.    She was seen by internal medicine December for chest pain.  Cardiac stress test was negative for ischemia.  She later had a calcium score with a low score of 3.  She had prior testing that has the score was 0.    She has a history of hyperlipidemia in which the total cholesterol and LDL that has been elevated.  HDL that has always been above the normal range.  She has deferred on medication, that has a attempted the pursue dietary therapy.  She feels that she does not good job with this particularly with proper fruits and vegetables periods.  No formal exercise routine but she tries to stay active in her yd.    Also has a history of hypertension.  At times was on medications but deferred eventually against the medications.    On recent labs.  That has now noted that that has an elevated lipoprotein a.  That has that has explain that this was an independent risk factor that can increase the risk of atherosclerosis involving the coronary vessels.    Will recent previous testing of stress test and  calcium score, no active ischemia has  been demonstrated , risk for cardiovascular events that has low.    Nevertheless she that has 2 risk factors of hypertension, hyperlipidemia with the addition of the elevated LPA    It was explained to it that with this being the case that she will be consideration of statin medication.    Previously outlined certain blood pressure medicines that she could be considered.  Always wanted to look them up and do her research it    She is aware the medications can help with the above but also expresses a concern, fear of medications also cause harm to the body    It was explained to her that that has no consist that has guideline a standardization about coronary artery calcium testing.    I feel that she should do better in regard to her blood pressure status, lipid status.  I explained that I feel that it will require medication to help with this    Today vital signs per epic and blood pressure taken by me 158/64 this is been a consistent finding    It was she inquired about what medications would consider for her.    Regarding blood pressure, anemia 3 in regard to olmesartan 5 mg nifedipine 30 mg triamterene hydrochlorothiazide.  In the past she attempted amlodipine but then got off the medication    Regarding hyperlipidemia, I feel that she will do well with a statin drug and can start off with something low intensity such as pravastatin 20 mg.  The option of Zetia was also discussed    That has explain that although current testing despite low i cardiovascular risk with a low Agatston score, prior negative stress test, it does not mean that it can progress later particularly with the risk factors stated above    Will be contacted in a week's time about following up on this    That has also explained to the reasons coming in for periodic visits with internal medicine that has to be proactive, preventive.  When such modality is reducing risk for cardiovascular events, mainly by moderating the risk factors

## 2025-02-28 PROBLEM — E78.5 HYPERLIPIDEMIA: Status: ACTIVE | Noted: 2023-04-06

## 2025-03-17 ENCOUNTER — TELEPHONE (OUTPATIENT)
Dept: INTERNAL MEDICINE | Facility: CLINIC | Age: 70
End: 2025-03-17

## 2025-03-17 NOTE — TELEPHONE ENCOUNTER
"Pt was transferred to my line without notice from a phone .   Pt sounds angry. She states that "how can Dr Willis prescribe 4 blood pressure medications when after she looked them up found out that some of those medication have been recalled". "He knows how particular I am about putting medication into my body. She states that she is very disappointed in Dr Willis. I tried to reassure her that Dr Willis would not intentional try to do harm to her and that recalling of drugs would come from the pharmacy. She did not seem interested in what I was telling her. She wants a call back from Dr Willis.  "

## 2025-04-02 ENCOUNTER — DOCUMENTATION ONLY (OUTPATIENT)
Dept: INTERNAL MEDICINE | Facility: CLINIC | Age: 70
End: 2025-04-02

## 2025-04-02 NOTE — PROGRESS NOTES
ARB    Candesartan  Irbesartan  Losartan  Olmesartan  Telmisartan  Valsartan    ACE inhibitor  Benazepril  Captopril  Enalapril  Fosinopril  Lisinopril  Quinapril  Ramipril  Trandolapril    Dihydropyridine  Amlodipine  Felodipine  Nifedipine    Nondihydropyridine  Diltiazem  Verapamil      Beta blocker (systemic)  Atenolol  Bisoprolol  Carvedilol  Labetalol  Metoprolol  Nadolol  Nebivolol  Propranolol    Thiazide and thiazide-like diuretic  Chlorothiazide  Chlorthalidone  Hydrochlorothiazide  Indapamide

## 2025-04-04 ENCOUNTER — OCHSNER VIRTUAL EMERGENCY DEPARTMENT (OUTPATIENT)
Facility: CLINIC | Age: 70
End: 2025-04-04

## 2025-04-04 ENCOUNTER — OFFICE VISIT (OUTPATIENT)
Dept: INTERNAL MEDICINE | Facility: CLINIC | Age: 70
End: 2025-04-04

## 2025-04-04 ENCOUNTER — PATIENT OUTREACH (OUTPATIENT)
Facility: OTHER | Age: 70
End: 2025-04-04

## 2025-04-04 ENCOUNTER — NURSE TRIAGE (OUTPATIENT)
Dept: ADMINISTRATIVE | Facility: CLINIC | Age: 70
End: 2025-04-04

## 2025-04-04 ENCOUNTER — LAB VISIT (OUTPATIENT)
Dept: LAB | Facility: HOSPITAL | Age: 70
End: 2025-04-04
Attending: INTERNAL MEDICINE

## 2025-04-04 VITALS
OXYGEN SATURATION: 99 % | WEIGHT: 187.81 LBS | HEART RATE: 73 BPM | HEIGHT: 67 IN | DIASTOLIC BLOOD PRESSURE: 72 MMHG | BODY MASS INDEX: 29.48 KG/M2 | SYSTOLIC BLOOD PRESSURE: 158 MMHG

## 2025-04-04 DIAGNOSIS — R07.9 CHEST PAIN, UNSPECIFIED TYPE: ICD-10-CM

## 2025-04-04 DIAGNOSIS — I10 PRIMARY HYPERTENSION: ICD-10-CM

## 2025-04-04 DIAGNOSIS — J02.9 SORE THROAT: Primary | ICD-10-CM

## 2025-04-04 DIAGNOSIS — J06.9 VIRAL URI WITH COUGH: Primary | ICD-10-CM

## 2025-04-04 LAB
ABSOLUTE EOSINOPHIL (OHS): 0.24 K/UL
ABSOLUTE MONOCYTE (OHS): 0.59 K/UL (ref 0.3–1)
ABSOLUTE NEUTROPHIL COUNT (OHS): 2.54 K/UL (ref 1.8–7.7)
ANION GAP (OHS): 6 MMOL/L (ref 8–16)
BASOPHILS # BLD AUTO: 0.06 K/UL
BASOPHILS NFR BLD AUTO: 1.3 %
BUN SERPL-MCNC: 7 MG/DL (ref 8–23)
CALCIUM SERPL-MCNC: 9.2 MG/DL (ref 8.7–10.5)
CHLORIDE SERPL-SCNC: 106 MMOL/L (ref 95–110)
CK SERPL-CCNC: 88 U/L (ref 20–180)
CO2 SERPL-SCNC: 27 MMOL/L (ref 23–29)
CREAT SERPL-MCNC: 0.8 MG/DL (ref 0.5–1.4)
CTP QC/QA: YES
CTP QC/QA: YES
ERYTHROCYTE [DISTWIDTH] IN BLOOD BY AUTOMATED COUNT: 13.2 % (ref 11.5–14.5)
GFR SERPLBLD CREATININE-BSD FMLA CKD-EPI: >60 ML/MIN/1.73/M2
GLUCOSE SERPL-MCNC: 94 MG/DL (ref 70–110)
HCT VFR BLD AUTO: 39.8 % (ref 37–48.5)
HGB BLD-MCNC: 12.8 GM/DL (ref 12–16)
IMM GRANULOCYTES # BLD AUTO: 0.01 K/UL (ref 0–0.04)
IMM GRANULOCYTES NFR BLD AUTO: 0.2 % (ref 0–0.5)
LYMPHOCYTES # BLD AUTO: 1.36 K/UL (ref 1–4.8)
MCH RBC QN AUTO: 29.9 PG (ref 27–31)
MCHC RBC AUTO-ENTMCNC: 32.2 G/DL (ref 32–36)
MCV RBC AUTO: 93 FL (ref 82–98)
MOLECULAR STREP A: NEGATIVE
NUCLEATED RBC (/100WBC) (OHS): 0 /100 WBC
PLATELET # BLD AUTO: 195 K/UL (ref 150–450)
PMV BLD AUTO: 12 FL (ref 9.2–12.9)
POTASSIUM SERPL-SCNC: 3.9 MMOL/L (ref 3.5–5.1)
RBC # BLD AUTO: 4.28 M/UL (ref 4–5.4)
RELATIVE EOSINOPHIL (OHS): 5 %
RELATIVE LYMPHOCYTE (OHS): 28.3 % (ref 18–48)
RELATIVE MONOCYTE (OHS): 12.3 % (ref 4–15)
RELATIVE NEUTROPHIL (OHS): 52.9 % (ref 38–73)
SARS-COV-2 RDRP RESP QL NAA+PROBE: NEGATIVE
SODIUM SERPL-SCNC: 139 MMOL/L (ref 136–145)
TROPONIN I SERPL HS-MCNC: 5 NG/L
WBC # BLD AUTO: 4.8 K/UL (ref 3.9–12.7)

## 2025-04-04 PROCEDURE — 99214 OFFICE O/P EST MOD 30 MIN: CPT | Mod: PBBFAC | Performed by: INTERNAL MEDICINE

## 2025-04-04 PROCEDURE — 36415 COLL VENOUS BLD VENIPUNCTURE: CPT

## 2025-04-04 PROCEDURE — 85025 COMPLETE CBC W/AUTO DIFF WBC: CPT

## 2025-04-04 PROCEDURE — 84520 ASSAY OF UREA NITROGEN: CPT

## 2025-04-04 PROCEDURE — 84484 ASSAY OF TROPONIN QUANT: CPT

## 2025-04-04 PROCEDURE — 99999 PR PBB SHADOW E&M-EST. PATIENT-LVL IV: CPT | Mod: PBBFAC,,, | Performed by: INTERNAL MEDICINE

## 2025-04-04 PROCEDURE — 82550 ASSAY OF CK (CPK): CPT

## 2025-04-04 NOTE — PROGRESS NOTES
"Patient called the OOC RN on 4/4/25 for c/o "having problems with her throat and coughing. When she coughs it hurts in her throat". OOC RN consult [facility].     [facility] Provider Dr Annamaria Haji disposition recommendation patient to see primary care. "Recommendation comment: no red flags, needs eval; PCP who can refer to ENT if they deem necessary"     Appointment scheduled for 4/4/2025 at 11:30 AM with Jonny Willis MD at [address] [place] [address].    Follow up scheduled for 4/7/25 to assess for additional needs.      "

## 2025-04-04 NOTE — PLAN OF CARE-OVED
[facility] Emergency Department Plan of Care Note  Referral Source: Nurse On-Call                               Chief Complaint   Patient presents with    Sore Throat     C/o cough that makes her throat hurt. Asking for a specialist. No chest pain, sob, other symptoms. X 3-4d.        Recommendation: Primary Care                       Recommendation comment: no red flags, needs eval; PCP who can refer to ENT if they deem necessary    Encounter Diagnosis   Name Primary?    Sore throat Yes

## 2025-04-04 NOTE — PROGRESS NOTES
Medical history   Hypertension previously on medication  Hyperlipidemia with normal HDL   Lumbar spondylosis on imaging  Rotator cuff tendinopathy  Left parietal meningioma on MRI imaging     Social history   Tobacco use none   Alcohol use none      [age] year female   For the past 4 days that has been having a cough.  Nonproductive.  Itching irritation deep in his throat.  Occasionally she might has a degree of rhinorrhea white mucus that has well as spit it up.  No shortness a breath or wheezing.  Not aware of any fever chills no nasal congestion    Examination   Weight 187   BMI 29.42   Blood pressure by me 158/72   Pulse ox 99%   Tympanic membranes normal   Nasal mucosa is clear   Oropharynx slightly hyperemic, that has not erythematous no exudates  Neck, no palpable adenopathy, nontender   Chest clear breath sounds good air flow, no rales no wheezes  Heart regular rate and rhythm    Throat swab for strep negative nasal swab for COVID negative    Addendum  At the end of the appointment she that has a complaining of the pain in his right side of the sternum.  Mid chest.   off and on for minute.  On palpation  no pain    Impression   Viral upper respiratory infection with cough   Hypertension   Chest pain    Plan over-the-counter Mucinex DM, Claritin Zyrtec  CBC chemistry CPK troponin, do not feel the pain that has cardiac in nature    Regarding blood pressure, reference to previous notes, she had express concern about the side effects, attributes of certain blood pressure medicines given.  I provided a a complete list of of the antihypertensives to review including ARB, ACE inhibitors calcium channel blockers, beta-blockers, diuretics, clonidine, hydralazine.  Hoping that she will note a medicine that is acceptable

## 2025-04-04 NOTE — TELEPHONE ENCOUNTER
"Pt stated she is having problems with her throat and coughing. When she coughs it hurts in her throat.  Care advice recommends pt see MD today or tomorrow. Offered pt a pcp appt. Pt refused and is requesting to see a throat specialist. [facility] provider Dr Haji notified and stated "recommending PCP, and if they think she needs ENT, they can refer her. I can also place a referral but no idea when she will be able to get in." Appt given. Pt verbalized understanding.   Reason for Disposition   Continuous (nonstop) coughing interferes with work or school and no improvement using cough treatment per Care Advice    Additional Information   Negative: Bluish (or gray) lips or face   Negative: SEVERE difficulty breathing (e.g., struggling for each breath, speaks in single words)   Negative: Rapid onset of cough and has hives   Negative: Coughing started suddenly after medicine, an allergic food or bee sting   Negative: Difficulty breathing after exposure to flames, smoke, or fumes   Negative: Sounds like a life-threatening emergency to the triager   Negative: MODERATE difficulty breathing (e.g., speaks in phrases, SOB even at rest, pulse 100-120) and still present when not coughing   Negative: Chest pain present when not coughing   Negative: Passed out (e.g., fainted, lost consciousness, blacked out and was not responding)   Negative: Patient sounds very sick or weak to the triager   Negative: MILD difficulty breathing (e.g., minimal/no SOB at rest, SOB with walking, pulse <100) and still present when not coughing   Negative: Coughed up > 1 tablespoon (15 ml) blood (Exception: Blood-tinged sputum.)   Negative: Fever > 103 F (39.4 C)   Negative: Fever > 101 F (38.3 C) and over 60 years of age   Negative: Fever > 100 F (37.8 C) and has diabetes mellitus or a weak immune system (e.g., HIV positive, cancer chemotherapy, organ transplant, splenectomy, chronic steroids)   Negative: Fever > 100 F (37.8 C) and bedridden (e.g., CVA, " chronic illness, recovering from surgery)   Negative: Increasing ankle swelling   Negative: Wheezing is present   Negative: SEVERE coughing spells (e.g., whooping sound after coughing, vomiting after coughing)   Negative: Coughing up oh-colored (reddish-brown) or blood-tinged sputum   Negative: Fever present > 3 days (72 hours)   Negative: Fever returns after gone for over 24 hours and symptoms worse or not improved   Negative: Using nasal washes and pain medicine > 24 hours and sinus pain persists   Negative: Known COPD or other severe lung disease (i.e., bronchiectasis, cystic fibrosis, lung surgery) and symptoms getting worse (i.e., increased sputum purulence or amount, increased breathing difficulty)    Protocols used: Cough-A-OH

## 2025-04-07 ENCOUNTER — PATIENT OUTREACH (OUTPATIENT)
Facility: OTHER | Age: 70
End: 2025-04-07

## 2025-04-07 NOTE — PROGRESS NOTES
Chart review confirms the patient attended a primary care appointment on 4/4/25. A follow-up call was made to check for any additional needs, but there was no response. A voicemail was left requesting the patient to return the call.

## 2025-04-08 NOTE — TELEPHONE ENCOUNTER
Pt was given lab results by extended hours nurse Cherry she was not pleased with the results she want to speak with you in regards to results    Thank you for allowing us to care for you at [facility] today. Thank you for your patience.    You have been seen and evaluated for abdominal pain    Please read the instructions provided  If given prescriptions, take as instructed    Try to stay well hydrated, and sip small amounts of liquids frequently if you are having any issues.     Remember, your care process does not end after your visit today. Please follow-up with your doctor within 1-2 days for a follow-up check to ensure you are  improving, to see if you need any further evaluation/testing, or to evaluate for any alternate diagnoses. If you do not have a primary care provider, call the [facility] referral line at [phone] to help find you a primary care provider within our system.    Please return to the emergency department if you develop symptoms that may include worsening abdominal pain, persistent nausea and vomiting to the point where you are unable to keep down fluids, if you develop chest pain or difficulty breathing, bleeding, dizziness or lightheadedness, or if you develop any other new or concerning symptoms as these could be signs of more serious medical illness.    We hope you feel better.

## 2025-04-09 ENCOUNTER — TELEPHONE (OUTPATIENT)
Dept: INTERNAL MEDICINE | Facility: CLINIC | Age: 70
End: 2025-04-09

## 2025-04-09 ENCOUNTER — HOSPITAL ENCOUNTER (EMERGENCY)
Facility: HOSPITAL | Age: 70
Discharge: LEFT BEFORE DISCHARGE | End: 2025-04-09
Attending: STUDENT IN AN ORGANIZED HEALTH CARE EDUCATION/TRAINING PROGRAM

## 2025-04-09 ENCOUNTER — NURSE TRIAGE (OUTPATIENT)
Dept: ADMINISTRATIVE | Facility: CLINIC | Age: 70
End: 2025-04-09

## 2025-04-09 VITALS
TEMPERATURE: 98 F | OXYGEN SATURATION: 99 % | HEIGHT: 67 IN | RESPIRATION RATE: 18 BRPM | DIASTOLIC BLOOD PRESSURE: 83 MMHG | WEIGHT: 187.81 LBS | SYSTOLIC BLOOD PRESSURE: 187 MMHG | HEART RATE: 83 BPM | BODY MASS INDEX: 29.48 KG/M2

## 2025-04-09 DIAGNOSIS — M25.512 CHRONIC PAIN OF BOTH SHOULDERS: Primary | ICD-10-CM

## 2025-04-09 DIAGNOSIS — M25.511 CHRONIC PAIN OF BOTH SHOULDERS: Primary | ICD-10-CM

## 2025-04-09 DIAGNOSIS — M54.10 RADICULOPATHY OF ARM: ICD-10-CM

## 2025-04-09 DIAGNOSIS — G89.29 CHRONIC PAIN OF BOTH SHOULDERS: Primary | ICD-10-CM

## 2025-04-09 LAB
ABSOLUTE EOSINOPHIL (OHS): 0.24 K/UL
ABSOLUTE MONOCYTE (OHS): 0.53 K/UL (ref 0.3–1)
ABSOLUTE NEUTROPHIL COUNT (OHS): 2.55 K/UL (ref 1.8–7.7)
ALBUMIN SERPL BCP-MCNC: 3.6 G/DL (ref 3.5–5.2)
ALP SERPL-CCNC: 78 UNIT/L (ref 40–150)
ALT SERPL W/O P-5'-P-CCNC: 15 UNIT/L (ref 10–44)
ANION GAP (OHS): 6 MMOL/L (ref 8–16)
AST SERPL-CCNC: 18 UNIT/L (ref 11–45)
BASOPHILS # BLD AUTO: 0.06 K/UL
BASOPHILS NFR BLD AUTO: 1.2 %
BILIRUB SERPL-MCNC: 0.7 MG/DL (ref 0.1–1)
BUN SERPL-MCNC: 12 MG/DL (ref 8–23)
CALCIUM SERPL-MCNC: 9.4 MG/DL (ref 8.7–10.5)
CHLORIDE SERPL-SCNC: 103 MMOL/L (ref 95–110)
CO2 SERPL-SCNC: 29 MMOL/L (ref 23–29)
CREAT SERPL-MCNC: 0.8 MG/DL (ref 0.5–1.4)
ERYTHROCYTE [DISTWIDTH] IN BLOOD BY AUTOMATED COUNT: 13.2 % (ref 11.5–14.5)
GFR SERPLBLD CREATININE-BSD FMLA CKD-EPI: >60 ML/MIN/1.73/M2
GLUCOSE SERPL-MCNC: 90 MG/DL (ref 70–110)
HCT VFR BLD AUTO: 42.3 % (ref 37–48.5)
HGB BLD-MCNC: 14 GM/DL (ref 12–16)
IMM GRANULOCYTES # BLD AUTO: 0.01 K/UL (ref 0–0.04)
IMM GRANULOCYTES NFR BLD AUTO: 0.2 % (ref 0–0.5)
LYMPHOCYTES # BLD AUTO: 1.66 K/UL (ref 1–4.8)
MCH RBC QN AUTO: 30.4 PG (ref 27–31)
MCHC RBC AUTO-ENTMCNC: 33.1 G/DL (ref 32–36)
MCV RBC AUTO: 92 FL (ref 82–98)
NUCLEATED RBC (/100WBC) (OHS): 0 /100 WBC
PLATELET # BLD AUTO: 227 K/UL (ref 150–450)
PMV BLD AUTO: 11.3 FL (ref 9.2–12.9)
POTASSIUM SERPL-SCNC: 4 MMOL/L (ref 3.5–5.1)
PROT SERPL-MCNC: 7.1 GM/DL (ref 6–8.4)
RBC # BLD AUTO: 4.6 M/UL (ref 4–5.4)
RELATIVE EOSINOPHIL (OHS): 4.8 %
RELATIVE LYMPHOCYTE (OHS): 32.9 % (ref 18–48)
RELATIVE MONOCYTE (OHS): 10.5 % (ref 4–15)
RELATIVE NEUTROPHIL (OHS): 50.4 % (ref 38–73)
SODIUM SERPL-SCNC: 138 MMOL/L (ref 136–145)
WBC # BLD AUTO: 5.05 K/UL (ref 3.9–12.7)

## 2025-04-09 PROCEDURE — 99283 EMERGENCY DEPT VISIT LOW MDM: CPT

## 2025-04-09 PROCEDURE — 82947 ASSAY GLUCOSE BLOOD QUANT: CPT | Performed by: EMERGENCY MEDICINE

## 2025-04-09 PROCEDURE — 85025 COMPLETE CBC W/AUTO DIFF WBC: CPT | Performed by: EMERGENCY MEDICINE

## 2025-04-09 RX ORDER — MELOXICAM 7.5 MG/1
7.5 TABLET ORAL DAILY
Qty: 30 TABLET | Refills: 0 | Status: SHIPPED | OUTPATIENT
Start: 2025-04-09 | End: 2025-05-09

## 2025-04-09 NOTE — ED PROVIDER NOTES
Encounter Date: 2025       History     Chief Complaint   Patient presents with    Multiple Complaints     Bilateral shoulder pain and R wrist pain denies swelling     71 y/o female with chronic shoulder pain, HTN, GERD, and chronic back pain which presents to the ED with right forearm pain with associated shoulder pain for the past 4 days. She states she has had pain to her shoulders fro several years and doesn't know why. She has not seen an orthopedics. Pt states she has never been told that she had osteoarthritis.     The history is provided by the patient.     Review of patient's allergies indicates:  No Known Allergies  Past Medical History:   Diagnosis Date    Acute costochondritis 2012    Back pain     Benign essential hypertension     Gastroesophageal reflux disease without esophagitis     Pain in joint involving multiple sites 2013     Past Surgical History:   Procedure Laterality Date    BREAST BIOPSY Right      SECTION      KNEE ARTHROSCOPY W/ ACL RECONSTRUCTION      REFRACTIVE SURGERY Bilateral  or     Dr. Bharath DAWSON distance     Family History   Problem Relation Name Age of Onset    Hypertension Mother      Heart disease Maternal Grandmother      Celiac disease Neg Hx      Colon cancer Neg Hx      Colon polyps Neg Hx      Crohn's disease Neg Hx      Cystic fibrosis Neg Hx      Esophageal cancer Neg Hx      Inflammatory bowel disease Neg Hx      Irritable bowel syndrome Neg Hx      Liver cancer Neg Hx      Liver disease Neg Hx      Rectal cancer Neg Hx      Stomach cancer Neg Hx       Social History[1]  Review of Systems   Constitutional:  Negative for fever.   HENT:  Negative for sore throat.    Respiratory:  Negative for shortness of breath.    Cardiovascular:  Negative for chest pain.   Gastrointestinal:  Negative for nausea.   Genitourinary:  Negative for dysuria.   Musculoskeletal:  Positive for arthralgias and myalgias. Negative for back pain.   Skin:  Negative for rash.    Neurological:  Negative for weakness.   Hematological:  Does not bruise/bleed easily.   All other systems reviewed and are negative.      Physical Exam     Initial Vitals [04/09/25 1421]   BP Pulse Resp Temp SpO2   (!) 202/90 77 20 98.1 °F (36.7 °C) 99 %      MAP       --         Physical Exam    Nursing note and vitals reviewed.  Constitutional: She appears well-developed and well-nourished.   HENT:   Head: Normocephalic and atraumatic.   Eyes: Conjunctivae and EOM are normal. Pupils are equal, round, and reactive to light.   Neck:   Normal range of motion.  Cardiovascular:  Normal rate, regular rhythm, normal heart sounds and intact distal pulses.     Exam reveals no gallop and no friction rub.       No murmur heard.  Pulmonary/Chest: Breath sounds normal. No respiratory distress. She has no wheezes. She has no rhonchi. She has no rales. She exhibits no tenderness.   Musculoskeletal:         General: Tenderness present. No edema.      Cervical back: Normal range of motion.      Comments: Pain with ROM to both shoulders- no edema, erythema- neurovascularly intact     Neurological: She is alert and oriented to person, place, and time. She has normal strength. GCS score is 15. GCS eye subscore is 4. GCS verbal subscore is 5. GCS motor subscore is 6.   Skin: Skin is warm. Capillary refill takes less than 2 seconds. No rash and no abscess noted. No erythema.   Psychiatric: She has a normal mood and affect.         ED Course   Procedures  Labs Reviewed   COMPREHENSIVE METABOLIC PANEL - Abnormal       Result Value    Sodium 138      Potassium 4.0      Chloride 103      CO2 29      Glucose 90      BUN 12      Creatinine 0.8      Calcium 9.4      Protein Total 7.1      Albumin 3.6      Bilirubin Total 0.7      ALP 78      AST 18      ALT 15      Anion Gap 6 (*)     eGFR >60     CBC WITH DIFFERENTIAL - Normal    WBC 5.05      RBC 4.60      HGB 14.0      HCT 42.3      MCV 92      MCH 30.4      MCHC 33.1      RDW 13.2       Platelet Count 227      MPV 11.3      Nucleated RBC 0      Neut % 50.4      Lymph % 32.9      Mono % 10.5      Eos % 4.8      Basophil % 1.2      Imm Grans % 0.2      Neut # 2.55      Lymph # 1.66      Mono # 0.53      Eos # 0.24      Baso # 0.06      Imm Grans # 0.01     CBC W/ AUTO DIFFERENTIAL    Narrative:     The following orders were created for panel order CBC auto differential.  Procedure                               Abnormality         Status                     ---------                               -----------         ------                     CBC with Differential[[id number]]       Normal              Final result                 Please view results for these tests on the individual orders.          Imaging Results    None          Medications - No data to display  Medical Decision Making  71 y/o female with chronic shoulder pain that appears to have radiculopathy of the right arm. Pt does not have decreased strength. Neurovascularly intact. Labs are unremarkable. Pt will be treated for arthritis, as she has previous MRI that shows arthritis. Upon disucssing this with the patient, she states that she was never told that. She does have relief of her pain with tylenol/ibuprofen. She will be discharged with mobic and advised to follow up with ortho, for which a referral has been placed. Patient given strict return precautions and voiced understanding of all discharge instructions. Pt was stable at discharge.       Differential Diagnosis: arthritis, strain, radiculopathy, cellulitis    Problems Addressed:  Chronic pain of both shoulders: acute illness or injury    Risk  Prescription drug management.                                      Clinical Impression:  Final diagnoses:  [M25.511, G89.29, M25.512] Chronic pain of both shoulders (Primary)  [M54.10] Radiculopathy of arm          ED Disposition Condition    Discharge Stable          ED Prescriptions       Medication Sig Dispense Start Date End Date Auth.  Provider    meloxicam (MOBIC) 7.5 MG tablet Take 1 tablet (7.5 mg total) by mouth once daily. 30 tablet 4/9/2025 5/9/2025 Lu Grace FNP          Follow-up Information       Follow up With Specialties Details Why Contact Info    Jonny Willis MD Internal Medicine Schedule an appointment as soon as possible for a visit  As needed [address]  [facility] [address], [address]  [place] [address]  [phone]      [facility] - Emergency Dept Emergency Medicine  If symptoms worsen [address] [address] [id number] [phone]                 [1]   Social History  Tobacco Use    Smoking status: Never     Passive exposure: Never    Smokeless tobacco: Never   Substance Use Topics    Alcohol use: No    Drug use: No        Lu Grace FNP  04/10/25 0116

## 2025-04-09 NOTE — TELEPHONE ENCOUNTER
Patient c/o arm pain. She denies injury. Advised per protocol to go to the nearest ED now. Patient verbalizes understanding. Advised the patient to call back with any further questions or if symptoms worsen.      Reason for Disposition   Age > 40 and no obvious cause for pain, pain still present even when not moving the arm    Additional Information   Negative: Shock suspected (e.g., cold/pale/clammy skin, too weak to stand, low BP, rapid pulse)   Negative: Similar pain previously and it was from 'heart attack'   Negative: Similar pain previously from 'angina' and not relieved by nitroglycerin   Negative: Sounds like a life-threatening emergency to the triager   Negative: Difficulty breathing or unusual sweating (e.g., sweating without exertion)   Negative: Chest pain lasting longer than 5 minutes    Protocols used: Arm Pain-A-[place]
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
## 2025-04-09 NOTE — TELEPHONE ENCOUNTER
Spoke with pt and she complained of pain in her left arm. I was trying to get pt scheduled to see Dr. Willis but she never got back on the line.

## 2025-04-09 NOTE — FIRST PROVIDER EVALUATION
"Medical screening examination initiated.  I have conducted a focused provider triage encounter, findings are as follows:    Brief history of present illness:  right forearm and bilateral shoulder pain, no trauma    Vitals:    04/09/25 1421   BP: (!) 202/90   Pulse: 77   Resp: 20   Temp: 98.1 °F (36.7 °C)   TempSrc: Oral   SpO2: 99%   Weight: 85.2 kg (187 lb 13.3 oz)   Height: 5' 7" (1.702 m)       Pertinent physical exam:  No acute distress or altered mental status. TTP in extensor muscles of right distal forearm    Brief workup plan:  labs given HTN, defer imaging to Intake    Preliminary workup initiated; this workup will be continued and followed by the physician or advanced practice provider that is assigned to the patient when roomed.  "

## 2025-04-09 NOTE — ED TRIAGE NOTES
Patient identifiers verified and correct for Josi Gil  C/C: forearm pain  APPEARANCE: awake and alert in NAD.   SKIN: warm, dry and intact. No breakdown or bruising.  MUSCULOSKELETAL: Patient moving all extremities spontaneously, no obvious swelling or deformities noted. Ambulates independently. Complains of right forearm pain.   RESPIRATORY: Denies shortness of breath.Respirations unlabored.   CARDIAC: Denies CP, 2+ distal pulses; no peripheral edema  ABDOMEN: S/ND/NT, Denies nausea  : voids spontaneously, denies difficulty  Neurologic: AAO x 4; follows commands equal strength in all extremities; denies numbness/tingling. Denies dizziness       Painful throbbing sensation in right forearm for 4 days.  Also reports pain in SHI shoulders, neck, and center back.

## 2025-04-09 NOTE — DISCHARGE INSTRUCTIONS

## 2025-04-09 NOTE — TELEPHONE ENCOUNTER
----- Message from Sybil sent at 4/9/2025 11:15 AM CDT -----  Contact: [phone] Patient  Caller is requesting an earlier appointment then we can schedule.  Caller is requesting a message be sent to the provider.If this is for urgent care symptoms, did you offer other providers at this location, providers at other locations, or [facility]? (yes, no, n/a):  If this is for the patients physical, did you offer to schedule next available and put on wait list, or to see NP or PA for their physical?  (yes, no, n/a):  When is the next available appointment with their provider:  04/17/2025Reason for the appointment:  arm painPatient preference of timeframe to be scheduled:  ASAPWould the patient like a call back, or a response through their [facility] portal?:   Call Back Please. Thank youComments:  Pt did speak to an on call nurse

## 2025-05-02 ENCOUNTER — TELEPHONE (OUTPATIENT)
Dept: PAIN MEDICINE | Facility: CLINIC | Age: 70
End: 2025-05-02

## 2025-05-02 ENCOUNTER — OFFICE VISIT (OUTPATIENT)
Dept: PAIN MEDICINE | Facility: CLINIC | Age: 70
End: 2025-05-02

## 2025-05-02 DIAGNOSIS — M25.511 CHRONIC PAIN OF BOTH SHOULDERS: ICD-10-CM

## 2025-05-02 DIAGNOSIS — M19.012 PRIMARY OSTEOARTHRITIS OF LEFT SHOULDER: Primary | Chronic | ICD-10-CM

## 2025-05-02 DIAGNOSIS — G89.29 CHRONIC PAIN OF BOTH SHOULDERS: ICD-10-CM

## 2025-05-02 DIAGNOSIS — M25.512 CHRONIC PAIN OF BOTH SHOULDERS: ICD-10-CM

## 2025-05-02 PROCEDURE — 99999 PR PBB SHADOW E&M-EST. PATIENT-LVL I: CPT | Mod: PBBFAC,,, | Performed by: STUDENT IN AN ORGANIZED HEALTH CARE EDUCATION/TRAINING PROGRAM

## 2025-05-02 PROCEDURE — 99211 OFF/OP EST MAY X REQ PHY/QHP: CPT | Mod: PBBFAC,PN | Performed by: STUDENT IN AN ORGANIZED HEALTH CARE EDUCATION/TRAINING PROGRAM

## 2025-05-02 NOTE — TELEPHONE ENCOUNTER
ALCONM stating I was calling to make sure the pt is aware that her insurance will not cover her scheduled visit today and she will have a self pay balance of $134.38 to be seen today.

## 2025-05-15 ENCOUNTER — TELEPHONE (OUTPATIENT)
Dept: OPTOMETRY | Facility: CLINIC | Age: 70
End: 2025-05-15

## 2025-05-26 RX ORDER — IBUPROFEN 800 MG/1
800 TABLET, FILM COATED ORAL 3 TIMES DAILY
Qty: 30 TABLET | Refills: 0 | Status: SHIPPED | OUTPATIENT
Start: 2025-05-26 | End: 2025-06-05

## 2025-05-26 RX ORDER — IBUPROFEN 800 MG/1
800 TABLET, FILM COATED ORAL 3 TIMES DAILY
Qty: 30 TABLET | Refills: 0 | Status: CANCELLED | OUTPATIENT
Start: 2025-05-26 | End: 2025-06-05

## 2025-05-26 NOTE — TELEPHONE ENCOUNTER
----- Message from Ananth sent at 5/26/2025 12:05 PM CDT -----  Contact: [phone]  Requesting an RX refill or new RX.Is this a refill or new RX: Refill RX name and strength (copy/paste from chart):  ibuproprofenIs this a 30 day or 90 day RX: Pharmacy name and phone # (copy/paste from chart):  [facility] [address] [address]Mrhrf: [phone] Fax: [phone] doctors have asked that we provide their patients with the following 2 reminders -- prescription refills can take up to 72 hours, and a friendly reminder that in the future you can use your [facility] account to request refills: call back

## 2025-05-26 NOTE — TELEPHONE ENCOUNTER
Unable to retrieve patient chart and identify care due.  [facility] Embedded Care Due Messages. Reference number: [id number].   5/26/2025 1:57:07 PM CDT

## 2025-05-26 NOTE — TELEPHONE ENCOUNTER
----- Message from Alli sent at 5/26/2025  9:04 AM CDT -----  Contact: [phone]@patient  Type: Returning a callWho left a message? Pt When did the practice call?Does patient know what this is regarding:Would the patient rather a call back or a response via [facility]? Call back Comments:Pt would like a call back to discuss her health with the doc. Please call pt to advise  [phone]

## 2025-05-26 NOTE — TELEPHONE ENCOUNTER
----- Message from Higinio sent at 5/23/2025  3:12 PM CDT -----  Regarding: Refill Needed  Contact: Pt [phone]  Requesting an RX refill or new RX.Is this a refill or new RX: Refill ( Not found in chart )RX name and strength (copy/paste from chart):  ibuprofen 800 MGIs this a 30 day or 90 day RX: Pharmacy name and phone # (copy/paste from chart):  [facility] [facility] [address]Dzhkq: [phone] Fax: [phone]: Patient says she is in severe pain and needs this RX today if possible.

## 2025-05-26 NOTE — TELEPHONE ENCOUNTER
Unable to retrieve patient chart and identify care due.  [facility] Embedded Care Due Messages. Reference number: [id number].   5/26/2025 11:30:16 AM CDT

## 2025-05-26 NOTE — TELEPHONE ENCOUNTER
----- Message from Makayla sent at 5/23/2025  3:43 PM CDT -----  Contact: [phone] pt  1MEDICALADVICE Patient is calling for Medical Advice regarding:Pain she's in Patient wants a call back or thru [facility]:Call back Comments:Pt would like a call back ASAP pt states she's in pain and would like a call from nurse in office today.Pt would like pain medication sent to her pharmacy today Please advise patient replies from provider may take up to 48 hours.

## 2025-05-26 NOTE — TELEPHONE ENCOUNTER
Refill Routing Note   Medication(s) are not appropriate for processing by [facility] for the following reason(s):        Outside of protocol    [facility] action(s):  Route             Appointments  past 12m or future 3m with PCP    Date Provider   Last Visit   4/4/2025 Jonny Willis MD   Next Visit   Visit date not found Jonny Willis MD   ED visits in past 90 days: 1        Note composed:11:47 AM 05/26/2025

## 2025-05-26 NOTE — TELEPHONE ENCOUNTER
I called and spoke with pt, and she said she is having pain in her back, and that it is a 10/10.  She would like have ibuproprofseth called in to her pharmacy.  She also would like for Dr. Willis to give her a call as well on tomorrow when he is in office.

## 2025-05-27 ENCOUNTER — TELEPHONE (OUTPATIENT)
Dept: INTERNAL MEDICINE | Facility: CLINIC | Age: 70
End: 2025-05-27

## 2025-05-27 DIAGNOSIS — H93.8X3 EAR FULLNESS, BILATERAL: Primary | ICD-10-CM

## 2025-05-27 NOTE — TELEPHONE ENCOUNTER
----- Message from Higinio sent at 5/27/2025 11:09 AM CDT -----  Regarding: Sooner Appointment  Contact: Pt [phone]  Caller is requesting an earlier appointment then we can schedule.  Caller is requesting a message be sent to the provider.If this is for urgent care symptoms, did you offer other providers at this location, providers at other locations, or [facility]? (yes, no, n/a):  Yes; refusedIf this is for the patients physical, did you offer to schedule next available and put on wait list, or to see NP or PA for their physical?  (yes, no, n/a):  Yes; refusedWhen is the next available appointment with their provider:  June 9Reason for the appointment:  Check Up / Medication DiscussionPatient preference of timeframe to be scheduled:  ASAPWould the patient like a call back, or a response through their [facility] portal?:   CallComments:   Patient says she needs to speak to office and that it is urgent.

## 2025-05-27 NOTE — TELEPHONE ENCOUNTER
I could see  her  June 3rd at 1:00 p.m.    could have seen at 4:00 p.m. but she [facility] at 3:00 p.m.

## 2025-05-27 NOTE — PROGRESS NOTES
Pt arrived to clinic requesting help regarding her ears. She was seen her 1/7/2025. Will place referral for ENT.

## 2025-05-27 NOTE — TELEPHONE ENCOUNTER
Refill Routing Note   Medication(s) are not appropriate for processing by [facility] for the following reason(s):        Outside of protocol    [facility] action(s):  Route               Appointments  past 12m or future 3m with PCP    Date Provider   Last Visit   4/4/2025 Jonny Willis MD   Next Visit   Visit date not found Jonny Willis MD   ED visits in past 90 days: 1        Note composed:7:54 PM 05/26/2025

## 2025-05-29 ENCOUNTER — TELEPHONE (OUTPATIENT)
Dept: OTOLARYNGOLOGY | Facility: CLINIC | Age: 70
End: 2025-05-29

## 2025-05-29 NOTE — TELEPHONE ENCOUNTER
Spoke with patient to clarify reason for consultation of ear. States she had her ears cleaned in January and they had bleed and still feel full since then.

## 2025-05-29 NOTE — TELEPHONE ENCOUNTER
----- Message from Freya sent at 5/28/2025  4:52 PM CDT -----  Type:  requesting a call Who Called: pt Would the patient rather a call back or a response via [facility]? Best Call Back Number:  [phone] Information: regarding earlier appt request sent via text

## 2025-05-30 ENCOUNTER — TELEPHONE (OUTPATIENT)
Dept: INTERNAL MEDICINE | Facility: CLINIC | Age: 70
End: 2025-05-30

## 2025-05-30 ENCOUNTER — OFFICE VISIT (OUTPATIENT)
Dept: OTOLARYNGOLOGY | Facility: CLINIC | Age: 70
End: 2025-05-30

## 2025-05-30 ENCOUNTER — CLINICAL SUPPORT (OUTPATIENT)
Dept: AUDIOLOGY | Facility: CLINIC | Age: 70
End: 2025-05-30

## 2025-05-30 DIAGNOSIS — H61.23 BILATERAL IMPACTED CERUMEN: ICD-10-CM

## 2025-05-30 DIAGNOSIS — H93.11 TINNITUS OF RIGHT EAR: ICD-10-CM

## 2025-05-30 DIAGNOSIS — H90.A21 SENSORINEURAL HEARING LOSS (SNHL) OF RIGHT EAR WITH RESTRICTED HEARING OF LEFT EAR: Primary | ICD-10-CM

## 2025-05-30 DIAGNOSIS — H90.3 ASYMMETRICAL SENSORINEURAL HEARING LOSS: Primary | ICD-10-CM

## 2025-05-30 PROCEDURE — 99999 PR PBB SHADOW E&M-EST. PATIENT-LVL I: CPT | Mod: PBBFAC,,, | Performed by: AUDIOLOGIST

## 2025-05-30 PROCEDURE — 99999 PR PBB SHADOW E&M-EST. PATIENT-LVL II: CPT | Mod: PBBFAC,,,

## 2025-05-30 PROCEDURE — 99212 OFFICE O/P EST SF 10 MIN: CPT | Mod: PBBFAC

## 2025-05-30 PROCEDURE — 99211 OFF/OP EST MAY X REQ PHY/QHP: CPT | Mod: PBBFAC,27 | Performed by: AUDIOLOGIST

## 2025-05-30 NOTE — PROGRESS NOTES
Josi Gil, a 70 y.o. female, was seen today in the clinic for an audiologic evaluation.  Patient's main complaint was aural fullness.  Patient denied vertigo.    Tympanometry revealed Type A in the right ear and Type A in the left ear.     Audiogram results revealed normal sloping to moderately-severe sensorineural hearing loss in the right ear and normal to moderate sensorineural hearing loss in the left ear.  Speech reception thresholds were noted at 15 dB in the right ear and 15 dB in the left ear.  Speech discrimination scores were 100% in the right ear and 100% in the left ear.    Recommendations:  Otologic evaluation  Annual audiogram  Hearing protection when in noise

## 2025-05-30 NOTE — TELEPHONE ENCOUNTER
----- Message from Nisha sent at 5/30/2025  1:37 PM CDT -----  Regarding: Would like to speak to you  She would not give any specifics, please call here.  [phone]

## 2025-05-30 NOTE — PROGRESS NOTES
"Subjective:   Josi Gil is a 70 y.o. female with a past medical history of hypertension who presents today for ear evaluation. Patient had cerumen removal performed at urgent care 2025. She notes intermittent episodes of right ear discomfort, scant bloody discharge and intermittent tinnitus described as "tingling" sound since. States tingling is not a feeling. She used Ciprodex drops for a few days with no improvement. Per chart review, cerumen was completely removed from the right but not the left. She has no hearing concerns today. Patient denies Q tip use. She denies a history of ear infections, ear surgeries or significant noise exposure. Denies a history of TMJ.    Past Medical History  She has a past medical history of Acute costochondritis, Back pain, Benign essential hypertension, Gastroesophageal reflux disease without esophagitis, and Pain in joint involving multiple sites.    Past Surgical History  She has a past surgical history that includes Knee arthroscopy w/ ACL reconstruction;  section; Refractive surgery (Bilateral,  or ); and Breast biopsy (Right).    Family History  Her family history includes Heart disease in her maternal grandmother; Hypertension in her mother.    Social History  She reports that she has never smoked. She has never been exposed to tobacco smoke. She has never used smokeless tobacco. She reports that she does not drink alcohol and does not use drugs.    Allergies  She has no known allergies.    Medications  She has a current medication list which includes the following prescription(s): ibuprofen.    Review of Systems   HENT: Positive for ear discharge, ear pain and ringing in the ears.  Negative for ear infection and hearing loss.      Neurological: Negative for dizziness.          Objective:       Head:  Normocephalic and atraumatic.     Ears:    Right Ear: No drainage, swelling or tenderness. Tympanic membrane is not injected, not scarred, " not perforated, not erythematous, not retracted and not bulging. No middle ear effusion.   Left Ear: No drainage, swelling or tenderness. Tympanic membrane is not injected, not scarred, not perforated, not erythematous, not retracted and not bulging.  No middle ear effusion.   Ceruminous debris completely obstructing left TM removed via microscopy. Some dry skin adhered to right ear canal was removed. No bleeding noted. No concern for infection.    Pulmonary/Chest:   Effort normal.     Psychiatric:   Her behavior is normal.     Neurological:   She has neurological normal, alert and oriented.       Procedure  Cerumen removal performed.  See procedure note.  Procedure Note:  The patient was brought to the minor procedure room and placed under the operating microscope of the right and left ear canal which was cleaned of ceruminous debris. Using a combination of suction, curettes and cup forceps the patient's cerumen was removed. The patient tolerated the procedure well. There were no complications.     Audiology     I independently reviewed the tracings of the complete audiometric evaluation performed today. I reviewed the audiogram with the patient as well. Pertinent findings include: normal sloping to moderately severe sensorineural hearing loss in the right ear and normal sloping to moderate sensorineural hearing loss in the left ear. Asymmetry between 4k-8k Hz noted. SRT 15 dBHL AU. Speech discrimination 100% AU. Type A tympanogram AU.    Imaging:   MRI brain wo contrast 9/23/2023 reviewed.       Impression:  Mild cerebral volume loss with scattered foci of T2 FLAIR signal abnormality supratentorial white matter while nonspecific concerning for sequela of chronic ischemic change.  No evidence for acute infarction     Lobular small extra-axial lesion overlies the left parietal convexity with underlying thickening inner table of the parietal calvarium most compatible with meningioma.  Clinical correlation and further  evaluation with post-contrast imaging advised.     Assessment:     1. Asymmetrical sensorineural hearing loss    2. Bilateral impacted cerumen    3. Tinnitus of right ear      Plan:   Josi was seen today for ear fullness.  Diagnoses and all orders for this visit:    Asymmetrical sensorineural hearing loss  Tinnitus of right ear  - Audiogram reviewed in detail with the patient. Mild asymmetry noted between 1407-4961 Hz with the right being the worse hearing ear. Recent MRI brain reviewed with no obvious structural abnormality. We discussed that MRI IAC are generally recommended to rule out a definite cause and help clarify whether there is an underlying structural abnormality (Rule out acoustic neuroma). Patient instructed to reach out to me if she is interested in pursuing the MRI. I recommend she follow up in 1 year with a repeat audiogram to monitor the asymmetry.     Bilateral impacted cerumen  - Cerumen removal performed. Patient tolerated the procedure well. Reassurance provided. No signs of infection and no source of bleeding identified. Routine ear care discussed - recommend a few drops of mineral / baby oil to the ear canals weekly. Avoidance of instrumentation in the ear. Follow up in 1 year for routine cleaning or sooner if needed.       Loni Lennon PA-C

## 2025-06-09 ENCOUNTER — TELEPHONE (OUTPATIENT)
Dept: INTERNAL MEDICINE | Facility: CLINIC | Age: 70
End: 2025-06-09
Payer: MEDICARE

## 2025-06-09 ENCOUNTER — OFFICE VISIT (OUTPATIENT)
Dept: INTERNAL MEDICINE | Facility: CLINIC | Age: 70
End: 2025-06-09

## 2025-06-09 ENCOUNTER — LAB VISIT (OUTPATIENT)
Dept: LAB | Facility: HOSPITAL | Age: 70
End: 2025-06-09
Attending: INTERNAL MEDICINE

## 2025-06-09 VITALS
BODY MASS INDEX: 27.74 KG/M2 | OXYGEN SATURATION: 99 % | DIASTOLIC BLOOD PRESSURE: 78 MMHG | HEIGHT: 68 IN | SYSTOLIC BLOOD PRESSURE: 160 MMHG | WEIGHT: 183 LBS | HEART RATE: 78 BPM

## 2025-06-09 DIAGNOSIS — M25.512 PAIN OF BOTH SHOULDER JOINTS: ICD-10-CM

## 2025-06-09 DIAGNOSIS — M25.511 PAIN OF BOTH SHOULDER JOINTS: Primary | ICD-10-CM

## 2025-06-09 DIAGNOSIS — I10 PRIMARY HYPERTENSION: ICD-10-CM

## 2025-06-09 DIAGNOSIS — M77.8 TENDONITIS OF WRIST, RIGHT: ICD-10-CM

## 2025-06-09 DIAGNOSIS — M25.511 PAIN OF BOTH SHOULDER JOINTS: ICD-10-CM

## 2025-06-09 DIAGNOSIS — M25.512 PAIN OF BOTH SHOULDER JOINTS: Primary | ICD-10-CM

## 2025-06-09 LAB
ALBUMIN SERPL BCP-MCNC: 3.9 G/DL (ref 3.5–5.2)
ALP SERPL-CCNC: 72 UNIT/L (ref 40–150)
ALT SERPL W/O P-5'-P-CCNC: 14 UNIT/L (ref 10–44)
ANION GAP (OHS): 11 MMOL/L (ref 8–16)
AST SERPL-CCNC: 17 UNIT/L (ref 11–45)
BILIRUB SERPL-MCNC: 0.8 MG/DL (ref 0.1–1)
BNP SERPL-MCNC: 63 PG/ML (ref 0–99)
BUN SERPL-MCNC: 6 MG/DL (ref 8–23)
CALCIUM SERPL-MCNC: 9.2 MG/DL (ref 8.7–10.5)
CHLORIDE SERPL-SCNC: 104 MMOL/L (ref 95–110)
CO2 SERPL-SCNC: 25 MMOL/L (ref 23–29)
CREAT SERPL-MCNC: 0.8 MG/DL (ref 0.5–1.4)
CRP SERPL-MCNC: 2.1 MG/L
GFR SERPLBLD CREATININE-BSD FMLA CKD-EPI: >60 ML/MIN/1.73/M2
GLUCOSE SERPL-MCNC: 83 MG/DL (ref 70–110)
POTASSIUM SERPL-SCNC: 4 MMOL/L (ref 3.5–5.1)
PROT SERPL-MCNC: 6.8 GM/DL (ref 6–8.4)
SODIUM SERPL-SCNC: 140 MMOL/L (ref 136–145)
T4 FREE SERPL-MCNC: 1.11 NG/DL (ref 0.71–1.51)
TSH SERPL-ACNC: 0.79 UIU/ML (ref 0.4–4)

## 2025-06-09 PROCEDURE — 99214 OFFICE O/P EST MOD 30 MIN: CPT | Mod: S$PBB,,, | Performed by: INTERNAL MEDICINE

## 2025-06-09 PROCEDURE — 99999 PR PBB SHADOW E&M-EST. PATIENT-LVL III: CPT | Mod: PBBFAC,,, | Performed by: INTERNAL MEDICINE

## 2025-06-09 PROCEDURE — 80053 COMPREHEN METABOLIC PANEL: CPT

## 2025-06-09 PROCEDURE — 99213 OFFICE O/P EST LOW 20 MIN: CPT | Mod: PBBFAC | Performed by: INTERNAL MEDICINE

## 2025-06-09 PROCEDURE — 36415 COLL VENOUS BLD VENIPUNCTURE: CPT

## 2025-06-09 PROCEDURE — 85652 RBC SED RATE AUTOMATED: CPT

## 2025-06-09 PROCEDURE — 83880 ASSAY OF NATRIURETIC PEPTIDE: CPT

## 2025-06-09 PROCEDURE — 84439 ASSAY OF FREE THYROXINE: CPT

## 2025-06-09 PROCEDURE — 86140 C-REACTIVE PROTEIN: CPT

## 2025-06-09 PROCEDURE — 85025 COMPLETE CBC W/AUTO DIFF WBC: CPT

## 2025-06-09 PROCEDURE — 84443 ASSAY THYROID STIM HORMONE: CPT

## 2025-06-09 NOTE — TELEPHONE ENCOUNTER
Copied from CRM #[id number]. Topic: General Inquiry - Patient Advice  >> Jun 9, 2025  8:39 AM Higinio wrote:  .1MEDICALADVICE     Patient is calling for Medical Advice regarding: Patient would like a call from the office, she refused to say why. She said she has been trying to reach the office for a while now, including going to the office herself, with no success. She says she is in is lot of pain and needs to speak to office about it.    How long has patient had these symptoms:    Pharmacy name and phone#:    Patient wants a call back or thru [facility]: Call    Comments:    Please advise patient replies from provider may take up to 48 hours.

## 2025-06-09 NOTE — PROGRESS NOTES
70-year-old female     Presents with ongoing pain involving both shoulders appears to be worse on the left.  Previous imaging study has revealed degenerative changes.  It is painful to try to lift arms up .  She can feel the pain going down her arm.  Recently she has been complaining of pain involving the right wrist near the thumb.  There has been no recent activity account for this.  Recently ibuprofen in a mg t.i.d. was attempted.  Was not helpful.  In the past a number of anti-inflammatories have been tried without relief.  On occasion she got a Toradol injection but the last 1 did not help as much.  Today she took 3 aspirins which was helpful to a degree.    She wants to know if there is any other process going to account for the pain.  That has been no joint swelling.  No other symptoms that have occurred acutely.  Breathing is fine no abdominal pain.  Regular bowel urine function no cold intolerance.  Occasional feel tight feeling in the chest and mid back but this is chronic.    Medical history   Hypertension   Hyperlipidemia    Medications none.  In the past she has a attempted to treat this to nonpharmacologic means.  In addition she was resistant to a number medication because of potential side effects    Examination   Weight 182   BMI 27.82   Pulse 76   Blood pressure 168/72   Chest clear breath sounds  Heart regular rate and rhythm   1+ biceps triceps reflexes   Trace knee and ankle jerk reflexes is difficult to try to abduct to internally rotate arms at the shoulder because of pain  Tenderness over the radial aspect of the right wrist    Impression   Chronic shoulder arthropathy  De Quervain's tendonitis  Hypertension    Plan   Labs of chemistry CBC C-reactive protein sed rate rheumatoid factor    Since he did okay with aspirin can try salsalate Diflucan now but it does not appear to be covered by insurance    Another medication to try is Relafen 500 mg b.i.d. taken with meals but the others recommend  episodic cold pack over the shoulder, and acetaminophen 2 tablets 2 twice a day or 3 times a day on a consistent daily basis

## 2025-06-09 NOTE — TELEPHONE ENCOUNTER
I called and spoke with pt, and she said that she is in intense pain.  She said that she left several messages.  I asked if she went to the ER, and she sdaid no, because she does not get help when going there.  She would like a call from Dr. Willis.  I offered her an appointment for today at 3.  She took and will be in then.

## 2025-06-10 LAB
ABSOLUTE EOSINOPHIL (OHS): 0.25 K/UL
ABSOLUTE MONOCYTE (OHS): 0.51 K/UL (ref 0.3–1)
ABSOLUTE NEUTROPHIL COUNT (OHS): 2.47 K/UL (ref 1.8–7.7)
BASOPHILS # BLD AUTO: 0.06 K/UL
BASOPHILS NFR BLD AUTO: 1.2 %
ERYTHROCYTE [DISTWIDTH] IN BLOOD BY AUTOMATED COUNT: 13.2 % (ref 11.5–14.5)
ERYTHROCYTE [SEDIMENTATION RATE] IN BLOOD BY PHOTOMETRIC METHOD: 11 MM/HR
HCT VFR BLD AUTO: 41.4 % (ref 37–48.5)
HGB BLD-MCNC: 13.6 GM/DL (ref 12–16)
IMM GRANULOCYTES # BLD AUTO: 0.01 K/UL (ref 0–0.04)
IMM GRANULOCYTES NFR BLD AUTO: 0.2 % (ref 0–0.5)
LYMPHOCYTES # BLD AUTO: 1.91 K/UL (ref 1–4.8)
MCH RBC QN AUTO: 30.8 PG (ref 27–31)
MCHC RBC AUTO-ENTMCNC: 32.9 G/DL (ref 32–36)
MCV RBC AUTO: 94 FL (ref 82–98)
NUCLEATED RBC (/100WBC) (OHS): 0 /100 WBC
PLATELET # BLD AUTO: 231 K/UL (ref 150–450)
PMV BLD AUTO: 12 FL (ref 9.2–12.9)
RBC # BLD AUTO: 4.41 M/UL (ref 4–5.4)
RELATIVE EOSINOPHIL (OHS): 4.8 %
RELATIVE LYMPHOCYTE (OHS): 36.7 % (ref 18–48)
RELATIVE MONOCYTE (OHS): 9.8 % (ref 4–15)
RELATIVE NEUTROPHIL (OHS): 47.3 % (ref 38–73)
WBC # BLD AUTO: 5.21 K/UL (ref 3.9–12.7)

## 2025-06-11 ENCOUNTER — TELEPHONE (OUTPATIENT)
Dept: INTERNAL MEDICINE | Facility: CLINIC | Age: 70
End: 2025-06-11
Payer: MEDICARE

## 2025-06-11 DIAGNOSIS — E78.5 HYPERLIPIDEMIA, UNSPECIFIED HYPERLIPIDEMIA TYPE: Primary | ICD-10-CM

## 2025-06-11 NOTE — TELEPHONE ENCOUNTER
Copied from CRM #[id number]. Topic: General Inquiry - Test Results  >> Jun 11, 2025 12:39 PM Georgia wrote:  Name of test: lab    Date of test: 06/09/25    Ordering provider: Dr Willis    Where was the test performed:[facility], Lab    Comments:

## 2025-06-13 ENCOUNTER — LAB VISIT (OUTPATIENT)
Dept: LAB | Facility: HOSPITAL | Age: 70
End: 2025-06-13
Attending: INTERNAL MEDICINE

## 2025-06-13 DIAGNOSIS — E78.5 HYPERLIPIDEMIA, UNSPECIFIED HYPERLIPIDEMIA TYPE: ICD-10-CM

## 2025-06-13 LAB
CHOLEST SERPL-MCNC: 232 MG/DL (ref 120–199)
CHOLEST/HDLC SERPL: 2.8 {RATIO} (ref 2–5)
HDLC SERPL-MCNC: 84 MG/DL (ref 40–75)
HDLC SERPL: 36.2 % (ref 20–50)
LDLC SERPL CALC-MCNC: 133.6 MG/DL (ref 63–159)
NONHDLC SERPL-MCNC: 148 MG/DL
TRIGL SERPL-MCNC: 72 MG/DL (ref 30–150)

## 2025-06-13 PROCEDURE — 36415 COLL VENOUS BLD VENIPUNCTURE: CPT

## 2025-06-13 PROCEDURE — 82465 ASSAY BLD/SERUM CHOLESTEROL: CPT

## 2025-06-26 ENCOUNTER — TELEPHONE (OUTPATIENT)
Dept: INTERNAL MEDICINE | Facility: CLINIC | Age: 70
End: 2025-06-26
Payer: COMMERCIAL

## 2025-06-26 NOTE — TELEPHONE ENCOUNTER
Copied from CRM #[id number]. Topic: General Inquiry - Patient Advice  >> Jun 26, 2025 12:49 PM Donnie wrote:  Name of Who is Calling: pt        What is the request in detail: calling to discuss personal health information pt says that she was supposed to receive from the provider        Can the clinic reply by [facility]: no        What Number [facility] Back [facility]:[phone]  >> Jun 26, 2025  2:26 PM Med Assistant Krystin wrote:    ----- Message -----  From: Donnie Madrid  Sent: 6/26/2025  12:52 PM CDT  To: Debbie Vilchis

## 2025-07-15 ENCOUNTER — TELEPHONE (OUTPATIENT)
Dept: INTERNAL MEDICINE | Facility: CLINIC | Age: 70
End: 2025-07-15

## 2025-07-15 NOTE — TELEPHONE ENCOUNTER
I called and spoke with pt, and her said that you and her talked in detail about the medication and paperwork that you was gonna get to her.  She also said for you to give her a call today.

## 2025-07-15 NOTE — TELEPHONE ENCOUNTER
Copied from CRM #[id number]. Topic: General Inquiry - Status Check  >> Jul 15, 2025  8:20 AM Iris wrote:  Type:  Patient Requesting paperwork    Who Called:Patient  Patient Requesting a Response?:URGENT NEEDS PAPERWORK  Would the patient rather a call back or a response via [facility]? PLEASE CALL   Best Call Back Number:[phone]  Additional Information: Patient has requested contact & needing a response for a month

## 2025-07-17 ENCOUNTER — TELEPHONE (OUTPATIENT)
Dept: INTERNAL MEDICINE | Facility: CLINIC | Age: 70
End: 2025-07-17

## 2025-07-17 NOTE — TELEPHONE ENCOUNTER
Pt would like a call from you.  She said you was gonna give her some paperwork, and that you and her discussed in detail.

## 2025-07-17 NOTE — TELEPHONE ENCOUNTER
Copied from CRM #[id number]. Topic: General Inquiry - Patient Advice  >> Jul 17, 2025  8:43 AM Ananth wrote:  .1MEDICALADVICE     Patient is calling for Medical Advice regarding:   Pt said she has been calling multiple times and wants a call back from PCP and still never got a call back she would like a call today     How long has patient had these symptoms:    Pharmacy name and phone#:    Patient wants a call back or thru [facility], provide patient's call back phone number:[phone]     Comments:    Please advise patient replies from provider may take up to 48 hours.

## 2025-07-21 ENCOUNTER — OFFICE VISIT (OUTPATIENT)
Dept: OTOLARYNGOLOGY | Facility: CLINIC | Age: 70
End: 2025-07-21

## 2025-07-21 ENCOUNTER — DOCUMENTATION ONLY (OUTPATIENT)
Dept: INTERNAL MEDICINE | Facility: CLINIC | Age: 70
End: 2025-07-21

## 2025-07-21 DIAGNOSIS — M26.609 TEMPOROMANDIBULAR JOINT DYSFUNCTION: Primary | ICD-10-CM

## 2025-07-21 PROCEDURE — 92504 EAR MICROSCOPY EXAMINATION: CPT | Mod: PBBFAC | Performed by: OTOLARYNGOLOGY

## 2025-07-21 PROCEDURE — 99214 OFFICE O/P EST MOD 30 MIN: CPT | Mod: S$PBB,,, | Performed by: OTOLARYNGOLOGY

## 2025-07-21 PROCEDURE — 99211 OFF/OP EST MAY X REQ PHY/QHP: CPT | Mod: PBBFAC | Performed by: OTOLARYNGOLOGY

## 2025-07-21 PROCEDURE — 92504 EAR MICROSCOPY EXAMINATION: CPT | Mod: S$PBB,,, | Performed by: OTOLARYNGOLOGY

## 2025-07-21 PROCEDURE — 99999 PR PBB SHADOW E&M-EST. PATIENT-LVL I: CPT | Mod: PBBFAC,,, | Performed by: OTOLARYNGOLOGY

## 2025-07-21 NOTE — PROGRESS NOTES
Internal medicine note    Conversation with Mrs. Gil Friday July 18th    She inquired about Depo-Provera injections    In review of the electronic medical record, did see not any encounter or statement regarding Depo-Provera injections    Gynecology office visit was noted on 8/23/2005.  History of total abdominal hysterectomy with bilateral salpingo-oophorectomy, which on the note of August 2025 reported the procedure 8 years prior which would suggest 1997    The electronic medical record information can only go back to year 2004.  She requested medical records prior to this.  We will need to contact medical records department regarding this

## 2025-07-21 NOTE — PROGRESS NOTES
Subjective     Patient ID: Josi Gil is a 70 y.o. female.    Chief Complaint: Hearing Loss    HPI: Pt with Zack otalgia R>>L.    Worse after visit  with PA/ ear cleaning.    No new HL.    Past Medical History: Patient has a past medical history of Acute costochondritis (2012), Back pain, Benign essential hypertension, Gastroesophageal reflux disease without esophagitis, and Pain in joint involving multiple sites (2013).    Past Surgical History: Patient has a past surgical history that includes Knee arthroscopy w/ ACL reconstruction;  section; Refractive surgery (Bilateral,  or ); and Breast biopsy (Right).    Social History: Patient reports that she has never smoked. She has never been exposed to tobacco smoke. She has never used smokeless tobacco. She reports that she does not drink alcohol and does not use drugs.    Family History: family history includes Heart disease in her maternal grandmother; Hypertension in her mother.    Medications:   No current outpatient medications on file.     No current facility-administered medications for this visit.       Allergies: Patient has no known allergies.    Review of Systems   Constitutional:  Negative for appetite change, chills, fatigue and fever.   HENT:  Positive for ear pain and hearing loss. Negative for nasal congestion, ear discharge, facial swelling, postnasal drip, rhinorrhea, sore throat, tinnitus and trouble swallowing.    Eyes:  Negative for photophobia, pain, discharge, redness, itching and visual disturbance.   Respiratory:  Negative for apnea, cough, choking, chest tightness, shortness of breath, wheezing and stridor.    Cardiovascular:  Negative for chest pain and palpitations.   Gastrointestinal:  Negative for abdominal pain, constipation, diarrhea, nausea and vomiting.   Musculoskeletal:  Negative for arthralgias, gait problem, joint swelling, myalgias, neck pain and neck stiffness.   Integumentary:  Negative for  color change, pallor, rash and wound.   Neurological:  Negative for dizziness, tremors, seizures, syncope, facial asymmetry, speech difficulty, weakness, light-headedness, numbness and headaches.   Hematological:  Negative for adenopathy. Does not bruise/bleed easily.   Psychiatric/Behavioral:  Negative for agitation, behavioral problems, confusion, decreased concentration, dysphoric mood, hallucinations, sleep disturbance and suicidal ideas. The patient is not nervous/anxious and is not hyperactive.           Objective     Physical Exam  Constitutional:       General: She is not in acute distress.     Appearance: She is well-developed. She is not diaphoretic.   HENT:      Right Ear: External ear normal. Decreased hearing noted. No laceration, drainage, swelling or tenderness. No middle ear effusion. No foreign body. No mastoid tenderness. No hemotympanum. Tympanic membrane is not injected, scarred, perforated, erythematous, retracted or bulging. Tympanic membrane has normal mobility.      Left Ear: External ear normal. Decreased hearing noted. No laceration, drainage, swelling or tenderness.  No middle ear effusion. No foreign body. No mastoid tenderness. No hemotympanum. Tympanic membrane is not injected, scarred, perforated, erythematous, retracted or bulging. Tympanic membrane has normal mobility.      Ears:        Comments:     Procedure note:    The patient was brought to the minor procedure room and placed in the supine position. The operating video microscope was brought on to the field and the right ear canal, tympanic membrane and other structures were visualized and shown the the patient and family. The patient tolerated the procedure well and there were no complications.    Eyes:      Extraocular Movements:      Right eye: Normal extraocular motion and no nystagmus.      Left eye: Normal extraocular motion and no nystagmus.   Neurological:      Cranial Nerves: No cranial nerve deficit.      Sensory: No  sensory deficit.      Coordination: Coordination normal.      Gait: Gait normal.            Assessment and Plan     1. Temporomandibular joint dysfunction        TMJ regimen with soft diet, NSAID's, Heating pad over joint for 20 minutes tid for 7-10 days. If no better consider re evaluation for use of muscle relaxants and or referral to Oral Surgery specialist.           No follow-ups on file.

## 2025-08-05 ENCOUNTER — TELEPHONE (OUTPATIENT)
Dept: INTERNAL MEDICINE | Facility: CLINIC | Age: 70
End: 2025-08-05

## 2025-08-05 NOTE — TELEPHONE ENCOUNTER
I called and spoke with the pt, and she said that she has not received the documents that she asked for.  She said if need be, she can make an appointment, and yall can go over what she needs, and leave with a copy in hand.  But also said that she does not know why she has not received what she asked for.  She said she does not want any problems, jus tthe documents she asked for.

## 2025-08-05 NOTE — TELEPHONE ENCOUNTER
Copied from CRM #[id number]. Topic: General Inquiry - Patient Advice  >> Aug 5, 2025  9:01 AM Lana wrote:  Type:  Needs Medical Advice    Who Called: patient  Symptoms (please be specific): calling because its been weeks and she still has not received info requested. Asked for a call back asap  Would the patient rather a call back or a response via [facility]? call  Best Call Back Number: [phone]  Additional Information:

## 2025-08-07 ENCOUNTER — TELEPHONE (OUTPATIENT)
Dept: INTERNAL MEDICINE | Facility: CLINIC | Age: 70
End: 2025-08-07

## 2025-08-07 NOTE — TELEPHONE ENCOUNTER
Copied from CRM #[id number]. Topic: General Inquiry - Patient Advice  >> Aug 7, 2025  9:58 AM Windy wrote:  .1MEDICALADVICE     Patient is calling for Medical Advice regarding:Pt called in regards to previous message she states she has not gotten the documents or a call from Dr Willis please advise     How long has patient had these symptoms:N/A    Pharmacy name and phone#:N/A    Patient wants a call back or thru [facility], provide patient's call back phone number:Callback     Comments:    Please advise patient replies from provider may take up to 48 hours.

## 2025-08-08 ENCOUNTER — PATIENT MESSAGE (OUTPATIENT)
Dept: INTERNAL MEDICINE | Facility: CLINIC | Age: 70
End: 2025-08-08

## 2025-08-14 ENCOUNTER — LAB VISIT (OUTPATIENT)
Dept: LAB | Facility: HOSPITAL | Age: 70
End: 2025-08-14
Attending: INTERNAL MEDICINE

## 2025-08-14 ENCOUNTER — OFFICE VISIT (OUTPATIENT)
Dept: INTERNAL MEDICINE | Facility: CLINIC | Age: 70
End: 2025-08-14

## 2025-08-14 VITALS
SYSTOLIC BLOOD PRESSURE: 190 MMHG | HEART RATE: 73 BPM | OXYGEN SATURATION: 99 % | BODY MASS INDEX: 28.63 KG/M2 | HEIGHT: 68 IN | DIASTOLIC BLOOD PRESSURE: 80 MMHG | WEIGHT: 188.94 LBS

## 2025-08-14 DIAGNOSIS — E87.6 LOW BLOOD POTASSIUM: ICD-10-CM

## 2025-08-14 DIAGNOSIS — I10 PRIMARY HYPERTENSION: Primary | ICD-10-CM

## 2025-08-14 DIAGNOSIS — M79.10 MYALGIA: ICD-10-CM

## 2025-08-14 DIAGNOSIS — D32.9 MENINGIOMA: ICD-10-CM

## 2025-08-14 DIAGNOSIS — I10 PRIMARY HYPERTENSION: ICD-10-CM

## 2025-08-14 LAB
ABSOLUTE EOSINOPHIL (OHS): 0.24 K/UL
ABSOLUTE MONOCYTE (OHS): 0.48 K/UL (ref 0.3–1)
ABSOLUTE NEUTROPHIL COUNT (OHS): 3.12 K/UL (ref 1.8–7.7)
ALBUMIN SERPL BCP-MCNC: 3.9 G/DL (ref 3.5–5.2)
ALP SERPL-CCNC: 81 UNIT/L (ref 40–150)
ALT SERPL W/O P-5'-P-CCNC: 17 UNIT/L (ref 0–55)
ANION GAP (OHS): 8 MMOL/L (ref 8–16)
AST SERPL-CCNC: 23 UNIT/L (ref 0–50)
BASOPHILS # BLD AUTO: 0.07 K/UL
BASOPHILS NFR BLD AUTO: 1.3 %
BILIRUB SERPL-MCNC: 0.4 MG/DL (ref 0.1–1)
BUN SERPL-MCNC: 10 MG/DL (ref 8–23)
CALCIUM SERPL-MCNC: 9.2 MG/DL (ref 8.7–10.5)
CHLORIDE SERPL-SCNC: 104 MMOL/L (ref 95–110)
CK SERPL-CCNC: 75 U/L (ref 20–180)
CO2 SERPL-SCNC: 27 MMOL/L (ref 23–29)
CREAT SERPL-MCNC: 0.9 MG/DL (ref 0.5–1.4)
EAG (OHS): 105 MG/DL (ref 68–131)
ERYTHROCYTE [DISTWIDTH] IN BLOOD BY AUTOMATED COUNT: 13.2 % (ref 11.5–14.5)
GFR SERPLBLD CREATININE-BSD FMLA CKD-EPI: >60 ML/MIN/1.73/M2
GLUCOSE SERPL-MCNC: 94 MG/DL (ref 70–110)
HBA1C MFR BLD: 5.3 % (ref 4–5.6)
HCT VFR BLD AUTO: 40.9 % (ref 37–48.5)
HGB BLD-MCNC: 13.5 GM/DL (ref 12–16)
IMM GRANULOCYTES # BLD AUTO: 0 K/UL (ref 0–0.04)
IMM GRANULOCYTES NFR BLD AUTO: 0 % (ref 0–0.5)
LYMPHOCYTES # BLD AUTO: 1.56 K/UL (ref 1–4.8)
MCH RBC QN AUTO: 30.8 PG (ref 27–31)
MCHC RBC AUTO-ENTMCNC: 33 G/DL (ref 32–36)
MCV RBC AUTO: 93 FL (ref 82–98)
NUCLEATED RBC (/100WBC) (OHS): 0 /100 WBC
PLATELET # BLD AUTO: 219 K/UL (ref 150–450)
PMV BLD AUTO: 11.9 FL (ref 9.2–12.9)
POTASSIUM SERPL-SCNC: 3.9 MMOL/L (ref 3.5–5.1)
PROT SERPL-MCNC: 7.1 GM/DL (ref 6–8.4)
RBC # BLD AUTO: 4.39 M/UL (ref 4–5.4)
RELATIVE EOSINOPHIL (OHS): 4.4 %
RELATIVE LYMPHOCYTE (OHS): 28.5 % (ref 18–48)
RELATIVE MONOCYTE (OHS): 8.8 % (ref 4–15)
RELATIVE NEUTROPHIL (OHS): 57 % (ref 38–73)
SODIUM SERPL-SCNC: 139 MMOL/L (ref 136–145)
WBC # BLD AUTO: 5.47 K/UL (ref 3.9–12.7)

## 2025-08-14 PROCEDURE — 99214 OFFICE O/P EST MOD 30 MIN: CPT | Mod: PBBFAC | Performed by: INTERNAL MEDICINE

## 2025-08-14 PROCEDURE — 99999 PR PBB SHADOW E&M-EST. PATIENT-LVL IV: CPT | Mod: PBBFAC,,, | Performed by: INTERNAL MEDICINE

## 2025-08-14 PROCEDURE — 84075 ASSAY ALKALINE PHOSPHATASE: CPT

## 2025-08-14 PROCEDURE — 85025 COMPLETE CBC W/AUTO DIFF WBC: CPT

## 2025-08-14 PROCEDURE — 36415 COLL VENOUS BLD VENIPUNCTURE: CPT

## 2025-08-14 PROCEDURE — 82550 ASSAY OF CK (CPK): CPT

## 2025-08-14 PROCEDURE — 99214 OFFICE O/P EST MOD 30 MIN: CPT | Mod: S$PBB,,, | Performed by: INTERNAL MEDICINE

## 2025-08-14 PROCEDURE — 83036 HEMOGLOBIN GLYCOSYLATED A1C: CPT

## 2025-08-14 RX ORDER — LISINOPRIL 10 MG/1
10 TABLET ORAL DAILY
Qty: 30 TABLET | Refills: 1 | Status: SHIPPED | OUTPATIENT
Start: 2025-08-14

## 2025-08-15 ENCOUNTER — TELEPHONE (OUTPATIENT)
Dept: INTERNAL MEDICINE | Facility: CLINIC | Age: 70
End: 2025-08-15

## 2025-08-15 RX ORDER — KETOROLAC TROMETHAMINE 30 MG/ML
60 INJECTION, SOLUTION INTRAMUSCULAR; INTRAVENOUS
Status: SHIPPED | OUTPATIENT
Start: 2025-08-15

## 2025-08-15 RX ORDER — NABUMETONE 500 MG/1
500 TABLET, FILM COATED ORAL 2 TIMES DAILY
Qty: 20 TABLET | Refills: 0 | Status: SHIPPED | OUTPATIENT
Start: 2025-08-15 | End: 2025-08-25

## 2025-08-16 ENCOUNTER — NURSE TRIAGE (OUTPATIENT)
Dept: ADMINISTRATIVE | Facility: CLINIC | Age: 70
End: 2025-08-16

## 2025-08-18 RX ORDER — IBUPROFEN 800 MG/1
800 TABLET, FILM COATED ORAL 3 TIMES DAILY
Qty: 30 TABLET | Refills: 0 | Status: SHIPPED | OUTPATIENT
Start: 2025-08-18 | End: 2025-08-29